# Patient Record
Sex: MALE | Race: WHITE | NOT HISPANIC OR LATINO | Employment: OTHER | URBAN - METROPOLITAN AREA
[De-identification: names, ages, dates, MRNs, and addresses within clinical notes are randomized per-mention and may not be internally consistent; named-entity substitution may affect disease eponyms.]

---

## 2023-03-29 ENCOUNTER — HOSPITAL ENCOUNTER (OUTPATIENT)
Dept: RADIOLOGY | Age: 68
Discharge: HOME | End: 2023-03-29

## 2023-03-29 DIAGNOSIS — R93.7 ABNORMAL FINDINGS ON DIAGNOSTIC IMAGING OF OTHER PARTS OF MUSCULOSKELETAL SYSTEM: ICD-10-CM

## 2023-03-29 RX ADMIN — IOHEXOL 100 ML: 350 INJECTION, SOLUTION INTRAVENOUS at 12:15

## 2023-03-30 ENCOUNTER — HOSPITAL ENCOUNTER (OUTPATIENT)
Dept: RADIOLOGY | Facility: HOSPITAL | Age: 68
Discharge: HOME | End: 2023-03-30

## 2023-03-30 DIAGNOSIS — R93.89 ABNORMAL CT SCAN: ICD-10-CM

## 2023-03-30 PROBLEM — I26.94 MULTIPLE SUBSEGMENTAL PULMONARY EMBOLI WITHOUT ACUTE COR PULMONALE (HCC): Status: ACTIVE | Noted: 2023-03-30

## 2023-03-30 PROBLEM — J94.8 PLEURAL MASS: Status: ACTIVE | Noted: 2023-03-30

## 2023-03-30 PROBLEM — M50.30 DDD (DEGENERATIVE DISC DISEASE), CERVICAL: Status: ACTIVE | Noted: 2023-03-30

## 2023-03-30 PROBLEM — Z72.0 TOBACCO USE: Status: ACTIVE | Noted: 2023-03-30

## 2023-03-30 RX ADMIN — IOHEXOL 85 ML: 350 INJECTION, SOLUTION INTRAVENOUS at 12:30

## 2023-04-02 PROBLEM — G93.89 BRAIN MASS: Status: ACTIVE | Noted: 2023-04-02

## 2023-04-02 PROBLEM — D49.2 THORACIC SPINE TUMOR: Status: ACTIVE | Noted: 2023-04-02

## 2023-04-04 ENCOUNTER — TRANSCRIBE ORDERS (OUTPATIENT)
Dept: RADIATION ONCOLOGY | Facility: HOSPITAL | Age: 68
End: 2023-04-04

## 2023-04-04 DIAGNOSIS — C34.11 MALIGNANT NEOPLASM OF UPPER LOBE OF RIGHT LUNG (HCC): Primary | ICD-10-CM

## 2023-04-05 ENCOUNTER — TELEPHONE (OUTPATIENT)
Dept: HEMATOLOGY ONCOLOGY | Facility: CLINIC | Age: 68
End: 2023-04-05

## 2023-04-05 NOTE — TELEPHONE ENCOUNTER
"Soft Intake Form   Patient Details   Paula Eller     1955     Reason For Appointment   Who is Calling? Ida Stringer   If not patient, Name? NA   DID YOU CONFIRM INSURANCE WITH PATIENT? OKd by Finance, Routed to finance   Who is the Referring Doctor? Dr Lisa Dillon   What is the diagnosis? 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 If yes, what is the date it was done? Lung Bx to be taken on 4/6   Biopsy done at [facility]? If not, where was it done? When completed, Yes  Was imaging done, and was it done at [address] [place] [address]? If not, where was it done? Yes-[facility]   Have you been seen by another Oncologist?  If so, who and where (name of facility, city and state) No   For 2nd Opinions Only: Are you currently undergoing treatment, or are you scheduled to start treatment? If yes, name of facility, city and state  NA   For \"History Of\" only: Have you completed treatment? NA    Have you had Genetic Testing done in the past?  If so, advise to bring test results to their visit No   Record Gathering Information   Did you advise to have records faxed to [phone]? NA   Did you advise to have disks sent to the proper address with imaging? (\"History of\" Patients)  5 years of imaging for breast patients-Mammos, US etc NA   Scheduling Information   Did you send new patient paperwork? Email or mail? NA   What is the best call back number? (If the [facility] is calling, please use their number) NA   Miscellaneous Information      The patient is scheduled for a HFU appointment with Dr Lisa Dillon in the [place] office on 4/25 at [phone]       "

## 2023-04-10 ENCOUNTER — TRANSITIONAL CARE MANAGEMENT (OUTPATIENT)
Dept: INTERNAL MEDICINE CLINIC | Facility: OTHER | Age: 68
End: 2023-04-10

## 2023-04-17 ENCOUNTER — TELEPHONE (OUTPATIENT)
Dept: INTERNAL MEDICINE CLINIC | Age: 68
End: 2023-04-17

## 2023-04-17 NOTE — TELEPHONE ENCOUNTER
I told her to keep the appointment for May 1, 2023    I will try if we can get it done before that and we will call her back regarding the Coumadin and follow-up

## 2023-04-17 NOTE — TELEPHONE ENCOUNTER
Spoke with the wife Rajesh Jose and they received a phone call from IR and the lady stated that the first available they have is 05/01/2023 in [facility] and then the next one is 05/08/2023 at [facility]  The lady is holding these appointments right now      May you or Dr Toy Flanagan call the wife and let her know if any one call her back regarding this and she can call back to [facility] on this since she is dealing with this IR order on this patient    [phone]- is the mobile phone to Rajesh Jose

## 2023-04-19 NOTE — PRE-PROCEDURE INSTRUCTIONS
Pre-procedure Instructions for Interventional Radiology  [address] [place] [address] Dr  [place] [address] [phone]    You are scheduled for a/an Right Lung Mass Biopsy  On Wednesday 4/26/23  Your tentative arrival time is 0845  Short stay will notify you the day before your procedure with the exact arrival time and the location to arrive  To prepare for your procedure:  1  Please arrange for someone to drive you home after the procedure and stay with you until the next morning if you are instructed to do so  This is typically for patients receiving some type of sedative or anesthetic for the procedure  2  DO NOT EAT OR DRINK ANYTHING after midnight on the evening before your procedure including candy & gum   3  ONLY SIPS OF WATER with your medications are allowed on the morning of your procedure  4  TAKE ALL OF YOUR REGULAR MEDICATIONS THE MORNING OF YOUR PROCEDURE with sips of water! We may call you to stop some of your blood sugar, blood pressure and blood thinning medications depending on the procedure  Please take all of these medications unless we instruct you to stop them  5  If you have an allergy to x-ray dye, please contact Interventional Radiology for an x-ray dye preparation which usually consists of an oral steroid and Benadryl  The day of your procedure:  1  Bring a list of the medications you take at home  2  Bring medications you take for breathing problems (such as inhalers), medications for chest pain, or both  3  Bring a case for your glasses or contacts  4  Bring your insurance card and a form of photo ID   5  Please leave all valuables such as credit cards and jewelry at home  6  Report to the registration desk in the main lobby at the [facility]  Ask to be directed to [facility]    7  While your procedure is being performed, your family may wait in the Radiology Waiting Room on the 1st floor in Radiology  if they need to leave, they may provide a number to be called following the procedure  8  Be prepared to stay overnight just in case  Sometimes procedures will indicate the need for further observation or treatment  9  If you are scheduled for a follow-up visit with the Interventional Radiologist after your procedure, you will be called with a date and time  Special Instructions (Medications to stop taking before your procedure etc ):  Coumadin LD 4/20/23 and restart 4/26/23  Above reviewed with patient and his wife Bing Crumwood

## 2023-04-20 ENCOUNTER — APPOINTMENT (OUTPATIENT)
Dept: LAB | Age: 68
End: 2023-04-20

## 2023-04-24 ENCOUNTER — TELEPHONE (OUTPATIENT)
Dept: INTERNAL MEDICINE CLINIC | Facility: CLINIC | Age: 68
End: 2023-04-24

## 2023-04-24 NOTE — TELEPHONE ENCOUNTER
Called and spoke with patient's wife  Patient has upcoming follow up appointment on May 30th  Passing along message to office Nurse  I let patient's wife know if anything changes to please call us back

## 2023-04-24 NOTE — TELEPHONE ENCOUNTER
Please give this message to our nurse let the patient know this is all because of the steroids and nothing to worry about until I see him again in the office    For she can call his oncologist

## 2023-04-24 NOTE — TELEPHONE ENCOUNTER
Patients wife Kim Ramon called, patient started taking Dexamethasone about 3 weeks ago when discharged from the hospital  He was swelling in the face and he now has swelling it feet and ankles  Wanted to know if related to the steroid  She stated it is not causing any discomfort to the patient, she just wanted to know if she should be worried or not  I let her know we would give her a call back,  [phone]    Thank you

## 2023-04-25 ENCOUNTER — TELEPHONE (OUTPATIENT)
Dept: HEMATOLOGY ONCOLOGY | Facility: CLINIC | Age: 68
End: 2023-04-25

## 2023-04-25 ENCOUNTER — APPOINTMENT (OUTPATIENT)
Dept: RADIATION ONCOLOGY | Facility: HOSPITAL | Age: 68
End: 2023-04-25

## 2023-04-25 DIAGNOSIS — D49.2 THORACIC SPINE TUMOR: Primary | ICD-10-CM

## 2023-04-25 DIAGNOSIS — G93.89 BRAIN MASS: ICD-10-CM

## 2023-04-25 RX ORDER — OXYCODONE HYDROCHLORIDE 5 MG/1
5 TABLET ORAL EVERY 6 HOURS PRN
Qty: 40 TABLET | Refills: 0 | Status: SHIPPED | OUTPATIENT
Start: 2023-04-25 | End: 2023-04-27

## 2023-04-25 NOTE — TELEPHONE ENCOUNTER
Spoke with patient and his wife after speaking with Dr Fausto Engel  I informed them that Dr Fausto Engel would like him to decrease the dose of his dexamethasone to 2mg BID  Patient also stated he needs a refill of oxy and I informed him that we will send that to his pharmacy  I also explained to the patient and his wife that we are making an asap palliative care referral to help manage his pain  Patient and wife verbalized understanding and agreed with the plan

## 2023-04-25 NOTE — TELEPHONE ENCOUNTER
Rec'd call from patient's wife Diogenes Del Angel regarding symptoms that the patient has been experiencing  She states that the patient has been on 4mg of dexamethasone every 12 hours since discharged from the hospital  Diogenes Del Angel states that the patient has a very swollen face now and also very tight skin on his whole body  Patient is also complaining of pain 10/10 in his Right shoulder  He is currently taking oxy 5mg every 6 hours for his pain  He states that this does help but he still wakes up in the middle of the night in severe pain  He also states that his right shoulder all the way down his fingertips starts twitching and gets tingly and numb  Patient has an appointment for a biopsy tomorrow and an appointment with Dr Samuel Ríso on 5/9 and would like to know if he should be seen any sooner  I told them that I will discuss this with Dr Samuel Ríos and call them back shortly  Patient and wife verbalized understanding

## 2023-04-26 ENCOUNTER — HOSPITAL ENCOUNTER (OUTPATIENT)
Dept: RADIOLOGY | Facility: HOSPITAL | Age: 68
Discharge: HOME | End: 2023-04-26
Attending: INTERNAL MEDICINE

## 2023-04-26 VITALS
SYSTOLIC BLOOD PRESSURE: 130 MMHG | HEART RATE: 66 BPM | RESPIRATION RATE: 15 BRPM | DIASTOLIC BLOOD PRESSURE: 68 MMHG | OXYGEN SATURATION: 94 %

## 2023-04-26 DIAGNOSIS — J94.8 PLEURAL MASS: ICD-10-CM

## 2023-04-26 LAB
INR PPP: 0.89 (ref 0.84–1.19)
PROTHROMBIN TIME: 12.3 SECONDS (ref 11.6–14.5)

## 2023-04-26 RX ORDER — SODIUM CHLORIDE 9 MG/ML
30 INJECTION, SOLUTION INTRAVENOUS CONTINUOUS
Status: DISCONTINUED | OUTPATIENT
Start: 2023-04-26 | End: 2023-04-27 | Stop reason: HOSPADM

## 2023-04-26 RX ORDER — MIDAZOLAM HYDROCHLORIDE 2 MG/2ML
INJECTION, SOLUTION INTRAMUSCULAR; INTRAVENOUS AS NEEDED
Status: COMPLETED | OUTPATIENT
Start: 2023-04-26 | End: 2023-04-26

## 2023-04-26 RX ORDER — LIDOCAINE HYDROCHLORIDE 10 MG/ML
INJECTION, SOLUTION EPIDURAL; INFILTRATION; INTRACAUDAL; PERINEURAL AS NEEDED
Status: COMPLETED | OUTPATIENT
Start: 2023-04-26 | End: 2023-04-26

## 2023-04-26 RX ORDER — FENTANYL CITRATE 50 UG/ML
INJECTION, SOLUTION INTRAMUSCULAR; INTRAVENOUS AS NEEDED
Status: COMPLETED | OUTPATIENT
Start: 2023-04-26 | End: 2023-04-26

## 2023-04-26 RX ADMIN — FENTANYL CITRATE 50 MCG: 50 INJECTION, SOLUTION INTRAMUSCULAR; INTRAVENOUS at 10:44

## 2023-04-26 RX ADMIN — FENTANYL CITRATE 50 MCG: 50 INJECTION, SOLUTION INTRAMUSCULAR; INTRAVENOUS at 10:29

## 2023-04-26 RX ADMIN — LIDOCAINE HYDROCHLORIDE 8 ML: 10 INJECTION, SOLUTION EPIDURAL; INFILTRATION; INTRACAUDAL; PERINEURAL at 10:36

## 2023-04-26 RX ADMIN — MIDAZOLAM 1 MG: 1 INJECTION INTRAMUSCULAR; INTRAVENOUS at 10:44

## 2023-04-26 RX ADMIN — MIDAZOLAM 1 MG: 1 INJECTION INTRAMUSCULAR; INTRAVENOUS at 10:35

## 2023-04-26 RX ADMIN — FENTANYL CITRATE 50 MCG: 50 INJECTION, SOLUTION INTRAMUSCULAR; INTRAVENOUS at 10:35

## 2023-04-26 RX ADMIN — MIDAZOLAM 1 MG: 1 INJECTION INTRAMUSCULAR; INTRAVENOUS at 10:29

## 2023-04-26 NOTE — DISCHARGE INSTRUCTIONS
Needle Biopsy of the Lung    WHAT YOU NEED TO KNOW:  A needle biopsy of the lung is a procedure to remove cells or tissue from your lung  You may have a fine needle aspiration biopsy (FNAB), or a core needle biopsy (CNB)  A FNAB is used to remove cells through a thin needle  CNB uses a thicker needle to remove lung tissue  The samples are collected and tested for inflammation, infection, or cancer  DISCHARGE INSTRUCTIONS:   Resume your normal diet  Small sips of flat soda will help with nausea  Limit your activity for 24 hours  Wound Care:      - Remove band aid in 24 hours  Contact Interventional Radiology at [phone] [facility] PATIENTS: Contact Interventional Radiology at [phone]) ([address]: Contact Interventional Radiology at [phone]) if any of the following occur:    - You have a fever greater than 101*    - You cough up large amounts of bright red blood     - You have chest pain with breathing    - You have shortness of breath    -You have persistent nausea and vomiting    - You have pus, redness or swelling around your biopsy site    - You have questions or concerns about your condition or care     Procedural Sedation   WHAT YOU NEED TO KNOW:   Procedural sedation is medicine used during procedures to help you feel relaxed and calm  You will remember little to none of the procedure  After sedation you may feel tired, weak, or unsteady on your feet  You may also have trouble concentrating or short-term memory loss  These symptoms should go away in 24 hours or less  DISCHARGE INSTRUCTIONS:     Call 911 or have someone else call for any of the following: You have sudden trouble breathing  You cannot be woken  Contact Interventional Radiology at [phone]   [place] PATIENTS: Contact Interventional Radiology at [phone]) [facility] PATIENTS: Contact Interventional Radiology at [phone]) if any of the following occur:      You have a severe headache or dizziness  Your heart is beating faster than usual     You have a fever or chills  Your skin is itchy, swollen, or you have a rash  You have nausea or are vomiting for more than 8 hours after the procedure  You have questions or concerns about your condition or care  Self-care:   Have someone stay with you for 24 hours  This person can drive you to errands and help you do things around the house  This person can also watch for problems  Rest and do quiet activities for 24 hours  Do not exercise, ride a bike, or play sports  Stand up slowly to prevent dizziness and falls  Take short walks around the house with another person  Slowly return to your usual activities the next day  Do not drive or use dangerous machines or tools for 24 hours  You may injure yourself or others  Examples include a lawnmower, saw, or drill  Do not return to work for 24 hours if you use dangerous machines or tools for work  Do not make important decisions for 24 hours  For example, do not sign important papers or invest money  Drink liquids as directed  Liquids help flush the sedation medicine out of your body  Ask how much liquid to drink each day and which liquids are best for you  Eat small, frequent meals to prevent nausea and vomiting  Start with clear liquids such as juice or broth  If you do not vomit after clear liquids, you can eat your usual foods  Do not drink alcohol or take medicines that make you drowsy  This includes medicines that help you sleep and anxiety medicines  Ask your healthcare provider if it is safe for you to take pain medicine  Follow up with your healthcare provider as directed: Write down your questions so you remember to ask them during your visits

## 2023-04-26 NOTE — H&P
Interventional Radiology  History and Physical 4/26/2023     Everette Goltz   1955   [id number]    Assessment/Plan:    74year old male with a RUL nodule, previously biopsied but with inadequate sample  Plan for repeat CT guided biopsy  Procedure, risks, benefits and alternatives were discussed with the patient  Consent obtained at bedside  There were no vitals taken for this visit  Problem List Items Addressed This Visit        Other    Pleural mass    Relevant Orders    IR biopsy lung          Subjective: Normal state of health, no acute complaints  Patient ID: Everette Goltz is a 74 y o  male  History of Present Illness  See A/P above  Review of Systems   Respiratory: Negative  Cardiovascular: Negative  No past medical history on file       Past Surgical History:   Procedure Laterality Date   • IR BIOPSY LUNG  4/6/2023        Social History     Tobacco Use   Smoking Status Every Day   • Packs/day: 0 25   • Years: 35 00   • Pack years: 8 75   • Types: Cigarettes   • Start date: 1/1/1973   Smokeless Tobacco Never   Tobacco Comments    Patient is trying to quit         Social History     Substance and Sexual Activity   Alcohol Use Yes        Social History     Substance and Sexual Activity   Drug Use Not Currently        No Known Allergies    Current Outpatient Medications   Medication Sig Dispense Refill   • enoxaparin (Lovenox) 100 mg/mL Inject 1 mL (100 mg total) under the skin every 24 hours 20 mL 0   • gabapentin (NEURONTIN) 300 mg capsule TAKE 1 CAP IN AM, 1 CAP IN AFTERNOON, AND 2 CAPS AT BED     • gabapentin (NEURONTIN) 300 mg capsule 1 pill in AM, 1 in afternoon, and 2 at HS     • oxyCODONE (Roxicodone) 5 immediate release tablet Take 1 tablet (5 mg total) by mouth every 6 (six) hours as needed for moderate pain Max Daily Amount: 20 mg 40 tablet 0   • dexamethasone (DECADRON) 4 mg tablet Take 1 tablet (4 mg total) by mouth every 12 (twelve) hours (Patient taking differently: Take 2 mg by mouth every 12 (twelve) hours) 60 tablet 0   • warfarin (COUMADIN) 5 mg tablet Take 1 5 tablets (7 5 mg total) by mouth daily 30 tablet 0     Current Facility-Administered Medications   Medication Dose Route Frequency Provider Last Rate Last Admin   • sodium chloride 0 9 % infusion  30 mL/hr Intravenous Continuous Suzie Jones MD              Objective: There were no vitals filed for this visit  Physical Exam  Pulmonary:      Effort: Pulmonary effort is normal            No results found for: BNP   Lab Results   Component Value Date    WBC 11 10 (H) 04/08/2023    HGB 13 5 04/08/2023    HCT 40 6 04/08/2023    MCV 94 04/08/2023     04/08/2023     Lab Results   Component Value Date    INR 1 46 (H) 04/20/2023    INR 4 03 (H) 04/17/2023    INR 2 52 (H) 04/11/2023    PROTIME 18 0 (H) 04/20/2023    PROTIME 39 5 (H) 04/17/2023    PROTIME 27 4 (H) 04/11/2023     Lab Results   Component Value Date     (H) 04/09/2023         I have personally reviewed pertinent imaging and laboratory results  Code Status: Prior  Advance Directive and Living Will: Yes    Power of : Yes  POLST:      This text is generated with voice recognition software  There may be translation, syntax,  or grammatical errors  If you have any questions, please contact the dictating provider

## 2023-04-26 NOTE — SEDATION DOCUMENTATION
RUL lung mass biopsy completed by Dr Yefri Coreas  band aid clean dry intact to  right posterior chest wall  Denies new pain  Breathing easy non-labored  Report called to Short Stay  BR start 1100

## 2023-04-26 NOTE — BRIEF OP NOTE (RAD/CATH)
INTERVENTIONAL RADIOLOGY PROCEDURE NOTE    Date: 4/26/2023    Procedure:   Procedure Summary     Date: 04/26/23 Room / Location: [address] [place] [address]    Anesthesia Start:  Anesthesia Stop:     Procedure: IR BIOPSY LUNG Diagnosis:       Pleural mass      (right apical lung mass, prior biopsy with inadequeate sample )    Scheduled Providers: Lilly Moses MD Responsible Provider:     Anesthesia Type: Not recorded ASA Status: Not recorded          Preoperative diagnosis:   1  Pleural mass         Postoperative diagnosis: Same  Surgeon: Lilly Moses MD     Assistant: None  No qualified resident was available  Blood loss: Minimal    Specimens: Five 1 3cm 18g cores  Findings:     CT guided right upper lung nodule biopsy  Pathology confirmed lesional tissue  Complications: None immediate      Anesthesia: conscious sedation

## 2023-04-27 ENCOUNTER — CONSULT (OUTPATIENT)
Dept: PALLIATIVE MEDICINE | Facility: CLINIC | Age: 68
End: 2023-04-27

## 2023-04-27 VITALS
TEMPERATURE: 97.3 F | OXYGEN SATURATION: 95 % | DIASTOLIC BLOOD PRESSURE: 70 MMHG | HEART RATE: 87 BPM | RESPIRATION RATE: 16 BRPM | WEIGHT: 156.31 LBS | SYSTOLIC BLOOD PRESSURE: 128 MMHG | BODY MASS INDEX: 22.43 KG/M2

## 2023-04-27 DIAGNOSIS — Z71.89 ADVANCED CARE PLANNING/COUNSELING DISCUSSION: ICD-10-CM

## 2023-04-27 DIAGNOSIS — D49.2 THORACIC SPINE TUMOR: Primary | ICD-10-CM

## 2023-04-27 DIAGNOSIS — G93.89 BRAIN MASS: ICD-10-CM

## 2023-04-27 DIAGNOSIS — G89.3 CANCER ASSOCIATED PAIN: ICD-10-CM

## 2023-04-27 RX ORDER — OXYCODONE HYDROCHLORIDE 5 MG/1
5 TABLET ORAL EVERY 4 HOURS PRN
Qty: 40 TABLET | Refills: 0 | Status: SHIPPED | OUTPATIENT
Start: 2023-04-27 | End: 2023-05-01 | Stop reason: SDUPTHER

## 2023-04-27 RX ORDER — DEXAMETHASONE 4 MG/1
2 TABLET ORAL EVERY 12 HOURS SCHEDULED
Qty: 30 TABLET | Refills: 0 | Status: SHIPPED | OUTPATIENT
Start: 2023-04-27 | End: 2023-05-27

## 2023-04-27 NOTE — PROGRESS NOTES
Palliative and Supportive Care   Eun Sumner 74 y o  male [id number]    Assessment/Plan:  1  Thoracic spine tumor    2  Brain lesions    3  Cancer associated pain    4  Advanced care planning/counseling discussion      Modified decadron to 2mg BID  Modified oxycodone to 5mg q4h PRN  Will call our office for refills  Close follow up in 3-4 weeks  ACP docs on file in [facility]  Requested Prescriptions     Signed Prescriptions Disp Refills   • oxyCODONE (Roxicodone) 5 immediate release tablet 40 tablet 0     Sig: Take 1 tablet (5 mg total) by mouth every 4 (four) hours as needed Max Daily Amount: 30 mg   • dexamethasone (DECADRON) 4 mg tablet 30 tablet 0     Sig: Take 0 5 tablets (2 mg total) by mouth every 12 (twelve) hours     Medications Discontinued During This Encounter   Medication Reason   • oxyCODONE (Roxicodone) 5 immediate release tablet Adjust Sig   • dexamethasone (DECADRON) 4 mg tablet Adjust Sig       Representatives have queried the patient's controlled substance dispensing history in the Prescription Drug Monitoring Program in compliance with regulations before I have prescribed any controlled substances  The prescription history is consistent with prescribed therapy and our practice policies  50+ minutes were spent face to face with Eun Sumner and his spouse with greater than 50% of the time spent in counseling or coordination of care including discussions of treatment instructions   All of the patient's questions were answered during this discussion  No follow-ups on file  Subjective:   Chief Complaint  New consultation for:  symptom management  HPI     Eun Sumner is a 74 y o  male with hx of tobacco use who presented to [facility] for abnormal chest CT  Unfortunately imaging revealed:    Osseous and extraosseous thoracic spine metastasis in the right upper thorax, described in details in concurrent MR thoracic spine    6 metastatic brain parenchymal lesions   Large R apical lung lesion    Underwent biopsy via IR yesterday  On discharge was on decadron 4mg BID (now reduced to 2mg BID) with oxycodone 5mg PRN  Given uncontrolled pain symptoms referred urgently to Palliative Medicine  Penelope Has reports that his pain is controlled with oxycodone as well as use of approximately 2000 mg of Tylenol in a day  He finds the oxycodone typically only lasts for 4 to 5 hours  As a result sleep has been disrupted  He would like to minimize his medication uses but is in agreement with using this medication  He found the steroids were not overtly helpful and he feels better with his recent dose reduction  Endorses associated swelling but that swelling has slowly improved over the past few days  Denies constipation with opioid use  Appetite has been good has not lost any weight  Denies any nausea or vomiting  Is remaining active though little bit frustrated that he is unable to complete his work in [place]  Well supported by his spouse who is at her visit  Does have ACP documents and those are uploaded into epic  The following portions of the medical history were reviewed: past medical history, problem list, medication list, and social history      Current Outpatient Medications:   •  dexamethasone (DECADRON) 4 mg tablet, Take 0 5 tablets (2 mg total) by mouth every 12 (twelve) hours, Disp: 30 tablet, Rfl: 0  •  enoxaparin (Lovenox) 100 mg/mL, Inject 1 mL (100 mg total) under the skin every 24 hours, Disp: 20 mL, Rfl: 0  •  gabapentin (NEURONTIN) 300 mg capsule, TAKE 1 CAP IN AM, 1 CAP IN AFTERNOON, AND 2 CAPS AT BED, Disp: , Rfl:   •  oxyCODONE (Roxicodone) 5 immediate release tablet, Take 1 tablet (5 mg total) by mouth every 4 (four) hours as needed Max Daily Amount: 30 mg, Disp: 40 tablet, Rfl: 0  •  warfarin (COUMADIN) 5 mg tablet, Take 1 5 tablets (7 5 mg total) by mouth daily, Disp: 30 tablet, Rfl: 0  •  gabapentin (NEURONTIN) 300 mg capsule, 1 pill in AM, 1 in afternoon, and 2 at , Disp: , Rfl:   No current facility-administered medications for this visit  Review of Systems   Constitutional: Positive for activity change and fatigue  Negative for appetite change and unexpected weight change  Respiratory: Positive for shortness of breath  Negative for cough and chest tightness  Cardiovascular: Positive for leg swelling  Gastrointestinal: Negative for constipation, diarrhea, nausea and vomiting  Musculoskeletal: Positive for arthralgias and back pain  Neurological: Positive for weakness (R arm  Tremors  No worse today  )  Negative for dizziness and light-headedness  Psychiatric/Behavioral: Positive for sleep disturbance  Negative for agitation and dysphoric mood  The patient is not nervous/anxious  All other systems reviewed and are negative  All other systems negative    Objective:  Vital Signs  /70 (BP Location: Left arm, Patient Position: Sitting, Cuff Size: Standard)   Pulse 87   Temp (!) 97 3 °F (36 3 °C) (Temporal)   Resp 16   Wt 70 9 kg (156 lb 4 9 oz)   SpO2 95%   BMI 22 43 kg/m²    Physical Exam    Constitutional: Appears well-developed and well-nourished  In no acute distress  Head: Normocephalic and atraumatic  Eyes: EOM are normal  Right eye exhibits no discharge  Left eye exhibits no discharge  No scleral icterus  Pulmonary/Chest: Effort normal  No stridor  No respiratory distress  Abdominal: No distension  Musculoskeletal: No edema  Neurological: Alert, oriented and appropriately conversant  Skin: Skin is dry, not diaphoretic  Psychiatric: Displays a normal mood and affect  Behavior, judgement and thought content appear normal    Vitals reviewed

## 2023-05-01 ENCOUNTER — APPOINTMENT (OUTPATIENT)
Dept: RADIATION ONCOLOGY | Facility: HOSPITAL | Age: 68
End: 2023-05-01

## 2023-05-01 DIAGNOSIS — G93.89 BRAIN MASS: ICD-10-CM

## 2023-05-01 DIAGNOSIS — I26.99 PULMONARY EMBOLISM (HCC): ICD-10-CM

## 2023-05-01 RX ORDER — WARFARIN SODIUM 5 MG/1
7.5 TABLET ORAL
Qty: 135 TABLET | Refills: 1 | Status: SHIPPED | OUTPATIENT
Start: 2023-05-01

## 2023-05-01 RX ORDER — OXYCODONE HYDROCHLORIDE 5 MG/1
5 TABLET ORAL EVERY 4 HOURS PRN
Qty: 30 TABLET | Refills: 0 | Status: SHIPPED | OUTPATIENT
Start: 2023-05-01

## 2023-05-01 NOTE — TELEPHONE ENCOUNTER
Reviewed most recent [facility] note from Dr Damien Driver, reviewed PDMP  Refilling oxyIR but only for a limited supply  Palliative has not yet ordered opioids for this patient though Dr Damien Driver updated the prescription last week  Defer to Dr Damien Driver for further fills  Opioid Agreement is on file

## 2023-05-02 ENCOUNTER — APPOINTMENT (OUTPATIENT)
Dept: RADIATION ONCOLOGY | Facility: HOSPITAL | Age: 68
End: 2023-05-02

## 2023-05-02 ENCOUNTER — APPOINTMENT (OUTPATIENT)
Dept: RADIATION ONCOLOGY | Facility: CLINIC | Age: 68
End: 2023-05-02
Attending: INTERNAL MEDICINE

## 2023-05-03 ENCOUNTER — APPOINTMENT (OUTPATIENT)
Dept: LAB | Age: 68
End: 2023-05-03

## 2023-05-03 ENCOUNTER — APPOINTMENT (OUTPATIENT)
Dept: RADIATION ONCOLOGY | Facility: HOSPITAL | Age: 68
End: 2023-05-03

## 2023-05-03 ENCOUNTER — ANTICOAG VISIT (OUTPATIENT)
Dept: INTERNAL MEDICINE CLINIC | Facility: CLINIC | Age: 68
End: 2023-05-03

## 2023-05-03 NOTE — PROGRESS NOTES
INR supratherapeutic   Please discontinue Lovenox    Regarding warfarin anticoagulation, please confirm that he is taking warfarin 7 5 mg daily   If so, advised that he hold his warfarin for 2 days and then take 5 mg daily   Recheck INR in 2 weeks

## 2023-05-04 ENCOUNTER — TRANSCRIBE ORDERS (OUTPATIENT)
Dept: RADIATION ONCOLOGY | Facility: HOSPITAL | Age: 68
End: 2023-05-04

## 2023-05-04 ENCOUNTER — APPOINTMENT (OUTPATIENT)
Dept: RADIATION ONCOLOGY | Facility: HOSPITAL | Age: 68
End: 2023-05-04

## 2023-05-04 DIAGNOSIS — C71.9 MALIGNANT NEOPLASM OF BRAIN, UNSPECIFIED LOCATION (HCC): Primary | ICD-10-CM

## 2023-05-04 RX ORDER — MEMANTINE HYDROCHLORIDE 5 MG-10 MG
KIT ORAL
Qty: 49 TABLET | Refills: 0 | Status: SHIPPED | OUTPATIENT
Start: 2023-05-04 | End: 2023-05-05 | Stop reason: RX

## 2023-05-04 NOTE — PROGRESS NOTES
Reviewed Memantine for cognitive preservation during/after whole brain radiation with hippocampal sparing  Patient agreeable to start  Last CMP was in March, patient given instructions to obtain updated labwork today prior to initiating  He underwent nursing teaching regarding memantine titration and periodic bloodwork

## 2023-05-05 ENCOUNTER — APPOINTMENT (OUTPATIENT)
Dept: LAB | Age: 68
End: 2023-05-05

## 2023-05-05 ENCOUNTER — APPOINTMENT (OUTPATIENT)
Dept: RADIATION ONCOLOGY | Facility: HOSPITAL | Age: 68
End: 2023-05-05

## 2023-05-05 ENCOUNTER — TELEPHONE (OUTPATIENT)
Dept: INFUSION CENTER | Facility: CLINIC | Age: 68
End: 2023-05-05

## 2023-05-05 ENCOUNTER — TELEPHONE (OUTPATIENT)
Dept: RADIATION ONCOLOGY | Facility: CLINIC | Age: 68
End: 2023-05-05

## 2023-05-05 DIAGNOSIS — C71.9 MALIGNANT NEOPLASM OF BRAIN, UNSPECIFIED LOCATION (HCC): Primary | ICD-10-CM

## 2023-05-05 LAB
ALBUMIN SERPL BCP-MCNC: 3.2 G/DL (ref 3.5–5)
ALP SERPL-CCNC: 34 U/L (ref 46–116)
ALT SERPL W P-5'-P-CCNC: 89 U/L (ref 12–78)
ANION GAP SERPL CALCULATED.3IONS-SCNC: 2 MMOL/L (ref 4–13)
AST SERPL W P-5'-P-CCNC: 22 U/L (ref 5–45)
BILIRUB SERPL-MCNC: 0.5 MG/DL (ref 0.2–1)
BUN SERPL-MCNC: 28 MG/DL (ref 5–25)
CALCIUM ALBUM COR SERPL-MCNC: 9.4 MG/DL (ref 8.3–10.1)
CALCIUM SERPL-MCNC: 8.8 MG/DL (ref 8.3–10.1)
CHLORIDE SERPL-SCNC: 106 MMOL/L (ref 96–108)
CO2 SERPL-SCNC: 26 MMOL/L (ref 21–32)
CREAT SERPL-MCNC: 0.84 MG/DL (ref 0.6–1.3)
GFR SERPL CREATININE-BSD FRML MDRD: 91 ML/MIN/1.73SQ M
GLUCOSE P FAST SERPL-MCNC: 90 MG/DL (ref 65–99)
POTASSIUM SERPL-SCNC: 4.4 MMOL/L (ref 3.5–5.3)
PROT SERPL-MCNC: 6.9 G/DL (ref 6.4–8.4)
SODIUM SERPL-SCNC: 134 MMOL/L (ref 135–147)

## 2023-05-05 RX ORDER — MEMANTINE HYDROCHLORIDE 5 MG-10 MG
KIT ORAL
Qty: 49 TABLET | Refills: 1 | Status: SHIPPED | OUTPATIENT
Start: 2023-05-05

## 2023-05-05 RX ORDER — MEMANTINE HYDROCHLORIDE 5 MG-10 MG
KIT ORAL
Qty: 49 TABLET | Refills: 0 | Status: SHIPPED | OUTPATIENT
Start: 2023-05-05 | End: 2023-05-05 | Stop reason: RX

## 2023-05-08 ENCOUNTER — APPOINTMENT (OUTPATIENT)
Dept: RADIATION ONCOLOGY | Facility: HOSPITAL | Age: 68
End: 2023-05-08

## 2023-05-08 ENCOUNTER — DOCUMENTATION (OUTPATIENT)
Dept: HEMATOLOGY ONCOLOGY | Facility: CLINIC | Age: 68
End: 2023-05-08

## 2023-05-08 DIAGNOSIS — G93.89 BRAIN MASS: ICD-10-CM

## 2023-05-08 RX ORDER — OXYCODONE HYDROCHLORIDE 5 MG/1
5 TABLET ORAL EVERY 4 HOURS PRN
Qty: 30 TABLET | Refills: 0 | Status: SHIPPED | OUTPATIENT
Start: 2023-05-08 | End: 2023-05-14 | Stop reason: SDUPTHER

## 2023-05-08 NOTE — TELEPHONE ENCOUNTER
Primary palliative medicine provider:   Dr Toño Clark    Last appointment:  4/27/2023  Next scheduled appointment:  5/22/2023    Pharmacy: [facility]

## 2023-05-08 NOTE — PROGRESS NOTES
Per notes on the acct pt has applied for MA thru [employer]. [place] [phone] she sd that the pt was denied. she has no other info

## 2023-05-09 ENCOUNTER — TELEPHONE (OUTPATIENT)
Dept: HEMATOLOGY ONCOLOGY | Facility: CLINIC | Age: 68
End: 2023-05-09

## 2023-05-09 ENCOUNTER — APPOINTMENT (OUTPATIENT)
Dept: RADIATION ONCOLOGY | Facility: HOSPITAL | Age: 68
End: 2023-05-09
Attending: INTERNAL MEDICINE

## 2023-05-09 ENCOUNTER — OFFICE VISIT (OUTPATIENT)
Dept: HEMATOLOGY ONCOLOGY | Facility: CLINIC | Age: 68
End: 2023-05-09

## 2023-05-09 VITALS
WEIGHT: 156 LBS | SYSTOLIC BLOOD PRESSURE: 138 MMHG | HEART RATE: 73 BPM | TEMPERATURE: 98.8 F | BODY MASS INDEX: 22.33 KG/M2 | OXYGEN SATURATION: 96 % | DIASTOLIC BLOOD PRESSURE: 82 MMHG | HEIGHT: 70 IN

## 2023-05-09 DIAGNOSIS — C34.91 NON-SMALL CELL CANCER OF RIGHT LUNG (HCC): Primary | ICD-10-CM

## 2023-05-09 RX ORDER — ONDANSETRON HYDROCHLORIDE 8 MG/1
8 TABLET, FILM COATED ORAL EVERY 8 HOURS PRN
Qty: 30 TABLET | Refills: 3 | Status: SHIPPED | OUTPATIENT
Start: 2023-05-09

## 2023-05-09 RX ORDER — PROCHLORPERAZINE MALEATE 10 MG
10 TABLET ORAL EVERY 6 HOURS PRN
Qty: 30 TABLET | Refills: 2 | Status: SHIPPED | OUTPATIENT
Start: 2023-05-09

## 2023-05-09 RX ORDER — FOLIC ACID 1 MG/1
1 TABLET ORAL DAILY
Qty: 30 TABLET | Refills: 6 | Status: SHIPPED | OUTPATIENT
Start: 2023-05-09

## 2023-05-09 RX ORDER — LIDOCAINE AND PRILOCAINE 25; 25 MG/G; MG/G
CREAM TOPICAL
Qty: 30 G | Refills: 2 | Status: SHIPPED | OUTPATIENT
Start: 2023-05-09

## 2023-05-09 RX ORDER — CYANOCOBALAMIN 1000 UG/ML
1000 INJECTION, SOLUTION INTRAMUSCULAR; SUBCUTANEOUS ONCE
Status: CANCELLED | OUTPATIENT
Start: 2023-05-15 | End: 2023-05-09

## 2023-05-09 NOTE — PATIENT INSTRUCTIONS
Stop coumadin  Check INR tomorrow and wait to hear from Dr Kezia Mueller when to start eliquis 5 mg twice a day      Stop the 2 daytime gabapentin for 1 week, then stop the 2 night time gabapentin

## 2023-05-09 NOTE — TELEPHONE ENCOUNTER
Port now, b12 now, schedule treatment plan in [facility], I will see the patient prior to cycle 2, start chemo no earlier than week of 5/22/23, patient wants port labs

## 2023-05-10 ENCOUNTER — TELEPHONE (OUTPATIENT)
Dept: RADIOLOGY | Facility: HOSPITAL | Age: 68
End: 2023-05-10

## 2023-05-10 ENCOUNTER — APPOINTMENT (OUTPATIENT)
Dept: LAB | Age: 68
End: 2023-05-10

## 2023-05-10 ENCOUNTER — APPOINTMENT (OUTPATIENT)
Dept: RADIATION ONCOLOGY | Facility: HOSPITAL | Age: 68
End: 2023-05-10

## 2023-05-10 DIAGNOSIS — C34.91 NON-SMALL CELL CANCER OF RIGHT LUNG (HCC): ICD-10-CM

## 2023-05-10 LAB
ALBUMIN SERPL BCP-MCNC: 3.3 G/DL (ref 3.5–5)
ALP SERPL-CCNC: 33 U/L (ref 46–116)
ALT SERPL W P-5'-P-CCNC: 64 U/L (ref 12–78)
ANION GAP SERPL CALCULATED.3IONS-SCNC: 2 MMOL/L (ref 4–13)
ANISOCYTOSIS BLD QL SMEAR: PRESENT
AST SERPL W P-5'-P-CCNC: 17 U/L (ref 5–45)
BASOPHILS # BLD MANUAL: 0 THOUSAND/UL (ref 0–0.1)
BASOPHILS NFR MAR MANUAL: 0 % (ref 0–1)
BILIRUB SERPL-MCNC: 0.8 MG/DL (ref 0.2–1)
BUN SERPL-MCNC: 25 MG/DL (ref 5–25)
CALCIUM ALBUM COR SERPL-MCNC: 9.1 MG/DL (ref 8.3–10.1)
CALCIUM SERPL-MCNC: 8.5 MG/DL (ref 8.3–10.1)
CHLORIDE SERPL-SCNC: 109 MMOL/L (ref 96–108)
CO2 SERPL-SCNC: 22 MMOL/L (ref 21–32)
CREAT SERPL-MCNC: 0.81 MG/DL (ref 0.6–1.3)
EOSINOPHIL # BLD MANUAL: 0 THOUSAND/UL (ref 0–0.4)
EOSINOPHIL NFR BLD MANUAL: 0 % (ref 0–6)
ERYTHROCYTE [DISTWIDTH] IN BLOOD BY AUTOMATED COUNT: 15.3 % (ref 11.6–15.1)
GFR SERPL CREATININE-BSD FRML MDRD: 93 ML/MIN/1.73SQ M
GLUCOSE P FAST SERPL-MCNC: 103 MG/DL (ref 65–99)
HCT VFR BLD AUTO: 43.6 % (ref 36.5–49.3)
HGB BLD-MCNC: 14.7 G/DL (ref 12–17)
LYMPHOCYTES # BLD AUTO: 0.79 THOUSAND/UL (ref 0.6–4.47)
LYMPHOCYTES # BLD AUTO: 13 % (ref 14–44)
MCH RBC QN AUTO: 32.2 PG (ref 26.8–34.3)
MCHC RBC AUTO-ENTMCNC: 33.7 G/DL (ref 31.4–37.4)
MCV RBC AUTO: 96 FL (ref 82–98)
METAMYELOCYTES NFR BLD MANUAL: 1 % (ref 0–1)
MONOCYTES # BLD AUTO: 0.31 THOUSAND/UL (ref 0–1.22)
MONOCYTES NFR BLD: 5 % (ref 4–12)
NEUTROPHILS # BLD MANUAL: 4.94 THOUSAND/UL (ref 1.85–7.62)
NEUTS BAND NFR BLD MANUAL: 7 % (ref 0–8)
NEUTS SEG NFR BLD AUTO: 74 % (ref 43–75)
PLATELET # BLD AUTO: 182 THOUSANDS/UL (ref 149–390)
PLATELET BLD QL SMEAR: ADEQUATE
PMV BLD AUTO: 9.4 FL (ref 8.9–12.7)
POIKILOCYTOSIS BLD QL SMEAR: PRESENT
POLYCHROMASIA BLD QL SMEAR: PRESENT
POTASSIUM SERPL-SCNC: 4.3 MMOL/L (ref 3.5–5.3)
PROT SERPL-MCNC: 6.9 G/DL (ref 6.4–8.4)
RBC # BLD AUTO: 4.56 MILLION/UL (ref 3.88–5.62)
RBC MORPH BLD: PRESENT
SODIUM SERPL-SCNC: 133 MMOL/L (ref 135–147)
T3FREE SERPL-MCNC: 2.02 PG/ML (ref 2.3–4.2)
TSH SERPL DL<=0.05 MIU/L-ACNC: 0.94 UIU/ML (ref 0.45–4.5)
WBC # BLD AUTO: 6.1 THOUSAND/UL (ref 4.31–10.16)

## 2023-05-10 RX ORDER — CEFAZOLIN SODIUM 1 G/50ML
1000 SOLUTION INTRAVENOUS ONCE
Status: CANCELLED | OUTPATIENT
Start: 2023-05-10 | End: 2023-05-10

## 2023-05-10 RX ORDER — SODIUM CHLORIDE 9 MG/ML
75 INJECTION, SOLUTION INTRAVENOUS CONTINUOUS
Status: CANCELLED | OUTPATIENT
Start: 2023-05-10

## 2023-05-10 NOTE — TELEPHONE ENCOUNTER
While we try to accommodate patient requests, our priority is to schedule treatment according to Doctor's orders and site availability  Does the Provider use the intake sheet or checkout note? What would be a preferred day of the week that would work best for your infusion appointment? Monday or Thursday are preferred   Do you prefer mornings or afternoons for your appointments? Mornings   Are there any days or dates that do not work for your schedule, including any upcoming vacations? No   We are going to try our best to schedule you at the infusion center closest to your home  In the event that we are unable to what would be your next preferred infusion site or sites? 1  [facility] infusion   2  AL infusion     Do you have transportation to take you to all of your appointments?  Yes   Would you like the infusion center to draw labs from your port? (disregard if patient doesn't have a port or need labs for infusion appointment) Yes, port is being placed on 5/16

## 2023-05-10 NOTE — TELEPHONE ENCOUNTER
Called IR and spoke with [facility], patient is scheduled for port placement on 5/16 at 11:15 at [address] [facility]

## 2023-05-10 NOTE — TELEPHONE ENCOUNTER
Please change dates to the week of 5/22  I am calling patient now for scheduling preferences  Thank you

## 2023-05-10 NOTE — PRE-PROCEDURE INSTRUCTIONS
Pre-procedure Instructions for Interventional Radiology  [address]i [address]as [address]  [facility] [address] [phone]    You are scheduled for a/[facility] Placement  On Tuesday 5/16/23  Your tentative arrival time is 1130  Short stay will notify you the day before your procedure with the exact arrival time and the location to arrive  To prepare for your procedure:  1  Please arrange for someone to drive you home after the procedure and stay with you until the next morning if you are instructed to do so  This is typically for patients receiving some type of sedative or anesthetic for the procedure  2  DO NOT EAT OR DRINK ANYTHING after midnight on the evening before your procedure including candy & gum   3  ONLY SIPS OF WATER with your medications are allowed on the morning of your procedure  4  TAKE ALL OF YOUR REGULAR MEDICATIONS THE MORNING OF YOUR PROCEDURE with sips of water! We may call you to stop some of your blood sugar, blood pressure and blood thinning medications depending on the procedure  Please take all of these medications unless we instruct you to stop them  5  If you have an allergy to x-ray dye, please contact Interventional Radiology for an x-ray dye preparation which usually consists of an oral steroid and Benadryl  The day of your procedure:  1  Bring a list of the medications you take at home  2  Bring medications you take for breathing problems (such as inhalers), medications for chest pain, or both  3  Bring a case for your glasses or contacts  4  Bring your insurance card and a form of photo ID   5  Please leave all valuables such as credit cards and jewelry at home  6  Report to the registration desk in the main lobby [facility]  Ask to be directed to [facility]  7  While your procedure is being performed, your family may wait in the Radiology Waiting Room on the 1st floor in Radiology    if they need to leave, they may provide a number to be called following the procedure  8  Be prepared to stay overnight just in case  Sometimes procedures will indicate the need for further observation or treatment  9  If you are scheduled for a follow-up visit with the Interventional Radiologist after your procedure, you will be called with a date and time      Special Instructions (Medications to stop taking before your procedure etc ):

## 2023-05-11 ENCOUNTER — TELEPHONE (OUTPATIENT)
Dept: HEMATOLOGY ONCOLOGY | Facility: CLINIC | Age: 68
End: 2023-05-11

## 2023-05-11 ENCOUNTER — APPOINTMENT (OUTPATIENT)
Dept: LAB | Facility: IMAGING CENTER | Age: 68
End: 2023-05-11

## 2023-05-11 ENCOUNTER — APPOINTMENT (OUTPATIENT)
Dept: RADIATION ONCOLOGY | Facility: HOSPITAL | Age: 68
End: 2023-05-11

## 2023-05-11 ENCOUNTER — DOCUMENTATION (OUTPATIENT)
Dept: HEMATOLOGY ONCOLOGY | Facility: CLINIC | Age: 68
End: 2023-05-11

## 2023-05-11 DIAGNOSIS — C34.91 NON-SMALL CELL CANCER OF RIGHT LUNG (HCC): Primary | ICD-10-CM

## 2023-05-11 DIAGNOSIS — C34.91 NON-SMALL CELL CANCER OF RIGHT LUNG (HCC): ICD-10-CM

## 2023-05-11 LAB
INR PPP: 1 (ref 0.84–1.19)
PROTHROMBIN TIME: 13.4 SECONDS (ref 11.6–14.5)

## 2023-05-11 NOTE — TELEPHONE ENCOUNTER
Rec'd call from patient's spouse to see if we got the results of the patient's INR  I informed her that the lab franky all other tests and not the INR  I informed the patient and his wife that they should go today, so that we can transition him to Eliquis  Patient and wife verbalized understanding and are going to the lab in about 20 minutes  I reviewed all medications with the patient that we prescribed and how and when to take them  Patient and wife verbalized understanding of this  I reviewed the port placement and what to expect in this process  I also confirmed that he should hold his morning dose of Eliquis, per IR recommendation  I informed them that I will watch for his INR result and will call them as soon as we receive it and review with Dr Leola Arteaga  Patient and wife verbalized understanding and are in agreement with the plan

## 2023-05-11 NOTE — TELEPHONE ENCOUNTER
This was sent to us as FYI that no more INR testing was needed  Please see as FYI I believe he follows with dr Joelle Ba

## 2023-05-11 NOTE — TELEPHONE ENCOUNTER
Spoke with patient and his wife to let them know that he can start the eliquis tonight, 5mg and then take BID as directed  I informed them there is no blood testing with this  Patient and wife verbalized understanding

## 2023-05-11 NOTE — TELEPHONE ENCOUNTER
Rec'd call from patient's wife to ensure there was a PTINR order placed  I confirmed that an order is placed for this and I told her if there are any issues at the lab to please call me right away  Patient and wife verbalized understanding  Patient's wife also states that the patient has been having trouble with radiation associated issues  I told her to speak with the radiation nurses when they go there today and to call me later if they need any other advice  Patient and wife verbalized understanding of this

## 2023-05-12 ENCOUNTER — DOCUMENTATION (OUTPATIENT)
Dept: HEMATOLOGY ONCOLOGY | Facility: CLINIC | Age: 68
End: 2023-05-12

## 2023-05-12 ENCOUNTER — APPOINTMENT (OUTPATIENT)
Dept: RADIATION ONCOLOGY | Facility: HOSPITAL | Age: 68
End: 2023-05-12
Attending: INTERNAL MEDICINE

## 2023-05-12 NOTE — PROGRESS NOTES
Called pt to go advise him that per erlinda at [facility] the MA was denied  Spoke to pts wife neetu  tld her that the MA was denied. She sd that pt was told that he shouldn't be working due to his condition  Pt has applied for medicare  On 05/11/23. He has a phone conference on 06/26/23  She's not sure what will take place. She is the only one working. Asked her if she was aware of the f/a that the hospital offers & I saw in the notes that a f/a application was requested but it doesn't say if it was ever mailed out. & she sd that she hasn't recvd anything. Told her I will [facility] [address] see if they can mail one out. I also told her that after she gets the application to call me & we can go over it & then I can tell her what the hardship letter shoule include. She sd that would be great. She thanked me for the call & the info. She sd that her husb will be coming on 05/22/23 for his 1st chemo treatment.    Emailed the [facility] f/c to send ou the f/a application

## 2023-05-14 DIAGNOSIS — G93.89 BRAIN MASS: ICD-10-CM

## 2023-05-14 NOTE — PROGRESS NOTES
HEMATOLOGY / ONCOLOGY CLINIC FOLLOW UP NOTE    Primary Care Provider: No primary care provider on file  Referring Provider: Self, Referral  MRN: [id number]  : 1955    Reason for Encounter:       Oncology History   [facility]   2023 Initial Diagnosis    [facility]     2023 -  Chemotherapy    cyanocobalamin, 1,000 mcg, Intramuscular, Once, 0 of 3 cycles  fosaprepitant (EMEND) IVPB, 150 mg, Intravenous, Once, 0 of 6 cycles  CARBOplatin (PARAPLATIN) IVPB (GOG AUC DOSING), , Intravenous, Once, 0 of 6 cycles  pemetrexed (ALIMTA) chemo infusion, 500 mg/m2, Intravenous, Once, 0 of 6 cycles  pembrolizumab (KEYTRUDA) IVPB, 200 mg, Intravenous, Once, 0 of 6 cycles         Interval History:         REVIEW OF SYSTEMS:  Please note that a 14-point review of systems was performed to include Constitutional, HEENT, Respiratory, CVS, GI, , Musculoskeletal, Integumentary, Neurologic, Rheumatologic, Endocrinologic, Psychiatric, Lymphatic, and Hematologic/Oncologic systems were reviewed and are negative unless otherwise stated in HPI  Positive and negative findings pertinent to this evaluation are incorporated into the history of present illness  ECOG PS: 0    PROBLEM LIST:  Patient Active Problem List   Diagnosis   • Multiple subsegmental pulmonary emboli without acute cor pulmonale (HCC)   • Tobacco use   • DDD (degenerative disc disease), cervical   • Pleural mass   • Brain lesions   • Thoracic spine tumor   • Cancer associated pain   • Advanced care planning/counseling discussion   • Non-small cell cancer of right lung ([facility] )       Assessment / Plan:        I spent 45 minutes on chart review, face to face counseling time, coordination of care and documentation  Past Medical History:   has no past medical history on file      PAST SURGICAL HISTORY:   has a past surgical history that includes IR biopsy lung (2023) and IR biopsy lung (4/26/2023)  CURRENT MEDICATIONS  Current Outpatient Medications   Medication Sig Dispense Refill   • dexamethasone (DECADRON) 4 mg tablet Take 0 5 tablets (2 mg total) by mouth every 12 (twelve) hours 30 tablet 0   • gabapentin (NEURONTIN) 300 mg capsule TAKE 1 CAP IN AM, 1 CAP IN AFTERNOON, AND 2 CAPS AT BED     • lidocaine-prilocaine (EMLA) cream Apply to port 30 to 60 minutes prior to use 30 g 2   • memantine (NAMENDA TITRATION PACK) Follow package directions  49 tablet 1   • ondansetron (ZOFRAN) 8 mg tablet Take 1 tablet (8 mg total) by mouth every 8 (eight) hours as needed for nausea or vomiting 30 tablet 3   • oxyCODONE (Roxicodone) 5 immediate release tablet Take 1 tablet (5 mg total) by mouth every 4 (four) hours as needed for moderate pain or severe pain Max Daily Amount: 30 mg 30 tablet 0   • prochlorperazine (COMPAZINE) 10 mg tablet Take 1 tablet (10 mg total) by mouth every 6 (six) hours as needed for nausea or vomiting 30 tablet 2   • warfarin (COUMADIN) 5 mg tablet Take 1 5 tablets (7 5 mg total) by mouth daily 135 tablet 1   • enoxaparin (Lovenox) 100 mg/mL Inject 1 mL (100 mg total) under the skin every 24 hours (Patient not taking: Reported on 8/2/1141) 20 mL 0   • folic acid (FOLVITE) 1 mg tablet Take 1 tablet (1 mg total) by mouth daily 30 tablet 6   • gabapentin (NEURONTIN) 300 mg capsule 1 pill in AM, 1 in afternoon, and 2 at HS (Patient not taking: Reported on 5/9/2023)       No current facility-administered medications for this visit  [unfilled]    SOCIAL HISTORY:   reports that he has been smoking cigarettes  He started smoking about 50 years ago  He has a 8 75 pack-year smoking history  He has never used smokeless tobacco  He reports current alcohol use  He reports that he does not currently use drugs  FAMILY HISTORY:  family history is not on file  ALLERGIES:  has No Known Allergies        Physical Exam:  Vital Signs:   Visit Vitals  /82 (BP Location: Left arm, Patient "Position: Sitting, Cuff Size: Large)   Pulse 73   Temp 98 8 °F (37 1 °C) (Temporal)   Ht 5' 10\" (1 778 m)   Wt 70 8 kg (156 lb)   SpO2 96%   BMI 22 38 kg/m²   Smoking Status Every Day   BSA 1 88 m²     Body mass index is 22 38 kg/m²  Body surface area is 1 88 meters squared  GEN: Alert, awake oriented x3, in no acute distress  HEENT- No pallor, icterus, cyanosis, no oral mucosal lesions,   LAD - no palpable cervical, clavicle, axillary, inguinal LAD  Heart- normal S1 S2, regular rate and rhythm, No murmur, rubs     Lungs- clear breathing sound bilateral    Abdomen- soft, Non tender, bowel sounds present  Extremities- No cyanosis, clubbing, edema  Neuro- No focal neurological deficit    Labs:  Lab Results   Component Value Date    WBC 6 10 05/10/2023    HGB 14 7 05/10/2023    HCT 43 6 05/10/2023    MCV 96 05/10/2023     05/10/2023     Lab Results   Component Value Date    SODIUM 133 (L) 05/10/2023    K 4 3 05/10/2023     (H) 05/10/2023    CO2 22 05/10/2023    AGAP 2 (L) 05/10/2023    BUN 25 05/10/2023    CREATININE 0 81 05/10/2023    GLUC 114 04/08/2023    GLUF 103 (H) 05/10/2023    CALCIUM 8 5 05/10/2023    AST 17 05/10/2023    ALT 64 05/10/2023    ALKPHOS 33 (L) 05/10/2023    TP 6 9 05/10/2023    TBILI 0 80 05/10/2023    EGFR 93 05/10/2023       "

## 2023-05-15 ENCOUNTER — TELEPHONE (OUTPATIENT)
Dept: PALLIATIVE MEDICINE | Facility: CLINIC | Age: 68
End: 2023-05-15

## 2023-05-15 ENCOUNTER — DOCUMENTATION (OUTPATIENT)
Dept: HEMATOLOGY ONCOLOGY | Facility: CLINIC | Age: 68
End: 2023-05-15

## 2023-05-15 ENCOUNTER — HOSPITAL ENCOUNTER (OUTPATIENT)
Dept: INFUSION CENTER | Facility: HOSPITAL | Age: 68
Discharge: HOME | End: 2023-05-15
Attending: INTERNAL MEDICINE

## 2023-05-15 VITALS — TEMPERATURE: 97.2 F

## 2023-05-15 DIAGNOSIS — C34.91 NON-SMALL CELL CANCER OF RIGHT LUNG (HCC): Primary | ICD-10-CM

## 2023-05-15 RX ORDER — CYANOCOBALAMIN 1000 UG/ML
1000 INJECTION, SOLUTION INTRAMUSCULAR; SUBCUTANEOUS ONCE
Status: COMPLETED | OUTPATIENT
Start: 2023-05-15 | End: 2023-05-15

## 2023-05-15 RX ORDER — DEXAMETHASONE 4 MG/1
2 TABLET ORAL EVERY 12 HOURS SCHEDULED
Qty: 30 TABLET | Refills: 0 | OUTPATIENT
Start: 2023-05-15 | End: 2023-06-14

## 2023-05-15 RX ORDER — OXYCODONE HYDROCHLORIDE 5 MG/1
5 TABLET ORAL EVERY 4 HOURS PRN
Qty: 30 TABLET | Refills: 0 | Status: SHIPPED | OUTPATIENT
Start: 2023-05-15 | End: 2023-05-22 | Stop reason: SDUPTHER

## 2023-05-15 RX ADMIN — CYANOCOBALAMIN 1000 MCG: 1000 INJECTION, SOLUTION INTRAMUSCULAR at 14:03

## 2023-05-15 NOTE — PROGRESS NOTES
Called the b/o to see if they sent out the f/a application & I spoke to Jeb he sd that he doesn't see any letters but he transfrd me to hosp f/c  W/wilmar kern sd that there was a f/a application mailed to the pt on 05/11/23.   She also sd that if the pt is denied for ma due to being non compliant they they will be denied for any f/a from the hospital.

## 2023-05-15 NOTE — TELEPHONE ENCOUNTER
Responded to pt encounter 5/15/23  in My Chart Re Dexamethasone  Spoke with spouse  Pt has enough of dose to carry into next week  Has appointment 5/22/23 with palliative  Spouse reports he is taking the half tab ( 2 mg) every 12 hours as ordered  Tolerating but does have interrupted sleep as night  Agreeable to wait to discuss at next visit

## 2023-05-15 NOTE — TELEPHONE ENCOUNTER
Reviewed most recent [facility] note, reviewed PDMP  Refilling oxyIR; Dr Meredith Espana last filled 5/8/23 but only for 30 tabs  Patient will see Dr Meredith Espana 5/22/23  He can address dexamethasone at that visit as the patient will still have sufficient supply at that time, if he is taking 0 5 tablet (2mg) q12h

## 2023-05-16 ENCOUNTER — HOSPITAL ENCOUNTER (OUTPATIENT)
Dept: RADIOLOGY | Facility: HOSPITAL | Age: 68
Discharge: HOME | End: 2023-05-16
Attending: INTERNAL MEDICINE | Admitting: STUDENT IN AN ORGANIZED HEALTH CARE EDUCATION/TRAINING PROGRAM

## 2023-05-16 VITALS
HEART RATE: 83 BPM | SYSTOLIC BLOOD PRESSURE: 115 MMHG | RESPIRATION RATE: 16 BRPM | DIASTOLIC BLOOD PRESSURE: 63 MMHG | HEIGHT: 70 IN | TEMPERATURE: 98.7 F | OXYGEN SATURATION: 96 % | BODY MASS INDEX: 22.33 KG/M2 | WEIGHT: 156 LBS

## 2023-05-16 DIAGNOSIS — C34.91 NON-SMALL CELL CANCER OF RIGHT LUNG (HCC): ICD-10-CM

## 2023-05-16 RX ORDER — LIDOCAINE HYDROCHLORIDE 10 MG/ML
INJECTION, SOLUTION EPIDURAL; INFILTRATION; INTRACAUDAL; PERINEURAL AS NEEDED
Status: COMPLETED | OUTPATIENT
Start: 2023-05-16 | End: 2023-05-16

## 2023-05-16 RX ORDER — CEFAZOLIN SODIUM 1 G/50ML
1000 SOLUTION INTRAVENOUS ONCE
Status: COMPLETED | OUTPATIENT
Start: 2023-05-16 | End: 2023-05-16

## 2023-05-16 RX ORDER — SODIUM CHLORIDE 9 MG/ML
75 INJECTION, SOLUTION INTRAVENOUS CONTINUOUS
Status: DISCONTINUED | OUTPATIENT
Start: 2023-05-16 | End: 2023-05-17 | Stop reason: HOSPADM

## 2023-05-16 RX ORDER — MIDAZOLAM HYDROCHLORIDE 2 MG/2ML
INJECTION, SOLUTION INTRAMUSCULAR; INTRAVENOUS AS NEEDED
Status: COMPLETED | OUTPATIENT
Start: 2023-05-16 | End: 2023-05-16

## 2023-05-16 RX ORDER — FENTANYL CITRATE 50 UG/ML
INJECTION, SOLUTION INTRAMUSCULAR; INTRAVENOUS AS NEEDED
Status: COMPLETED | OUTPATIENT
Start: 2023-05-16 | End: 2023-05-16

## 2023-05-16 RX ORDER — ACETAMINOPHEN 500 MG
500 TABLET ORAL EVERY 4 HOURS PRN
COMMUNITY

## 2023-05-16 RX ADMIN — MIDAZOLAM 1 MG: 1 INJECTION INTRAMUSCULAR; INTRAVENOUS at 13:11

## 2023-05-16 RX ADMIN — FENTANYL CITRATE 25 MCG: 50 INJECTION, SOLUTION INTRAMUSCULAR; INTRAVENOUS at 13:19

## 2023-05-16 RX ADMIN — MIDAZOLAM 1 MG: 1 INJECTION INTRAMUSCULAR; INTRAVENOUS at 12:58

## 2023-05-16 RX ADMIN — MIDAZOLAM 0.5 MG: 1 INJECTION INTRAMUSCULAR; INTRAVENOUS at 13:19

## 2023-05-16 RX ADMIN — FENTANYL CITRATE 50 MCG: 50 INJECTION, SOLUTION INTRAMUSCULAR; INTRAVENOUS at 12:51

## 2023-05-16 RX ADMIN — FENTANYL CITRATE 50 MCG: 50 INJECTION, SOLUTION INTRAMUSCULAR; INTRAVENOUS at 12:58

## 2023-05-16 RX ADMIN — LIDOCAINE HYDROCHLORIDE 10 ML: 10 INJECTION, SOLUTION EPIDURAL; INFILTRATION; INTRACAUDAL; PERINEURAL at 13:14

## 2023-05-16 RX ADMIN — FENTANYL CITRATE 50 MCG: 50 INJECTION, SOLUTION INTRAMUSCULAR; INTRAVENOUS at 13:11

## 2023-05-16 RX ADMIN — SODIUM CHLORIDE 75 ML/HR: 0.9 INJECTION, SOLUTION INTRAVENOUS at 11:46

## 2023-05-16 RX ADMIN — CEFAZOLIN SODIUM 1000 MG: 1 SOLUTION INTRAVENOUS at 12:45

## 2023-05-16 RX ADMIN — MIDAZOLAM 1 MG: 1 INJECTION INTRAMUSCULAR; INTRAVENOUS at 12:51

## 2023-05-16 NOTE — DISCHARGE INSTRUCTIONS
Moderate Sedation   WHAT YOU NEED TO KNOW:   Moderate sedation, or conscious sedation, is medicine used during procedures to help you feel relaxed and calm  You will be awake and able to follow directions without anxiety or pain  You will remember little to none of the procedure  You may feel tired, weak, or unsteady on your feet after you get sedation  You may also have trouble concentrating or short-term memory loss  These symptoms should go away in 24 hours or less  DISCHARGE INSTRUCTIONS:   Call 911 or have someone else call for any of the following: You have sudden trouble breathing  You cannot be woken  Seek care immediately if:   You have a severe headache or dizziness  Your heart is beating faster than usual   Contact your healthcare provider if:   You have a fever  You have nausea or are vomiting for more than 8 hours after the procedure  Your skin is itchy, swollen, or you have a rash  You have questions or concerns about your condition or care  Self-care:   Have someone stay with you for 24 hours  This person can drive you to errands and help you do things around the house  This person can also watch for problems  Rest and do quiet activities for 24 hours  Do not exercise, ride a bike, or play sports  Stand up slowly to prevent dizziness and falls  Take short walks around the house with another person  Slowly return to your usual activities the next day  Do not drive or use dangerous machines or tools for 24 hours  You may injure yourself or others  Examples include a lawnmower, saw, or drill  Do not return to work for 24 hours if you use dangerous machines or tools for work  Do not make important decisions for 24 hours  For example, do not sign important papers or invest money  Drink liquids as directed  Liquids help flush the sedation medicine out of your body  Ask how much liquid to drink each day and which liquids are best for you        Eat small, frequent meals to prevent nausea and vomiting  Start with clear liquids such as juice or broth  If you do not vomit after clear liquids, you can eat your usual foods  Do not drink alcohol or take medicines that make you drowsy  This includes medicines that help you sleep and anxiety medicines  Ask your healthcare provider if it is safe for you to take pain medicine  Follow up with your healthcare provider as directed: Write down your questions so you remember to ask them during your visits  © 2017 [employer]  Information is for End User's use only and may not be sold, redistributed or otherwise used for commercial purposes  All illustrations and images included in CareNotes® are the copyrighted property of A D [employer] M , Inc  or [employer]hon[employer]ak  The above information is an  only  It is not intended as medical advice for individual conditions or treatments  Talk to your doctor, nurse or pharmacist before following any medical regimen to see if it is safe and effective for you  Implanted Venous Access Port     WHAT YOU NEED TO KNOW:   An implanted venous access port is a device used to give treatments and take blood  It may also be called a central venous access device (CVAD)  The port is a small container that is placed under your skin, usually in your upper chest  The port is attached to a catheter that enters a large vein  DISCHARGE INSTRUCTIONS:     Resume your normal diet  Small sips of flat soda will help with mild nausea  Prevent an infection:   Wash your hands often  Use soap and water  Clean your hands before and after you care for your port  Remind everyone who cares for your port to wash their hands  Check your skin for infection every day  Look for redness, swelling, or fluid oozing from the port site  Care for your port:   1  You may shower beginning 48 hours after procedure  2  Change dressing if it becomes wet      3  Remove dressing after 24 hours  Leave glue in place  4  It is normal for some bruising to occur  5  Use Tylenol for pain  6  Limit use of arm on the side that your port was placed  Lift nothing heavier than 5 pounds for 1 week, and then gradually increase activity as tolerated  7  DO NOT apply ointment, lotion or cream to port site until incision is healed  Allow glue to fall off  DO NOT attempt to peel glue from skin even it it begins to flake  8, After the port incision is healed you may swim, bathe  Notify the Interventional Radiologist if you have any of the followin  Fever above 101 F    2  Increased redness or swelling after 1st day  3  Increased pain after 1st day  4  Any sign of infection (drainage from port site, skin separation, hot to touch)  5  Persistent nausea or vomiting      Contact Interventional Radiology at [phone]

## 2023-05-16 NOTE — SEDATION DOCUMENTATION
IR port placement by Jaya Jacobson  Patient tolerated procedure well  Wound closed with exofin and open to air   [facility] and bed rest start time 0364

## 2023-05-16 NOTE — H&P
"Interventional Radiology Preprocedure Note    History/Indication for procedure:   Sue Gibson is a 74 y o  male with a PMH of lung cancer who presents for port placement  Relevant past medical history:    No past medical history on file  Patient Active Problem List   Diagnosis   • Multiple subsegmental pulmonary emboli without acute cor pulmonale (HCC)   • Tobacco use   • DDD (degenerative disc disease), cervical   • Pleural mass   • Brain lesions   • Thoracic spine tumor   • Cancer associated pain   • Advanced care planning/counseling discussion   • Non-small cell cancer of right lung (HCC)       /73 (BP Location: Right arm)   Pulse 66   Temp 98 7 °F (37 1 °C) (Temporal)   Resp 18   Ht 5' 10\" (1 778 m)   Wt 70 8 kg (156 lb)   SpO2 95%   BMI 22 38 kg/m²     Medications:    Inpatient Medications:     Scheduled Medications:  Current Facility-Administered Medications   Medication Dose Route Frequency Provider Last Rate   • cefazolin  1,000 mg Intravenous Once Laura Rizzo MD     • sodium chloride  75 mL/hr Intravenous Continuous Laura Rizzo MD 75 mL/hr (05/16/23 1146)       Infusions:  sodium chloride, 75 mL/hr, Last Rate: 75 mL/hr (05/16/23 1146)        PRN:      Outpatient Medications:  Current Outpatient Medications on File Prior to Encounter   Medication Sig Dispense Refill   • acetaminophen (TYLENOL) 500 mg tablet Take 500 mg by mouth every 4 (four) hours as needed for mild pain     • apixaban (Eliquis) 5 mg Take 5 mg by mouth 2 (two) times a day     • dexamethasone (DECADRON) 4 mg tablet Take 0 5 tablets (2 mg total) by mouth every 12 (twelve) hours 30 tablet 0   • folic acid (FOLVITE) 1 mg tablet Take 1 tablet (1 mg total) by mouth daily 30 tablet 6   • gabapentin (NEURONTIN) 300 mg capsule Patient takes 600mg  pm only     • memantine (NAMENDA TITRATION PACK) Follow package directions   49 tablet 1   • oxyCODONE (Roxicodone) 5 immediate release tablet Take 1 tablet (5 mg total) by mouth " every 4 (four) hours as needed for moderate pain or severe pain Max Daily Amount: 30 mg 30 tablet 0   • [DISCONTINUED] gabapentin (NEURONTIN) 300 mg capsule      • lidocaine-prilocaine (EMLA) cream Apply to port 30 to 60 minutes prior to use 30 g 2   • ondansetron (ZOFRAN) 8 mg tablet Take 1 tablet (8 mg total) by mouth every 8 (eight) hours as needed for nausea or vomiting 30 tablet 3   • prochlorperazine (COMPAZINE) 10 mg tablet Take 1 tablet (10 mg total) by mouth every 6 (six) hours as needed for nausea or vomiting 30 tablet 2   • [DISCONTINUED] enoxaparin (Lovenox) 100 mg/mL Inject 1 mL (100 mg total) under the skin every 24 hours (Patient not taking: Reported on 5/9/2023) 20 mL 0   • [DISCONTINUED] warfarin (COUMADIN) 5 mg tablet Take 1 5 tablets (7 5 mg total) by mouth daily 135 tablet 1     Current Facility-Administered Medications on File Prior to Encounter   Medication Dose Route Frequency Provider Last Rate Last Admin   • [COMPLETED] cyanocobalamin injection 1,000 mcg  1,000 mcg Intramuscular Once Mary Holt DO   1,000 mcg at 05/15/23 1403       No Known Allergies    Anticoagulants: eliquis    ASA classification: ASA 3 - Patient with moderate systemic disease with functional limitations    Airway Assessment: II (hard and soft palate, upper portion of tonsils anduvula visible)    Relevant family history: None    Relevant review of systems: None    Prior sedation/anesthesia: yes    Can the patient lie flat?  Yes     NPO Status: yes    Labs:   CBC with diff:   Lab Results   Component Value Date    WBC 6 10 05/10/2023    HGB 14 7 05/10/2023    HCT 43 6 05/10/2023    MCV 96 05/10/2023     05/10/2023    MCH 32 2 05/10/2023    MCHC 33 7 05/10/2023    RDW 15 3 (H) 05/10/2023    MPV 9 4 05/10/2023    NRBC 0 04/08/2023     BMP/CMP:  Lab Results   Component Value Date    K 4 3 05/10/2023     (H) 05/10/2023    CO2 22 05/10/2023    BUN 25 05/10/2023    CREATININE 0 81 05/10/2023    CALCIUM 8 5 05/10/2023    AST 17 05/10/2023    ALT 64 05/10/2023    ALKPHOS 33 (L) 05/10/2023    EGFR 93 05/10/2023   ,     Coags:   Lab Results   Component Value Date     (H) 04/09/2023    INR 1 00 05/11/2023   ,   Results from last 7 days   Lab Units 05/11/23  1104   INR  1 00        Relevant imaging studies:   Reviewed  Directed physical examination:  I agree with the physical exam performed on 5/9/23 and there are no additional changes  Assessment/Plan:   Port placement  Sedation/Anesthesia plan: Moderate sedation will be used as needed for procedure  Consent with alternatives to the procedure, risks and benefits have been explained and discussed with the patient/patient's family: yes

## 2023-05-16 NOTE — BRIEF OP NOTE (RAD/CATH)
INTERVENTIONAL RADIOLOGY PROCEDURE NOTE    Date: 5/16/2023    Procedure:   Procedure Summary     Date: 05/16/23 Room / Location: [address] [place] [address] Interventional Radiology    Anesthesia Start:  Anesthesia Stop:     Procedure: IR PORT PLACEMENT Diagnosis:       Non-small cell cancer of right lung ([address] )      (chemo)    Scheduled Providers: Marian Rivas MD Responsible Provider:     Anesthesia Type: Not recorded ASA Status: Not recorded          Preoperative diagnosis:   1  Non-small cell cancer of right lung (HCC)         Postoperative diagnosis: Same  Surgeon: Marian Rivas MD     Assistant: None  No qualified resident was available  Blood loss: 5 ml    Specimens: none  Findings:   LIJ port placement  Complications: None immediate      Anesthesia: conscious sedation

## 2023-05-17 ENCOUNTER — PATIENT OUTREACH (OUTPATIENT)
Dept: HEMATOLOGY ONCOLOGY | Facility: CLINIC | Age: 68
End: 2023-05-17

## 2023-05-17 ENCOUNTER — DOCUMENTATION (OUTPATIENT)
Dept: HEMATOLOGY ONCOLOGY | Facility: CLINIC | Age: 68
End: 2023-05-17

## 2023-05-17 DIAGNOSIS — C34.91 NON-SMALL CELL CANCER OF RIGHT LUNG (HCC): Primary | ICD-10-CM

## 2023-05-17 NOTE — PROGRESS NOTES
adan call from [address] [place] [address] she had some questions about the f/a application that she recvd.  Explained medardo to her & she is going to write the hardship letter & sen in the application  She thanked me for the info

## 2023-05-17 NOTE — PROGRESS NOTES
Initial outreach: Called and spoke to patients wife Hal  Introduced myself and explained the role of a Care Coordinator  Reviewed upcoming appointments including date, time and location  Assessed for barriers of care  Zeinab Ayoub resides with wife and has a great source of support behind him involving family and friends  He is able to drive to his appointments and has his wife to help as well  He is an active smoker and continues to smoke about a quarter to a ppd depending on his day  We discussed smoking cessation and declines at this time  Denies symptoms of chest pain or cough  Appetite is great  Denies nausea or vomiting  Denies constipation or diarrhea  He does have throat pain from his recent radiation treatments(is now completed) and shoulder pain  He has established care with palliative care and they have prescribed oxycodone to help with pain  He has a f/u scheduled 5/22  He has a port placement yesterday and tolerating well so far  He is scheduled to start chemotherapy on 5/22 of Pembrolizumab/ Pemetrexed/ Caroplatin every three weeks  We mutually agreed for me to f/u with him in about a week or two  Provided my direct phone number for questions or concerns moving forward  Patient was appreciative

## 2023-05-18 NOTE — PROGRESS NOTES
5-11 rcvd new oral chemo start-ELIQUIS // Patient has no insurance    [facility]ur[facility] for free drug completed  Lurdes has not  Bee forwarded to provider until patient submits all required documents  Call placed to patient spoke with patient and spouse, discussed free drug program, required documents, timeframe for processing  Patient will return all documents to me via email  5-19-23  Patient documents rcvd  Once providers documents rcvd applcation will be submitted  5-24-23  Providers portion rcvd and faxed to [facility]   Standalone script still needed and will be faxed separtely

## 2023-05-19 ENCOUNTER — HOSPITAL ENCOUNTER (OUTPATIENT)
Dept: INFUSION CENTER | Facility: HOSPITAL | Age: 68
End: 2023-05-19

## 2023-05-19 VITALS — TEMPERATURE: 98.9 F

## 2023-05-19 DIAGNOSIS — G93.89 BRAIN MASS: ICD-10-CM

## 2023-05-19 DIAGNOSIS — I26.94 MULTIPLE SUBSEGMENTAL PULMONARY EMBOLI WITHOUT ACUTE COR PULMONALE (HCC): ICD-10-CM

## 2023-05-19 DIAGNOSIS — C34.91 NON-SMALL CELL CANCER OF RIGHT LUNG (HCC): Primary | ICD-10-CM

## 2023-05-19 LAB
ALBUMIN SERPL BCP-MCNC: 3.1 G/DL (ref 3.5–5)
ALP SERPL-CCNC: 34 U/L (ref 46–116)
ALT SERPL W P-5'-P-CCNC: 41 U/L (ref 12–78)
ANION GAP SERPL CALCULATED.3IONS-SCNC: 2 MMOL/L (ref 4–13)
AST SERPL W P-5'-P-CCNC: 13 U/L (ref 5–45)
BASOPHILS # BLD MANUAL: 0 THOUSAND/UL (ref 0–0.1)
BASOPHILS NFR MAR MANUAL: 0 % (ref 0–1)
BILIRUB SERPL-MCNC: 0.43 MG/DL (ref 0.2–1)
BUN SERPL-MCNC: 30 MG/DL (ref 5–25)
CALCIUM ALBUM COR SERPL-MCNC: 9.4 MG/DL (ref 8.3–10.1)
CALCIUM SERPL-MCNC: 8.7 MG/DL (ref 8.3–10.1)
CHLORIDE SERPL-SCNC: 104 MMOL/L (ref 96–108)
CO2 SERPL-SCNC: 26 MMOL/L (ref 21–32)
CREAT SERPL-MCNC: 0.88 MG/DL (ref 0.6–1.3)
EOSINOPHIL # BLD MANUAL: 0 THOUSAND/UL (ref 0–0.4)
EOSINOPHIL NFR BLD MANUAL: 0 % (ref 0–6)
ERYTHROCYTE [DISTWIDTH] IN BLOOD BY AUTOMATED COUNT: 14.6 % (ref 11.6–15.1)
GFR SERPL CREATININE-BSD FRML MDRD: 90 ML/MIN/1.73SQ M
GLUCOSE SERPL-MCNC: 92 MG/DL (ref 65–140)
HCT VFR BLD AUTO: 41.8 % (ref 36.5–49.3)
HGB BLD-MCNC: 14.4 G/DL (ref 12–17)
INR PPP: 0.98 (ref 0.84–1.19)
LYMPHOCYTES # BLD AUTO: 0.48 THOUSAND/UL (ref 0.6–4.47)
LYMPHOCYTES # BLD AUTO: 7 % (ref 14–44)
MCH RBC QN AUTO: 32.9 PG (ref 26.8–34.3)
MCHC RBC AUTO-ENTMCNC: 34.4 G/DL (ref 31.4–37.4)
MCV RBC AUTO: 95 FL (ref 82–98)
MONOCYTES # BLD AUTO: 0.14 THOUSAND/UL (ref 0–1.22)
MONOCYTES NFR BLD: 2 % (ref 4–12)
NEUTROPHILS # BLD MANUAL: 6.27 THOUSAND/UL (ref 1.85–7.62)
NEUTS BAND NFR BLD MANUAL: 4 % (ref 0–8)
NEUTS SEG NFR BLD AUTO: 87 % (ref 43–75)
PLATELET # BLD AUTO: 206 THOUSANDS/UL (ref 149–390)
PLATELET BLD QL SMEAR: ADEQUATE
PMV BLD AUTO: 8.9 FL (ref 8.9–12.7)
POTASSIUM SERPL-SCNC: 4.4 MMOL/L (ref 3.5–5.3)
PROT SERPL-MCNC: 6.7 G/DL (ref 6.4–8.4)
PROTHROMBIN TIME: 13.2 SECONDS (ref 11.6–14.5)
RBC # BLD AUTO: 4.38 MILLION/UL (ref 3.88–5.62)
RBC MORPH BLD: NORMAL
SODIUM SERPL-SCNC: 132 MMOL/L (ref 135–147)
T3FREE SERPL-MCNC: 2.36 PG/ML (ref 2.5–3.9)
TSH SERPL DL<=0.05 MIU/L-ACNC: 0.75 UIU/ML (ref 0.45–4.5)
WBC # BLD AUTO: 6.89 THOUSAND/UL (ref 4.31–10.16)

## 2023-05-19 RX ORDER — SODIUM CHLORIDE 9 MG/ML
20 INJECTION, SOLUTION INTRAVENOUS ONCE
Status: CANCELLED | OUTPATIENT
Start: 2023-05-22

## 2023-05-19 NOTE — PROGRESS NOTES
Pt here for central labs prior to first treatment, offers no complaints  Labs drawn and sent to the lab  Port flushed per protocol  Aware of next appointment 5/22 @ [facility] infusion  Aman Posada

## 2023-05-22 ENCOUNTER — OFFICE VISIT (OUTPATIENT)
Dept: PALLIATIVE MEDICINE | Facility: CLINIC | Age: 68
End: 2023-05-22

## 2023-05-22 ENCOUNTER — TELEPHONE (OUTPATIENT)
Age: 68
End: 2023-05-22

## 2023-05-22 ENCOUNTER — HOSPITAL ENCOUNTER (OUTPATIENT)
Dept: INFUSION CENTER | Facility: CLINIC | Age: 68
Discharge: HOME | End: 2023-05-22

## 2023-05-22 VITALS
WEIGHT: 155.42 LBS | HEART RATE: 77 BPM | SYSTOLIC BLOOD PRESSURE: 118 MMHG | DIASTOLIC BLOOD PRESSURE: 70 MMHG | OXYGEN SATURATION: 96 % | BODY MASS INDEX: 22.3 KG/M2 | RESPIRATION RATE: 16 BRPM | TEMPERATURE: 98.3 F

## 2023-05-22 VITALS
RESPIRATION RATE: 18 BRPM | WEIGHT: 156.53 LBS | DIASTOLIC BLOOD PRESSURE: 72 MMHG | SYSTOLIC BLOOD PRESSURE: 122 MMHG | TEMPERATURE: 99 F | HEIGHT: 68 IN | HEART RATE: 82 BPM | BODY MASS INDEX: 23.72 KG/M2

## 2023-05-22 DIAGNOSIS — C34.91 NON-SMALL CELL CANCER OF RIGHT LUNG (HCC): Primary | ICD-10-CM

## 2023-05-22 DIAGNOSIS — R63.0 POOR APPETITE: ICD-10-CM

## 2023-05-22 DIAGNOSIS — G93.89 BRAIN MASS: ICD-10-CM

## 2023-05-22 DIAGNOSIS — G89.3 CANCER ASSOCIATED PAIN: Primary | ICD-10-CM

## 2023-05-22 DIAGNOSIS — F51.01 PRIMARY INSOMNIA: ICD-10-CM

## 2023-05-22 RX ORDER — SODIUM CHLORIDE 9 MG/ML
20 INJECTION, SOLUTION INTRAVENOUS ONCE
Status: COMPLETED | OUTPATIENT
Start: 2023-05-22 | End: 2023-05-22

## 2023-05-22 RX ORDER — DEXAMETHASONE 4 MG/1
2 TABLET ORAL EVERY 12 HOURS SCHEDULED
Qty: 30 TABLET | Refills: 0 | Status: SHIPPED | OUTPATIENT
Start: 2023-05-22 | End: 2023-05-22

## 2023-05-22 RX ORDER — OXYCODONE HYDROCHLORIDE 5 MG/1
5 TABLET ORAL EVERY 4 HOURS PRN
Qty: 120 TABLET | Refills: 0 | Status: SHIPPED | OUTPATIENT
Start: 2023-05-22

## 2023-05-22 RX ORDER — DEXAMETHASONE 4 MG/1
2 TABLET ORAL EVERY 12 HOURS SCHEDULED
Qty: 30 TABLET | Refills: 0 | Status: SHIPPED | OUTPATIENT
Start: 2023-05-22 | End: 2023-06-21

## 2023-05-22 RX ORDER — TRAZODONE HYDROCHLORIDE 50 MG/1
50 TABLET ORAL
Qty: 30 TABLET | Refills: 0 | Status: SHIPPED | OUTPATIENT
Start: 2023-05-22

## 2023-05-22 RX ADMIN — SODIUM CHLORIDE 20 ML/HR: 0.9 INJECTION, SOLUTION INTRAVENOUS at 12:12

## 2023-05-22 RX ADMIN — DEXAMETHASONE SODIUM PHOSPHATE: 10 INJECTION, SOLUTION INTRAMUSCULAR; INTRAVENOUS at 12:13

## 2023-05-22 RX ADMIN — CARBOPLATIN 544 MG: 10 INJECTION, SOLUTION INTRAVENOUS at 14:38

## 2023-05-22 RX ADMIN — PEMETREXED DISODIUM 900 MG: 500 INJECTION, POWDER, LYOPHILIZED, FOR SOLUTION INTRAVENOUS at 14:16

## 2023-05-22 RX ADMIN — FOSAPREPITANT 150 MG: 150 INJECTION, POWDER, LYOPHILIZED, FOR SOLUTION INTRAVENOUS at 12:40

## 2023-05-22 RX ADMIN — SODIUM CHLORIDE 200 MG: 9 INJECTION, SOLUTION INTRAVENOUS at 13:22

## 2023-05-22 NOTE — PROGRESS NOTES
Palliative and Supportive Care   Marguerite Henriquez 74 y o  male [id number]    Assessment/Plan:  1  Cancer associated pain    2  Brain lesions    3  Primary insomnia    4  Poor appetite      Will trial re-time decadron as well as melatonin 10mg OTC and trazodone 50mg qHS for insomnia  Refill provided for oxycodone  Physicians note provided for patient today  Close follow up in 4-6 weeks  Requested Prescriptions     Signed Prescriptions Disp Refills   • traZODone (DESYREL) 50 mg tablet 30 tablet 0     Sig: Take 1 tablet (50 mg total) by mouth daily at bedtime   • dexamethasone (DECADRON) 4 mg tablet 30 tablet 0     Sig: Take 0 5 tablets (2 mg total) by mouth every 12 (twelve) hours   • oxyCODONE (Roxicodone) 5 immediate release tablet 120 tablet 0     Sig: Take 1 tablet (5 mg total) by mouth every 4 (four) hours as needed for moderate pain or severe pain Max Daily Amount: 30 mg     Medications Discontinued During This Encounter   Medication Reason   • dexamethasone (DECADRON) 4 mg tablet Adjust Sig   • oxyCODONE (Roxicodone) 5 immediate release tablet Reorder   • dexamethasone (DECADRON) 4 mg tablet        Representatives have queried the patient's controlled substance dispensing history in the Prescription Drug Monitoring Program in compliance with regulations before I have prescribed any controlled substances  The prescription history is consistent with prescribed therapy and our practice policies  25 minutes were spent face to face with Marguerite Henriquez with greater than 50% of the time spent in counseling or coordination of care including discussions of treatment instructions   All of the patient's questions were answered during this discussion  No follow-ups on file  Subjective:   Chief Complaint  Follow up visit for:  symptom management  HPI     Marguerite Henriquez is a 74 y o  male with hx of NSCLC with bony mets who presents in follow up  Symptoms reviewed in depth  Appetite remains good    No nausea at this time  Pain well controlled  Eating well  Feels fatigued with moderate exertion  Is suffering from insomnia with 1-2 hours of sleep most evenings  No significant daytime napping  No OIC  Scheduled for chemo to start today  The following portions of the medical history were reviewed: past medical history, problem list, medication list, and social history  Current Outpatient Medications:   •  acetaminophen (TYLENOL) 500 mg tablet, Take 500 mg by mouth every 4 (four) hours as needed for mild pain, Disp: , Rfl:   •  apixaban (ELIQUIS) 5 mg, Take 5 mg by mouth 2 (two) times a day, Disp: , Rfl:   •  dexamethasone (DECADRON) 4 mg tablet, Take 0 5 tablets (2 mg total) by mouth every 12 (twelve) hours, Disp: 30 tablet, Rfl: 0  •  folic acid (FOLVITE) 1 mg tablet, Take 1 tablet (1 mg total) by mouth daily, Disp: 30 tablet, Rfl: 6  •  gabapentin (NEURONTIN) 300 mg capsule, Patient takes 600mg  pm only, Disp: , Rfl:   •  memantine (NAMENDA TITRATION PACK), Follow package directions  , Disp: 49 tablet, Rfl: 1  •  ondansetron (ZOFRAN) 8 mg tablet, Take 1 tablet (8 mg total) by mouth every 8 (eight) hours as needed for nausea or vomiting, Disp: 30 tablet, Rfl: 3  •  oxyCODONE (Roxicodone) 5 immediate release tablet, Take 1 tablet (5 mg total) by mouth every 4 (four) hours as needed for moderate pain or severe pain Max Daily Amount: 30 mg, Disp: 120 tablet, Rfl: 0  •  prochlorperazine (COMPAZINE) 10 mg tablet, Take 1 tablet (10 mg total) by mouth every 6 (six) hours as needed for nausea or vomiting, Disp: 30 tablet, Rfl: 2  •  traZODone (DESYREL) 50 mg tablet, Take 1 tablet (50 mg total) by mouth daily at bedtime, Disp: 30 tablet, Rfl: 0  •  lidocaine-prilocaine (EMLA) cream, Apply to port 30 to 60 minutes prior to use, Disp: 30 g, Rfl: 2  No current facility-administered medications for this visit      Facility-Administered Medications Ordered in Other Visits:   •  alteplase (CATHFLO) injection 2 mg, 2 mg, Intracatheter, Q1MIN PRN, Siobhan Kerr, DO  •  CARBOplatin (PARAPLATIN) 544 mg in sodium chloride 0 9 % 250 mL IVPB, 544 mg, Intravenous, Once, Siobhan Kerr, DO  •  fosaprepitant (EMEND) 150 mg in sodium chloride 0 9 % 250 mL IVPB, 150 mg, Intravenous, Once, Siobhan Kerr, DO  •  ondansetron (ZOFRAN) 16 mg, dexamethasone (DECADRON) 10 mg in sodium chloride 0 9 % 50 mL IVPB, , Intravenous, Once, Siobhan Kerr, DO  •  pembrolizumab (KEYTRUDA) 200 mg in sodium chloride 0 9 % 50 mL IVPB, 200 mg, Intravenous, Once, Siobhan Kerr, DO  •  PEMEtrexed (ALIMTA) 900 mg in sodium chloride 0 9 % 100 mL chemo infusion, 900 mg, Intravenous, Once, Siobhan Kerr, DO  Review of Systems    All other systems negative    Objective:  Vital Signs  /70 (BP Location: Left arm, Patient Position: Sitting, Cuff Size: Standard)   Pulse 77   Temp 98 3 °F (36 8 °C) (Temporal)   Resp 16   Wt 70 5 kg (155 lb 6 8 oz)   SpO2 96%   BMI 22 30 kg/m²    Physical Exam    Constitutional: Appears well-developed and well-nourished  In no acute distress  Head: Normocephalic and atraumatic  Eyes: EOM are normal  Right eye exhibits no discharge  Left eye exhibits no discharge  No scleral icterus  Pulmonary/Chest: Effort normal  No stridor  No respiratory distress  Abdominal: No distension  Musculoskeletal: No edema  Neurological: Alert, oriented and appropriately conversant  Skin: Skin is dry, not diaphoretic  Psychiatric: Displays a normal mood and affect  Behavior, judgement and thought content appear normal    Vitals reviewed

## 2023-05-22 NOTE — TELEPHONE ENCOUNTER
Patient seen in the office on 5/9/23  Note pending  Patient will be starting keytruda 200mg, pemetrexed 940mg, and carboplatin 544mg every 21 days for 6 cycles

## 2023-05-22 NOTE — PATIENT INSTRUCTIONS
You may take 10mg of melatonin over the counter every night for insomnia  You may retime your decadron to 2mg with breakfast and 2mg with lunch  You may take trazodone 50mg at bedtime for insomnia  I will refer you to our oncology dietitian

## 2023-05-22 NOTE — LETTER
May 22, 2023     Patient: Rohit Bowie  YOB: 1955  Date of Visit: 5/22/2023      To Whom it May Concern:    Rohit Bowie is under my professional care  Fabio Hawkins was seen in my office on 5/22/2023  Fabio Hawkins is actively undergoing cancer treatments  Based on the location of his cancer as well as the significant side effects of his treatment regimen Wings ability to participate in physical labor should be markedly limited  Heavy lifting or extended time on his feet are absolutely prohibited due to his condition  Further, he will be starting chemotherapy on 5/22 with an aggressive regimen with common toxicities including worsening fatigue, severe nausea, nerve pain, and poor appetite  Please take this into consideration when evaluating his case  If you have any questions or concerns, please don't hesitate to call           Sincerely,          Payal Mccartney MD        CC: No Recipients

## 2023-05-23 ENCOUNTER — PATIENT OUTREACH (OUTPATIENT)
Dept: HEMATOLOGY ONCOLOGY | Facility: CLINIC | Age: 68
End: 2023-05-23

## 2023-05-23 DIAGNOSIS — C71.9 MALIGNANT NEOPLASM OF BRAIN, UNSPECIFIED LOCATION (HCC): Primary | ICD-10-CM

## 2023-05-23 RX ORDER — MEMANTINE HYDROCHLORIDE 10 MG/1
10 TABLET ORAL 2 TIMES DAILY
Qty: 60 TABLET | Refills: 2 | Status: SHIPPED | OUTPATIENT
Start: 2023-05-23

## 2023-05-23 NOTE — PROGRESS NOTES
Biopsychosocial and Barriers Assessment    Cancer Diagnosis:Non-small cell cancer of right lung   Home/Cell Phone: [phone]  Emergency Contact: Caio Pitts [phone]  Marital Status: Not legally   Interpretation concerns, speaks another language, preferred language:  Cultural concerns: None reported  Ability to read or write: Fully Literate    Caregiver/Support: spouse, family, friends, children   Children: 6 children, 8 grandchildren  Child/Elder care: Pt's parents are still living    Housing: Own home  Home Setup: one level  Lives With: spouse  Daily Living Activities: Pt is able to care for himself  Durable Medical Equipment: walker  Ambulation: Ambulates independently    Preferred Pharmacy: [facility] co-pays with insurance: Pt currently with no insurance and has applied for [place] [id number] with medication coverage: Pt currently with no insurance and has applied for [facility]  No medication coverage: Pt currently with no insurance and has applied for [facility]      Primary Care Provider: No PCP  Hx of Home Health Care: No  Hx of Short term rehab: No  Mental Health Hx: No  Substance Abuse Hx: current smoker and socially drinks  Employment: stopped working in March, spouse is working  [employer] Status/Location: No  Ability to pay bills: Yes  POA/LW/AD: No  Transportation Plan/Concerns: Spouse drives      What do you know about your Cancer Diagnosis    What has your doctor told you about your cancer diagnosis: Pt's wife was well informed with the pt's diagnosis  What has your doctor told you about your cancer treatment: Pt completed radiation and recently started chemo  What specific concerns do you have about your diagnosis and treatment: No concerns were expressed  Have you been made aware of any hair loss associated with treatment: N/A    Additional Comments:    MSW received a referral for this pt from Navigation and as per chart, outreach was made to spouse    Spouse "was open and receptive of this call  The role of the OSW was described and explained and contact information was provided  She reports that the pt is coping well at this time with a low stress level  Pt and spouse have a blended family and she described feeling well supported  They have strong hector and believe this is \"all part of God's plan  \"  With regards to finances, the are using savings , in addition to the pt's spouse working  The main concern at this time is that the pt has no insurance and they have been working on this with Sreekanth Sandhu  They are completing the [facility] application and they have a meeting with Elfego Chaparro [address]  The pt may be able to start receiving his Medicare benefits  Pt was denied for MA   Pt's wife sounded appropriate on the phone and appears to be coping well  She is aware that she can reach out as needed  MSW provided information on the cancer funds and how this can assist, if needed, with prescriptions  Spouse verbalized understanding and appreciation  Emotional support offered and MSW will continue to follow           "

## 2023-05-24 DIAGNOSIS — C34.91 NON-SMALL CELL CANCER OF RIGHT LUNG (HCC): ICD-10-CM

## 2023-05-24 DIAGNOSIS — I26.94 MULTIPLE SUBSEGMENTAL PULMONARY EMBOLI WITHOUT ACUTE COR PULMONALE (HCC): Primary | ICD-10-CM

## 2023-05-30 ENCOUNTER — OFFICE VISIT (OUTPATIENT)
Dept: INTERNAL MEDICINE CLINIC | Facility: OTHER | Age: 68
End: 2023-05-30

## 2023-05-30 VITALS
DIASTOLIC BLOOD PRESSURE: 80 MMHG | HEART RATE: 86 BPM | HEIGHT: 68 IN | WEIGHT: 148 LBS | OXYGEN SATURATION: 98 % | TEMPERATURE: 98 F | BODY MASS INDEX: 22.43 KG/M2 | SYSTOLIC BLOOD PRESSURE: 128 MMHG

## 2023-05-30 DIAGNOSIS — I26.94 MULTIPLE SUBSEGMENTAL PULMONARY EMBOLI WITHOUT ACUTE COR PULMONALE (HCC): ICD-10-CM

## 2023-05-30 DIAGNOSIS — Z72.0 TOBACCO USE: ICD-10-CM

## 2023-05-30 DIAGNOSIS — C34.91 NON-SMALL CELL CANCER OF RIGHT LUNG (HCC): Primary | ICD-10-CM

## 2023-05-30 DIAGNOSIS — Z12.11 SCREEN FOR COLON CANCER: ICD-10-CM

## 2023-05-30 DIAGNOSIS — Z72.0 TOBACCO ABUSE: ICD-10-CM

## 2023-05-30 DIAGNOSIS — D49.2 THORACIC SPINE TUMOR: ICD-10-CM

## 2023-05-30 DIAGNOSIS — C79.31 METASTASIS TO BRAIN (HCC): ICD-10-CM

## 2023-05-30 RX ORDER — NICOTINE 21 MG/24HR
1 PATCH, TRANSDERMAL 24 HOURS TRANSDERMAL EVERY 24 HOURS
Qty: 28 PATCH | Refills: 0 | Status: SHIPPED | OUTPATIENT
Start: 2023-05-30

## 2023-05-30 NOTE — PROGRESS NOTES
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  Tobacco abuse  -     nicotine (NICODERM CQ) 14 mg/24hr TD 24 hr patch; Place 1 patch on the skin over 24 hours every 24 hours    Metastasis to brain [facility])  Brain metastasis status post radiation  Tobacco use    Patient is still smoking about 10 cigarettes/day we will start him on a NicoDerm patch and see how he does with that he is very much anxious to stop smoking         M*Modal software was used to dictate this note  It may contain errors with dictating incorrect words or incorrect spelling  Please contact the provider directly with any questions  Subjective:      Chief Complaint   Patient presents with   • Follow-up     1 month follow up   Labs done 5/19  Would like to discuss smoking patient is ready to quit  Patient ID: Ashley Olivares is a 74 y o  male  HPI  This is a very pleasant 74 years young gentleman who is here today for the follow-up he was recently diagnosed with the stage IV non-small cell lung cancer he finished his radiation treatment and now he is on chemotherapy he is followed up by the hematology oncology and also by the palliative care his pain problems are followed up by the palliative he is doing well with that the pain control to the thoracic spine is much improved he does not have any pain complaints today only complaint is that he is a still smoking and wanted to quit and I will try to help him    He has underlying problem with the COPD he does not complain of increasing shortness of breath or chest pain still have some cough but not unusual   Weight loss noted which is about 8 pounds in the last few weeks  He said his appetite is good and pain control is better we will continue to follow I will see him back in about 1 month  Continue follow-up with the hematology oncology radiation oncology and also palliative care  The following portions of the patient's history were reviewed and updated as appropriate: allergies, current medications, past family history, past medical history, past social history, past surgical history and problem list     Review of Systems   Constitutional: Positive for fatigue  Negative for chills and fever  HENT: Negative for ear pain and sore throat  Eyes: Negative for pain and visual disturbance  Respiratory: Positive for cough and shortness of breath  Cardiovascular: Negative for chest pain and palpitations  Gastrointestinal: Negative for abdominal pain and vomiting  Genitourinary: Negative for dysuria and hematuria  Musculoskeletal: Positive for back pain  Negative for arthralgias  Skin: Negative for color change and rash  Neurological: Negative for seizures and syncope  All other systems reviewed and are negative  History reviewed  No pertinent past medical history        Current Outpatient Medications:   •  acetaminophen (TYLENOL) 500 mg tablet, Take 500 mg by mouth every 4 (four) hours as needed for mild pain, Disp: , Rfl:   •  apixaban (ELIQUIS) 5 mg, Take 5 mg by mouth 2 (two) times a day, Disp: , Rfl:   •  dexamethasone (DECADRON) 4 mg tablet, Take 0 5 tablets (2 mg total) by mouth every 12 (twelve) hours, Disp: 30 tablet, Rfl: 0  •  folic acid (FOLVITE) 1 mg tablet, Take 1 tablet (1 mg total) by mouth daily, Disp: 30 tablet, Rfl: 6  •  gabapentin (NEURONTIN) 300 mg capsule, Patient takes 300mg in the am 600mg  pm only, Disp: , Rfl:   •  lidocaine-prilocaine (EMLA) cream, Apply to port 30 to 60 minutes prior to use, Disp: "30 g, Rfl: 2  •  memantine (Namenda) 10 mg tablet, Take 1 tablet (10 mg total) by mouth 2 (two) times a day Continue with bloodwork (complete metabolic panel) every 8 weeks, Disp: 60 tablet, Rfl: 2  •  nicotine (NICODERM CQ) 14 mg/24hr TD 24 hr patch, Place 1 patch on the skin over 24 hours every 24 hours, Disp: 28 patch, Rfl: 0  •  ondansetron (ZOFRAN) 8 mg tablet, Take 1 tablet (8 mg total) by mouth every 8 (eight) hours as needed for nausea or vomiting, Disp: 30 tablet, Rfl: 3  •  oxyCODONE (Roxicodone) 5 immediate release tablet, Take 1 tablet (5 mg total) by mouth every 4 (four) hours as needed for moderate pain or severe pain Max Daily Amount: 30 mg, Disp: 120 tablet, Rfl: 0  •  prochlorperazine (COMPAZINE) 10 mg tablet, Take 1 tablet (10 mg total) by mouth every 6 (six) hours as needed for nausea or vomiting, Disp: 30 tablet, Rfl: 2  •  traZODone (DESYREL) 50 mg tablet, Take 1 tablet (50 mg total) by mouth daily at bedtime, Disp: 30 tablet, Rfl: 0    No Known Allergies    Social History   Past Surgical History:   Procedure Laterality Date   • IR BIOPSY LUNG  4/6/2023   • IR BIOPSY LUNG  4/26/2023   • IR PORT PLACEMENT  5/16/2023     History reviewed  No pertinent family history  Objective:  /80 (BP Location: Left arm, Patient Position: Sitting, Cuff Size: Adult)   Pulse 86   Temp 98 °F (36 7 °C) (Temporal)   Ht 5' 8\" (1 727 m)   Wt 67 1 kg (148 lb)   SpO2 98%   BMI 22 50 kg/m²        Physical Exam  Constitutional:       Appearance: He is well-developed  HENT:      Head: Normocephalic  Right Ear: External ear normal       Nose: Nose normal    Eyes:      Pupils: Pupils are equal, round, and reactive to light  Neck:      Thyroid: No thyromegaly  Vascular: No JVD  Trachea: No tracheal deviation  Cardiovascular:      Rate and Rhythm: Normal rate and regular rhythm  Heart sounds: Normal heart sounds  Pulmonary:      Breath sounds: Decreased breath sounds present   " Abdominal:      General: Bowel sounds are normal       Palpations: Abdomen is soft  Musculoskeletal:         General: No deformity  Cervical back: Normal range of motion and neck supple  Lymphadenopathy:      Cervical: No cervical adenopathy  Skin:     General: Skin is warm and dry  Neurological:      Mental Status: He is alert and oriented to person, place, and time  Psychiatric:         Behavior: Behavior normal          Thought Content:  Thought content normal          Judgment: Judgment normal

## 2023-06-01 ENCOUNTER — TELEPHONE (OUTPATIENT)
Dept: RADIATION ONCOLOGY | Facility: HOSPITAL | Age: 68
End: 2023-06-01

## 2023-06-01 DIAGNOSIS — C34.91 NON-SMALL CELL CANCER OF RIGHT LUNG (HCC): ICD-10-CM

## 2023-06-01 RX ORDER — FOLIC ACID 1 MG/1
TABLET ORAL
Qty: 90 TABLET | Refills: 3 | Status: SHIPPED | OUTPATIENT
Start: 2023-06-01

## 2023-06-01 NOTE — TELEPHONE ENCOUNTER
Dr Elisa Stokes, I called pt's wife back regarding bloodwork, but don't want to cancel any of your lab orders without talking to you first    He is getting labs every 3 weeks for Med Onc, prior to his infusions and this includes a CMP  You have BMP ordered q 8 weeks for the namenda  They want to know if we can cancel the q 8 week lab order and he just follow his lab schedule for infusion q 3 weeks? Is this okay or do you need the BMP as ordered so that you are sent the result to monitor? Please advise, I told her you are out of the office until Monday 6/5 and I can call her back once I talk to you   Thanks

## 2023-06-01 NOTE — TELEPHONE ENCOUNTER
Pt wife called and  Asked if its ok that they follow the CMP orders Dr Ruelas placed in Epic every 3 weeks versus Dr Genao orders every 8 weeks and if so can those orders be removed so it doesn't confuse the PT and the Lab department  Please have nurse call wife back to verify

## 2023-06-05 ENCOUNTER — TELEPHONE (OUTPATIENT)
Age: 68
End: 2023-06-05

## 2023-06-05 NOTE — TELEPHONE ENCOUNTER
Sure, I called them to let them know to disregard our order q 8 weeks  I am not able to cancel the order though, maybe because I didn't place it? Looks like CMP q 8 weeks was entered on 5/4

## 2023-06-05 NOTE — TELEPHONE ENCOUNTER
Called wife to let her know that Malcolm Arrieta was trying to reach out to him to let them know his eliquis was approved  She said they already received his eliquis on Wednesday  No other needs at this time

## 2023-06-06 ENCOUNTER — OFFICE VISIT (OUTPATIENT)
Dept: HEMATOLOGY ONCOLOGY | Facility: CLINIC | Age: 68
End: 2023-06-06

## 2023-06-06 VITALS
DIASTOLIC BLOOD PRESSURE: 80 MMHG | RESPIRATION RATE: 17 BRPM | BODY MASS INDEX: 23.79 KG/M2 | TEMPERATURE: 98 F | HEART RATE: 81 BPM | OXYGEN SATURATION: 95 % | SYSTOLIC BLOOD PRESSURE: 130 MMHG | HEIGHT: 68 IN | WEIGHT: 157 LBS

## 2023-06-06 DIAGNOSIS — C34.91 NON-SMALL CELL CANCER OF RIGHT LUNG (HCC): Primary | ICD-10-CM

## 2023-06-06 PROCEDURE — 99214 OFFICE O/P EST MOD 30 MIN: CPT | Performed by: INTERNAL MEDICINE

## 2023-06-08 DIAGNOSIS — C34.91 NON-SMALL CELL CANCER OF RIGHT LUNG (HCC): Primary | ICD-10-CM

## 2023-06-08 DIAGNOSIS — Z51.81 THERAPEUTIC DRUG MONITORING: ICD-10-CM

## 2023-06-08 NOTE — PROGRESS NOTES
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    2023 - palliative RT to T spine and brain     Thoracic spine tumor   2023 Initial Diagnosis    Thoracic spine tumor     Non-small cell cancer of right lung ([facility] [address] )   2023 Initial Diagnosis    Non-small cell cancer of right lung ([facility] [address] )     2023 -  Chemotherapy    cyanocobalamin, 1,000 mcg, Intramuscular, Once, 1 of 3 cycles  Administration: 1,000 mcg (5/15/2023)  alteplase (CATHFLO), 2 mg, Intracatheter, Every 1 Minute as needed, 1 of 6 cycles  fosaprepitant (EMEND) IVPB, 150 mg, Intravenous, Once, 1 of 6 cycles  Administration: 150 mg (2023)  CARBOplatin (PARAPLATIN) IVPB (GOG AUC DOSING), 544 mg, Intravenous, Once, 1 of 6 cycles  Administration: 544 mg (2023)  pemetrexed (ALIMTA) chemo infusion, 940 mg, Intravenous, Once, 1 of 6 cycles  Administration: 900 mg (2023)  pembrolizumab (KEYTRUDA) IVPB, 200 mg, Intravenous, Once, 1 of 6 cycles  Administration: 200 mg (2023)         Interval History: Patient presents for follow up of          REVIEW OF SYSTEMS:  Please note that a 14-point review of systems was performed to include Constitutional, HEENT, Respiratory, CVS, GI, , Musculoskeletal, Integumentary, Neurologic, Rheumatologic, Endocrinologic, Psychiatric, Lymphatic, and Hematologic/Oncologic systems were reviewed and are negative unless otherwise stated in HPI  Positive and negative findings pertinent to this evaluation are incorporated into the history of present illness        ECOG PS: 0    PROBLEM LIST:  Patient Active Problem List   Diagnosis   • Multiple subsegmental pulmonary emboli without acute cor pulmonale (HCC)   • Tobacco use   • DDD (degenerative disc disease), cervical   • Pleural mass   • Brain lesions   • Thoracic spine tumor   • Cancer associated pain   • Advanced care planning/counseling discussion   • Non-small cell cancer of right lung (HCC)   • Metastasis to brain [facility])       Assessment / Plan:       I spent 30 minutes on chart review, face to face counseling time, coordination of care and documentation  Past Medical History:   has no past medical history on file  PAST SURGICAL HISTORY:   has a past surgical history that includes IR biopsy lung (4/6/2023); IR biopsy lung (4/26/2023); and IR port placement (5/16/2023)      CURRENT MEDICATIONS  Current Outpatient Medications   Medication Sig Dispense Refill   • acetaminophen (TYLENOL) 500 mg tablet Take 500 mg by mouth every 4 (four) hours as needed for mild pain     • apixaban (ELIQUIS) 5 mg Take 1 tablet (5 mg total) by mouth 2 (two) times a day 60 tablet 5   • dexamethasone (DECADRON) 4 mg tablet Take 0 5 tablets (2 mg total) by mouth every 12 (twelve) hours 30 tablet 0   • folic acid (FOLVITE) 1 mg tablet TAKE 1 TABLET BY MOUTH EVERY DAY 90 tablet 3   • gabapentin (NEURONTIN) 300 mg capsule Patient takes 300mg in the am 600mg  pm only     • lidocaine-prilocaine (EMLA) cream Apply to port 30 to 60 minutes prior to use 30 g 2   • memantine (Namenda) 10 mg tablet Take 1 tablet (10 mg total) by mouth 2 (two) times a day Continue with bloodwork (complete metabolic panel) every 8 weeks 60 tablet 2   • nicotine (NICODERM CQ) 14 mg/24hr TD 24 hr patch Place 1 patch on the skin over 24 hours every 24 hours 28 patch 0   • ondansetron (ZOFRAN) 8 mg tablet Take 1 tablet (8 mg total) by mouth every 8 (eight) hours as needed for nausea or vomiting 30 tablet 3   • oxyCODONE (Roxicodone) 5 immediate release tablet Take 1 tablet (5 mg total) by mouth every 4 (four) hours as needed for moderate pain or severe pain Max Daily Amount: 30 mg 120 tablet 0   • prochlorperazine (COMPAZINE) 10 mg tablet "Take 1 tablet (10 mg total) by mouth every 6 (six) hours as needed for nausea or vomiting 30 tablet 2   • traZODone (DESYREL) 50 mg tablet Take 1 tablet (50 mg total) by mouth daily at bedtime 30 tablet 0     No current facility-administered medications for this visit  [unfilled]    SOCIAL HISTORY:   reports that he has been smoking cigarettes  He started smoking about 50 years ago  He has a 8 75 pack-year smoking history  He has never used smokeless tobacco  He reports current alcohol use  He reports that he does not currently use drugs  FAMILY HISTORY:  family history is not on file  ALLERGIES:  has No Known Allergies  Physical Exam:  Vital Signs:   Visit Vitals  /80 (BP Location: Right arm, Patient Position: Sitting, Cuff Size: Adult)   Pulse 81   Temp 98 °F (36 7 °C)   Resp 17   Ht 5' 8\" (1 727 m)   Wt 71 2 kg (157 lb)   SpO2 95%   BMI 23 87 kg/m²   Smoking Status Every Day   BSA 1 84 m²     Body mass index is 23 87 kg/m²  Body surface area is 1 84 meters squared  GEN: Alert, awake oriented x3, in no acute distress  HEENT- No pallor, icterus, cyanosis, no oral mucosal lesions,   LAD - no palpable cervical, clavicle, axillary, inguinal LAD  Heart- normal S1 S2, regular rate and rhythm, No murmur, rubs     Lungs- clear breathing sound bilateral    Abdomen- soft, Non tender, bowel sounds present  Extremities- No cyanosis, clubbing, edema  Neuro- No focal neurological deficit    Labs:  Lab Results   Component Value Date    HCT 41 8 05/19/2023    HGB 14 4 05/19/2023    MCV 95 05/19/2023     05/19/2023    WBC 6 89 05/19/2023     Lab Results   Component Value Date    AGAP 2 (L) 05/19/2023    ALKPHOS 34 (L) 05/19/2023    ALT 41 05/19/2023    AST 13 05/19/2023    BUN 30 (H) 05/19/2023    CALCIUM 8 7 05/19/2023     05/19/2023    CO2 26 05/19/2023    CREATININE 0 88 05/19/2023    EGFR 90 05/19/2023    GLUC 92 05/19/2023    GLUF 103 (H) 05/10/2023    K 4 4 05/19/2023    SODIUM 132 (L) " 05/19/2023    TBILI 0 43 05/19/2023    TP 6 7 05/19/2023

## 2023-06-09 ENCOUNTER — HOSPITAL ENCOUNTER (OUTPATIENT)
Dept: INFUSION CENTER | Facility: HOSPITAL | Age: 68
End: 2023-06-09
Payer: COMMERCIAL

## 2023-06-09 DIAGNOSIS — Z51.81 THERAPEUTIC DRUG MONITORING: ICD-10-CM

## 2023-06-09 DIAGNOSIS — C34.91 NON-SMALL CELL CANCER OF RIGHT LUNG (HCC): Primary | ICD-10-CM

## 2023-06-09 LAB
ALBUMIN SERPL BCP-MCNC: 3.1 G/DL (ref 3.5–5)
ALP SERPL-CCNC: 39 U/L (ref 46–116)
ALT SERPL W P-5'-P-CCNC: 59 U/L (ref 12–78)
ANION GAP SERPL CALCULATED.3IONS-SCNC: 4 MMOL/L (ref 4–13)
AST SERPL W P-5'-P-CCNC: 14 U/L (ref 5–45)
BASOPHILS # BLD MANUAL: 0 THOUSAND/UL (ref 0–0.1)
BASOPHILS NFR MAR MANUAL: 0 % (ref 0–1)
BILIRUB SERPL-MCNC: 0.41 MG/DL (ref 0.2–1)
BUN SERPL-MCNC: 29 MG/DL (ref 5–25)
CALCIUM ALBUM COR SERPL-MCNC: 9.2 MG/DL (ref 8.3–10.1)
CALCIUM SERPL-MCNC: 8.5 MG/DL (ref 8.3–10.1)
CHLORIDE SERPL-SCNC: 107 MMOL/L (ref 96–108)
CO2 SERPL-SCNC: 26 MMOL/L (ref 21–32)
CREAT SERPL-MCNC: 0.88 MG/DL (ref 0.6–1.3)
EOSINOPHIL # BLD MANUAL: 0 THOUSAND/UL (ref 0–0.4)
EOSINOPHIL NFR BLD MANUAL: 0 % (ref 0–6)
ERYTHROCYTE [DISTWIDTH] IN BLOOD BY AUTOMATED COUNT: 15.4 % (ref 11.6–15.1)
GFR SERPL CREATININE-BSD FRML MDRD: 90 ML/MIN/1.73SQ M
GLUCOSE SERPL-MCNC: 108 MG/DL (ref 65–140)
HCT VFR BLD AUTO: 38.9 % (ref 36.5–49.3)
HGB BLD-MCNC: 13.1 G/DL (ref 12–17)
LYMPHOCYTES # BLD AUTO: 0.52 THOUSAND/UL (ref 0.6–4.47)
LYMPHOCYTES # BLD AUTO: 10 % (ref 14–44)
MCH RBC QN AUTO: 33 PG (ref 26.8–34.3)
MCHC RBC AUTO-ENTMCNC: 33.7 G/DL (ref 31.4–37.4)
MCV RBC AUTO: 98 FL (ref 82–98)
MONOCYTES # BLD AUTO: 0.31 THOUSAND/UL (ref 0–1.22)
MONOCYTES NFR BLD: 6 % (ref 4–12)
MYELOCYTES NFR BLD MANUAL: 1 % (ref 0–1)
NEUTROPHILS # BLD MANUAL: 4.09 THOUSAND/UL (ref 1.85–7.62)
NEUTS BAND NFR BLD MANUAL: 6 % (ref 0–8)
NEUTS SEG NFR BLD AUTO: 72 % (ref 43–75)
PLATELET # BLD AUTO: 168 THOUSANDS/UL (ref 149–390)
PLATELET BLD QL SMEAR: ADEQUATE
PMV BLD AUTO: 8.6 FL (ref 8.9–12.7)
POTASSIUM SERPL-SCNC: 4.4 MMOL/L (ref 3.5–5.3)
PROT SERPL-MCNC: 6.7 G/DL (ref 6.4–8.4)
RBC # BLD AUTO: 3.97 MILLION/UL (ref 3.88–5.62)
RBC MORPH BLD: NORMAL
SODIUM SERPL-SCNC: 137 MMOL/L (ref 135–147)
T3FREE SERPL-MCNC: 1.96 PG/ML (ref 2.5–3.9)
TSH SERPL DL<=0.05 MIU/L-ACNC: 0.92 UIU/ML (ref 0.45–4.5)
VARIANT LYMPHS # BLD AUTO: 5 %
WBC # BLD AUTO: 5.24 THOUSAND/UL (ref 4.31–10.16)

## 2023-06-09 PROCEDURE — 80053 COMPREHEN METABOLIC PANEL: CPT | Performed by: INTERNAL MEDICINE

## 2023-06-09 PROCEDURE — 84443 ASSAY THYROID STIM HORMONE: CPT | Performed by: INTERNAL MEDICINE

## 2023-06-09 PROCEDURE — 85027 COMPLETE CBC AUTOMATED: CPT | Performed by: INTERNAL MEDICINE

## 2023-06-09 PROCEDURE — 84481 FREE ASSAY (FT-3): CPT | Performed by: INTERNAL MEDICINE

## 2023-06-09 PROCEDURE — 85007 BL SMEAR W/DIFF WBC COUNT: CPT | Performed by: INTERNAL MEDICINE

## 2023-06-09 RX ORDER — SODIUM CHLORIDE 9 MG/ML
20 INJECTION, SOLUTION INTRAVENOUS ONCE
Status: CANCELLED | OUTPATIENT
Start: 2023-06-12

## 2023-06-09 NOTE — PROGRESS NOTES
Port flush, central labs drawn without incident  Patient declined printed AVS   Patient to return to department as scheduled

## 2023-06-12 ENCOUNTER — HOSPITAL ENCOUNTER (OUTPATIENT)
Dept: INFUSION CENTER | Facility: HOSPITAL | Age: 68
Discharge: HOME | End: 2023-06-12
Attending: INTERNAL MEDICINE
Payer: COMMERCIAL

## 2023-06-12 VITALS
WEIGHT: 154.54 LBS | DIASTOLIC BLOOD PRESSURE: 73 MMHG | RESPIRATION RATE: 18 BRPM | HEART RATE: 69 BPM | TEMPERATURE: 97.4 F | HEIGHT: 68 IN | SYSTOLIC BLOOD PRESSURE: 111 MMHG | BODY MASS INDEX: 23.42 KG/M2

## 2023-06-12 DIAGNOSIS — C34.91 NON-SMALL CELL CANCER OF RIGHT LUNG (HCC): Primary | ICD-10-CM

## 2023-06-12 RX ORDER — SODIUM CHLORIDE 9 MG/ML
20 INJECTION, SOLUTION INTRAVENOUS ONCE
Status: COMPLETED | OUTPATIENT
Start: 2023-06-12 | End: 2023-06-12

## 2023-06-12 RX ADMIN — SODIUM CHLORIDE 20 ML/HR: 0.9 INJECTION, SOLUTION INTRAVENOUS at 11:47

## 2023-06-12 RX ADMIN — CARBOPLATIN 544 MG: 10 INJECTION, SOLUTION INTRAVENOUS at 14:14

## 2023-06-12 RX ADMIN — PEMETREXED DISODIUM 900 MG: 500 INJECTION, POWDER, LYOPHILIZED, FOR SOLUTION INTRAVENOUS at 13:56

## 2023-06-12 RX ADMIN — SODIUM CHLORIDE 200 MG: 9 INJECTION, SOLUTION INTRAVENOUS at 13:00

## 2023-06-12 RX ADMIN — FOSAPREPITANT 150 MG: 150 INJECTION, POWDER, LYOPHILIZED, FOR SOLUTION INTRAVENOUS at 12:18

## 2023-06-12 RX ADMIN — DEXAMETHASONE SODIUM PHOSPHATE: 10 INJECTION, SOLUTION INTRAMUSCULAR; INTRAVENOUS at 11:47

## 2023-06-13 ENCOUNTER — PATIENT OUTREACH (OUTPATIENT)
Dept: HEMATOLOGY ONCOLOGY | Facility: CLINIC | Age: 68
End: 2023-06-13

## 2023-06-13 DIAGNOSIS — F51.01 PRIMARY INSOMNIA: ICD-10-CM

## 2023-06-13 RX ORDER — TRAZODONE HYDROCHLORIDE 50 MG/1
TABLET ORAL
Qty: 90 TABLET | Refills: 1 | Status: SHIPPED | OUTPATIENT
Start: 2023-06-13

## 2023-06-13 NOTE — PROGRESS NOTES
MSW made outreach to pt's wife this day to offer support and to assess any needs  Pt's wife shared that the pt is feeling well and that [facility] is doing well with pain management  They have not heard from the Financial Aid office with regards to the status of the [facility] application and this has caused a great deal of stress  The pt has also not had any response with his social security and there is a great deal of frustration with having no answers and having to wait until the end of the month to have a meeting with SSI  MSW provided information on the cancer funds and encouraged them to consider allowing this program help to offset some household bills  Pt's wife is hesitant but does understand that she can call to use the program at any time  Significant support was offered and MSW will continue to follow

## 2023-06-20 ENCOUNTER — OFFICE VISIT (OUTPATIENT)
Dept: PALLIATIVE MEDICINE | Facility: CLINIC | Age: 68
End: 2023-06-20

## 2023-06-20 VITALS
SYSTOLIC BLOOD PRESSURE: 118 MMHG | HEIGHT: 68 IN | OXYGEN SATURATION: 98 % | BODY MASS INDEX: 24.86 KG/M2 | HEART RATE: 84 BPM | TEMPERATURE: 97.6 F | WEIGHT: 164.02 LBS | DIASTOLIC BLOOD PRESSURE: 60 MMHG

## 2023-06-20 DIAGNOSIS — G89.3 CANCER ASSOCIATED PAIN: ICD-10-CM

## 2023-06-20 DIAGNOSIS — Z72.0 TOBACCO USE: ICD-10-CM

## 2023-06-20 DIAGNOSIS — C79.31 METASTASIS TO BRAIN (HCC): ICD-10-CM

## 2023-06-20 DIAGNOSIS — C34.91 NON-SMALL CELL CANCER OF RIGHT LUNG (HCC): Primary | ICD-10-CM

## 2023-06-20 DIAGNOSIS — G93.89 BRAIN MASS: ICD-10-CM

## 2023-06-20 PROCEDURE — 99214 OFFICE O/P EST MOD 30 MIN: CPT | Performed by: INTERNAL MEDICINE

## 2023-06-20 RX ORDER — DEXAMETHASONE 4 MG/1
2 TABLET ORAL 2 TIMES DAILY WITH MEALS
Qty: 30 TABLET | Refills: 0 | Status: SHIPPED | OUTPATIENT
Start: 2023-06-20 | End: 2023-07-20

## 2023-06-20 NOTE — PROGRESS NOTES
Palliative and Supportive Care   Aries Sanderson 74 y o  male [id number]    Assessment/Plan:  1  Non-small cell cancer of right lung ([facility] [address] )    2  Metastasis to brain ([facility] [address] )    3  Cancer associated pain    4  Tobacco use    5  Brain lesions      Will continue to use occasional oxycodone only as needed and supplement with tylenol  May retime gabapentin to 600mg qHS only  Advised to discuss namenda use with Dr Rubio Si  Continue melatonin 5mg qHS  Discussed nutritional considerations for his bowel discomfort but ultimately this is likely a side effect of his treatments/meds  May trial fiber and d/c if not effective  Compression stockings for edema but hopefully will be able to come off steroids soon  Will follow up in 2-3 months  Requested Prescriptions     Signed Prescriptions Disp Refills   • dexamethasone (DECADRON) 4 mg tablet 30 tablet 0     Sig: Take 0 5 tablets (2 mg total) by mouth 2 (two) times a day with meals     Medications Discontinued During This Encounter   Medication Reason   • dexamethasone (DECADRON) 4 mg tablet        Representatives have queried the patient's controlled substance dispensing history in the Prescription Drug Monitoring Program in compliance with regulations before I have prescribed any controlled substances  The prescription history is consistent with prescribed therapy and our practice policies  25 minutes were spent face to face with Aries Sanderson with greater than 50% of the time spent in counseling or coordination of care including discussions of treatment instructions   All of the patient's questions were answered during this discussion  No follow-ups on file  Subjective:   Chief Complaint  Follow up visit for:  symptom management  HPI     Aries Sanderson is a 74 y o  male with metastatic NSCLC on pembrolizumab/pemetrexed/carboplatin following with Dr Reena Rhodes who presents for symptom mgmt follow up  Doing really quite well overall    Pain is almost "completely gone  Takes an occasional oxycodone (1-2x daily) and tylenol when it is bad  Felt fatigued with his cancer treatments but managing  Melatonin 10mg was too much so he started using 5mg which gets him 4-5 hours of sleep at a time  Overall feeling much more rested  Denies constipation but does feel his bowels are somewhat irregular  Also experiencing \"gas pain\" which he has been remediating with OTC gasx  The following portions of the medical history were reviewed: past medical history, problem list, medication list, and social history      Current Outpatient Medications:   •  acetaminophen (TYLENOL) 500 mg tablet, Take 500 mg by mouth every 4 (four) hours as needed for mild pain, Disp: , Rfl:   •  apixaban (ELIQUIS) 5 mg, Take 1 tablet (5 mg total) by mouth 2 (two) times a day, Disp: 60 tablet, Rfl: 5  •  dexamethasone (DECADRON) 4 mg tablet, Take 0 5 tablets (2 mg total) by mouth 2 (two) times a day with meals, Disp: 30 tablet, Rfl: 0  •  folic acid (FOLVITE) 1 mg tablet, TAKE 1 TABLET BY MOUTH EVERY DAY, Disp: 90 tablet, Rfl: 3  •  gabapentin (NEURONTIN) 300 mg capsule, Patient takes 300mg in the am 600mg  pm only, Disp: , Rfl:   •  lidocaine-prilocaine (EMLA) cream, Apply to port 30 to 60 minutes prior to use, Disp: 30 g, Rfl: 2  •  memantine (Namenda) 10 mg tablet, Take 1 tablet (10 mg total) by mouth 2 (two) times a day Continue with bloodwork (complete metabolic panel) every 8 weeks, Disp: 60 tablet, Rfl: 2  •  nicotine (NICODERM CQ) 14 mg/24hr TD 24 hr patch, Place 1 patch on the skin over 24 hours every 24 hours, Disp: 28 patch, Rfl: 0  •  ondansetron (ZOFRAN) 8 mg tablet, Take 1 tablet (8 mg total) by mouth every 8 (eight) hours as needed for nausea or vomiting, Disp: 30 tablet, Rfl: 3  •  oxyCODONE (Roxicodone) 5 immediate release tablet, Take 1 tablet (5 mg total) by mouth every 4 (four) hours as needed for moderate pain or severe pain Max Daily Amount: 30 mg, Disp: 120 tablet, Rfl: 0  • " " prochlorperazine (COMPAZINE) 10 mg tablet, Take 1 tablet (10 mg total) by mouth every 6 (six) hours as needed for nausea or vomiting, Disp: 30 tablet, Rfl: 2  •  traZODone (DESYREL) 50 mg tablet, TAKE 1 TABLET BY MOUTH DAILY AT BEDTIME, Disp: 90 tablet, Rfl: 1  Review of Systems    All other systems negative    Objective:  Vital Signs  /60 (BP Location: Left arm, Patient Position: Sitting, Cuff Size: Standard)   Pulse 84   Temp 97 6 °F (36 4 °C) (Temporal)   Ht 5' 8\" (1 727 m)   Wt 74 4 kg (164 lb 0 4 oz)   SpO2 98%   BMI 24 94 kg/m²    Physical Exam    Constitutional: Appears well-developed and well-nourished  In no acute distress  Head: Normocephalic and atraumatic  Eyes: EOM are normal  Right eye exhibits no discharge  Left eye exhibits no discharge  No scleral icterus  Pulmonary/Chest: Effort normal  No stridor  No respiratory distress  Abdominal: No distension  Musculoskeletal: LE edema  Neurological: Alert, oriented and appropriately conversant  Skin: Skin is dry, not diaphoretic  Psychiatric: Displays a normal mood and affect  Behavior, judgement and thought content appear normal    Vitals reviewed      "

## 2023-06-22 ENCOUNTER — TELEMEDICINE (OUTPATIENT)
Dept: RADIATION ONCOLOGY | Facility: HOSPITAL | Age: 68
End: 2023-06-22

## 2023-06-22 DIAGNOSIS — C71.9 MALIGNANT NEOPLASM OF BRAIN, UNSPECIFIED LOCATION (HCC): Primary | ICD-10-CM

## 2023-06-22 PROCEDURE — 99024 POSTOP FOLLOW-UP VISIT: CPT | Performed by: INTERNAL MEDICINE

## 2023-06-22 NOTE — PROGRESS NOTES
Virtual Brief Visit - Radiation Oncology   Cristi Sandifer 1955 74 y o  male [id number]      REQUIRED DOCUMENTATION:   1  This service was provided via Telemedicine  2  Provider located at   [address] [place], [place] [id number]  3  TeleMed provider: Alvaro Vences MD   4  Identify all parties in room with patient during tele consult:  His wife  5 Patient was then informed that this was a Telemedicine visit and that the exam was being conducted confidentially over secure lines  My office door was closed  No one else was in the room  Patient acknowledged consent and understanding of privacy and security of the Telemedicine visit, and gave us permission to have the assistant stay in the room in order to assist with the history and to conduct the exam   I informed the patient that I have reviewed their record in Epic and presented the opportunity for them to ask any questions regarding the visit today  The patient agreed to participate  Cancer Staging   Non-small cell cancer of right lung [facility])  Staging form: Lung, AJCC 8th Edition  - Clinical stage from 3/29/2023: Stage DORI (cT4, cN1, cM1b) - Signed by Teena Rm DO on 6/16/2023    Assessment/Plan:  Cristi Sandifer is a 74 y o  male initially admitted due to right upper extremity symptoms  With CT and MRI imaging notable for a 3 1 cm right upper lobe mass adjacent to the ribs with concern for extension to the epidural space, with thoracic spine MRI notable for an enhancing lesion involving the right T2 vertebral body, with involvement of adjacent posterior right first through third ribs, with the apical lung lesion extending into the right paravertebral soft tissue with involvement of the right anterior, lateral, and right posterior aspect of the epidural space spanning from T2 through mid T3, with effacement of the right T2-T3 foramen  There is also small lesion at the posterior T1 vertebral body  An MRI of the brain was obtained on 4/1/2023 which shows 6 metastatic lesions both in the cerebrum (bilaterally) and cerebellum  He was asymptomatic from the brain metastases  He underwent palliative radiation therapy to the right upper lobe mass/ low cervical upper thoracic spine as well as whole brain radiation therapy, completing on 5/12/2023  He presents for routine end of treatment follow-up visit  He notes that he has been able to wean off of narcotic pain medication and only requires an occasional Tylenol for pain relief  He had expected alopecia, and shaved his head  He notes intermittent difficulty with walking, or pins-and-needles in his lower extremities  He describes generalized bloating and globus sensation in the throat  I reviewed that some of these side effects may be related to being on steroids and suggested antiacid measures  There is a plan to taper his steroids  I reviewed that memantine is unlikely to be causing these symptoms with its mild side effect profile  That being said he can also discontinue this if he wishes  Continue systemic therapy  6/20/23 Palliative care  6/30/23 Dr Hoyt Libman follow-up  7/3/23 Infusion  RTC in 6 months  No orders of the defined types were placed in this encounter  History of Present Illness   Interval History:  Previously admitted in April 2023 due to right upper extremity symptoms  CT and MRI imaging revealed a 3 1 cm right upper lobe mass adjacent to the ribs with concern for extension to the epidural space, metastatic disease involving the bone and brain  He underwent RUL lung biopsy on 4/26/23 with pathology consistent with Poorly differentiated non-small cell carcinoma most compatible with adenocarcinoma of the lung  He completed palliative radiation on 5/12/23, 10 fractions to whole brain and 10 fractions to C7-T4 and right lung       5/4/23 Dr Sun Show - reviewed Memantine for cognitive preservation during/after whole brain radiation with hippocampal sparing  Patient agreeable to start  Last CMP was in March, patient given instructions to obtain updated labwork today prior to initiating  5/9/23 Med Onc, Dr Amandeep Lewis  Recommend systemic therapy with Carbo/Alimta/Keytruda   Gave him a weaning schedule for the gabapentin to see if this helps with the tremor in his right hand  Taking him off of coumadin and starting eliquis  5/16/23 IR port placement     5/22/23 Palliative care  Will trial re-time decadron as well as melatonin 10mg OTC and trazodone 50mg qHS for insomnia  Refill provided for oxycodone  Close follow-up in 4-6 wks  5/22/23 Infusion, Carbo/Alimta/Keytruda C1    6/6/23 Dr Amandeep Lewis  He has tolerated the first cycle of treatment well  We will continue without dose reduction  His pain is currently under control  Historical Information   Oncology History   Thoracic spine tumor   4/2/2023 Initial Diagnosis    Thoracic spine tumor     Non-small cell cancer of right lung ([facility] [address] )   3/29/2023 -  Cancer Staged    Staging form: Lung, AJCC 8th Edition  - Clinical stage from 3/29/2023: Stage DORI (cT4, cN1, cM1b) - Signed by Apple Lacy DO on 6/16/2023 4/6/2023 Biopsy    Lung, right upper lobe, biopsy:     - Rare detached single and small clusters of atypical, degenerated epithelial cells  - Predominantly fibroelastotic stromal fibrosis       4/26/2023 Biopsy    Lung, right upper lobe mass, biopsy:      - Poorly differentiated non-small cell carcinoma most compatible with adenocarcinoma of the lung     5/1/2023 - 5/12/2023 Radiation    Course: C1    Plan ID Energy Fractions Dose per Fraction (cGy) Dose Correction (cGy) Total Dose Delivered (cGy) Elapsed Days   C7_T4_R Lung 10X/6X 10 / 10 300 0 3,000 11   HCS_WhBrain 6X 10 / 10 300 0 3,000 11      Dr Larry Schneider     5/9/2023 Initial Diagnosis    Non-small cell cancer of right lung ([facility] [address] )     5/22/2023 -  Chemotherapy    cyanocobalamin, 1,000 mcg, Intramuscular, Once, 1 of 3 cycles  Administration: 1,000 mcg (5/15/2023)  alteplase (CATHFLO), 2 mg, Intracatheter, Every 1 Minute as needed, 2 of 6 cycles  fosaprepitant (EMEND) IVPB, 150 mg, Intravenous, Once, 2 of 6 cycles  Administration: 150 mg (5/22/2023)  CARBOplatin (PARAPLATIN) IVPB (GOG AUC DOSING), 544 mg, Intravenous, Once, 2 of 6 cycles  Administration: 544 mg (5/22/2023)  pemetrexed (ALIMTA) chemo infusion, 940 mg, Intravenous, Once, 2 of 6 cycles  Administration: 900 mg (5/22/2023)  pembrolizumab (KEYTRUDA) IVPB, 200 mg, Intravenous, Once, 2 of 6 cycles  Administration: 200 mg (5/22/2023)       No past medical history on file    Past Surgical History:   Procedure Laterality Date   • IR BIOPSY LUNG  4/6/2023   • IR BIOPSY LUNG  4/26/2023   • IR PORT PLACEMENT  5/16/2023     Social History   Social History     Substance and Sexual Activity   Alcohol Use Yes     Social History     Substance and Sexual Activity   Drug Use Not Currently     Social History     Tobacco Use   Smoking Status Every Day   • Packs/day: 0 25   • Years: 35 00   • Total pack years: 8 75   • Types: Cigarettes   • Start date: 1/1/1973   Smokeless Tobacco Never   Tobacco Comments    Patient is trying to quit,       Meds/Allergies     Current Outpatient Medications:   •  acetaminophen (TYLENOL) 500 mg tablet, Take 500 mg by mouth every 4 (four) hours as needed for mild pain, Disp: , Rfl:   •  apixaban (ELIQUIS) 5 mg, Take 1 tablet (5 mg total) by mouth 2 (two) times a day, Disp: 60 tablet, Rfl: 5  •  dexamethasone (DECADRON) 4 mg tablet, Take 0 5 tablets (2 mg total) by mouth 2 (two) times a day with meals, Disp: 30 tablet, Rfl: 0  •  folic acid (FOLVITE) 1 mg tablet, TAKE 1 TABLET BY MOUTH EVERY DAY, Disp: 90 tablet, Rfl: 3  •  gabapentin (NEURONTIN) 300 mg capsule, Patient takes 300mg in the am 600mg  pm only, Disp: , Rfl:   •  lidocaine-prilocaine (EMLA) cream, Apply to port 30 to 60 minutes prior to use, Disp: 30 g, Rfl: 2  • "memantine (Namenda) 10 mg tablet, Take 1 tablet (10 mg total) by mouth 2 (two) times a day Continue with bloodwork (complete metabolic panel) every 8 weeks, Disp: 60 tablet, Rfl: 2  •  nicotine (NICODERM CQ) 14 mg/24hr TD 24 hr patch, Place 1 patch on the skin over 24 hours every 24 hours, Disp: 28 patch, Rfl: 0  •  ondansetron (ZOFRAN) 8 mg tablet, Take 1 tablet (8 mg total) by mouth every 8 (eight) hours as needed for nausea or vomiting, Disp: 30 tablet, Rfl: 3  •  oxyCODONE (Roxicodone) 5 immediate release tablet, Take 1 tablet (5 mg total) by mouth every 4 (four) hours as needed for moderate pain or severe pain Max Daily Amount: 30 mg, Disp: 120 tablet, Rfl: 0  •  prochlorperazine (COMPAZINE) 10 mg tablet, Take 1 tablet (10 mg total) by mouth every 6 (six) hours as needed for nausea or vomiting, Disp: 30 tablet, Rfl: 2  •  traZODone (DESYREL) 50 mg tablet, TAKE 1 TABLET BY MOUTH DAILY AT BEDTIME, Disp: 90 tablet, Rfl: 1  No Known Allergies    OBJECTIVE:   Physical exam limited over telephone encounter  Andres Dominguez MD  6/22/2023,2:36 PM    VIRTUAL VISIT DISCLAIMER  Patient verbally agrees to participate in [facility]  Pt is aware that [facility] could be limited without vital signs or the ability to perform a full hands-on physical exam  Patient understands that the patient or the provider may request at any time to terminate the video visit and request the patient to seek care or treatment in person  Portions of the record may have been created with voice recognition software  Occasional wrong word or \"sound a like\" substitutions may have occurred due to the inherent limitations of voice recognition software  Read the chart carefully and recognize, using context, where substitutions have occurred    "

## 2023-06-30 ENCOUNTER — HOSPITAL ENCOUNTER (OUTPATIENT)
Dept: INFUSION CENTER | Facility: HOSPITAL | Age: 68
End: 2023-06-30

## 2023-06-30 ENCOUNTER — TELEPHONE (OUTPATIENT)
Dept: INTERNAL MEDICINE CLINIC | Facility: OTHER | Age: 68
End: 2023-06-30

## 2023-06-30 ENCOUNTER — OFFICE VISIT (OUTPATIENT)
Dept: HEMATOLOGY ONCOLOGY | Facility: CLINIC | Age: 68
End: 2023-06-30

## 2023-06-30 VITALS
WEIGHT: 165 LBS | SYSTOLIC BLOOD PRESSURE: 130 MMHG | OXYGEN SATURATION: 95 % | HEIGHT: 68 IN | HEART RATE: 68 BPM | TEMPERATURE: 97.8 F | BODY MASS INDEX: 25.01 KG/M2 | DIASTOLIC BLOOD PRESSURE: 82 MMHG

## 2023-06-30 DIAGNOSIS — G93.89 BRAIN MASS: ICD-10-CM

## 2023-06-30 DIAGNOSIS — C34.91 NON-SMALL CELL CANCER OF RIGHT LUNG (HCC): Primary | ICD-10-CM

## 2023-06-30 DIAGNOSIS — I26.94 MULTIPLE SUBSEGMENTAL PULMONARY EMBOLI WITHOUT ACUTE COR PULMONALE (HCC): ICD-10-CM

## 2023-06-30 LAB
ALBUMIN SERPL BCP-MCNC: 3 G/DL (ref 3.5–5)
ALP SERPL-CCNC: 38 U/L (ref 46–116)
ALT SERPL W P-5'-P-CCNC: 63 U/L (ref 12–78)
ANION GAP SERPL CALCULATED.3IONS-SCNC: 4 MMOL/L
ANISOCYTOSIS BLD QL SMEAR: PRESENT
AST SERPL W P-5'-P-CCNC: 28 U/L (ref 5–45)
BASOPHILS # BLD MANUAL: 0 THOUSAND/UL (ref 0–0.1)
BASOPHILS NFR MAR MANUAL: 0 % (ref 0–1)
BILIRUB SERPL-MCNC: 0.4 MG/DL (ref 0.2–1)
BUN SERPL-MCNC: 28 MG/DL (ref 5–25)
CALCIUM ALBUM COR SERPL-MCNC: 9.4 MG/DL (ref 8.3–10.1)
CALCIUM SERPL-MCNC: 8.6 MG/DL (ref 8.3–10.1)
CHLORIDE SERPL-SCNC: 112 MMOL/L (ref 96–108)
CO2 SERPL-SCNC: 25 MMOL/L (ref 21–32)
CREAT SERPL-MCNC: 0.86 MG/DL (ref 0.6–1.3)
EOSINOPHIL # BLD MANUAL: 0 THOUSAND/UL (ref 0–0.4)
EOSINOPHIL NFR BLD MANUAL: 0 % (ref 0–6)
ERYTHROCYTE [DISTWIDTH] IN BLOOD BY AUTOMATED COUNT: 17.3 % (ref 11.6–15.1)
GFR SERPL CREATININE-BSD FRML MDRD: 90 ML/MIN/1.73SQ M
GLUCOSE SERPL-MCNC: 98 MG/DL (ref 65–140)
HCT VFR BLD AUTO: 35.5 % (ref 36.5–49.3)
HGB BLD-MCNC: 12.1 G/DL (ref 12–17)
INR PPP: 1.06 (ref 0.84–1.19)
LYMPHOCYTES # BLD AUTO: 0.64 THOUSAND/UL (ref 0.6–4.47)
LYMPHOCYTES # BLD AUTO: 11 % (ref 14–44)
MACROCYTES BLD QL AUTO: PRESENT
MCH RBC QN AUTO: 33.8 PG (ref 26.8–34.3)
MCHC RBC AUTO-ENTMCNC: 34.1 G/DL (ref 31.4–37.4)
MCV RBC AUTO: 99 FL (ref 82–98)
METAMYELOCYTES NFR BLD MANUAL: 4 % (ref 0–1)
MONOCYTES # BLD AUTO: 0.58 THOUSAND/UL (ref 0–1.22)
MONOCYTES NFR BLD: 10 % (ref 4–12)
MYELOCYTES NFR BLD MANUAL: 6 % (ref 0–1)
NEUTROPHILS # BLD MANUAL: 3.65 THOUSAND/UL (ref 1.85–7.62)
NEUTS BAND NFR BLD MANUAL: 3 % (ref 0–8)
NEUTS SEG NFR BLD AUTO: 60 % (ref 43–75)
NRBC BLD AUTO-RTO: 3 /100 WBC (ref 0–2)
PLATELET # BLD AUTO: 166 THOUSANDS/UL (ref 149–390)
PLATELET BLD QL SMEAR: ADEQUATE
PMV BLD AUTO: 8.9 FL (ref 8.9–12.7)
POLYCHROMASIA BLD QL SMEAR: PRESENT
POTASSIUM SERPL-SCNC: 4.2 MMOL/L (ref 3.5–5.3)
PROMYELOCYTES NFR BLD MANUAL: 1 % (ref 0–0)
PROT SERPL-MCNC: 6.6 G/DL (ref 6.4–8.4)
PROTHROMBIN TIME: 14 SECONDS (ref 11.6–14.5)
RBC # BLD AUTO: 3.58 MILLION/UL (ref 3.88–5.62)
RBC MORPH BLD: PRESENT
SODIUM SERPL-SCNC: 141 MMOL/L (ref 135–147)
T3FREE SERPL-MCNC: 2.29 PG/ML (ref 2.5–3.9)
TSH SERPL DL<=0.05 MIU/L-ACNC: 1.3 UIU/ML (ref 0.45–4.5)
VARIANT LYMPHS # BLD AUTO: 5 %
WBC # BLD AUTO: 5.8 THOUSAND/UL (ref 4.31–10.16)

## 2023-06-30 PROCEDURE — 99214 OFFICE O/P EST MOD 30 MIN: CPT | Performed by: INTERNAL MEDICINE

## 2023-06-30 PROCEDURE — 85007 BL SMEAR W/DIFF WBC COUNT: CPT | Performed by: INTERNAL MEDICINE

## 2023-06-30 PROCEDURE — 85027 COMPLETE CBC AUTOMATED: CPT | Performed by: INTERNAL MEDICINE

## 2023-06-30 PROCEDURE — 84443 ASSAY THYROID STIM HORMONE: CPT | Performed by: INTERNAL MEDICINE

## 2023-06-30 PROCEDURE — 85610 PROTHROMBIN TIME: CPT | Performed by: INTERNAL MEDICINE

## 2023-06-30 PROCEDURE — 84481 FREE ASSAY (FT-3): CPT | Performed by: INTERNAL MEDICINE

## 2023-06-30 PROCEDURE — 80053 COMPREHEN METABOLIC PANEL: CPT | Performed by: INTERNAL MEDICINE

## 2023-06-30 RX ORDER — SODIUM CHLORIDE 9 MG/ML
20 INJECTION, SOLUTION INTRAVENOUS ONCE
Status: CANCELLED | OUTPATIENT
Start: 2023-07-03

## 2023-06-30 RX ORDER — CYANOCOBALAMIN 1000 UG/ML
1000 INJECTION, SOLUTION INTRAMUSCULAR; SUBCUTANEOUS ONCE
Status: CANCELLED | OUTPATIENT
Start: 2023-07-03 | End: 2023-07-03

## 2023-06-30 NOTE — TELEPHONE ENCOUNTER
Spoke with Clive Robles is getting labs drawn every 3 weeks for chemo and they are drawing his PT/INR. This is not needed anymore, but since being on Eliquis. But there was a standing order in from him previously being on coumadin. Dr. Jewels Wu are you able to delete this order, I cannot. Thank you!

## 2023-06-30 NOTE — TELEPHONE ENCOUNTER
Hi, this is [employer] calling. Regarding Marcela Lopez, I've been trying to get in touch with Martin Loud about some blood work that has been on the schedule. So if you could return our call [phone], that'd be great. Thanks so much.

## 2023-07-02 NOTE — PROGRESS NOTES
HEMATOLOGY / ONCOLOGY CLINIC FOLLOW UP NOTE    Primary Care Provider: No primary care provider on file. Referring Provider:    MRN: [id number]  : 1955    Reason for Encounter: follow up stage IV adenocarcinoma of the lung       Oncology History Overview Note   2023 - stage IV poorly differentiated adenocarcinoma of the RUL with mets to brain and bone     2023 - palliative RT to T spine and brain     2023 - start carbo/pemetrexed/pembrolizumab     Thoracic spine tumor   2023 Initial Diagnosis    Thoracic spine tumor     Non-small cell cancer of right lung ([address])   3/29/2023 -  Cancer Staged    Staging form: Lung, AJCC 8th Edition  - Clinical stage from 3/29/2023: Stage DORI (cT4, cN1, cM1b) - Signed by Anjelica David DO on 2023 Biopsy    Lung, right upper lobe, biopsy:     - Rare detached single and small clusters of atypical, degenerated epithelial cells. - Predominantly fibroelastotic stromal fibrosis.      2023 Biopsy    Lung, right upper lobe mass, biopsy:      - Poorly differentiated non-small cell carcinoma most compatible with adenocarcinoma of the lung     2023 - 2023 Radiation    Course: C1    Plan ID Energy Fractions Dose per Fraction (cGy) Dose Correction (cGy) Total Dose Delivered (cGy) Elapsed Days   [id number]_R Lung 10X/6X 10 / 10 300 0 3,000 11   HCS_WhBrain 6X 10 / 10 300 0 3,000 11      Dr. Umm Maria     2023 Initial Diagnosis    Non-small cell cancer of right lung ([address])     2023 -  Chemotherapy    cyanocobalamin, 1,000 mcg, Intramuscular, Once, 1 of 3 cycles  Administration: 1,000 mcg (5/15/2023)  alteplase (CATHFLO), 2 mg, Intracatheter, Every 1 Minute as needed, 2 of 6 cycles  fosaprepitant (EMEND) IVPB, 150 mg, Intravenous, Once, 2 of 6 cycles  Administration: 150 mg (2023), 150 mg (2023)  CARBOplatin (PARAPLATIN) IVPB (GOG AUC DOSING), 544 mg, Intravenous, Once, 2 of 6 cycles  Administration: 544 mg (2023), 544 mg (6/12/2023)  pemetrexed (ALIMTA) chemo infusion, 940 mg, Intravenous, Once, 2 of 6 cycles  Administration: 900 mg (5/22/2023), 900 mg (6/12/2023)  pembrolizumab (KEYTRUDA) IVPB, 200 mg, Intravenous, Once, 2 of 6 cycles  Administration: 200 mg (5/22/2023), 200 mg (6/12/2023)     Metastasis to brain ([address])   5/30/2023 Initial Diagnosis    Metastasis to brain [facility])         Interval History: Patient presents for follow up of his stage IV adenocarcinoma of the lung. He is due for cycle 3 of carboplatin, pemetrexed, pembrolizumab. He is tolerating treatment very well. His pain in the midthoracic spine is completely resolved. He is not taking any oxycodone anymore. He denies any fevers or infections. No nausea or vomiting. No diarrhea. No rash. He is having lower extremity edema and weight gain from being on dexamethasone. It looks like this is also been managed by Dr. Cassandra Hernandez after he was discharged from the hospital.         REVIEW OF SYSTEMS:  Please note that a 14-point review of systems was performed to include Constitutional, HEENT, Respiratory, CVS, GI, , Musculoskeletal, Integumentary, Neurologic, Rheumatologic, Endocrinologic, Psychiatric, Lymphatic, and Hematologic/Oncologic systems were reviewed and are negative unless otherwise stated in HPI. Positive and negative findings pertinent to this evaluation are incorporated into the history of present illness. ECOG PS: 0    PROBLEM LIST:  Patient Active Problem List   Diagnosis   • Multiple subsegmental pulmonary emboli without acute cor pulmonale (HCC)   • Tobacco use   • DDD (degenerative disc disease), cervical   • Pleural mass   • Brain lesions   • Thoracic spine tumor   • Cancer associated pain   • Advanced care planning/counseling discussion   • Non-small cell cancer of right lung ([address])   • Metastasis to brain [facility])       Assessment / Plan: 72-year-old male with stage IV adenocarcinoma of the lung.   He is tolerating treatment very well and I will see him prior to cycle 4 with a CT of the chest abdomen pelvis and MRI of the brain prior to assess disease response to treatment. I will reach out to Dr. Yuriy Kaba about discussing weaning his  Dexamethasone. We also discussed the amount of activity that would be safe for him. I recommended that he keep his lifting to less than 20 pounds and avoid any activities where he might fall and sustain any fractures. Overall he is doing very well. I spent 30 minutes on chart review, face to face counseling time, coordination of care and documentation. Past Medical History:   has no past medical history on file. PAST SURGICAL HISTORY:   has a past surgical history that includes IR biopsy lung (4/6/2023); IR biopsy lung (4/26/2023); and IR port placement (5/16/2023).     CURRENT MEDICATIONS  Current Outpatient Medications   Medication Sig Dispense Refill   • acetaminophen (TYLENOL) 500 mg tablet Take 500 mg by mouth every 4 (four) hours as needed for mild pain     • apixaban (ELIQUIS) 5 mg Take 1 tablet (5 mg total) by mouth 2 (two) times a day 60 tablet 5   • dexamethasone (DECADRON) 4 mg tablet Take 0.5 tablets (2 mg total) by mouth 2 (two) times a day with meals 30 tablet 0   • folic acid (FOLVITE) 1 mg tablet TAKE 1 TABLET BY MOUTH EVERY DAY 90 tablet 3   • gabapentin (NEURONTIN) 300 mg capsule Patient takes 300mg in the am 600mg  pm only     • lidocaine-prilocaine (EMLA) cream Apply to port 30 to 60 minutes prior to use 30 g 2   • memantine (Namenda) 10 mg tablet Take 1 tablet (10 mg total) by mouth 2 (two) times a day Continue with bloodwork (complete metabolic panel) every 8 weeks 60 tablet 2   • nicotine (NICODERM CQ) 14 mg/24hr TD 24 hr patch Place 1 patch on the skin over 24 hours every 24 hours 28 patch 0   • ondansetron (ZOFRAN) 8 mg tablet Take 1 tablet (8 mg total) by mouth every 8 (eight) hours as needed for nausea or vomiting 30 tablet 3   • oxyCODONE (Roxicodone) 5 immediate release tablet Take 1 tablet (5 mg total) by mouth every 4 (four) hours as needed for moderate pain or severe pain Max Daily Amount: 30 mg 120 tablet 0   • prochlorperazine (COMPAZINE) 10 mg tablet Take 1 tablet (10 mg total) by mouth every 6 (six) hours as needed for nausea or vomiting 30 tablet 2   • traZODone (DESYREL) 50 mg tablet TAKE 1 TABLET BY MOUTH DAILY AT BEDTIME (Patient not taking: Reported on 6/30/2023) 90 tablet 1     No current facility-administered medications for this visit. [unfilled]    SOCIAL HISTORY:   reports that he has been smoking cigarettes. He started smoking about 50 years ago. He has a 8.75 pack-year smoking history. He has never used smokeless tobacco. He reports current alcohol use. He reports that he does not currently use drugs. FAMILY HISTORY:  family history is not on file. ALLERGIES:  has No Known Allergies. Physical Exam:  Vital Signs:   Visit Vitals  /82   Pulse 68   Temp 97.8 °F (36.6 °C) (Temporal)   Ht 5' 8" (1.727 m)   Wt 74.8 kg (165 lb)   SpO2 95%   BMI 25.09 kg/m²   Smoking Status Every Day   BSA 1.88 m²     Body mass index is 25.09 kg/m². Body surface area is 1.88 meters squared. GEN: Alert, awake oriented x3, in no acute distress  HEENT- No pallor, icterus, cyanosis, no oral mucosal lesions,   LAD - no palpable cervical, clavicle, axillary, inguinal LAD  Heart- normal S1 S2, regular rate and rhythm, No murmur, rubs.    Lungs- clear breathing sound bilateral.   Abdomen- soft, Non tender, bowel sounds present  Extremities- No cyanosis, clubbing, 2+ b/l LE edema  Neuro- No focal neurological deficit    Labs:  Lab Results   Component Value Date    WBC 5.80 06/30/2023    HGB 12.1 06/30/2023    HCT 35.5 (L) 06/30/2023    MCV 99 (H) 06/30/2023     06/30/2023     Lab Results   Component Value Date    SODIUM 141 06/30/2023    K 4.2 06/30/2023     (H) 06/30/2023    CO2 25 06/30/2023    AGAP 4 06/30/2023    BUN 28 (H) 06/30/2023    CREATININE 0.86 06/30/2023    GLUC 98 06/30/2023    GLUF 103 (H) 05/10/2023    CALCIUM 8.6 06/30/2023    AST 28 06/30/2023    ALT 63 06/30/2023    ALKPHOS 38 (L) 06/30/2023    TP 6.6 06/30/2023    TBILI 0.40 06/30/2023    EGFR 90 06/30/2023

## 2023-07-03 ENCOUNTER — TELEPHONE (OUTPATIENT)
Dept: HEMATOLOGY ONCOLOGY | Facility: CLINIC | Age: 68
End: 2023-07-03

## 2023-07-03 ENCOUNTER — HOSPITAL ENCOUNTER (OUTPATIENT)
Dept: INFUSION CENTER | Facility: HOSPITAL | Age: 68
Discharge: HOME | End: 2023-07-03
Attending: INTERNAL MEDICINE
Payer: COMMERCIAL

## 2023-07-03 VITALS
WEIGHT: 158.51 LBS | BODY MASS INDEX: 24.02 KG/M2 | SYSTOLIC BLOOD PRESSURE: 120 MMHG | HEIGHT: 68 IN | RESPIRATION RATE: 19 BRPM | HEART RATE: 88 BPM | OXYGEN SATURATION: 99 % | DIASTOLIC BLOOD PRESSURE: 79 MMHG | TEMPERATURE: 97.7 F

## 2023-07-03 DIAGNOSIS — C34.91 NON-SMALL CELL CANCER OF RIGHT LUNG (HCC): Primary | ICD-10-CM

## 2023-07-03 RX ORDER — CYANOCOBALAMIN 1000 UG/ML
1000 INJECTION, SOLUTION INTRAMUSCULAR; SUBCUTANEOUS ONCE
Status: COMPLETED | OUTPATIENT
Start: 2023-07-03 | End: 2023-07-03

## 2023-07-03 RX ORDER — SODIUM CHLORIDE 9 MG/ML
20 INJECTION, SOLUTION INTRAVENOUS ONCE
Status: COMPLETED | OUTPATIENT
Start: 2023-07-03 | End: 2023-07-03

## 2023-07-03 RX ADMIN — FOSAPREPITANT 150 MG: 150 INJECTION, POWDER, LYOPHILIZED, FOR SOLUTION INTRAVENOUS at 09:54

## 2023-07-03 RX ADMIN — SODIUM CHLORIDE 20 ML/HR: 0.9 INJECTION, SOLUTION INTRAVENOUS at 09:21

## 2023-07-03 RX ADMIN — PEMETREXED DISODIUM 900 MG: 500 INJECTION, POWDER, LYOPHILIZED, FOR SOLUTION INTRAVENOUS at 11:23

## 2023-07-03 RX ADMIN — CYANOCOBALAMIN 1000 MCG: 1000 INJECTION, SOLUTION INTRAMUSCULAR at 12:45

## 2023-07-03 RX ADMIN — CARBOPLATIN 554 MG: 10 INJECTION, SOLUTION INTRAVENOUS at 11:37

## 2023-07-03 RX ADMIN — DEXAMETHASONE SODIUM PHOSPHATE: 10 INJECTION, SOLUTION INTRAMUSCULAR; INTRAVENOUS at 09:20

## 2023-07-03 RX ADMIN — SODIUM CHLORIDE 200 MG: 9 INJECTION, SOLUTION INTRAVENOUS at 10:30

## 2023-07-03 NOTE — TELEPHONE ENCOUNTER
I spoke with Dr. Aliza Edwards who is in agreement with the dexamethasone wean. I spoke with Denver Pointer and asked him to cut his dexamethasone dose back to 2 mg daily for 7 days and then stop it altogether. I told him that if he starts to feel fatigue or not well after the dexamethasone is stopped to let me know. We may need to wean a little slower than this. He is eager to get off of it so we will go with this weaning schedule for now.

## 2023-07-08 ENCOUNTER — HOSPITAL ENCOUNTER (OUTPATIENT)
Dept: RADIOLOGY | Facility: HOSPITAL | Age: 68
Discharge: HOME | End: 2023-07-08
Attending: INTERNAL MEDICINE

## 2023-07-08 DIAGNOSIS — C34.91 NON-SMALL CELL CANCER OF RIGHT LUNG (HCC): Primary | ICD-10-CM

## 2023-07-08 DIAGNOSIS — G89.3 CANCER ASSOCIATED PAIN: ICD-10-CM

## 2023-07-08 DIAGNOSIS — M50.30 DDD (DEGENERATIVE DISC DISEASE), CERVICAL: ICD-10-CM

## 2023-07-08 DIAGNOSIS — C34.91 NON-SMALL CELL CANCER OF RIGHT LUNG (HCC): ICD-10-CM

## 2023-07-08 DIAGNOSIS — D49.2 THORACIC SPINE TUMOR: ICD-10-CM

## 2023-07-08 PROCEDURE — G1004 CDSM NDSC: HCPCS

## 2023-07-08 PROCEDURE — 74177 CT ABD & PELVIS W/CONTRAST: CPT

## 2023-07-08 PROCEDURE — 71260 CT THORAX DX C+: CPT

## 2023-07-08 RX ADMIN — IOHEXOL 100 ML: 350 INJECTION, SOLUTION INTRAVENOUS at 10:43

## 2023-07-10 RX ORDER — GABAPENTIN 300 MG/1
CAPSULE ORAL
Qty: 90 CAPSULE | Refills: 1 | Status: SHIPPED | OUTPATIENT
Start: 2023-07-10

## 2023-07-12 ENCOUNTER — HOSPITAL ENCOUNTER (INPATIENT)
Facility: HOSPITAL | Age: 68
LOS: 2 days | Discharge: HOME | DRG: 180 | End: 2023-07-14
Attending: EMERGENCY MEDICINE | Admitting: INTERNAL MEDICINE
Payer: MEDICARE

## 2023-07-12 ENCOUNTER — APPOINTMENT (EMERGENCY)
Dept: RADIOLOGY | Facility: HOSPITAL | Age: 68
DRG: 180 | End: 2023-07-12
Payer: MEDICARE

## 2023-07-12 ENCOUNTER — TELEPHONE (OUTPATIENT)
Age: 68
End: 2023-07-12

## 2023-07-12 DIAGNOSIS — C79.31 METASTASIS TO BRAIN (HCC): ICD-10-CM

## 2023-07-12 DIAGNOSIS — C34.91 NON-SMALL CELL CANCER OF RIGHT LUNG (HCC): Primary | ICD-10-CM

## 2023-07-12 DIAGNOSIS — R50.9 FEVER: ICD-10-CM

## 2023-07-12 DIAGNOSIS — C34.91 NON-SMALL CELL CANCER OF RIGHT LUNG (HCC): ICD-10-CM

## 2023-07-12 DIAGNOSIS — R91.1 LESION OF LUNG: Primary | ICD-10-CM

## 2023-07-12 DIAGNOSIS — D84.9 IMMUNOCOMPROMISED (HCC): ICD-10-CM

## 2023-07-12 DIAGNOSIS — G89.3 CANCER ASSOCIATED PAIN: ICD-10-CM

## 2023-07-12 PROBLEM — R53.83 FATIGUE: Status: ACTIVE | Noted: 2023-07-12

## 2023-07-12 LAB
ALBUMIN SERPL BCP-MCNC: 3.1 G/DL (ref 3.5–5)
ALP SERPL-CCNC: 43 U/L (ref 46–116)
ALT SERPL W P-5'-P-CCNC: 44 U/L (ref 12–78)
ANION GAP SERPL CALCULATED.3IONS-SCNC: 5 MMOL/L
APTT PPP: 27 SECONDS (ref 23–37)
AST SERPL W P-5'-P-CCNC: 17 U/L (ref 5–45)
BASOPHILS # BLD AUTO: 0.01 THOUSANDS/ÂΜL (ref 0–0.1)
BASOPHILS NFR BLD AUTO: 0 % (ref 0–1)
BILIRUB SERPL-MCNC: 0.8 MG/DL (ref 0.2–1)
BUN SERPL-MCNC: 20 MG/DL (ref 5–25)
CALCIUM ALBUM COR SERPL-MCNC: 9 MG/DL (ref 8.3–10.1)
CALCIUM SERPL-MCNC: 8.3 MG/DL (ref 8.3–10.1)
CHLORIDE SERPL-SCNC: 107 MMOL/L (ref 96–108)
CO2 SERPL-SCNC: 24 MMOL/L (ref 21–32)
CREAT SERPL-MCNC: 1.02 MG/DL (ref 0.6–1.3)
EOSINOPHIL # BLD AUTO: 0.01 THOUSAND/ÂΜL (ref 0–0.61)
EOSINOPHIL NFR BLD AUTO: 0 % (ref 0–6)
ERYTHROCYTE [DISTWIDTH] IN BLOOD BY AUTOMATED COUNT: 17.1 % (ref 11.6–15.1)
FLUAV RNA RESP QL NAA+PROBE: NEGATIVE
FLUBV RNA RESP QL NAA+PROBE: NEGATIVE
GFR SERPL CREATININE-BSD FRML MDRD: 76 ML/MIN/1.73SQ M
GLUCOSE SERPL-MCNC: 110 MG/DL (ref 65–140)
HCT VFR BLD AUTO: 32.1 % (ref 36.5–49.3)
HGB BLD-MCNC: 11.2 G/DL (ref 12–17)
IMM GRANULOCYTES # BLD AUTO: 0.05 THOUSAND/UL (ref 0–0.2)
IMM GRANULOCYTES NFR BLD AUTO: 2 % (ref 0–2)
INR PPP: 1.11 (ref 0.84–1.19)
LACTATE SERPL-SCNC: 1 MMOL/L (ref 0.5–2)
LYMPHOCYTES # BLD AUTO: 0.53 THOUSANDS/ÂΜL (ref 0.6–4.47)
LYMPHOCYTES NFR BLD AUTO: 15 % (ref 14–44)
MCH RBC QN AUTO: 34.4 PG (ref 26.8–34.3)
MCHC RBC AUTO-ENTMCNC: 34.9 G/DL (ref 31.4–37.4)
MCV RBC AUTO: 99 FL (ref 82–98)
MONOCYTES # BLD AUTO: 0.42 THOUSAND/ÂΜL (ref 0.17–1.22)
MONOCYTES NFR BLD AUTO: 12 % (ref 4–12)
NEUTROPHILS # BLD AUTO: 2.42 THOUSANDS/ÂΜL (ref 1.85–7.62)
NEUTS SEG NFR BLD AUTO: 71 % (ref 43–75)
NRBC BLD AUTO-RTO: 0 /100 WBCS
PLATELET # BLD AUTO: 87 THOUSANDS/UL (ref 149–390)
PMV BLD AUTO: 9.5 FL (ref 8.9–12.7)
POTASSIUM SERPL-SCNC: 4.1 MMOL/L (ref 3.5–5.3)
PROCALCITONIN SERPL-MCNC: 0.08 NG/ML
PROT SERPL-MCNC: 6.6 G/DL (ref 6.4–8.4)
PROTHROMBIN TIME: 14.5 SECONDS (ref 11.6–14.5)
RBC # BLD AUTO: 3.26 MILLION/UL (ref 3.88–5.62)
RSV RNA RESP QL NAA+PROBE: NEGATIVE
SARS-COV-2 RNA RESP QL NAA+PROBE: NEGATIVE
SODIUM SERPL-SCNC: 136 MMOL/L (ref 135–147)
WBC # BLD AUTO: 3.44 THOUSAND/UL (ref 4.31–10.16)

## 2023-07-12 PROCEDURE — 36415 COLL VENOUS BLD VENIPUNCTURE: CPT

## 2023-07-12 PROCEDURE — 94760 N-INVAS EAR/PLS OXIMETRY 1: CPT

## 2023-07-12 PROCEDURE — 83605 ASSAY OF LACTIC ACID: CPT

## 2023-07-12 PROCEDURE — 85025 COMPLETE CBC W/AUTO DIFF WBC: CPT

## 2023-07-12 PROCEDURE — 85730 THROMBOPLASTIN TIME PARTIAL: CPT

## 2023-07-12 PROCEDURE — 93005 ELECTROCARDIOGRAM TRACING: CPT

## 2023-07-12 PROCEDURE — 0241U HB NFCT DS VIR RESP RNA 4 TRGT: CPT

## 2023-07-12 PROCEDURE — 71046 X-RAY EXAM CHEST 2 VIEWS: CPT

## 2023-07-12 PROCEDURE — 84145 PROCALCITONIN (PCT): CPT

## 2023-07-12 PROCEDURE — 99285 EMERGENCY DEPT VISIT HI MDM: CPT | Performed by: EMERGENCY MEDICINE

## 2023-07-12 PROCEDURE — 99285 EMERGENCY DEPT VISIT HI MDM: CPT

## 2023-07-12 PROCEDURE — 85610 PROTHROMBIN TIME: CPT

## 2023-07-12 PROCEDURE — 87040 BLOOD CULTURE FOR BACTERIA: CPT

## 2023-07-12 PROCEDURE — 80053 COMPREHEN METABOLIC PANEL: CPT

## 2023-07-12 PROCEDURE — 96365 THER/PROPH/DIAG IV INF INIT: CPT

## 2023-07-12 RX ORDER — ONDANSETRON 4 MG/1
4 TABLET, ORALLY DISINTEGRATING ORAL EVERY 8 HOURS PRN
Status: DISCONTINUED | OUTPATIENT
Start: 2023-07-12 | End: 2023-07-14 | Stop reason: HOSPADM

## 2023-07-12 RX ORDER — VANCOMYCIN HYDROCHLORIDE 1 G/200ML
15 INJECTION, SOLUTION INTRAVENOUS ONCE
Status: DISCONTINUED | OUTPATIENT
Start: 2023-07-12 | End: 2023-07-12

## 2023-07-12 RX ORDER — GABAPENTIN 300 MG/1
300 CAPSULE ORAL DAILY
Status: DISCONTINUED | OUTPATIENT
Start: 2023-07-13 | End: 2023-07-14 | Stop reason: HOSPADM

## 2023-07-12 RX ORDER — VANCOMYCIN HYDROCHLORIDE 1 G/200ML
15 INJECTION, SOLUTION INTRAVENOUS EVERY 12 HOURS
Status: DISCONTINUED | OUTPATIENT
Start: 2023-07-13 | End: 2023-07-12

## 2023-07-12 RX ORDER — VANCOMYCIN HYDROCHLORIDE 750 MG/150ML
750 INJECTION, SOLUTION INTRAVENOUS EVERY 12 HOURS
Status: DISCONTINUED | OUTPATIENT
Start: 2023-07-13 | End: 2023-07-13

## 2023-07-12 RX ORDER — ACETAMINOPHEN 325 MG/1
650 TABLET ORAL ONCE
Status: COMPLETED | OUTPATIENT
Start: 2023-07-12 | End: 2023-07-12

## 2023-07-12 RX ORDER — FOLIC ACID 1 MG/1
1000 TABLET ORAL DAILY
Status: DISCONTINUED | OUTPATIENT
Start: 2023-07-13 | End: 2023-07-14 | Stop reason: HOSPADM

## 2023-07-12 RX ORDER — GABAPENTIN 300 MG/1
600 CAPSULE ORAL
Status: DISCONTINUED | OUTPATIENT
Start: 2023-07-12 | End: 2023-07-14 | Stop reason: HOSPADM

## 2023-07-12 RX ORDER — ACETAMINOPHEN 325 MG/1
650 TABLET ORAL EVERY 8 HOURS PRN
Status: DISCONTINUED | OUTPATIENT
Start: 2023-07-12 | End: 2023-07-14 | Stop reason: HOSPADM

## 2023-07-12 RX ORDER — DEXAMETHASONE 2 MG/1
2 TABLET ORAL DAILY
Status: DISCONTINUED | OUTPATIENT
Start: 2023-07-13 | End: 2023-07-14 | Stop reason: HOSPADM

## 2023-07-12 RX ORDER — MEMANTINE HYDROCHLORIDE 10 MG/1
10 TABLET ORAL 2 TIMES DAILY
Status: DISCONTINUED | OUTPATIENT
Start: 2023-07-13 | End: 2023-07-14 | Stop reason: HOSPADM

## 2023-07-12 RX ADMIN — VANCOMYCIN HYDROCHLORIDE 1750 MG: 10 INJECTION, POWDER, LYOPHILIZED, FOR SOLUTION INTRAVENOUS at 21:22

## 2023-07-12 RX ADMIN — ACETAMINOPHEN 650 MG: 325 TABLET, FILM COATED ORAL at 19:22

## 2023-07-12 RX ADMIN — CEFEPIME 2000 MG: 2 INJECTION, POWDER, FOR SOLUTION INTRAVENOUS at 19:48

## 2023-07-12 RX ADMIN — GABAPENTIN 600 MG: 300 CAPSULE ORAL at 21:56

## 2023-07-12 NOTE — ED ATTENDING ATTESTATION
7/12/2023  IRossi MD, saw and evaluated the patient. I have discussed the patient with the resident/non-physician practitioner and agree with the resident's/non-physician practitioner's findings, Plan of Care, and MDM as documented in the resident's/non-physician practitioner's note, except where noted. All available labs and Radiology studies were reviewed. I was present for key portions of any procedure(s) performed by the resident/non-physician practitioner and I was immediately available to provide assistance. At this point I agree with the current assessment done in the Emergency Department. I have conducted an independent evaluation of this patient a history and physical is as follows:    Chief Complaint   Patient presents with   • Fever Immunocompromised     Fever since Sunday, pain in back of neck and headache, feels tired and "off", no chest pain, no SOB      29-year-old gentleman with past medical history of lung cancer currently on chemotherapy every 3 weeks, presenting to the emergency department with fatigue over the past 2 to 3 days, as well as feeling warm for 2 to 3 days. Today, patient's temperature was measured and was 100.4. Patient has been on dexamethasone for brain metastases but has been getting symptoms including lower extremity edema and other side effects. Over the past 2 weeks, his Decadron was tapered down, last dose was yesterday. When he developed symptoms today, he received another dose of Decadron 2 mg. Patient notes baseline cough, nonproductive, not worse than usual.  No shortness of breath. No chest pain. No nausea, vomiting, abdominal pain, or diarrhea. Patient has had a good appetite. No burning with urination. No headache at present. Overall, at the moment, patient feels reasonably well. Patient presents with his fiancée.       Patient is on chemotherapy (pembrolizumab Tam Ruizin), pemetrexed (Alimta) and carboplatin), last chemo therapy on July 3.    Patient has had some difficulties with short-term memory, occasionally forgetting things that he and fiancé spoke about, however, this is not worse than usual and he has not been confused. Patient underwent CT of chest, abdomen, and pelvis with IV contrast on 7/8, and results have just become available today. 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 /68   Pulse 84   Temp 100.2 °F (37.9 °C) (Temporal)   Resp 16   Wt 72.6 kg (160 lb)   SpO2 95%   BMI 24.33 kg/m²      Vital signs and nursing notes reviewed    CONSTITUTIONAL: male appearing stated age resting in bed, in no acute distress  HEENT: atraumatic, normocephalic. Sclera anicteric, conjunctiva are not injected. Moist oral mucosa  CARDIOVASCULAR/CHEST: RRR, no M/R/G. 2+ radial pulses  PULMONARY: Breathing comfortably on RA. Breath sounds are equal and clear to auscultation  ABDOMEN: non-distended. BS present, normoactive. Non-tender  MSK: moves all extremities, no deformities, no peripheral edema, no calf asymmetry  NEURO: Awake, alert, and oriented x 3. Face symmetric. Moves all extremities spontaneously.  No focal neurologic deficits  SKIN: Warm, appears well-perfused  MENTAL STATUS: Normal affect    XR chest 2 views    (Results Pending)     Labs Reviewed   CBC AND DIFFERENTIAL - Abnormal       Result Value Ref Range Status    WBC 3.44 (*) 4.31 - 10.16 Thousand/uL Final    RBC 3.26 (*) 3.88 - 5.62 Million/uL Final    Hemoglobin 11.2 (*) 12.0 - 17.0 g/dL Final    Hematocrit 32.1 (*) 36.5 - 49.3 % Final    MCV 99 (*) 82 - 98 fL Final    MCH 34.4 (*) 26.8 - 34.3 pg Final    MCHC 34.9  31.4 - 37.4 g/dL Final    RDW 17.1 (*) 11.6 - 15.1 % Final    MPV 9.5  8.9 - 12.7 fL Final    Platelets 87 (*) 293 - 390 Thousands/uL Final    Comment: PLT Reviewed 7/12/23 ksh    nRBC 0  /100 WBCs Final    Neutrophils Relative 71  43 - 75 % Final    Immat GRANS % 2  0 - 2 % Final    Lymphocytes Relative 15  14 - 44 % Final    Monocytes Relative 12  4 - 12 % Final    Eosinophils Relative 0  0 - 6 % Final    Basophils Relative 0  0 - 1 % Final    Neutrophils Absolute 2.42  1.85 - 7.62 Thousands/µL Final    Immature Grans Absolute 0.05  0.00 - 0.20 Thousand/uL Final    Lymphocytes Absolute 0.53 (*) 0.60 - 4.47 Thousands/µL Final    Monocytes Absolute 0.42  0.17 - 1.22 Thousand/µL Final    Eosinophils Absolute 0.01  0.00 - 0.61 Thousand/µL Final    Basophils Absolute 0.01  0.00 - 0.10 Thousands/µL Final   COMPREHENSIVE METABOLIC PANEL - Abnormal    Sodium 136  135 - 147 mmol/L Final    Potassium 4.1  3.5 - 5.3 mmol/L Final    Chloride 107  96 - 108 mmol/L Final    CO2 24  21 - 32 mmol/L Final    ANION GAP 5  mmol/L Final    BUN 20  5 - 25 mg/dL Final    Creatinine 1.02  0.60 - 1.30 mg/dL Final    Comment: Standardized to IDMS reference method    Glucose 110  65 - 140 mg/dL Final    Comment: If the patient is fasting, the ADA then defines impaired fasting glucose as > 100 mg/dL and diabetes as > or equal to 123 mg/dL. Specimen collection should occur prior to Sulfasalazine administration due to the potential for falsely depressed results. Specimen collection should occur prior to Sulfapyridine administration due to the potential for falsely elevated results. Calcium 8.3  8.3 - 10.1 mg/dL Final    Corrected Calcium 9.0  8.3 - 10.1 mg/dL Final    AST 17  5 - 45 U/L Final    Comment: Specimen collection should occur prior to Sulfasalazine administration due to the potential for falsely depressed results.      ALT 44  12 - 78 U/L Final    Comment: Specimen collection should occur prior to Sulfasalazine and/or Sulfapyridine administration due to the potential for falsely depressed results. Alkaline Phosphatase 43 (*) 46 - 116 U/L Final    Total Protein 6.6  6.4 - 8.4 g/dL Final    Albumin 3.1 (*) 3.5 - 5.0 g/dL Final    Total Bilirubin 0.80  0.20 - 1.00 mg/dL Final    Comment: Use of this assay is not recommended for patients undergoing treatment with eltrombopag due to the potential for falsely elevated results. eGFR 76  ml/min/1.73sq m Final    Narrative:     [place] guidelines for Chronic Kidney Disease (CKD):   •  Stage 1 with normal or high GFR (GFR > 90 mL/min/1.73 square meters)  •  Stage 2 Mild CKD (GFR = 60-89 mL/min/1.73 square meters)  •  Stage 3A Moderate CKD (GFR = 45-59 mL/min/1.73 square meters)  •  Stage 3B Moderate CKD (GFR = 30-44 mL/min/1.73 square meters)  •  Stage 4 Severe CKD (GFR = 15-29 mL/min/1.73 square meters)  •  Stage 5 End Stage CKD (GFR <15 mL/min/1.73 square meters)  Note: GFR calculation is accurate only with a steady state creatinine   COVID19, INFLUENZA A/B, RSV PCR, [facility] - Normal    SARS-CoV-2 Negative  Negative Final    Comment:      INFLUENZA A PCR Negative  Negative Final    Comment:      INFLUENZA B PCR Negative  Negative Final    Comment:      RSV PCR Negative  Negative Final    Comment:      Narrative:     FOR PEDIATRIC PATIENTS - copy/paste COVID Guidelines URL to browser: [email or web address]    SARS-CoV-2 assay is a Nucleic Acid Amplification assay intended for the  qualitative detection of nucleic acid from SARS-CoV-2 in nasopharyngeal  swabs. Results are for the presumptive identification of SARS-CoV-2 RNA. Positive results are indicative of infection with SARS-CoV-2, the virus  causing COVID-19, but do not rule out bacterial infection or co-infection  with other viruses. Laboratories within the [facility] and its  territories are required to report all positive results to the appropriate  public health authorities.  Negative results do not preclude SARS-CoV-2  infection and should not be used as the sole basis for treatment or other  patient management decisions. Negative results must be combined with  clinical observations, patient history, and epidemiological information. This test has not been FDA cleared or approved. This test has been authorized by FDA under an Emergency Use Authorization  (EUA). This test is only authorized for the duration of time the  declaration that circumstances exist justifying the authorization of the  emergency use of an in vitro diagnostic tests for detection of SARS-CoV-2  virus and/or diagnosis of COVID-19 infection under section 564(b)(1) of  the Act, 21 U. S.C. 588NRB-0(T)(1), unless the authorization is terminated  or revoked sooner. The test has been validated but independent review by FDA  and CLIA is pending. Test performed using [employer]: This RT-PCR assay targets N2,  a region unique to SARS-CoV-2. A conserved region in the E-gene was chosen  for pan-Sarbecovirus detection which includes SARS-CoV-2. According to CMS-2020-01-R, this platform meets the definition of high-throughput technology. LACTIC ACID, PLASMA (W/REFLEX IF RESULT > 2.0) - Normal    LACTIC ACID 1.0  0.5 - 2.0 mmol/L Final    Narrative:     Result may be elevated if tourniquet was used during collection. PROCALCITONIN TEST - Normal    Procalcitonin 0.08  <=0.25 ng/ml Final    Comment: Suspected Lower Respiratory Tract Infection (LRTI):  - LESS than or EQUAL to 0.25 ng/mL:   low likelihood for bacterial LRTI; antibiotics DISCOURAGED.  - GREATER than 0.25 ng/mL:   increased likelihood for bacterial LRTI; antibiotics ENCOURAGED. Suspected Sepsis:  - Strongly consider initiating antibiotics in ALL UNSTABLE patients.   - LESS than or EQUAL to 0.5 ng/mL:   low likelihood for bacterial sepsis; antibiotics DISCOURAGED.  - GREATER than 0.5 ng/mL:   increased likelihood for bacterial sepsis; antibiotics ENCOURAGED.  - GREATER than 2 ng/mL:   high risk for severe sepsis / septic shock; antibiotics strongly ENCOURAGED. Decisions on antibiotic use should not be based solely on Procalcitonin (PCT) levels. If PCT is low but uncertainty exists with stopping antibiotics, repeat PCT in 6-24 hours to confirm the low level. If antibiotics are administered (regardless if initial PCT was high or low), repeat PCT every 1-2 days to consider early antibiotic cessation (when GREATER than 80% decrease from the peak OR when PCT drops below designated cutoffs, whichever comes first), so long as the infection is NOT one that typically requires prolonged treatment durations (e.g., bone/joint infections, endocarditis, Staph. aureus bacteremia).     Situations of FALSE-POSITIVE Procalcitonin values:  1) Newborns < 67 hours old  2) Massive stress from severe trauma / burns, major surgery, acute pancreatitis, cardiogenic / hemorrhagic shock, sickle cell crisis, or other organ perfusion abnormalities  3) Malaria and some Candidal infections  4) Treatment with agents that stimulate cytokines (e.g., OKT3, anti-lymphocyte globulins, alemtuzumab, IL-2, granulocyte transfusion [NOT GCSFs])  5) Chronic renal disease causes elevated baseline levels (consider GREATER than 0.75 ng/mL as an abnormal cut-off); initiating HD/CRRT may cause transient decreases  6) Paraneoplastic syndromes from medullary thyroid or SCLC, some forms of vasculitis, and acute ffubf-cp-cpat disease    Situations of FALSE-NEGATIVE Procalcitonin values:  1) Too early in clinical course for PCT to have reached its peak (may repeat in 6-24 hours to confirm low level)  2) Localized infection WITHOUT systemic (SIRS / sepsis) response (e.g., an abscess, osteomyelitis, cystitis)  3) Mycobacteria (e.g., Tuberculosis, MAC)  4) Cystic fibrosis exacerbations     PROTIME-INR - Normal    Protime 14.5  11.6 - 14.5 seconds Final    INR 1.11  0.84 - 1.19 Final   APTT - Normal    PTT 27  23 - 37 seconds Final Comment: Therapeutic Heparin Range =  60-90 seconds   BLOOD CULTURE    Blood Culture Received in Microbiology Lab. Culture in Progress. Preliminary   BLOOD CULTURE    Blood Culture Received in Microbiology Lab. Culture in Progress. Preliminary   SPUTUM CULTURE AND GRAM STAIN   LEGIONELLA ANTIGEN, URINE   UA W REFLEX TO MICROSCOPIC WITH REFLEX TO CULTURE         ED Course     Medications   acetaminophen (TYLENOL) tablet 650 mg (650 mg Oral Given 7/12/23 1922)   cefepime (MAXIPIME) 2 g/50 mL dextrose IVPB (0 mg Intravenous Stopped 7/12/23 2105)   vancomycin (VANCOCIN) 1,750 mg in sodium chloride 0.9 % 500 mL IVPB (0 mg Intravenous Stopped 7/13/23 261)     44-year-old male on chemotherapy presenting with fevers and generalized fatigue for the past 2 to 3 days. Vital signs reviewed, at present, temp 100.2, rest of vital signs within normal limits. Differential diagnosis includes neutropenic fever, pneumonia, urinary tract infection, GI source of infection, skin/soft tissue infection, fever secondary to immunotherapy, versus another etiology of symptoms. Blood cultures obtained and sent. EKG obtained, to my interpretation is as below. Labs reveal CMP with mild creatinine elevation of 1.02, baseline is 0.86. Lactate is normal at 1.0. Procalcitonin is normal at 0.08. CBC is with pancytopenia with white blood cell count of 3.44, hemoglobin 11.2, and platelet count of 87. Although patient is pancytopenic, he is not neutropenic at this time. COVID-19, influenza, and RSV PCR is negative. Chest x-ray to my interpretation is with known right apical mass, with no obvious infiltrate to suggest pneumonia at this time. We will start patient on broad-spectrum antibiotic coverage and admit patient to the hospital for further monitoring and until blood cultures result.     Procedures  ECG 12 Lead Documentation Only    Date/Time: 7/12/2023 7:35 PM    Performed by: Angela Herndon MD  Authorized by: Angela Herndon MD Comments:      Normal sinus rhythm, ventricular rate 85, parable 148, QRS 84, QTc 409, normal axis, no ST/T wave changes to suggest ischemia, no STEMI. No significant change from prior EKG dated 3/30/2023.

## 2023-07-12 NOTE — TELEPHONE ENCOUNTER
Per Dr. Meena Butt, continue the 2mg daily of dexamethasone. She is going to call us with an update later in the week or if symptoms get worse. If patient is having persistent/worsening symptoms, he will need to be evaluated in the ED. I called the wife back to let her know Dr. Go King directions. Wife verbalized understanding.

## 2023-07-12 NOTE — ED PROVIDER NOTES
History  Chief Complaint   Patient presents with   • Fever Immunocompromised     Fever since Sunday, pain in back of neck and headache, feels tired and "off", no chest pain, no SOB     75 yo M PMhx non-small cell lung cancer with metastasis to the brain (on chemo q. 3 weeks) presents to the ED complaining of 2 to 3 days of fatigue and fever noted at home. Patient tells me that his last dose of chemo was 7/3. He is also recently been bring off of his dexamethasone which he has been on chronically since March for chemo symptom management and for history of brain metastases. He was on 2 mg dexamethasone twice daily, was weaned to 2 mg daily and completed the taper as of yesterday. Due to the 2 to 3 days of feeling fatigued, patient and his fiancée called patient's oncologist who recommended restarting steroids and so he took a single 2 mg dose of dexamethasone this afternoon. Patient's fiancé took his temperature as he was feeling warm to the touch and noted his temperature to be 100.4 F. On triage note, 2-3 days feeling unwell. Chemo [id number], last dose was 7/3. Prednisone taper all week. Feeling warm, headache. No confusion. 100.4F at home. On eliquis for PE. Prior to Admission Medications   Prescriptions Last Dose Informant Patient Reported?  Taking?   acetaminophen (TYLENOL) 500 mg tablet   Yes No   Sig: Take 500 mg by mouth every 4 (four) hours as needed for mild pain   apixaban (ELIQUIS) 5 mg   No No   Sig: Take 1 tablet (5 mg total) by mouth 2 (two) times a day   dexamethasone (DECADRON) 4 mg tablet   No No   Sig: Take 0.5 tablets (2 mg total) by mouth 2 (two) times a day with meals   folic acid (FOLVITE) 1 mg tablet   No No   Sig: TAKE 1 TABLET BY MOUTH EVERY DAY   gabapentin (NEURONTIN) 300 mg capsule   No No   Sig: Take 300 mg in the AM and 600 mg in the Pm   lidocaine-prilocaine (EMLA) cream   No No   Sig: Apply to port 30 to 60 minutes prior to use   memantine (Namenda) 10 mg tablet   No No Sig: Take 1 tablet (10 mg total) by mouth 2 (two) times a day Continue with bloodwork (complete metabolic panel) every 8 weeks   nicotine (NICODERM CQ) 14 mg/24hr TD 24 hr patch   No No   Sig: Place 1 patch on the skin over 24 hours every 24 hours   ondansetron (ZOFRAN) 8 mg tablet   No No   Sig: Take 1 tablet (8 mg total) by mouth every 8 (eight) hours as needed for nausea or vomiting   oxyCODONE (Roxicodone) 5 immediate release tablet   No No   Sig: Take 1 tablet (5 mg total) by mouth every 4 (four) hours as needed for moderate pain or severe pain Max Daily Amount: 30 mg   prochlorperazine (COMPAZINE) 10 mg tablet   No No   Sig: Take 1 tablet (10 mg total) by mouth every 6 (six) hours as needed for nausea or vomiting   traZODone (DESYREL) 50 mg tablet   No No   Sig: TAKE 1 TABLET BY MOUTH DAILY AT BEDTIME   Patient not taking: Reported on 6/30/2023      Facility-Administered Medications: None       History reviewed. No pertinent past medical history. Past Surgical History:   Procedure Laterality Date   • IR BIOPSY LUNG  4/6/2023   • IR BIOPSY LUNG  4/26/2023   • IR PORT PLACEMENT  5/16/2023       History reviewed. No pertinent family history. I have reviewed and agree with the history as documented. E-Cigarette/Vaping   • E-Cigarette Use Never User      E-Cigarette/Vaping Substances   • Nicotine No    • THC No    • CBD No    • Flavoring No    • Other No    • Unknown No      Social History     Tobacco Use   • Smoking status: Every Day     Packs/day: 0.25     Years: 35.00     Total pack years: 8.75     Types: Cigarettes     Start date: 1/1/1973   • Smokeless tobacco: Never   • Tobacco comments:     Patient is trying to quit,     Vaping Use   • Vaping Use: Never used   Substance Use Topics   • Alcohol use: Yes     Comment: socially   • Drug use: Not Currently        Review of Systems   Constitutional: Positive for fatigue and fever. Negative for chills. HENT: Negative for ear pain and sore throat.     Eyes: Negative for pain and visual disturbance. Respiratory: Negative for cough and shortness of breath. Cardiovascular: Negative for chest pain and palpitations. Gastrointestinal: Negative for abdominal pain and vomiting. Genitourinary: Negative for dysuria and hematuria. Musculoskeletal: Negative for arthralgias and back pain. Skin: Negative for color change and rash. Neurological: Negative for seizures and syncope. All other systems reviewed and are negative. Physical Exam  ED Triage Vitals   Temperature Pulse Respirations Blood Pressure SpO2   07/12/23 1705 07/12/23 1705 07/12/23 1705 07/12/23 1705 07/12/23 1705   100.2 °F (37.9 °C) 101 22 135/67 96 %      Temp Source Heart Rate Source Patient Position - Orthostatic VS BP Location FiO2 (%)   07/12/23 1705 07/12/23 1705 07/12/23 1705 07/12/23 1705 --   Temporal Monitor Sitting Right arm       Pain Score       07/12/23 1922       Med Not Given for Pain - for MAR use only             Orthostatic Vital Signs  Vitals:    07/12/23 1705 07/12/23 2000 07/12/23 2100   BP: 135/67 126/76 111/71   Pulse: 101 88 82   Patient Position - Orthostatic VS: Sitting         Physical Exam  Vitals and nursing note reviewed. Constitutional:       General: He is not in acute distress. Appearance: He is well-developed. HENT:      Head: Normocephalic and atraumatic. Eyes:      Conjunctiva/sclera: Conjunctivae normal.   Cardiovascular:      Rate and Rhythm: Normal rate and regular rhythm. Pulses:           Dorsalis pedis pulses are 1+ on the right side and 1+ on the left side. Posterior tibial pulses are 1+ on the right side and 1+ on the left side. Heart sounds: No murmur heard. Pulmonary:      Effort: Pulmonary effort is normal. No respiratory distress. Breath sounds: Normal breath sounds. Abdominal:      Palpations: Abdomen is soft. Tenderness: There is no abdominal tenderness. Musculoskeletal:         General: No swelling. Cervical back: Neck supple. Right lower le+ Pitting Edema present. Left lower le+ Pitting Edema present. Skin:     General: Skin is warm and dry. Capillary Refill: Capillary refill takes less than 2 seconds. Neurological:      Mental Status: He is alert.    Psychiatric:         Mood and Affect: Mood normal.         ED Medications  Medications   cefepime (MAXIPIME) 2 g/50 mL dextrose IVPB (has no administration in time range)   vancomycin (VANCOCIN) 1,750 mg in sodium chloride 0.9 % 500 mL IVPB (1,750 mg Intravenous New Bag 23)   vancomycin (VANCOCIN) IVPB (premix in dextrose) 750 mg 150 mL (has no administration in time range)   acetaminophen (TYLENOL) tablet 650 mg (has no administration in time range)   apixaban (ELIQUIS) tablet 5 mg (has no administration in time range)   dexamethasone (DECADRON) tablet 2 mg (has no administration in time range)   folic acid (FOLVITE) tablet 1,000 mcg (has no administration in time range)   gabapentin (NEURONTIN) capsule 300 mg (has no administration in time range)   memantine (NAMENDA) tablet 10 mg (has no administration in time range)   ondansetron (ZOFRAN-ODT) dispersible tablet 4 mg (has no administration in time range)   gabapentin (NEURONTIN) capsule 600 mg (has no administration in time range)   acetaminophen (TYLENOL) tablet 650 mg (650 mg Oral Given 23)   cefepime (MAXIPIME) 2 g/50 mL dextrose IVPB (0 mg Intravenous Stopped 23)       Diagnostic Studies  Results Reviewed     Procedure Component Value Units Date/Time    Sputum culture and Gram stain [[id number]]     Lab Status: No result Specimen: Sputum     Legionella antigen, Urine [[id number]]     Lab Status: No result Specimen: Urine     FLU/RSV/COVID - if FLU/RSV clinically relevant [[id number]]  (Normal) Collected: 23    Lab Status: Final result Specimen: Nares from Nose Updated: 23     SARS-CoV-2 Negative     INFLUENZA A PCR Negative INFLUENZA B PCR Negative     RSV PCR Negative    Narrative:      FOR PEDIATRIC PATIENTS - copy/paste COVID Guidelines URL to browser: [email or web address]    SARS-CoV-2 assay is a Nucleic Acid Amplification assay intended for the  qualitative detection of nucleic acid from SARS-CoV-2 in nasopharyngeal  swabs. Results are for the presumptive identification of SARS-CoV-2 RNA. Positive results are indicative of infection with SARS-CoV-2, the virus  causing COVID-19, but do not rule out bacterial infection or co-infection  with other viruses. Laboratories within the [facility] and its  territories are required to report all positive results to the appropriate  public health authorities. Negative results do not preclude SARS-CoV-2  infection and should not be used as the sole basis for treatment or other  patient management decisions. Negative results must be combined with  clinical observations, patient history, and epidemiological information. This test has not been FDA cleared or approved. This test has been authorized by FDA under an Emergency Use Authorization  (EUA). This test is only authorized for the duration of time the  declaration that circumstances exist justifying the authorization of the  emergency use of an in vitro diagnostic tests for detection of SARS-CoV-2  virus and/or diagnosis of COVID-19 infection under section 564(b)(1) of  the Act, 21 U. S.C. 476AVK-7(B)(5), unless the authorization is terminated  or revoked sooner. The test has been validated but independent review by FDA  and CLIA is pending. Test performed using [employer] GeneXpert: This RT-PCR assay targets N2,  a region unique to SARS-CoV-2. A conserved region in the E-gene was chosen  for pan-Sarbecovirus detection which includes SARS-CoV-2. According to CMS-2020-01-R, this platform meets the definition of high-throughput technology.     Comprehensive metabolic panel [[id number]]  (Abnormal) Collected: 07/12/23 1758    Lab Status: Final result Specimen: Blood from Arm, Right Updated: 07/12/23 1916     Sodium 136 mmol/L      Potassium 4.1 mmol/L      Chloride 107 mmol/L      CO2 24 mmol/L      ANION GAP 5 mmol/L      BUN 20 mg/dL      Creatinine 1.02 mg/dL      Glucose 110 mg/dL      Calcium 8.3 mg/dL      Corrected Calcium 9.0 mg/dL      AST 17 U/L      ALT 44 U/L      Alkaline Phosphatase 43 U/L      Total Protein 6.6 g/dL      Albumin 3.1 g/dL      Total Bilirubin 0.80 mg/dL      eGFR 76 ml/min/1.73sq m     Narrative:      [place] guidelines for Chronic Kidney Disease (CKD):   •  Stage 1 with normal or high GFR (GFR > 90 mL/min/1.73 square meters)  •  Stage 2 Mild CKD (GFR = 60-89 mL/min/1.73 square meters)  •  Stage 3A Moderate CKD (GFR = 45-59 mL/min/1.73 square meters)  •  Stage 3B Moderate CKD (GFR = 30-44 mL/min/1.73 square meters)  •  Stage 4 Severe CKD (GFR = 15-29 mL/min/1.73 square meters)  •  Stage 5 End Stage CKD (GFR <15 mL/min/1.73 square meters)  Note: GFR calculation is accurate only with a steady state creatinine    Procalcitonin [[id number]]  (Normal) Collected: 07/12/23 1758    Lab Status: Final result Specimen: Blood from Arm, Right Updated: 07/12/23 1844     Procalcitonin 0.08 ng/ml     CBC and differential [[id number]]  (Abnormal) Collected: 07/12/23 1758    Lab Status: Final result Specimen: Blood from Arm, Right Updated: 07/12/23 1843     WBC 3.44 Thousand/uL      RBC 3.26 Million/uL      Hemoglobin 11.2 g/dL      Hematocrit 32.1 %      MCV 99 fL      MCH 34.4 pg      MCHC 34.9 g/dL      RDW 17.1 %      MPV 9.5 fL      Platelets 87 Thousands/uL      nRBC 0 /100 WBCs      Neutrophils Relative 71 %      Immat GRANS % 2 %      Lymphocytes Relative 15 %      Monocytes Relative 12 %      Eosinophils Relative 0 %      Basophils Relative 0 %      Neutrophils Absolute 2.42 Thousands/µL      Immature Grans Absolute 0.05 Thousand/uL Lymphocytes Absolute 0.53 Thousands/µL      Monocytes Absolute 0.42 Thousand/µL      Eosinophils Absolute 0.01 Thousand/µL      Basophils Absolute 0.01 Thousands/µL     Lactic acid [[id number]]  (Normal) Collected: 07/12/23 1758    Lab Status: Final result Specimen: Blood from Arm, Right Updated: 07/12/23 1828     LACTIC ACID 1.0 mmol/L     Narrative:      Result may be elevated if tourniquet was used during collection. Mary Whitlock [[id number]]  (Normal) Collected: 07/12/23 1758    Lab Status: Final result Specimen: Blood from Arm, Right Updated: 07/12/23 1824     Protime 14.5 seconds      INR 1.11    APTT [[id number]]  (Normal) Collected: 07/12/23 1758    Lab Status: Final result Specimen: Blood from Arm, Right Updated: 07/12/23 1824     PTT 27 seconds     Blood culture #1 [[id number]] Collected: 07/12/23 1758    Lab Status: In process Specimen: Blood from Line, Venous Updated: 07/12/23 1805    Blood culture #2 [[id number]] Collected: 07/12/23 1758    Lab Status: In process Specimen: Blood from Arm, Right Updated: 07/12/23 1805    UA w Reflex to Microscopic w Reflex to Culture [[id number]]     Lab Status: No result Specimen: Urine                  XR chest 2 views    (Results Pending)         Procedures  Procedures      ED Course  ED Course as of 07/12/23 2136 Wed Jul 12, 2023   2016 SOD contacted for admission                Identification of Seniors at [facility] Most Recent Value   (ISAR) Identification of Seniors at Risk    Before the illness or injury that brought you to the Emergency, did you need someone to help you on a regular basis? 0 Filed at: 07/12/2023 1707   In the last 24 hours, have you needed more help than usual? 0 Filed at: 07/12/2023 1707   Have you been hospitalized for one or more nights during the past 6 months? 1 Filed at: 07/12/2023 1707   In general, do you see well? 0 Filed at: 07/12/2023 1707   In general, do you have serious problems with your memory?  0 Filed at: 07/12/2023 [facility]   Do you take more than three different medications every day? 1 Filed at: 07/12/2023 1707   ISAR Score 2 Filed at: 07/12/2023 1707                              Medical Decision Making  75 yo M PMhx non-small cell lung cancer with metastasis to the brain (on chemo q. 3 weeks) presents to the ED complaining of 2 to 3 days of fatigue and fever noted at home. Patient tells me that his last dose of chemo was 7/3. DDx: Neutropenic fever, sepsis with the source being pneumonia versus UTI versus cellulitis. Patient presents with history of cancer on chemo and low-grade fever noted outpatient. Patient has no specific complaints to identify underlying infectious source. Patient has cough however tells me this is chronic and unchanged in quality. No urinary complaints no GI complaints. Patient's CT from day prior was read while patient here in the ED. Scan showing new left apical finding concerning for infectious versus inflammatory cause. We will plan to obtain sepsis labs, administer broad-spectrum antibiotics and admit patient for high risk fever in immunocompromised patient. Discussed plan with patient and he is in agreement. Discussed case with medicine, patient admitted for further evaluation and management. Amount and/or Complexity of Data Reviewed  Labs: ordered. Radiology: ordered. Risk  OTC drugs. Decision regarding hospitalization.             Disposition  Final diagnoses:   Fever   Immunocompromised ([address])     Time reflects when diagnosis was documented in both MDM as applicable and the Disposition within this note     Time User Action Codes Description Comment    7/12/2023  8:27 PM Merilynn Buoy Add [R91.1] Lesion of lung     7/12/2023  9:00 PM [place] Add [R50.9] Fever     7/12/2023  9:00 PM [place] Add [D84.9] Immunocompromised ([address])     7/12/2023  9:09 PM Merilynn Buoy Add [C79.31] Metastasis to brain ([address])     7/12/2023  9:09 PM Merilynn Buoy Add [C34.91] Non-small cell cancer of right lung [facility])       ED Disposition     ED Disposition   Admit    Condition   Stable    Date/Time   Wed Jul 12, 2023  9:00 PM    Comment   Case was discussed with Dr. Miko Patterson and the patient's admission status was agreed to be Admission Status: inpatient status to the service of Dr. Miko Patterson . Follow-up Information    None         Patient's Medications   Discharge Prescriptions    No medications on file     No discharge procedures on file. PDMP Review       Value Time User    PDMP Reviewed  Yes 5/15/2023  9:28 AM Lauren Valera MD           ED Provider  Attending physically available and evaluated [place] Record. I managed the patient along with the ED Attending.     Electronically Signed by         Jessica Driver MD  07/12/23 8328

## 2023-07-12 NOTE — TELEPHONE ENCOUNTER
Spoke to wife Akila. Patient tapered off his steroid and yesterday was his first day that he did not take any steroid. He has been experiencing neck stiffness, a little more disorientation, and severe fatigue the last several days. I let her know that I would speak to Dr. Eloy Lerner to see if she wants to continue a slower taper and will call her back as soon as possible with directions.

## 2023-07-13 LAB
ALBUMIN SERPL BCP-MCNC: 2.8 G/DL (ref 3.5–5)
ALP SERPL-CCNC: 42 U/L (ref 46–116)
ALT SERPL W P-5'-P-CCNC: 39 U/L (ref 12–78)
ANION GAP SERPL CALCULATED.3IONS-SCNC: 4 MMOL/L
AST SERPL W P-5'-P-CCNC: 14 U/L (ref 5–45)
ATRIAL RATE: 85 BPM
BACTERIA UR QL AUTO: ABNORMAL /HPF
BASOPHILS # BLD AUTO: 0.01 THOUSANDS/ÂΜL (ref 0–0.1)
BASOPHILS NFR BLD AUTO: 0 % (ref 0–1)
BILIRUB SERPL-MCNC: 0.66 MG/DL (ref 0.2–1)
BILIRUB UR QL STRIP: NEGATIVE
BUN SERPL-MCNC: 20 MG/DL (ref 5–25)
CALCIUM ALBUM COR SERPL-MCNC: 9.2 MG/DL (ref 8.3–10.1)
CALCIUM SERPL-MCNC: 8.2 MG/DL (ref 8.3–10.1)
CHLORIDE SERPL-SCNC: 114 MMOL/L (ref 96–108)
CLARITY UR: CLEAR
CO2 SERPL-SCNC: 22 MMOL/L (ref 21–32)
COLOR UR: YELLOW
CREAT SERPL-MCNC: 0.9 MG/DL (ref 0.6–1.3)
EOSINOPHIL # BLD AUTO: 0 THOUSAND/ÂΜL (ref 0–0.61)
EOSINOPHIL NFR BLD AUTO: 0 % (ref 0–6)
ERYTHROCYTE [DISTWIDTH] IN BLOOD BY AUTOMATED COUNT: 16.8 % (ref 11.6–15.1)
GFR SERPL CREATININE-BSD FRML MDRD: 89 ML/MIN/1.73SQ M
GLUCOSE SERPL-MCNC: 107 MG/DL (ref 65–140)
GLUCOSE UR STRIP-MCNC: NEGATIVE MG/DL
HCT VFR BLD AUTO: 33.5 % (ref 36.5–49.3)
HGB BLD-MCNC: 11.3 G/DL (ref 12–17)
HGB UR QL STRIP.AUTO: ABNORMAL
IMM GRANULOCYTES # BLD AUTO: 0.02 THOUSAND/UL (ref 0–0.2)
IMM GRANULOCYTES NFR BLD AUTO: 1 % (ref 0–2)
KETONES UR STRIP-MCNC: NEGATIVE MG/DL
L PNEUMO1 AG UR QL IA.RAPID: NEGATIVE
LEUKOCYTE ESTERASE UR QL STRIP: NEGATIVE
LYMPHOCYTES # BLD AUTO: 0.75 THOUSANDS/ÂΜL (ref 0.6–4.47)
LYMPHOCYTES NFR BLD AUTO: 25 % (ref 14–44)
MAGNESIUM SERPL-MCNC: 2.2 MG/DL (ref 1.6–2.6)
MCH RBC QN AUTO: 33.8 PG (ref 26.8–34.3)
MCHC RBC AUTO-ENTMCNC: 33.7 G/DL (ref 31.4–37.4)
MCV RBC AUTO: 100 FL (ref 82–98)
MONOCYTES # BLD AUTO: 0.39 THOUSAND/ÂΜL (ref 0.17–1.22)
MONOCYTES NFR BLD AUTO: 13 % (ref 4–12)
MUCOUS THREADS UR QL AUTO: ABNORMAL
NEUTROPHILS # BLD AUTO: 1.89 THOUSANDS/ÂΜL (ref 1.85–7.62)
NEUTS SEG NFR BLD AUTO: 61 % (ref 43–75)
NITRITE UR QL STRIP: NEGATIVE
NON-SQ EPI CELLS URNS QL MICRO: ABNORMAL /HPF
NRBC BLD AUTO-RTO: 0 /100 WBCS
P AXIS: 74 DEGREES
PH UR STRIP.AUTO: 5.5 [PH]
PHOSPHATE SERPL-MCNC: 3.6 MG/DL (ref 2.3–4.1)
PLATELET # BLD AUTO: 91 THOUSANDS/UL (ref 149–390)
PMV BLD AUTO: 9.3 FL (ref 8.9–12.7)
POTASSIUM SERPL-SCNC: 4 MMOL/L (ref 3.5–5.3)
PR INTERVAL: 148 MS
PROCALCITONIN SERPL-MCNC: 0.09 NG/ML
PROT SERPL-MCNC: 6.5 G/DL (ref 6.4–8.4)
PROT UR STRIP-MCNC: ABNORMAL MG/DL
QRS AXIS: 87 DEGREES
QRSD INTERVAL: 84 MS
QT INTERVAL: 344 MS
QTC INTERVAL: 409 MS
RBC # BLD AUTO: 3.34 MILLION/UL (ref 3.88–5.62)
RBC #/AREA URNS AUTO: ABNORMAL /HPF
SODIUM SERPL-SCNC: 140 MMOL/L (ref 135–147)
SP GR UR STRIP.AUTO: 1.02 (ref 1–1.03)
T WAVE AXIS: 64 DEGREES
TRANS CELLS #/AREA URNS HPF: PRESENT /[HPF]
UROBILINOGEN UR STRIP-ACNC: <2 MG/DL
VENTRICULAR RATE: 85 BPM
WBC # BLD AUTO: 3.06 THOUSAND/UL (ref 4.31–10.16)
WBC #/AREA URNS AUTO: ABNORMAL /HPF

## 2023-07-13 PROCEDURE — 84145 PROCALCITONIN (PCT): CPT | Performed by: STUDENT IN AN ORGANIZED HEALTH CARE EDUCATION/TRAINING PROGRAM

## 2023-07-13 PROCEDURE — 87070 CULTURE OTHR SPECIMN AEROBIC: CPT | Performed by: STUDENT IN AN ORGANIZED HEALTH CARE EDUCATION/TRAINING PROGRAM

## 2023-07-13 PROCEDURE — 87449 NOS EACH ORGANISM AG IA: CPT | Performed by: STUDENT IN AN ORGANIZED HEALTH CARE EDUCATION/TRAINING PROGRAM

## 2023-07-13 PROCEDURE — NC001 PR NO CHARGE: Performed by: INTERNAL MEDICINE

## 2023-07-13 PROCEDURE — 83735 ASSAY OF MAGNESIUM: CPT | Performed by: STUDENT IN AN ORGANIZED HEALTH CARE EDUCATION/TRAINING PROGRAM

## 2023-07-13 PROCEDURE — 99255 IP/OBS CONSLTJ NEW/EST HI 80: CPT | Performed by: INTERNAL MEDICINE

## 2023-07-13 PROCEDURE — 99223 1ST HOSP IP/OBS HIGH 75: CPT | Performed by: INTERNAL MEDICINE

## 2023-07-13 PROCEDURE — 87205 SMEAR GRAM STAIN: CPT | Performed by: STUDENT IN AN ORGANIZED HEALTH CARE EDUCATION/TRAINING PROGRAM

## 2023-07-13 PROCEDURE — 93010 ELECTROCARDIOGRAM REPORT: CPT | Performed by: INTERNAL MEDICINE

## 2023-07-13 PROCEDURE — 84100 ASSAY OF PHOSPHORUS: CPT | Performed by: STUDENT IN AN ORGANIZED HEALTH CARE EDUCATION/TRAINING PROGRAM

## 2023-07-13 PROCEDURE — 97166 OT EVAL MOD COMPLEX 45 MIN: CPT

## 2023-07-13 PROCEDURE — 85025 COMPLETE CBC W/AUTO DIFF WBC: CPT | Performed by: STUDENT IN AN ORGANIZED HEALTH CARE EDUCATION/TRAINING PROGRAM

## 2023-07-13 PROCEDURE — 81001 URINALYSIS AUTO W/SCOPE: CPT | Performed by: STUDENT IN AN ORGANIZED HEALTH CARE EDUCATION/TRAINING PROGRAM

## 2023-07-13 PROCEDURE — 97162 PT EVAL MOD COMPLEX 30 MIN: CPT

## 2023-07-13 PROCEDURE — 80053 COMPREHEN METABOLIC PANEL: CPT | Performed by: STUDENT IN AN ORGANIZED HEALTH CARE EDUCATION/TRAINING PROGRAM

## 2023-07-13 RX ADMIN — FOLIC ACID 1000 MCG: 1 TABLET ORAL at 08:57

## 2023-07-13 RX ADMIN — CEFEPIME 2000 MG: 2 INJECTION, POWDER, FOR SOLUTION INTRAVENOUS at 09:23

## 2023-07-13 RX ADMIN — DEXAMETHASONE 2 MG: 2 TABLET ORAL at 08:57

## 2023-07-13 RX ADMIN — GABAPENTIN 300 MG: 300 CAPSULE ORAL at 08:56

## 2023-07-13 RX ADMIN — MEMANTINE 10 MG: 10 TABLET ORAL at 17:28

## 2023-07-13 RX ADMIN — VANCOMYCIN HYDROCHLORIDE 750 MG: 750 INJECTION, SOLUTION INTRAVENOUS at 10:48

## 2023-07-13 RX ADMIN — APIXABAN 5 MG: 5 TABLET, FILM COATED ORAL at 17:28

## 2023-07-13 RX ADMIN — GABAPENTIN 600 MG: 300 CAPSULE ORAL at 21:58

## 2023-07-13 RX ADMIN — APIXABAN 5 MG: 5 TABLET, FILM COATED ORAL at 08:57

## 2023-07-13 RX ADMIN — MEMANTINE 10 MG: 10 TABLET ORAL at 08:57

## 2023-07-13 NOTE — ASSESSMENT & PLAN NOTE
Patient with history of non-small cell cancer of right lung and associated cancer pain.  -On gabapentin 300 mg in the a.m. and 600 mg nightly

## 2023-07-13 NOTE — CONSULTS
Consultation - Medical Oncology   Nelda Brothers 74 y.o. male MRN: [id number]  Unit/Bed#: [facility] 925-01 Encounter: [id number]      Assessment/Plan   Assessment and Plan:  1. New Irregular, Spiculated Mass of the Left Upper Lobe. 2. 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 A. ECOG Performance Status: 0.  3. History of Subsegmental Pulmonary Emboli (On Eliquis). 1. New Irregular, Spiculated Mass of the Left Upper Lobe:   A. Differential to Include: Infectious Versus Radiation Pneumonitis Versus Malignancy. Medical Oncology was consulted given evidence of a new irregular, spiculated mass in the left upper lobe noted on CT Chest/Abdomen/Pelvis from July 8th. Report states that, while not entirely excluded, the appearance is not typical for metastasis and a metachronous primary is unlikely given rapid development since March. Of note, the left apical tumor with chest-wall and rib invasion, as well as paravertebral and epidural extension from T1-T3 was essentially unchanged from the prior study in March. No evidence of metastatic disease was noted in the abdomen and pelvis. Differential to include the follow: Infectious etiology versus radiation pneumonitis versus malignancy. Infectious Disease was consulted. Low-suspicion of pneumonia; particularly in the absence of localizing (respiratory) symptoms, and a negative Procalcitonin (x2). Accompanying fever is attributable to malignancy, and appeared to have been controlled by corticosteroid therapy. Infectious Disease is recommending discontinuation of empiric-antibiotic therapy and continued observation. Above-mentioned may represent radiation pneumonitis or malignancy. Further recommendations, as per below. 1. Per Infectious Disease, empiric antibiotic-therapy was discontinued, with plan for observation alone.     2. Agree with continued observation alone, given high-suspicion of radiation pneumonitis. 2. 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 ECOG Performance Status: 0. Patient has established stage IV poorly-differentiated adenocarcinoma of the right upper lung with metastases to the brain and bone; of which was initially diagnosed in April of 2023. He follows with Medical Oncologist, Allison Sanchez D.O. Patient underwent Palliative Radiation Therapy to the thoracic spine and brain in May of 2023 (10-Fractions to Spine and 10-Fractions of Whole Brain Radiation Therapy), and subsequently started systemic-chemotherapy with Carboplatin, and Pemetrexed plus Pembrolizumab on May 22nd, 2023. Of note, patient was most recently evaluated in the outpatient setting by his Medical Oncologist on June 30th. At the time of presentation, patient was tolerating systemic-chemotherapy and immunotherapy well without significant adverse effects. His midthoracic spine pain had completely resolved. Patient was scheduled for a CT Chest/Abdomen/Pelvis and MRI Brain prior to initiation of Cycle 4. Otherwise, no change in management was documented. 1. Outpatient follow-up with Medical Oncology Allison Sanchez D.O.) for continuation of Cycle 4 of systemic chemotherapy. A. Will order a MRI Brain (With and Without Contrast) in anticipation of Cycle 4 of chemotherapy. History of Present Illness   Physician Requesting Consult: Derek Connelly MD  Reason for Consult / Principal Problem: New Irregular, Spiculated Mass in Left Upper Lobe. HPI: Nicol Freeman is a 70-year-old male, with a past medical history significant for degenerative disc disease, stage IV poorly-differentiated adenocarcinoma of the right upper lung with known metastases to the bone and brain (Status-Post Radiation Therapy to the Chest and Brain;  On Systemic-Chemotherapy), multiple subsegmental pulmonary emboli (On Eliquis); admitted on July 12th with progressive and generalized fatigue. Medical Oncology was consulted given evidence of a new irregular, spiculated mass in the left upper lobe noted on CT Chest/Abdomen/Pelvis from July 8th. On my evaluation this morning, patient characterizes profound fatigue, of which onset while he was performing yard-work on Wednesday, July 12th. Patient states that he is relatively active at baseline, and is able to perform his activities of daily living independently and without difficulty. Over the last few days, he describes onset of progressive fatigue with poor activity tolerance; prompting him to rest frequently. He notes that his systemic corticosteroid-therapy was being tapered by Medical Oncology (Tapered from Decadron 4 mg PO Daily to 2 mg PO Daily last week; and from Decadron 2 mg PO Daily to discontinued on Tuesday), which he thought may have been contributing. He contacted the [address] [place] [address] and was resumed on Decadron. The evening of Wednesday, July 12th; patient's significant other noted a temperature of 100.4 degrees Farenheit. Patient was subsequently referred to the Emergency Department as a result of the above-mentioned. On review of systems, patient endorses subjective fever; without chills, or night-sweats. He denies localizing symptoms, including cough beyond baseline (Nonproductive and unchanged from previous), shortness of breath, dysuria, urinary frequency, urgency, or dysuria. Oncologic History: Regarding patient's oncologic history, he has established stage IV poorly-differentiated adenocarcinoma of the right upper lung with metastases to the brain and bone; of which was initially diagnosed in April of 2023. He follows with Medical Oncologist, Yu Diaz D.O.  Patient underwent Palliative Radiation Therapy to the thoracic spine and brain in May of 2023 (10-Fractions to Spine and 10-Fractions of Whole Brain Radiation Therapy), and subsequently started systemic-chemotherapy with Carboplatin, and Pemetrexed plus Pembrolizumab on May 22nd, 2023. Of note, patient was most recently evaluated in the outpatient setting by his Medical Oncologist on June 30th. At the time of presentation, patient was tolerating systemic-chemotherapy and immunotherapy well without significant adverse effects. His midthoracic spine pain had completely resolved. Patient was scheduled for a CT Chest/Abdomen/Pelvis and MRI Brain prior to initiation of Cycle 4. Otherwise, no change in management was documented. Review of Systems:  All systems reviewed and negative except otherwise listed in the History of Present Illness. Consults    Review of Systems   Review of Systems:  All systems reviewed and negative except otherwise listed in the History of Present Illness. Historical Information   History reviewed. No pertinent past medical history. Past Surgical History:   Procedure Laterality Date   • IR BIOPSY LUNG  4/6/2023   • IR BIOPSY LUNG  4/26/2023   • IR PORT PLACEMENT  5/16/2023     Social History   Social History     Substance and Sexual Activity   Alcohol Use Never     Social History     Substance and Sexual Activity   Drug Use Not Currently     E-Cigarette/Vaping   • E-Cigarette Use Never User      E-Cigarette/Vaping Substances   • Nicotine No    • THC No    • CBD No    • Flavoring No    • Other No    • Unknown No      Social History     Tobacco Use   Smoking Status Every Day   • Packs/day: 0.25   • Years: 35.00   • Total pack years: 8.75   • Types: Cigarettes   • Start date: 1/1/1973   Smokeless Tobacco Never   Tobacco Comments    Patient is trying to quit,       Family History: non-contributory    Meds/Allergies   all current active meds have been reviewed  No Known Allergies    Objective   Vitals: Blood pressure 130/85, pulse 77, temperature 98.7 °F (37.1 °C), temperature source Oral, resp. rate 17, height 5' 10" (1.778 m), weight 69.8 kg (153 lb 14.1 oz), SpO2 97 %.     Intake/Output Summary (Last 24 hours) at 7/13/2023 4681  Last data filed at 7/13/2023 0001  Gross per 24 hour   Intake 550 ml   Output --   Net 550 ml     Invasive Devices     Central Venous Catheter Line  Duration           Port A Cath 05/16/23 Left Internal jugular 57 days                Physical Exam   Physical Exam:  General: Alert, and oriented; Pleasant, and conversational; No apparent distress; Well-appearing  HEENT: Atraumatic, and normocephalic; PERRLA; EOMI; Moist mucosal membranes; Moon-like facies  Neck: Trachea midline; No neck masses, thyromegaly, or cervical lymphadenopathy present on my exam; Skin discoloration noted from radiation  Cardiovascular: Regular rate and rhythm; No murmurs, rubs, or gallops appreciated  Respiratory: Clear to auscultation bilaterally; Adequate aeration; No supplemental oxygen requirement  Abdomen: Soft, non-tender; Non-distended; No organomegaly appreciated; Bowel sounds present in 4-quadrants  Extremities: No obvious deformities; Trace to 1+ bilateral lower extremity edema; Moves all  Neurologic: Appropriately alert, and oriented to Person, Place, and Time; No focal neurologic deficits    Lab Results: I have reviewed all pertinent labs. Imaging Studies: I have personally reviewed pertinent reports. EKG, Pathology, and Other Studies: I have personally reviewed pertinent reports. VTE Prophylaxis: Freddy Silva D.O.   Hematology-Oncology Fellow (PGY-4)

## 2023-07-13 NOTE — ASSESSMENT & PLAN NOTE
Patient has metastatic brain lesions that have been previously noted based on MRI brain from April 2023, about 6 brain lesions which are likely concerning for metastatic brain lesion from his history of non-small cell lung cancer. In the past, patient has been seen and evaluated by neurosurgery and was recommended for no acute intervention. Was recommended for radiation oncology treatments in the outpatient setting previously.   -Continue to monitor neurologic status

## 2023-07-13 NOTE — PROGRESS NOTES
Vancomycin Pharmacy Consult    Clive Atkinson is an 74 y.o. male who is currently receiving IV vancomycin for PNA vs. other. Vancomycin Assessment:  1. ID Consult: Yes  2. Cultures:   7/12 Blood 2/2: in process  7/12 FLU/RSV/COVID19: neg  3. Procalcitonin:   7/12: 0.08  7/13: 0.09  4. Renal Function:   SCr: 0.90  CrCl: 80 mL/min  UOP: n/a  5. Days of Therapy: 2  6. Current Dose: 750 mg IV q12h  7. Last Level: n/a  8. Goal AUC(24h): 400-600  9. Goal Random/Trough: 10-15    Vancomycin Plan:  1. Evaluation: continue regimen  2. New Dosing: continue 750 mg IV q12h  Predicted Trough / AUC(24h): 14.6 / 455  3. Next Level: random 7/14 at [address] [place] [address] Avenue will continue to follow closely for s/sx of nephrotoxicity, infusion reactions, and appropriateness of therapy. BMP and CBC will be ordered per protocol. We will continue to follow the patient’s culture results and clinical progress daily. Mavis Galarza, PharmD  Internal Medicine Clinical Pharmacist Specialist  [phone]  [facility]/Te[facility]

## 2023-07-13 NOTE — OCCUPATIONAL THERAPY NOTE
Occupational Therapy Evaluation     Patient Name: Anay Bhandari  Today's Date: 7/13/2023  Problem List  Principal Problem:    Lesion of left lung  Active Problems:    Multiple subsegmental pulmonary emboli without acute cor pulmonale (HCC)    Tobacco use    Brain lesions    Cancer associated pain    Non-small cell cancer of right lung ([address])    Fatigue    Past Medical History  History reviewed. No pertinent past medical history. Past Surgical History  Past Surgical History:   Procedure Laterality Date    IR BIOPSY LUNG  4/6/2023    IR BIOPSY LUNG  4/26/2023    IR PORT PLACEMENT  5/16/2023 07/13/23 1012   OT Last Visit   OT Visit Date 07/13/23   Note Type   Note type Evaluation   Pain Assessment   Pain Assessment Tool 0-10   Pain Score No Pain   Restrictions/Precautions   Weight Bearing Precautions Per Order No   Other Precautions Multiple lines   Home Living   Type of Home Mobile home   Home Layout One level  (3STE)   Bathroom Shower/Tub Tub/shower unit   Bathroom Toilet [address]  (did not use PTA)   Prior Function   Level of [place] Independent with ADLs; Independent with IADLS   Lives With Significant other   Receives Help From Family   IADLs Independent with driving; Independent with meal prep; Independent with medication management   Falls in the last 6 months 0   Vocational Retired   Lifestyle   Autonomy PTA, pt reports being I with ADLs, IADLs, fnxl mobility, (+)    Reciprocal Relationships Supportive S/O and family - S/O primarily works from home, local brother who is able to assist, + dtr   Service to Others Retired contractor   Intrinsic Gratification Likes contractor work   General   Family/Caregiver Present Yes   Subjective   Subjective "I just tired quicker"   ADL   Where Assessed Edge of bed   Eating Assistance 7  Independent   Grooming Assistance 7  Independent   UB Bathing Assistance 7  Independent   LB Bathing Assistance 5  2 [facility] Assistance 7  Independent   LB Dressing Assistance 5  Supervision/Setup   Toileting Assistance  5  Supervision/Setup   Bed Mobility   Supine to Sit 6  Modified independent   Additional items HOB elevated   Sit to Supine Unable to assess   Transfers   Sit to Stand 7  Independent   Stand to Sit 7  Independent   Additional Comments transfers w/o AD   Functional Mobility   Functional Mobility 5  Supervision   Additional Comments S, progressed to independent   Balance   Static Sitting Good   Dynamic Sitting Fair +   Static Standing Fair +   Dynamic Standing Fair   Ambulatory Fair   Activity Tolerance   Activity Tolerance Patient tolerated treatment well   Medical Staff Made Aware PT Elisabeth Rao   Nurse Made Aware RN clearance for session   RUE Assessment   RUE Assessment WFL   LUE Assessment   LUE Assessment WFL   Vision - Complex Assessment   Ocular Range of Motion Intact   Tracking Intact   Saccades Intact   Cognition   Overall Cognitive Status WFL   Arousal/Participation Alert; Responsive; Cooperative   Attention Within functional limits   Orientation Level Oriented X4   Memory Within functional limits   Following Commands Follows all commands and directions without difficulty   Comments Pt very pleasant and cooperative t/o session   Assessment   Assessment Pt is a 74 y.o. male admitted to [facility] on 7/12/2023 w/ Lesion of left lung, increased fatigue. Pt has PMH of NSCLC with metastatic brain lesions, Cancer associated pain, DDD, Pleural mass, Thoracic spine tumor. Pt with active OT orders and up and OOB as tolerated orders. Pt seen as a co-evaluation with PT due to the patient's co-morbidities, clinically unstable presentation/clinical complexity, and present impairments. As per pt report, pta, resides with his S/O in a 1STH, 3STE. Pt was I w/  ADLS and IADLS, (+) drove. Upon evaluation, pt currently independent for transfers. Initially S for mobility, progressed to independent.  Pt currently requires I eating, I grooming, I UB ADLs, S LB ADLs, and S toileting. From OT standpoint, recommendation would be home with social support. Pt with very supportive family + S/O who are able to assist as needed. Pt and S/O with no questions or concerns at this time. No further acute OT needs. D/C OT. Please re-consult if needed. Thank you. Pt was left after session with all current needs met.    Plan   OT Frequency Eval only   Recommendation   OT Discharge Recommendation No rehabilitation needs   AM-PAC Daily Activity Inpatient   Lower Body Dressing 3   Bathing 3   Toileting 3   Upper Body Dressing 4   Grooming 4   Eating 4   Daily Activity Raw Score 21   Daily Activity Standardized Score (Calc for Raw Score >=11) 44.27   AM-PAC Applied Cognition Inpatient   Following a Speech/Presentation 4   Understanding Ordinary Conversation 4   Taking Medications 4   Remembering Where Things Are Placed or Put Away 4   Remembering List of 4-5 Errands 4   Taking Care of Complicated Tasks 4   Applied Cognition Raw Score 24   Applied Cognition Standardized Score 62.21     Michelle Hinton MS, OTR/L

## 2023-07-13 NOTE — UTILIZATION REVIEW
Initial Clinical Review    Admission: Date/Time/Statement:   Admission Orders (From admission, onward)     Ordered        07/12/23 2028  Inpatient Admission  Once                      Orders Placed This Encounter   Procedures   • Inpatient Admission     Standing Status:   Standing     Number of Occurrences:   1     Order Specific Question:   Level of Care     Answer:   Med Surg [16]     Order Specific Question:   Estimated length of stay     Answer:   More than 2 Midnights     Order Specific Question:   Certification     Answer:   I certify that inpatient services are medically necessary for this patient for a duration of greater than two midnights. See H&P and MD Progress Notes for additional information about the patient's course of treatment. ED Arrival Information     Expected   7/12/2023     Arrival   7/12/2023 16:53    Acuity   Emergent            Means of arrival   Walk-In    Escorted by   Family Member    Service   [facility]    Admission type   Emergency            Arrival complaint   Fever, Lung cancer           Chief Complaint   Patient presents with   • Fever Immunocompromised     Fever since Sunday, pain in back of neck and headache, feels tired and "off", no chest pain, no SOB       Initial Presentation: 74 y.o. male who presented self from home to [address] [place] [address] ED. Inpatient admission for evaluation and treatment of  Lung lesion. PMHx: non-small cell lung cancer w/ mets to brain on chemo every 3 weeks. Presented w/ increased fatigue and low-grade fevers. Oncology office advised pt to present to ED. Finished taper of decadron this week. CT shows new mass in L upper lobe. On exam, b/l LE edema. Plan: IV ABX, Trend labs, replete electrolytes as needed; continue PTA meds, supportive care. Oncology, ID consulted. Date: 07/13/23   Day 2: Exam unremarkable.  Plan: IV ABX, Trend labs, replete electrolytes as needed; monitor nutritional intake, resume decadron, continue other current meds. Supportive care. Infectious Disease: Pt w/ febrile illness. DIscontinue ABX. suspect that his fever is secondary to tumor fever, controlled with dexamethasone previously and manifesting after weaning off dexamethasone. Resume dexamethasone. Oncology: Pt w/ new irregular spiculated mass of L upper lobe. High suspicion for radiation pneumonitis. Close monitoring off ABX. ED Triage Vitals   Temperature Pulse Respirations Blood Pressure SpO2   07/12/23 1705 07/12/23 1705 07/12/23 1705 07/12/23 1705 07/12/23 1705   100.2 °F (37.9 °C) 101 22 135/67 96 %      Temp Source Heart Rate Source Patient Position - Orthostatic VS BP Location FiO2 (%)   07/12/23 1705 07/12/23 1705 07/12/23 1705 07/12/23 1705 --   Temporal Monitor Sitting Right arm       Pain Score       07/12/23 1922       Med Not Given for Pain - for MAR use only          Wt Readings from Last 1 Encounters:   07/13/23 69.8 kg (153 lb 14.1 oz)     Additional Vital Signs:   Date/Time Temp Pulse Resp BP MAP (mmHg) SpO2 O2 Device   07/13/23 0900 -- -- -- -- -- -- None (Room air)   07/13/23 07:50:38 98.1 °F (36.7 °C) 81 17 124/92 103 95 % --   07/13/23 0432 -- -- -- -- -- -- None (Room air)   07/13/23 03:48:53 98.7 °F (37.1 °C) 77 17 130/85 100 97 % --   07/13/23 0200 -- 78 18 94/56 68 96 % --   07/13/23 0100 -- 80 16 108/69 83 97 % None (Room air)   07/13/23 0000 -- 84 18 113/65 83 96 % None (Room air)   07/12/23 2301 -- 84 16 106/68 82 95 % --   07/12/23 2233 -- -- -- -- -- -- None (Room air)   07/12/23 2100 -- 82 22 111/71 86 96 % --   07/12/23 2000 -- 88 20 126/76 96 96 % --     Pertinent Labs/Diagnostic Test Results:   XR chest 2 views   Final Result by Juan Ivy MD (07/13 1109)      Relatively stable right upper lobe Pancoast tumor with spiculated density in the left upper lobe with surrounding atelectasis.                   Workstation performed: [id number]           Results from last 7 days   Lab Units 07/12/23  8112 SARS-COV-2  Negative     Results from last 7 days   Lab Units 07/13/23  0447 07/12/23  1758   WBC Thousand/uL 3.06* 3.44*   HEMOGLOBIN g/dL 11.3* 11.2*   HEMATOCRIT % 33.5* 32.1*   PLATELETS Thousands/uL 91* 87*   NEUTROS ABS Thousands/µL 1.89 2.42         Results from last 7 days   Lab Units 07/13/23  0505 07/12/23  1758   SODIUM mmol/L 140 136   POTASSIUM mmol/L 4.0 4.1   CHLORIDE mmol/L 114* 107   CO2 mmol/L 22 24   ANION GAP mmol/L 4 5   BUN mg/dL 20 20   CREATININE mg/dL 0.90 1.02   EGFR ml/min/1.73sq m 89 76   CALCIUM mg/dL 8.2* 8.3   MAGNESIUM mg/dL 2.2  --    PHOSPHORUS mg/dL 3.6  --      Results from last 7 days   Lab Units 07/13/23  0505 07/12/23  1758   AST U/L 14 17   ALT U/L 39 44   ALK PHOS U/L 42* 43*   TOTAL PROTEIN g/dL 6.5 6.6   ALBUMIN g/dL 2.8* 3.1*   TOTAL BILIRUBIN mg/dL 0.66 0.80         Results from last 7 days   Lab Units 07/13/23  0505 07/12/23  1758   GLUCOSE RANDOM mg/dL 107 110     Results from last 7 days   Lab Units 07/12/23  1758   PROTIME seconds 14.5   INR  1.11   PTT seconds 27         Results from last 7 days   Lab Units 07/13/23  0520 07/12/23  1758   PROCALCITONIN ng/ml 0.09 0.08     Results from last 7 days   Lab Units 07/12/23  1758   LACTIC ACID mmol/L 1.0     Results from last 7 days   Lab Units 07/13/23  0923   CLARITY UA  Clear   COLOR UA  Yellow   SPEC GRAV UA  1.018   PH UA  5.5   GLUCOSE UA mg/dl Negative   KETONES UA mg/dl Negative   BLOOD UA  Small*   PROTEIN UA mg/dl Trace*   NITRITE UA  Negative   BILIRUBIN UA  Negative   UROBILINOGEN UA (BE) mg/dl <2.0   LEUKOCYTES UA  Negative   WBC UA /hpf 1-2   RBC UA /hpf 10-20*   BACTERIA UA /hpf None Seen   EPITHELIAL CELLS WET PREP /hpf Occasional   MUCUS THREADS  Innumerable*     Results from last 7 days   Lab Units 07/12/23  1923   INFLUENZA A PCR  Negative   INFLUENZA B PCR  Negative   RSV PCR  Negative     Results from last 7 days   Lab Units 07/12/23  1758   BLOOD CULTURE  Received in Microbiology Lab.  Culture in Progress. Received in Microbiology Lab. Culture in Progress. ED Treatment:   Medication Administration from 07/12/2023 1612 to 07/13/2023 0343       Date/Time Order Dose Route Action     07/12/2023 1922 EDT acetaminophen (TYLENOL) tablet 650 mg 650 mg Oral Given     07/12/2023 1948 EDT cefepime (MAXIPIME) 2 g/50 mL dextrose IVPB 2,000 mg Intravenous New Bag     07/12/2023 2122 EDT vancomycin (VANCOCIN) 1,750 mg in sodium chloride 0.9 % 500 mL IVPB 1,750 mg Intravenous New Bag     07/12/2023 2156 EDT gabapentin (NEURONTIN) capsule 600 mg 600 mg Oral Given        Present on Admission:  • Multiple subsegmental pulmonary emboli without acute cor pulmonale (HCC)  • Tobacco use  • Brain lesions  • Cancer associated pain  • Non-small cell cancer of right lung (HCC)      Admitting Diagnosis: Metastasis to brain (HCC) [C79.31]  Fever [R50.9]  Immunocompromised ([address]) [D84.9]  Lesion of lung [R91.1]  Non-small cell cancer of right lung (HCC) [C34.91]  Age/Sex: 74 y.o. male  Admission Orders:  Regular Diet. Aspiration precautions. Incentive Spirometry. SCDs. Scheduled Medications:  apixaban, 5 mg, Oral, BID  cefepime, 2,000 mg, Intravenous, Q8H  dexamethasone, 2 mg, Oral, Daily  folic acid, 4,111 mcg, Oral, Daily  gabapentin, 300 mg, Oral, Daily  gabapentin, 600 mg, Oral, HS  memantine, 10 mg, Oral, BID  vancomycin, 750 mg, Intravenous, Q12H    Continuous IV Infusions: none    PRN Meds:  acetaminophen, 650 mg, Oral, Q8H PRN  ondansetron, 4 mg, Oral, Q8H PRN        IP CONSULT TO INFECTIOUS DISEASES  IP CONSULT TO CASE MANAGEMENT  IP CONSULT TO PHARMACY  IP CONSULT TO ONCOLOGY    Network Utilization Review Department  ATTENTION: Please call with any questions or concerns to [phone] and carefully listen to the prompts so that you are directed to the right person.  All voicemails are confidential.  Jean Pierre Coreas all requests for admission clinical reviews, approved or denied determinations and any other requests to dedicated fax number below belonging to the campus where the patient is receiving treatment.  List of dedicated fax numbers for the Facilities:  [place] DENIALS (Administrative/Medical Necessity) [phone] [address] (Maternity/NICU/Pediatrics) [phone]   [address] [place] [address] [phone]   [facility] [address] [facility] [phone]   [address] [place] [address] [phone]   [address] [address] [facility] [phone]

## 2023-07-13 NOTE — PROGRESS NOTES
Anay Bhandari is a 74 y.o. male who is currently ordered Vancomycin IV with management by the Pharmacy Consult service. Relevant clinical data and objective / subjective history reviewed. Vancomycin Assessment:  Indication and Goal AUC/Trough: Pneumonia (goal -600, trough >10)  Clinical Status: stable  Micro:   Ordered  Renal Function:  SCr: 1.02 mg/dL  CrCl: 69.9 mL/min  Renal replacement: Not on dialysis  Days of Therapy: 1  Current Dose: 1000mg Q 12H  Vancomycin Plan:  New Dosinmg Loading dose. Then 750mg Q 12H  Estimated AUC: 483 mcg*hr/mL  Estimated Trough: 15.9 mcg/mL  Next Level:  @06:00  Renal Function Monitoring: Daily BMP and [place] will continue to follow closely for s/sx of nephrotoxicity, infusion reactions and appropriateness of therapy. BMP and CBC will be ordered per protocol. We will continue to follow the patient’s culture results and clinical progress daily.     Iraida Avila, Pharmacist

## 2023-07-13 NOTE — RESPIRATORY THERAPY NOTE
07/12/23 2233   Respiratory Protocol   Protocol Initiated? No   Language Barrier? No   Medical & Social History Reviewed? Yes   Diagnostic Studies Reviewed? Yes   Physical Assessment Performed? Yes   Respiratory Assessment   Assessment Type Assess only   General Appearance Alert; Awake   Respiratory Pattern Normal   Chest Assessment Chest expansion symmetrical   Bilateral Breath Sounds Diminished;Clear   Cough None   Resp Comments Patient presented to ED with fever and fatigue. He has [address] with mets/brain and is receiving chemo. He is an active tobacco user and admitted to being diagnosed with emphysema. He is currently on no home medications for his lung disease. Currently there is no indication for aerosol therapy. He has excellent technique on incentive spirometry, achieving inspiratory volume of 1250cc.    O2 Device RA

## 2023-07-13 NOTE — CASE MANAGEMENT
Case Management Assessment & Discharge Planning Note    Patient name Bisi Pena  Location [facility] 925/[facility] 481-72 MRN [id number]  : 1955 Date 2023       Current Admission Date: 2023  Current Admission Diagnosis:Lesion of left lung   Patient Active Problem List    Diagnosis Date Noted   • Lesion of left lung 2023   • Fatigue 2023   • Metastasis to brain ([address]) 2023   • Non-small cell cancer of right lung ([address]) 2023   • Cancer associated pain 2023   • Advanced care planning/counseling discussion 2023   • Brain lesions 2023   • Thoracic spine tumor 2023   • Multiple subsegmental pulmonary emboli without acute cor pulmonale ([address]) 2023   • Tobacco use 2023   • DDD (degenerative disc disease), cervical 2023   • Pleural mass 2023      LOS (days): 1  Geometric Mean LOS (GMLOS) (days):   Days to GMLOS:     OBJECTIVE:    Risk of Unplanned Readmission Score: 22.81         Current admission status: Inpatient       Preferred Pharmacy:   [address]  Phone: [phone] Fax: [address] [facility], [address] [facility] [address] [address]  Phone: [phone] Fax: [address] [place] [address]  Phone: [phone] Fax: [phone]    Primary Care Provider: Milton Rosado MD    Primary Insurance: MEDICARE  Secondary Insurance:     [facility]    There are no active Health Care Proxies on file.          Readmission Root Cause  30 Day Readmission: No    Patient Information  Admitted from[de-identified] Home  Mental Status: Alert  During Assessment patient was accompanied by: Spouse  Assessment information provided by[de-identified] Patient  Primary Caregiver: Self  Support Systems: Self, Spouse/significant other  [place] of Residence: [facility] [address] [facility] do you live in?: [place]  Type of Current Residence: [facility]  In the last 12 months, was there a time when you were not able to pay the mortgage or rent on time?: No  In the last 12 months, how many places have you lived?: 1  In the last 12 months, was there a time when you did not have a steady place to sleep or slept in a shelter (including now)?: No  Homeless/housing insecurity resource given?: N/A  Living Arrangements: Lives w/ Spouse/significant other  Is patient a ?: No    Activities of Daily Living Prior to Admission  Functional Status: Independent  Completes ADLs independently?: Yes  Ambulates independently?: Yes  Does patient use assisted devices?: No  Does patient currently own DME?: No  Does patient have a history of Outpatient Therapy (PT/OT)?: No  Does the patient have a history of Short-Term Rehab?: No  Does patient have a history of HHC?: No  Does patient currently have [address] [facility]?: No         Patient Information Continued  Income Source: Self-employed  Does patient have prescription coverage?: No  Within the past 12 months, you worried that your food would run out before you got the money to buy more.: Never true  Within the past 12 months, the food you bought just didn't last and you didn't have money to get more.: Never true  Food insecurity resource given?: N/A  Does patient receive dialysis treatments?: No  Does patient have a history of substance abuse?: No  Does patient have a history of Mental Health Diagnosis?: No         Means of Transportation  Means of Transport to Appts[de-identified] Drives Self  In the past 12 months, has lack of transportation kept you from medical appointments or from getting medications?: No  In the past 12 months, has lack of transportation kept you from meetings, work, or from getting things needed for daily living?: No  Was application for public transport provided?: N/A        DISCHARGE DETAILS:    Discharge planning discussed with[de-identified] Patient, patient's spouse  Freedom of Choice: Yes     CM contacted family/caregiver?: No- see comments (Pt alert and oriented, pt's wife present during assessment)             Contacts  Patient Contacts: Ari Palacios  Relationship to Patient[de-identified] Family  Contact Method: In Person  Reason/Outcome: Continuity of Care, Emergency Contact, Discharge Planning         Other Referral/Resources/Interventions Provided:  Referral Comments: Pending recs         Treatment Team Recommendation: Other (TBD)  Discharge Destination Plan[de-identified] Other (TBD)  Transport at Discharge : Family               Additional Comments: CM met with pt at bedside to complete open assessment and review previous assessment information. CM introduced self and role. Pt independent at baseline, does not use DME. CM to follow. Pt also reported wanting [facility] to be primary pharmacy. CM updated pt's chart to reflect this.

## 2023-07-13 NOTE — CONSULTS
Consultation - Infectious Disease   Dajuannely Suarez 74 y.o. male MRN: [id number]  [facility] Encounter: [id number]      IMPRESSION & RECOMMENDATIONS:   Impression/Recommendations: This is a 74 y.o. male, with advanced non-small cell lung cancer with CNS metastases, currently on chemotherapy and recently weaned off dexamethasone, presented to ER yesterday with a few days of fever/chills and fatigue. He is now back on dexamethasone and has no further fever. 1.  Febrile illness. Patient is clinically and systemically well, and without any obvious active infection. His procalcitonins are normal x 2. Patient's development of fever is temporally related to coming off dexamethasone. Patient was started back on dexamethasone yesterday and now is without fever. I suspect that his fever is secondary to tumor fever, controlled with dexamethasone previously and manifesting after weaning off dexamethasone. Admission blood cultures have no growth thus far. With patient clinically and systemically well, and without obvious active infection, will discontinue antibiotic to prevent antibiotic toxicities. Discontinue antibiotic and observe. Monitor temperature/WBC. Follow-up on admission blood cultures. 2.  New ERIKA mass. Patient has no respiratory symptoms. His procalcitonin's are normal.  Doubt pneumonia clinically. Consider pregnancy. Consider pneumonitis secondary to [facility]. Consider biopsy of this mass versus observation with repeat chest CT. Patient should get oncology evaluation for work-up plan of this new mass. If plan is for he may also need pulmonary evaluation. Discontinue antibiotic and observe, as in above. Recommend oncology evaluation regarding work-up of RUL mass. Consider pulmonary work-up for biopsy of mass. 3.  Generalized fatigue, common finding in patient advanced cancer on chemotherapy, with superimposed fever. Monitor symptoms.     4.  Advanced non-small cell lung cancer, with RUL mass, currently on chemotherapy and Keytruda. RUL mass is stable on CT. Oncology follow-up. 5.  CNS metastases. Patient had been on dexamethasone, which was weaned off but now restarted. Oncology follow-up. Continue dexamethasone per oncology. Discussed with patient and his wife in detail regarding the above plan. Discussed with primary service. Thank you for this consultation. We will follow along with you. HISTORY OF PRESENT ILLNESS:  Reason for Consult: Fever. HPI: Dominik Polanco is a 74 y.o. male, with advanced non-small cell lung cancer with CNS metastases, currently on chemotherapy every 3 weeks and also on systemic corticosteroid. Patient noticed that he has been more fatigued than usual and also with low-grade fever at home. Patient notified his oncologist yesterday and was told to come to the ER. On presentation, patient had no fever. He had leukopenia but not neutropenia. Chest CT showed a new ERIKA mass. Patient was admitted and started on vancomycin/cefepime. We are asked to evaluate the patient. At present, patient feels quite well. He still has generalized fatigue but no further fever or chills. He has no dyspnea or cough. He has no chest pain. Of note, patient had been on dexamethasone for CNS metastases since March of this year. This had been slowly weaned off, with last dose on 7/11. Yesterday, when patient call his oncologist, dexamethasone was restarted. REVIEW OF SYSTEMS:  A complete system-based review was done. Except for what is noted in HPI above, ROS of systems is otherwise negative. PAST MEDICAL HISTORY:  History reviewed. No pertinent past medical history. Past Surgical History:   Procedure Laterality Date   • IR BIOPSY LUNG  4/6/2023   • IR BIOPSY LUNG  4/26/2023   • IR PORT PLACEMENT  5/16/2023     Problem list reviewed.     FAMILY HISTORY:  Non-contributory    SOCIAL HISTORY:  Social History     Substance and Sexual Activity Alcohol Use Never     Social History     Substance and Sexual Activity   Drug Use Not Currently     Social History     Tobacco Use   Smoking Status Every Day   • Packs/day: 0.25   • Years: 35.00   • Total pack years: 8.75   • Types: Cigarettes   • Start date: 1971   Smokeless Tobacco Never   Tobacco Comments    Patient is trying to quit,         ALLERGIES:  No Known Allergies    MEDICATIONS:  All current active medications have been reviewed. Patient is currently on vancomycin/cefepime. PHYSICAL EXAM:  Vitals:  Temp:  [98.1 °F (36.7 °C)-100.2 °F (37.9 °C)] 98.1 °F (36.7 °C)  HR:  [] 81  Resp:  [16-22] 17  BP: ()/(56-92) 124/92  SpO2:  [95 %-97 %] 95 %  Temp (24hrs), Av °F (37.2 °C), Min:98.1 °F (36.7 °C), Max:100.2 °F (37.9 °C)  Current: Temperature: 98.1 °F (36.7 °C)     Physical Exam:  General:  Well-nourished, well-developed, in no acute distress. Awake, alert and oriented x 3. Eyes:  Conjunctive clear with no hemorrhages or effusions  Oropharynx:  No ulcers, no lesions, pharynx benign, no tonsillitis  Neck:  Supple, no lymphadenopathy, no mass, nontender  Lungs:  Expansion symmetric, no rales, no wheezing, no accessory muscle use  Cardiac:  Regular rate and rhythm, normal S1, normal S2, no murmurs  Abdomen:  Soft, nondistended, non-tender, no HSM  Extremities:  No edema, no erythema, nontender. No ulcers  Skin:  No rashes, no ulcers  Neurological:  Moves all four extremities spontaneously, sensation grossly intact    LABS, IMAGING, & OTHER STUDIES:  Lab Results:  I have personally reviewed pertinent labs.   Results from last 7 days   Lab Units 23  0505 23  1758   POTASSIUM mmol/L 4.0 4.1   CHLORIDE mmol/L 114* 107   CO2 mmol/L 22 24   BUN mg/dL 20 20   CREATININE mg/dL 0.90 1.02   EGFR ml/min/1.73sq m 89 76   CALCIUM mg/dL 8.2* 8.3   AST U/L 14 17   ALT U/L 39 44   ALK PHOS U/L 42* 43*     Results from last 7 days   Lab Units 23  0447 23  1758   WBC Thousand/uL 3.06* 3.44*   HEMOGLOBIN g/dL 11.3* 11.2*   PLATELETS Thousands/uL 91* 87*     Results from last 7 days   Lab Units 07/12/23  1800   BLOOD CULTURE  Received in Microbiology Lab. Culture in Progress. Received in Microbiology Lab. Culture in Progress. Imaging Studies:   I have personally reviewed pertinent imaging study reports and images in PACS. CXR reviewed personally. Stable RUL mass. Chest/abdomen/pelvis CT reviewed personally. Stable RUL mass. ERIKA mass. No acute intra-abdominal pathology. EKG, Pathology, and Other Studies:   I have personally reviewed pertinent reports.

## 2023-07-13 NOTE — ASSESSMENT & PLAN NOTE
Patient with history of multiple subsegmental pulmonary emboli that appear to have been diagnosed on 03/29/2023 and patient has been on anticoagulation since.  -Currently on Eliquis 5 mg twice daily

## 2023-07-13 NOTE — ASSESSMENT & PLAN NOTE
Patient with generalized malaise and fatigue that has been chronic but appears to have gotten worse over the course the last 5-10 days with subjective fevers.   Patient does report of slightly reduced oral intake during this time.  -Continue to monitor nutritional intake  -On antibiotic therapy as mentioned above for infectious process

## 2023-07-13 NOTE — PHYSICAL THERAPY NOTE
Physical Therapy Evaluation     Patient's Name: [place] [facility]    Admitting Diagnosis  Metastasis to brain [facility]) [C79.31]  Fever [R50.9]  Immunocompromised ([address]) [D84.9]  Lesion of lung [R91.1]  Non-small cell cancer of right lung ([address]) [C34.91]    Problem List  Patient Active Problem List   Diagnosis    Multiple subsegmental pulmonary emboli without acute cor pulmonale ([address])    Tobacco use    DDD (degenerative disc disease), cervical    Pleural mass    Brain lesions    Thoracic spine tumor    Cancer associated pain    Advanced care planning/counseling discussion    Non-small cell cancer of right lung ([address])    Metastasis to brain ([address])    Lesion of left lung    Fatigue       Past Medical History  History reviewed. No pertinent past medical history. Past Surgical History  Past Surgical History:   Procedure Laterality Date    IR BIOPSY LUNG  4/6/2023    IR BIOPSY LUNG  4/26/2023    IR PORT PLACEMENT  5/16/2023 07/13/23 1011   PT Last Visit   PT Visit Date 07/13/23   Note Type   Note type Evaluation   Pain Assessment   Pain Assessment Tool 0-10   Pain Score No Pain   Restrictions/Precautions   Weight Bearing Precautions Per Order No   Other Precautions Multiple lines; Fall Risk   Home Living   Type of Home Mobile home  (3 FRACISCO)   Home Layout One level;Performs ADLs on one level; Able to live on main level with bedroom/bathroom;Stairs to enter with rails   Bathroom Shower/Tub Tub/shower unit   [facility]  (did not use)   Prior Function   Level of [place] Independent with ADLs; Independent with functional mobility; Independent with IADLS   Lives With Significant other   Receives Help From Family   IADLs Independent with driving; Independent with meal prep; Independent with medication management   Falls in the last 6 months 0   Vocational Retired   General   Family/Caregiver Present Yes   Cognition   Arousal/Participation Cooperative   Orientation Level Oriented X4   Memory Within functional limits   Following Commands Follows all commands and directions without difficulty   Comments pt very pleasant and cooperative to participate in therapy session   Bed Mobility   Supine to Sit 6  Modified independent   Additional items HOB elevated   Sit to Supine 6  Modified independent   Additional items HOB elevated   Additional Comments pt supine in bed upon arrival. Pt returned to supine in bed with all needs within reach   Transfers   Sit to Stand 7  Independent   Stand to Sit 7  Independent   Additional Comments transfers without AD   Ambulation/Elevation   Gait pattern   (decreased gait speed)   Gait Assistance 5  Supervision  (progressed to I level)   Assistive Device None   Distance 120ft   Stair Management Assistance 5  Supervision   Stair Management Technique One rail R;Alternating pattern; Foreward   Number of Stairs 4  (limited 2* lines)   Balance   Static Sitting Fair +   Dynamic Sitting Fair +   Static Standing Fair   Dynamic Standing Fair   Ambulatory Fair   Activity Tolerance   Activity Tolerance Patient tolerated treatment well   Medical Staff Made Aware OT; co-eval performed this date 2* increased medical complexity and multiple co-morbidities   Nurse Made Aware RN cleared pt to participate in therapy session   Assessment   Prognosis Good   Assessment Pt seen for moderated complexity PT evaluation. Pt with active PT eval/treat and up and OOB as tolerated orders. Pt is a 74 y.o. male who was admitted to [facility] on 7/12 with Lesion of L lung. Pt's active problems include: multiple subsegmental PE without acute cor pulmonale, brain lesions, cancer associated pain, R lung non-small cell cancer, fatigue. Pt  has no past medical history on file. Pt resides with SO in 1 level trailer home with 3 FRACISCO and was independent prior to hospital admission.  Currently upon evaluation pt performing bed mobility tasks at mod I level, funational transfers at I level and ambulation at [facility] level progressing to independent level without use of AD/ DME. Pt was left supine in bed at the end of PT session with all needs within reach. Pt with no questions or concerns regarding d/c home; appears to be functioning at/ near baseline mobility levels. Pt with no further acute inpatient PT needs at this time- please re-consult if needed. PT to d/c pt and recommends home with family support as needed. Encourage pt to ambulate at least 3-4x/day with restorative/ nursing staff while remaining in hospital. The patient's [facility] Mobility Inpatient Short Form Raw Score is 22. A Raw score of greater than 16 suggests the patient may benefit from discharge to home. Please also refer to the recommendation of the Physical Therapist for safe discharge planning.    Goals   Patient Goals to go home   Plan   PT Frequency   (eval only- d/c PT)   Recommendation   PT Discharge Recommendation No rehabilitation needs   Equipment Recommended   (none)   AM-PAC Basic Mobility Inpatient   Turning in Flat Bed Without Bedrails 4   Lying on Back to Sitting on Edge of Flat Bed Without Bedrails 4   Moving Bed to Chair 4   Standing Up From Chair Using Arms 4   Walk in Room 3   Climb 3-5 Stairs With Railing 3   Basic Mobility Inpatient Raw Score 22   Basic Mobility Standardized Score 47.4   Highest Level Of Mobility   JH-HLM Goal 7: Walk 25 feet or more   JH-HLM Achieved 7: Walk 25 feet or more   Modified [place] Scale   Modified [place] Scale 1           [place], PT DPT

## 2023-07-13 NOTE — ASSESSMENT & PLAN NOTE
Patient with history of non-small cell cancer of the right lung that appears to be stage Pablo with prior lung biopsy from the right upper lobe that was indicative of poorly differentiated non-small cell carcinoma. Patient has been following with oncology in the outpatient setting, Dr. Dante Luna. Appears to have been on chemotherapy and has completed 3 cycles of Emend. Currently completed 3/6 cycles of carboplatin, pemetrexed. Pembrolizumab.    - Following with Dr. Dante Luna in outpatient setting  - Dexamethasone 2mg qd for cancer symptom management

## 2023-07-13 NOTE — PROGRESS NOTES
INTERNAL MEDICINE RESIDENCY PROGRESS NOTE     Name: Lichajanet [place]   Age & Sex: 74 y.o. male   MRN: [id number]  Unit/Bed#: [facility] 925-01   Encounter: [id number]  Team: SOD Team C     PATIENT INFORMATION     [facility]   Age & Sex: 74 y.o. male   MRN: [id number]  Hospital Stay Days: 1    ASSESSMENT/PLAN     Principal Problem:    Lesion of left lung  Active Problems:    Multiple subsegmental pulmonary emboli without acute cor pulmonale (HCC)    Tobacco use    Brain lesions    Cancer associated pain    Non-small cell cancer of right lung (HCC)    Fatigue      * Lesion of left lung  Assessment & Plan  2-day history of increased fatigue and low-grade fevers with home measurement of 100.4 °F (onset on 7/11). CT abdomen and pelvis on 7/8/2023 showed a new irregular and spiculated mass in the left upper lobe measuring 2.5 x 2.5 cm extending towards the pleura with cystic change concerning for infection/inflammatory process. Due to patient's history of immunocompromise due to ongoing chemotherapy and history of cancer, there are concerns patient not be able to mount a full immune response and for now, may benefit from treatment with antibiotics for this new possible infectious versus inflammatory mass in the left upper lobe. -Currently on IV vancomycin and cefepime, renal function reviewed  -Pharmacy consult ordered  -Infectious disease consulted for additional recommendations and antibiotic de-escalation if needed  -Medical oncology also consulted for further work-up for the spiculated mass  - Flu/RSV/COVID negative  - Monitor VS and WBC  - Maintain oxygen saturation > 88%  - Respiratory protocol  - Airway clearance protocol  - MRSA culture ordered  - Consider using procalcitonin to guide antibiotic discontinuation          Fatigue  Assessment & Plan  Patient with generalized malaise and fatigue that has been chronic but appears to have gotten worse over the course the last 5-10 days with subjective fevers. Patient does report of slightly reduced oral intake during this time.  -Continue to monitor nutritional intake  -On antibiotic therapy as mentioned above for infectious process      Non-small cell cancer of right lung [facility])  Assessment & Plan  Patient with history of non-small cell cancer of the right lung that appears to be stage Pablo with prior lung biopsy from the right upper lobe that was indicative of poorly differentiated non-small cell carcinoma. Patient has been following with oncology in the outpatient setting, Dr. Juliette Sewell. Appears to have been on chemotherapy and has completed 3 cycles of Emend. Currently completed 3/6 cycles of carboplatin, pemetrexed. Pembrolizumab.   -Following with Dr. Juliette Sewell in outpatient setting  -Medical oncology consulted while inpatient  - restarted on Dexamethasone 2mg qd    Cancer associated pain  Assessment & Plan  Patient with history of non-small cell cancer of right lung and associated cancer pain.  -On gabapentin 300 mg in the a.m. and 600 mg nightly    Brain lesions  Assessment & Plan  Patient has metastatic brain lesions that have been previously noted based on MRI brain from April 2023, about 6 brain lesions which are likely concerning for metastatic brain lesion from his history of non-small cell lung cancer. In the past, patient has been seen and evaluated by neurosurgery and was recommended for no acute intervention. Was recommended for radiation oncology treatments in the outpatient setting previously.   -Continue to monitor neurologic status    Tobacco use  Assessment & Plan  Patient has history of smoking, currently using nicotine patch.  -Can restart inpatient if needed    Multiple subsegmental pulmonary emboli without acute cor pulmonale [facility])  Assessment & Plan  Patient with history of multiple subsegmental pulmonary emboli that appear to have been diagnosed on 03/29/2023 and patient has been on anticoagulation since.  -Currently on Eliquis 5 mg twice daily      Disposition: working up new left lung lesion and possible infectious process; continue IP level of care    SUBJECTIVE     Patient seen and examined. No acute events overnight. Patient denies SOB, new cough, sputum production, CP, palpitations, chills, abdominal pain, N/V, or any other complaints. OBJECTIVE     Vitals:    23 0100 23 0200 23 0348 23 0750   BP: 108/69 94/56 130/85 124/92   BP Location:   Left arm    Pulse: 80 78 77 81   Resp: 16 18 17 17   Temp:   98.7 °F (37.1 °C) 98.1 °F (36.7 °C)   TempSrc:   Oral    SpO2: 97% 96% 97% 95%   Weight:   69.8 kg (153 lb 14.1 oz)    Height:   5' 10" (1.778 m)       Temperature:   Temp (24hrs), Av °F (37.2 °C), Min:98.1 °F (36.7 °C), Max:100.2 °F (37.9 °C)    Temperature: 98.1 °F (36.7 °C)  Intake & Output:  I/O        07 07 07 07 07 0700    IV Piggyback  550     Total Intake(mL/kg)  550 (7.9)     Net  +550            Unmeasured Urine Occurrence  2 x         Weights:   IBW (Ideal Body Weight): 73 kg    Body mass index is 22.08 kg/m². Weight (last 2 days)     Date/Time Weight    23 03:48:53 69.8 (153.88)    23 1705 72.6 (160)        Physical Exam  LABORATORY DATA     Labs: I have personally reviewed pertinent reports.   Results from last 7 days   Lab Units 23  0447 23  1758   WBC Thousand/uL 3.06* 3.44*   HEMOGLOBIN g/dL 11.3* 11.2*   HEMATOCRIT % 33.5* 32.1*   PLATELETS Thousands/uL 91* 87*   NEUTROS PCT % 61 71   MONOS PCT % 13* 12   EOS PCT % 0 0      Results from last 7 days   Lab Units 23  0505 23  1758   POTASSIUM mmol/L 4.0 4.1   CHLORIDE mmol/L 114* 107   CO2 mmol/L 22 24   BUN mg/dL 20 20   CREATININE mg/dL 0.90 1.02   CALCIUM mg/dL 8.2* 8.3   ALK PHOS U/L 42* 43*   ALT U/L 39 44   AST U/L 14 17     Results from last 7 days   Lab Units 23  0505   MAGNESIUM mg/dL 2.2     Results from last 7 days   Lab Units 23  0505   PHOSPHORUS mg/dL 3.6      Results from last 7 days   Lab Units 07/12/23  1758   INR  1.11   PTT seconds 27     Results from last 7 days   Lab Units 07/12/23  1758   LACTIC ACID mmol/L 1.0           IMAGING & DIAGNOSTIC TESTING     Radiology Results: I have personally reviewed pertinent reports. No results found. Other Diagnostic Testing: I have personally reviewed pertinent reports. ACTIVE MEDICATIONS     Current Facility-Administered Medications   Medication Dose Route Frequency   • acetaminophen (TYLENOL) tablet 650 mg  650 mg Oral Q8H PRN   • apixaban (ELIQUIS) tablet 5 mg  5 mg Oral BID   • cefepime (MAXIPIME) 2 g/50 mL dextrose IVPB  2,000 mg Intravenous Q8H   • dexamethasone (DECADRON) tablet 2 mg  2 mg Oral Daily   • folic acid (FOLVITE) tablet 1,000 mcg  1,000 mcg Oral Daily   • gabapentin (NEURONTIN) capsule 300 mg  300 mg Oral Daily   • gabapentin (NEURONTIN) capsule 600 mg  600 mg Oral HS   • memantine (NAMENDA) tablet 10 mg  10 mg Oral BID   • ondansetron (ZOFRAN-ODT) dispersible tablet 4 mg  4 mg Oral Q8H PRN   • vancomycin (VANCOCIN) IVPB (premix in dextrose) 750 mg 150 mL  750 mg Intravenous Q12H       VTE Pharmacologic Prophylaxis: eliquis  VTE Mechanical Prophylaxis: sequential compression device    Portions of the record may have been created with voice recognition software. Occasional wrong word or "sound a like" substitutions may have occurred due to the inherent limitations of voice recognition software.   Read the chart carefully and recognize, using context, where substitutions have occurred.  ==  Rosa Lujan MD  [address] [facility]  Internal Medicine Residency PGY-1

## 2023-07-13 NOTE — PLAN OF CARE
Problem: PAIN - ADULT  Goal: Verbalizes/displays adequate comfort level or baseline comfort level  Description: Interventions:  - Encourage patient to monitor pain and request assistance  - Assess pain using appropriate pain scale  - Administer analgesics based on type and severity of pain and evaluate response  - Implement non-pharmacological measures as appropriate and evaluate response  - Consider cultural and social influences on pain and pain management  - Notify physician/advanced practitioner if interventions unsuccessful or patient reports new pain  Outcome: Progressing     Problem: INFECTION - ADULT  Goal: Absence or prevention of progression during hospitalization  Description: INTERVENTIONS:  - Assess and monitor for signs and symptoms of infection  - Monitor lab/diagnostic results  - Monitor all insertion sites, i.e. indwelling lines, tubes, and drains  - Monitor endotracheal if appropriate and nasal secretions for changes in amount and color  - Outlook appropriate cooling/warming therapies per order  - Administer medications as ordered  - Instruct and encourage patient and family to use good hand hygiene technique  - Identify and instruct in appropriate isolation precautions for identified infection/condition  Outcome: Progressing  Goal: Absence of fever/infection during neutropenic period  Description: INTERVENTIONS:  - Monitor WBC    Outcome: Progressing     Problem: SAFETY ADULT  Goal: Patient will remain free of falls  Description: INTERVENTIONS:  - Educate patient/family on patient safety including physical limitations  - Instruct patient to call for assistance with activity   - Consult OT/PT to assist with strengthening/mobility   - Keep Call bell within reach  - Keep bed low and locked with side rails adjusted as appropriate  - Keep care items and personal belongings within reach  - Initiate and maintain comfort rounds  - Make Fall Risk Sign visible to staff  - Apply yellow socks and bracelet for high fall risk patients  - Consider moving patient to room near nurses station  Outcome: Progressing  Goal: Maintain or return to baseline ADL function  Description: INTERVENTIONS:  -  Assess patient's ability to carry out ADLs; assess patient's baseline for ADL function and identify physical deficits which impact ability to perform ADLs (bathing, care of mouth/teeth, toileting, grooming, dressing, etc.)  - Assess/evaluate cause of self-care deficits   - Assess range of motion  - Assess patient's mobility; develop plan if impaired  - Assess patient's need for assistive devices and provide as appropriate  - Encourage maximum independence but intervene and supervise when necessary  - Involve family in performance of ADLs  - Assess for home care needs following discharge   - Consider OT consult to assist with ADL evaluation and planning for discharge  - Provide patient education as appropriate  Outcome: Progressing  Goal: Maintains/Returns to pre admission functional level  Description: INTERVENTIONS:  - Perform BMAT or MOVE assessment daily.   - Set and communicate daily mobility goal to care team and patient/family/caregiver.    - Collaborate with rehabilitation services on mobility goals if consulted  - Out of bed for toileting  - Record patient progress and toleration of activity level   Outcome: Progressing     Problem: DISCHARGE PLANNING  Goal: Discharge to home or other facility with appropriate resources  Description: INTERVENTIONS:  - Identify barriers to discharge w/patient and caregiver  - Arrange for needed discharge resources and transportation as appropriate  - Identify discharge learning needs (meds, wound care, etc.)  - Arrange for interpretive services to assist at discharge as needed  - Refer to Case Management Department for coordinating discharge planning if the patient needs post-hospital services based on physician/advanced practitioner order or complex needs related to functional status, cognitive ability, or social support system  Outcome: Progressing     Problem: Knowledge Deficit  Goal: Patient/family/caregiver demonstrates understanding of disease process, treatment plan, medications, and discharge instructions  Description: Complete learning assessment and assess knowledge base.   Interventions:  - Provide teaching at level of understanding  - Provide teaching via preferred learning methods  Outcome: Progressing

## 2023-07-13 NOTE — H&P
INTERNAL MEDICINE RESIDENCY ADMISSION H&P     Name: Mt Rosado   Age & Sex: 74 y.o. male   MRN: [id number]  Unit/Bed#: [facility] 925-01   Encounter: [id number]  Primary Care Provider: Sahra Ayala MD    Code Status: Level 1 - Full Code  Admission Status: INPATIENT   Disposition: Patient requires Med/Surg    Admit to team: SOD Team C     ASSESSMENT/PLAN     Principal Problem:    Lesion of left lung  Active Problems:    Fatigue    Non-small cell cancer of right lung ([address])    Brain lesions    Cancer associated pain    Multiple subsegmental pulmonary emboli without acute cor pulmonale (HCC)    Tobacco use      * Lesion of left lung  Assessment & Plan  Patient presenting with 5-10-day history of generalized malaise and fatigue along with subjective fevers and feeling warm. Patient also reporting of a dry cough for the course of last 2 months. He appears to be aware of having generalized fatigue due to his history of non-small cell cancer and ongoing chemotherapy but is currently reporting worsening fatigue on top of this over the course the last few days. He has also had poor oral intake as a result. Imaging done a few days ago also revealed a new irregular spiculated mass in the left upper lobe that may be an underlying inflammatory or infectious process. CT chest abdomen indicative of a new irregular and spiculated mass in the left upper lobe measuring 2.5 x 2.5 cm extending towards the pleura with cystic change. On exam, patient able to sit up without any overt or obvious respiratory distress and speaking full sentences and is on room air currently. Due to patient's history of immunocompromise due to ongoing chemotherapy and history of cancer, I suspect that he may not be able to mount a full immune response and for now, may benefit from treatment with antibiotics for this new possible infectious versus inflammatory mass in the left upper lobe.     -Currently on vancomycin and cefepime, renal function reviewed  -Pharmacy consult ordered  -Infectious disease consulted for additional recommendations and antibiotic de-escalation if needed  -Medical oncology also consulted for further work-up for the spiculated mass  -Monitor VS and WBC  -Maintain oxygen saturation > 88%  -Respiratory protocol  -Airway clearance protocol  -MRSA culture ordered  -Consider using procalcitonin to guide antibiotic discontinuation          Fatigue  Assessment & Plan  Patient with generalized malaise and fatigue that has been chronic but appears to have gotten worse over the course the last 5-10 days with subjective fevers. Patient does report of slightly reduced oral intake during this time.  -Continue to monitor nutritional intake  -On antibiotic therapy as mentioned above for infectious process      Non-small cell cancer of right lung [facility])  Assessment & Plan  Patient with history of non-small cell cancer of the right lung that appears to be stage Pablo with prior lung biopsy from the right upper lobe that was indicative of poorly differentiated non-small cell carcinoma. Patient has been following with oncology in the outpatient setting, Dr. Gaurang Mendieta. Appears to have been on chemotherapy and has completed 3 cycles of Emend. Currently completed 3/6 cycles of carboplatin, pemetrexed. Pembrolizumab.   -Following with Dr. Gaurang Mendieta in outpatient setting  -Medical oncology consulted while inpatient    Brain lesions  Assessment & Plan  Patient has metastatic brain lesions that have been previously noted based on MRI brain from April 2023, about 6 brain lesions which are likely concerning for metastatic brain lesion from his history of non-small cell lung cancer. In the past, patient has been seen and evaluated by neurosurgery and was recommended for no acute intervention. Was recommended for radiation oncology treatments in the outpatient setting previously.   -Continue to monitor neurologic status    Cancer associated pain  Assessment & Plan  Patient with history of non-small cell cancer of right lung and associated cancer pain.  -On gabapentin 300 mg in the a.m. and 600 mg nightly    Multiple subsegmental pulmonary emboli without acute cor pulmonale (HCC)  Assessment & Plan  Patient with history of multiple subsegmental pulmonary emboli that appear to have been diagnosed on 03/29/2023 and patient has been on anticoagulation since.  -Currently on Eliquis 5 mg twice daily    Tobacco use  Assessment & Plan  Patient has history of smoking, currently using nicotine patch.  -Can restart inpatient if needed      VTE Pharmacologic Prophylaxis: On Eliquis for PE  VTE Mechanical Prophylaxis: sequential compression device    CHIEF COMPLAINT     Chief Complaint   Patient presents with   • Fever Immunocompromised     Fever since Sunday, pain in back of neck and headache, feels tired and "off", no chest pain, no SOB      HISTORY OF PRESENT ILLNESS     History Nara Simmons is a 61-year-old male with history of non-small cell lung cancer with mets to the brain on chemo every 3 weeks with Dr. Jason Lopez presenting with 3-day history of increased fatigue and low-grade fevers with home measurement of 100.4 °F.  His last dose of chemo was 7/3, this was cycle 3 of carboplatin, pemetrexed, pembrolizumab regimen. Overall he has tolerated treatment well, he notes that the fatigue that he has been feeling for the past 3 days is increased compared to his normal fatigue from chemotherapy. In addition to the fatigue his wife in the room notes that he has been feeling warm to touch for the past 3 days as well, but she just took his temperature at home for the first time today which was 100.4 °F.  They called the oncology office and were told to come to the ED. Of note this week he finished a taper of dexamethasone which she is being been on chronically since March for chemo symptom management and for history of brain mets.   He was on 2 mg dexamethasone twice a day, was weaned to 2 mg daily and completed the taper yesterday 7/11. Oncology recommended restarting steroids and he took a single 2 mg dose of dexamethasone this afternoon 7/12. He denies any chills, new productive cough, chest pain, shortness of breath, abdominal pain, bowel function, nausea/vomiting, dysuria, or other pain. CT abdomen and pelvis on 7/8/2023 showed a new irregular spiculated mass in the left upper lobe suspicious for infection/inflammatory process:  IMPRESSION:  1. Left apical tumor with chest wall/rib invasion and paravertebral/epidural extension from T1-T3 is essentially unchanged from the prior study from March. 2.  New irregular spiculated mass in the left upper lobe. While not entirely excluded, the appearance is not typical for metastasis and metachronous primary is unlikely given rapid development since March. An inflammatory/infectious process is possible   and short interval follow-up is recommended. 3.  No evidence of metastatic disease in the abdomen or pelvis. In the ED his initial vitals showed temperature 100.2 °F, , Resp rate 22, /67, Satting 96% on room air. His CMP was grossly normal.  Negative procalcitonin. CBC showed WBC 3.44, RBC 3.26, Hgb 11.2, Hct 32.1, platelets 87. Lactic acid negative. Current smoker. He will be admitted to [facility] for work-up for infection due to immunocompromised state and recent CT results. REVIEW OF SYSTEMS     Review of Systems   Constitutional: Positive for fatigue. Increased fatigue past 3 days, beyond what he has experienced as 'normal' following chemotherapy   HENT: Negative. Negative for congestion. Eyes: Negative for photophobia and visual disturbance. Respiratory: Negative for chest tightness and shortness of breath. Chronic dry cough from smoking for past 45 years, no new cough. No sputum production. Gastrointestinal: Negative. Genitourinary: Negative. Musculoskeletal: Negative. Skin: Negative. Allergic/Immunologic: Positive for immunocompromised state. Neurological: Negative. Psychiatric/Behavioral: Negative. OBJECTIVE     Vitals:    23 0000 23 0100 23 0200 23 0348   BP: 113/65 108/69 94/56 130/85   BP Location:    Left arm   Pulse: 84 80 78 77   Resp: 18 16 18 17   Temp:    98.7 °F (37.1 °C)   TempSrc:    Oral   SpO2: 96% 97% 96% 97%   Weight:    69.8 kg (153 lb 14.1 oz)   Height:    5' 10" (1.778 m)      Temperature:   Temp (24hrs), Av.5 °F (37.5 °C), Min:98.7 °F (37.1 °C), Max:100.2 °F (37.9 °C)    Temperature: 98.7 °F (37.1 °C)  Intake & Output:  I/O        07 07 07 07    IV Piggyback  50    Total Intake(mL/kg)  50 (0.7)    Net  +50              Weights:   IBW (Ideal Body Weight): 73 kg    Body mass index is 22.08 kg/m². Weight (last 2 days)     Date/Time Weight    23 03:48:53 69.8 (153.88)    23 1705 72.6 (160)        Physical Exam  Constitutional:       General: He is not in acute distress. Appearance: Normal appearance. He is normal weight. He is not ill-appearing, toxic-appearing or diaphoretic. HENT:      Head: Normocephalic and atraumatic. Right Ear: External ear normal.      Left Ear: External ear normal.      Nose: Nose normal.   Eyes:      General:         Right eye: No discharge. Left eye: No discharge. Extraocular Movements: Extraocular movements intact. Pupils: Pupils are equal, round, and reactive to light. Cardiovascular:      Rate and Rhythm: Normal rate and regular rhythm. Pulses: Normal pulses. Heart sounds: Normal heart sounds. No murmur heard. Pulmonary:      Effort: Pulmonary effort is normal. No respiratory distress. Breath sounds: Normal breath sounds. No stridor. No wheezing, rhonchi or rales. Chest:      Chest wall: No tenderness. Abdominal:      General: Abdomen is flat. Bowel sounds are normal. There is no distension.       Palpations: Abdomen is soft. Tenderness: There is no abdominal tenderness. There is no guarding. Musculoskeletal:         General: Normal range of motion. Cervical back: Normal range of motion and neck supple. Comments: Trace lower extremity edema 1-2+   Skin:     General: Skin is warm and dry. Neurological:      General: No focal deficit present. Mental Status: He is alert. Mental status is at baseline. PAST MEDICAL HISTORY   History reviewed. No pertinent past medical history. PAST SURGICAL HISTORY     Past Surgical History:   Procedure Laterality Date   • IR BIOPSY LUNG  4/6/2023   • IR BIOPSY LUNG  4/26/2023   • IR PORT PLACEMENT  5/16/2023     SOCIAL & FAMILY HISTORY     Social History     Substance and Sexual Activity   Alcohol Use Never     Substance and Sexual Activity   Alcohol Use Never        Substance and Sexual Activity   Drug Use Not Currently     Social History     Tobacco Use   Smoking Status Every Day   • Packs/day: 0.25   • Years: 35.00   • Total pack years: 8.75   • Types: Cigarettes   • Start date: 1/1/1973   Smokeless Tobacco Never   Tobacco Comments    Patient is trying to quit,       History reviewed. No pertinent family history. LABORATORY DATA     Labs: I have personally reviewed pertinent reports.     Results from last 7 days   Lab Units 07/13/23  0447 07/12/23  1758   WBC Thousand/uL 3.06* 3.44*   HEMOGLOBIN g/dL 11.3* 11.2*   HEMATOCRIT % 33.5* 32.1*   PLATELETS Thousands/uL 91* 87*   NEUTROS PCT % 61 71   MONOS PCT % 13* 12   EOS PCT % 0 0      Results from last 7 days   Lab Units 07/13/23  0505 07/12/23  1758   POTASSIUM mmol/L 4.0 4.1   CHLORIDE mmol/L 114* 107   CO2 mmol/L 22 24   BUN mg/dL 20 20   CREATININE mg/dL 0.90 1.02   CALCIUM mg/dL 8.2* 8.3   ALK PHOS U/L 42* 43*   ALT U/L 39 44   AST U/L 14 17     Results from last 7 days   Lab Units 07/13/23  0505   MAGNESIUM mg/dL 2.2     Results from last 7 days   Lab Units 07/13/23  0505   PHOSPHORUS mg/dL 3.6 Results from last 7 days   Lab Units 07/12/23  1758   INR  1.11   PTT seconds 27     Results from last 7 days   Lab Units 07/12/23  1758   LACTIC ACID mmol/L 1.0         Micro:  Lab Results   Component Value Date    BLOODCX Received in Microbiology Lab. Culture in Progress. 07/12/2023    BLOODCX Received in Microbiology Lab. Culture in Progress. 07/12/2023     IMAGING & DIAGNOSTIC TESTS     Imaging: I have personally reviewed pertinent reports. No results found. EKG, Pathology, and Other Studies: I have personally reviewed pertinent reports. ALLERGIES   No Known Allergies  MEDICATIONS PRIOR TO ARRIVAL     Prior to Admission medications    Medication Sig Start Date End Date Taking?  Authorizing Provider   acetaminophen (TYLENOL) 500 mg tablet Take 500 mg by mouth every 4 (four) hours as needed for mild pain    Historical Provider, MD   apixaban (ELIQUIS) 5 mg Take 1 tablet (5 mg total) by mouth 2 (two) times a day 5/31/23   Armida Staton DO   dexamethasone (DECADRON) 4 mg tablet Take 0.5 tablets (2 mg total) by mouth 2 (two) times a day with meals 6/20/23 7/20/23  Fernando Gregg MD   folic acid (FOLVITE) 1 mg tablet TAKE 1 TABLET BY MOUTH EVERY DAY 6/1/23   Armida Staton DO   gabapentin (NEURONTIN) 300 mg capsule Take 300 mg in the AM and 600 mg in the Pm 7/10/23   Cris Whiteside MD   lidocaine-prilocaine (EMLA) cream Apply to port 30 to 60 minutes prior to use 5/9/23   Armida Staton DO   memantine (Namenda) 10 mg tablet Take 1 tablet (10 mg total) by mouth 2 (two) times a day Continue with bloodwork (complete metabolic panel) every 8 weeks 5/23/23   Ashvin France MD   nicotine (NICODERM CQ) 14 mg/24hr TD 24 hr patch Place 1 patch on the skin over 24 hours every 24 hours 5/30/23   Cris Whiteside MD   ondansetron (ZOFRAN) 8 mg tablet Take 1 tablet (8 mg total) by mouth every 8 (eight) hours as needed for nausea or vomiting 5/9/23   Armida Staton DO   oxyCODONE (Roxicodone) 5 immediate release tablet Take 1 tablet (5 mg total) by mouth every 4 (four) hours as needed for moderate pain or severe pain Max Daily Amount: 30 mg 5/22/23   Krystina Fry MD   prochlorperazine (COMPAZINE) 10 mg tablet Take 1 tablet (10 mg total) by mouth every 6 (six) hours as needed for nausea or vomiting 5/9/23   [facility]    traZODone (DESYREL) 50 mg tablet TAKE 1 TABLET BY MOUTH DAILY AT BEDTIME  Patient not taking: Reported on 6/30/2023 6/13/23   Krystina Fry MD     MEDICATIONS ADMINISTERED IN LAST 24 HOURS     Medication Administration - last 24 hours from 07/12/2023 0543 to 07/13/2023 0543       Date/Time Order Dose Route Action Action by     07/12/2023 1922 EDT acetaminophen (TYLENOL) tablet 650 mg 650 mg Oral Given Conchita Martin RN     07/12/2023 2105 EDT cefepime (MAXIPIME) 2 g/50 mL dextrose IVPB 0 mg Intravenous Stopped Conchita Martin RN     07/12/2023 1948 EDT cefepime (MAXIPIME) 2 g/50 mL dextrose IVPB 2,000 mg Intravenous New Bag Conchita Martin RN     07/12/2023 2057 EDT vancomycin (VANCOCIN) IVPB (premix in dextrose) 1,000 mg 200 mL -- Intravenous Canceled Entry Conchita Martin RN     07/13/2023 0001 EDT vancomycin (VANCOCIN) 1,750 mg in sodium chloride 0.9 % 500 mL IVPB 0 mg Intravenous Stopped Conchita Martin RN     07/12/2023 2122 EDT vancomycin (VANCOCIN) 1,750 mg in sodium chloride 0.9 % 500 mL IVPB 1,750 mg Intravenous New Bag Conchita Martin RN     07/12/2023 2156 EDT gabapentin (NEURONTIN) capsule 600 mg 600 mg Oral Given Conchita Martin RN        CURRENT MEDICATIONS     Current Facility-Administered Medications   Medication Dose Route Frequency Provider Last Rate   • acetaminophen  650 mg Oral Q8H PRN Darylene Founds, MD     • apixaban  5 mg Oral BID Darylene Founds, MD     • cefepime  2,000 mg Intravenous Q12H Darylene Founds, MD     • dexamethasone  2 mg Oral Daily Darylene Founds, MD     • folic acid  2,710 mcg Oral Daily Darylene Founds, MD     • gabapentin  300 mg Oral Daily Jose Lilly MD     • gabapentin  600 mg Oral HS Jose Lilly MD     • memantine  10 mg Oral BID Jose Lilly MD     • ondansetron  4 mg Oral Q8H PRN Jose Lilly MD     • vancomycin  750 mg Intravenous Q12H Jose Lilly MD          acetaminophen, 650 mg, Q8H PRN  ondansetron, 4 mg, Q8H PRN        Admission Time  I spent 45 minutes admitting the patient. This involved direct patient contact where I performed a full history and physical, reviewing previous records, and reviewing laboratory and other diagnostic studies. Portions of the record may have been created with voice recognition software. Occasional wrong word or "sound a like" substitutions may have occurred due to the inherent limitations of voice recognition software.   Read the chart carefully and recognize, using context, where substitutions have occurred.    ==  Mee Lovett MD  [address] [facility]  Internal Medicine Residency PGY-1

## 2023-07-14 ENCOUNTER — TELEPHONE (OUTPATIENT)
Dept: HEMATOLOGY ONCOLOGY | Facility: CLINIC | Age: 68
End: 2023-07-14

## 2023-07-14 VITALS
WEIGHT: 153.88 LBS | HEIGHT: 70 IN | BODY MASS INDEX: 22.03 KG/M2 | DIASTOLIC BLOOD PRESSURE: 67 MMHG | RESPIRATION RATE: 17 BRPM | SYSTOLIC BLOOD PRESSURE: 106 MMHG | OXYGEN SATURATION: 95 % | HEART RATE: 75 BPM | TEMPERATURE: 98.1 F

## 2023-07-14 PROBLEM — G89.3 CANCER ASSOCIATED PAIN: Status: RESOLVED | Noted: 2023-04-27 | Resolved: 2023-07-14

## 2023-07-14 PROBLEM — R53.83 FATIGUE: Status: RESOLVED | Noted: 2023-07-12 | Resolved: 2023-07-14

## 2023-07-14 LAB
ANION GAP SERPL CALCULATED.3IONS-SCNC: 4 MMOL/L
BUN SERPL-MCNC: 18 MG/DL (ref 5–25)
CALCIUM SERPL-MCNC: 8.1 MG/DL (ref 8.3–10.1)
CHLORIDE SERPL-SCNC: 111 MMOL/L (ref 96–108)
CO2 SERPL-SCNC: 25 MMOL/L (ref 21–32)
CREAT SERPL-MCNC: 0.79 MG/DL (ref 0.6–1.3)
ERYTHROCYTE [DISTWIDTH] IN BLOOD BY AUTOMATED COUNT: 16.8 % (ref 11.6–15.1)
GFR SERPL CREATININE-BSD FRML MDRD: 94 ML/MIN/1.73SQ M
GLUCOSE SERPL-MCNC: 114 MG/DL (ref 65–140)
HCT VFR BLD AUTO: 28.3 % (ref 36.5–49.3)
HCT VFR BLD AUTO: 31.1 % (ref 36.5–49.3)
HGB BLD-MCNC: 10.7 G/DL (ref 12–17)
HGB BLD-MCNC: 9.6 G/DL (ref 12–17)
MCH RBC QN AUTO: 33.9 PG (ref 26.8–34.3)
MCHC RBC AUTO-ENTMCNC: 33.9 G/DL (ref 31.4–37.4)
MCV RBC AUTO: 100 FL (ref 82–98)
PLATELET # BLD AUTO: 72 THOUSANDS/UL (ref 149–390)
PMV BLD AUTO: 9.5 FL (ref 8.9–12.7)
POTASSIUM SERPL-SCNC: 3.6 MMOL/L (ref 3.5–5.3)
RBC # BLD AUTO: 2.83 MILLION/UL (ref 3.88–5.62)
SODIUM SERPL-SCNC: 140 MMOL/L (ref 135–147)
VANCOMYCIN SERPL-MCNC: 7.4 UG/ML (ref 10–20)
WBC # BLD AUTO: 3.13 THOUSAND/UL (ref 4.31–10.16)

## 2023-07-14 PROCEDURE — 85027 COMPLETE CBC AUTOMATED: CPT

## 2023-07-14 PROCEDURE — 80048 BASIC METABOLIC PNL TOTAL CA: CPT

## 2023-07-14 PROCEDURE — 85014 HEMATOCRIT: CPT | Performed by: STUDENT IN AN ORGANIZED HEALTH CARE EDUCATION/TRAINING PROGRAM

## 2023-07-14 PROCEDURE — 99232 SBSQ HOSP IP/OBS MODERATE 35: CPT | Performed by: INTERNAL MEDICINE

## 2023-07-14 PROCEDURE — 85018 HEMOGLOBIN: CPT | Performed by: STUDENT IN AN ORGANIZED HEALTH CARE EDUCATION/TRAINING PROGRAM

## 2023-07-14 PROCEDURE — 99238 HOSP IP/OBS DSCHRG MGMT 30/<: CPT | Performed by: INTERNAL MEDICINE

## 2023-07-14 PROCEDURE — 80202 ASSAY OF VANCOMYCIN: CPT | Performed by: STUDENT IN AN ORGANIZED HEALTH CARE EDUCATION/TRAINING PROGRAM

## 2023-07-14 RX ORDER — DEXAMETHASONE 2 MG/1
2 TABLET ORAL DAILY
Qty: 7 TABLET | Refills: 0 | Status: SHIPPED | OUTPATIENT
Start: 2023-07-14 | End: 2023-07-21

## 2023-07-14 RX ADMIN — APIXABAN 5 MG: 5 TABLET, FILM COATED ORAL at 08:40

## 2023-07-14 RX ADMIN — DEXAMETHASONE 2 MG: 2 TABLET ORAL at 08:40

## 2023-07-14 RX ADMIN — MEMANTINE 10 MG: 10 TABLET ORAL at 08:40

## 2023-07-14 RX ADMIN — GABAPENTIN 300 MG: 300 CAPSULE ORAL at 08:40

## 2023-07-14 RX ADMIN — FOLIC ACID 1000 MCG: 1 TABLET ORAL at 08:40

## 2023-07-14 NOTE — PLAN OF CARE
Problem: PAIN - ADULT  Goal: Verbalizes/displays adequate comfort level or baseline comfort level  Description: Interventions:  - Encourage patient to monitor pain and request assistance  - Assess pain using appropriate pain scale  - Administer analgesics based on type and severity of pain and evaluate response  - Implement non-pharmacological measures as appropriate and evaluate response  - Consider cultural and social influences on pain and pain management  - Notify physician/advanced practitioner if interventions unsuccessful or patient reports new pain  Outcome: Progressing     Problem: INFECTION - ADULT  Goal: Absence or prevention of progression during hospitalization  Description: INTERVENTIONS:  - Assess and monitor for signs and symptoms of infection  - Monitor lab/diagnostic results  - Monitor all insertion sites, i.e. indwelling lines, tubes, and drains  - Monitor endotracheal if appropriate and nasal secretions for changes in amount and color  - [place] appropriate cooling/warming therapies per order  - Administer medications as ordered  - Instruct and encourage patient and family to use good hand hygiene technique  - Identify and instruct in appropriate isolation precautions for identified infection/condition  Outcome: Progressing  Goal: Absence of fever/infection during neutropenic period  Description: INTERVENTIONS:  - Monitor WBC    Outcome: Progressing     Problem: SAFETY ADULT  Goal: Patient will remain free of falls  Description: INTERVENTIONS:  - Educate patient/family on patient safety including physical limitations  - Instruct patient to call for assistance with activity   - Consult OT/PT to assist with strengthening/mobility   - Keep Call bell within reach  - Keep bed low and locked with side rails adjusted as appropriate  - Keep care items and personal belongings within reach  - Initiate and maintain comfort rounds  - Make Fall Risk Sign visible to staff  - Apply yellow socks and bracelet for high fall risk patients  - Consider moving patient to room near nurses station  Outcome: Progressing  Goal: Maintain or return to baseline ADL function  Description: INTERVENTIONS:  -  Assess patient's ability to carry out ADLs; assess patient's baseline for ADL function and identify physical deficits which impact ability to perform ADLs (bathing, care of mouth/teeth, toileting, grooming, dressing, etc.)  - Assess/evaluate cause of self-care deficits   - Assess range of motion  - Assess patient's mobility; develop plan if impaired  - Assess patient's need for assistive devices and provide as appropriate  - Encourage maximum independence but intervene and supervise when necessary  - Involve family in performance of ADLs  - Assess for home care needs following discharge   - Consider OT consult to assist with ADL evaluation and planning for discharge  - Provide patient education as appropriate  Outcome: Progressing  Goal: Maintains/Returns to pre admission functional level  Description: INTERVENTIONS:  - Perform BMAT or MOVE assessment daily.   - Set and communicate daily mobility goal to care team and patient/family/caregiver.    - Collaborate with rehabilitation services on mobility goals if consulted  - Record patient progress and toleration of activity level   Outcome: Progressing     Problem: DISCHARGE PLANNING  Goal: Discharge to home or other facility with appropriate resources  Description: INTERVENTIONS:  - Identify barriers to discharge w/patient and caregiver  - Arrange for needed discharge resources and transportation as appropriate  - Identify discharge learning needs (meds, wound care, etc.)  - Arrange for interpretive services to assist at discharge as needed  - Refer to Case Management Department for coordinating discharge planning if the patient needs post-hospital services based on physician/advanced practitioner order or complex needs related to functional status, cognitive ability, or social support system  Outcome: Progressing     Problem: Knowledge Deficit  Goal: Patient/family/caregiver demonstrates understanding of disease process, treatment plan, medications, and discharge instructions  Description: Complete learning assessment and assess knowledge base. Interventions:  - Provide teaching at level of understanding  - Provide teaching via preferred learning methods  Outcome: Progressing     Problem: MOBILITY - ADULT  Goal: Maintain or return to baseline ADL function  Description: INTERVENTIONS:  -  Assess patient's ability to carry out ADLs; assess patient's baseline for ADL function and identify physical deficits which impact ability to perform ADLs (bathing, care of mouth/teeth, toileting, grooming, dressing, etc.)  - Assess/evaluate cause of self-care deficits   - Assess range of motion  - Assess patient's mobility; develop plan if impaired  - Assess patient's need for assistive devices and provide as appropriate  - Encourage maximum independence but intervene and supervise when necessary  - Involve family in performance of ADLs  - Assess for home care needs following discharge   - Consider OT consult to assist with ADL evaluation and planning for discharge  - Provide patient education as appropriate  Outcome: Progressing  Goal: Maintains/Returns to pre admission functional level  Description: INTERVENTIONS:  - Perform BMAT or MOVE assessment daily.   - Set and communicate daily mobility goal to care team and patient/family/caregiver.    - Collaborate with rehabilitation services on mobility goals if consulted  - Record patient progress and toleration of activity level   Outcome: Progressing

## 2023-07-14 NOTE — PROGRESS NOTES
Progress Note - Infectious Disease   Clive Atkinson 74 y.o. male MRN: [id number]  Unit/Bed#: [facility] 925-01 Encounter: [id number]      Impression/Recommendations:  1. Febrile illness. Patient remains clinically and systemically well, and without any obvious active infection. His procalcitonins were normal x 2. Patient's development of fever was temporally related to coming off dexamethasone. Patient was started back on dexamethasone just prior to admission and now is without fever. I suspect that his fever is secondary to tumor fever, controlled with dexamethasone previously and manifesting after weaning off dexamethasone. Admission blood cultures have no growth thus far. Patient was started on antibiotics on admission but these have been discontinued. He remains clinically well off antibiotic. Continue to observe off antibiotic. Monitor temperature/WBC. Follow-up on admission blood cultures.     2. New ERIKA mass. Patient has no respiratory symptoms. His procalcitonins are normal.  Doubt pneumonia clinically. Discussed with Dr. Junior Cabot from oncology. Patient did receive XRT of his thoracic spine. 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 Monitor.     3.  Generalized fatigue, common finding in patient advanced cancer on chemotherapy, with superimposed fever. Energy level is improved. Monitor symptoms.     4. Advanced non-small cell lung cancer, with RUL mass, currently on chemotherapy and Keytruda. RUL mass is stable on CT. Oncology follow-up.     5. CNS metastases. Patient had been on dexamethasone, which was weaned off but now restarted. Oncology follow-up. Continue dexamethasone per oncology.     Discussed with patient in detail regarding the above plan. Discussed with primary service earlier. Discussed with Dr. Junior Cabot from oncology service earlier. Okay for discharge from ID viewpoint.     Antibiotics:  Off antibiotic    Subjective:  Patient feels well.   Energy level improved. No dyspnea or cough. Temperature stays down. No chills. No diarrhea. Objective:  Vitals:  Temp:  [97.8 °F (36.6 °C)-98.4 °F (36.9 °C)] 98.1 °F (36.7 °C)  HR:  [75-85] 75  Resp:  [14-17] 17  BP: (105-107)/(67-72) 106/67  SpO2:  [94 %-95 %] 95 %  Temp (24hrs), Av.1 °F (36.7 °C), Min:97.8 °F (36.6 °C), Max:98.4 °F (36.9 °C)  Current: Temperature: 98.1 °F (36.7 °C)    Physical Exam:     General: Awake, alert, cooperative, no distress. Neck:  Supple. No mass. No lymphadenopathy. Lungs: Expansion symmetric, no rales, no wheezing, respirations unlabored. Heart:  Regular rate and rhythm, S1 and S2 normal, no murmur. Abdomen: Soft, nondistended, non-tender, bowel sounds active all four quadrants, no masses, no organomegaly. Extremities: No edema. No erythema/warmth. No ulcer. Nontender to palpation. Skin:  No rash. Neuro: Moves all extremities. Invasive Devices     Central Venous Catheter Line  Duration           Port A Cath 23 Left Internal jugular 58 days                Labs studies:   I have personally reviewed pertinent labs. Results from last 7 days   Lab Units 23  0542 23  0505 23  1758   POTASSIUM mmol/L 3.6 4.0 4.1   CHLORIDE mmol/L 111* 114* 107   CO2 mmol/L 25 22 24   BUN mg/dL 18 20 20   CREATININE mg/dL 0.79 0.90 1.02   EGFR ml/min/1.73sq m 94 89 76   CALCIUM mg/dL 8.1* 8.2* 8.3   AST U/L  --  14 17   ALT U/L  --  39 44   ALK PHOS U/L  --  42* 43*     Results from last 7 days   Lab Units 23  0950 23  0542 23  0447 23  1758   WBC Thousand/uL  --  3.13* 3.06* 3.44*   HEMOGLOBIN g/dL 10.7* 9.6* 11.3* 11.2*   PLATELETS Thousands/uL  --  72* 91* 87*     Results from last 7 days   Lab Units 23  1215 23  0936 23  1758   BLOOD CULTURE   --   --  No Growth at 24 hrs. No Growth at 24 hrs.    SPUTUM CULTURE   --  1+ Growth of  --    GRAM STAIN RESULT   --  1+ Epithelial Cells  1+ Polys  No bacteria seen  -- LEGIONELLA URINARY ANTIGEN  Negative  --   --        Imaging Studies:   I have personally reviewed pertinent imaging study reports and images in PACS. EKG, Pathology, and Other Studies:   I have personally reviewed pertinent reports.

## 2023-07-14 NOTE — DISCHARGE SUMMARY
INTERNAL MEDICINE RESIDENCY DISCHARGE SUMMARY     Librado Proctor   74 y.o. male  MRN: [id number]  Room/Bed: [place] [address]/[facility]     [address] [address]   Encounter: [id number]    Principal Problem:    Lesion of left lung  Active Problems:    Multiple subsegmental pulmonary emboli without acute cor pulmonale (HCC)    Tobacco use    Brain lesions    Non-small cell cancer of right lung (HCC)      * Lesion of left lung  Assessment & Plan  2-day history of increased fatigue and low-grade fevers with home measurement of 100.4 °F (onset on 7/11). CT abdomen and pelvis on 7/8/2023 showed a new irregular and spiculated mass in the left upper lobe measuring 2.5 x 2.5 cm extending towards the pleura with cystic change concerning for infection/inflammatory process. Due to patient's history of immunocompromise due to ongoing chemotherapy and history of cancer, there are concerns patient not be able to mount a full immune response and for now, may benefit from treatment with antibiotics for this new possible infectious versus inflammatory mass in the left upper lobe. Patient is clinically and systemically well, and without any obvious active infection. Procalcitonins are normal x 2. Admission blood cultures have no growth thus far. Flu/RSV/COVID negative  · ID evaluated pt - suspect that his fever is secondary to tumor fever, controlled with dexamethasone previously and manifesting after weaning off dexamethasone. With patient clinically and systemically well, and without obvious active infection, will discontinue antibiotic to prevent antibiotic toxicities. · Discontinued antibiotic and observe. · Monitor temperature/WBC. · Follow-up on cultures  · Medical oncology agree with continued observation alone, given high-suspicion of radiation pneumonitis.   · Patient has outpatient follow-up with Medical Oncology Caterina Holland D.O.) for continuation of Cycle 4 of systemic chemotherapy. Non-small cell cancer of right lung [facility])  Assessment & Plan  Patient with history of non-small cell cancer of the right lung that appears to be stage Pablo with prior lung biopsy from the right upper lobe that was indicative of poorly differentiated non-small cell carcinoma. Patient has been following with oncology in the outpatient setting, Dr. Sameera Bowling. Appears to have been on chemotherapy and has completed 3 cycles of Emend. Currently completed 3/6 cycles of carboplatin, pemetrexed. Pembrolizumab. - Following with Dr. Sameera Bowling in outpatient setting  - Dexamethasone 2mg qd for cancer symptom management    Brain lesions  Assessment & Plan  Patient has metastatic brain lesions that have been previously noted based on MRI brain from April 2023, about 6 brain lesions which are likely concerning for metastatic brain lesion from his history of non-small cell lung cancer. In the past, patient has been seen and evaluated by neurosurgery and was recommended for no acute intervention. Was recommended for radiation oncology treatments in the outpatient setting previously. -Continue to monitor neurologic status    Tobacco use  Assessment & Plan  Patient has history of smoking, currently using nicotine patch.  -Can restart inpatient if needed    Multiple subsegmental pulmonary emboli without acute cor pulmonale [facility])  Assessment & Plan  Patient with history of multiple subsegmental pulmonary emboli that appear to have been diagnosed on 03/29/2023 and patient has been on anticoagulation since.  -Currently on Eliquis 5 mg twice daily    Fatigue-resolved as of 7/14/2023  Assessment & Plan  Patient with generalized malaise and fatigue that has been chronic but appears to have gotten worse over the course the last 5-10 days with subjective fevers.   Patient does report of slightly reduced oral intake during this time.  -Continue to monitor nutritional intake  -On antibiotic therapy as mentioned above for infectious process      Cancer associated pain-resolved as of 7/14/2023  Assessment & Plan  Patient with history of non-small cell cancer of right lung and associated cancer pain.  -On gabapentin 300 mg in the a.m. and 600 mg nightly        [address] COURSE     62-year-old male with history of non-small cell lung cancer with mets to the brain (diagnosed March 2023) on chemo Q3W with Dr. Guera Alvarado presenting with a 2-day history of increased fatigue and low-grade fevers with home measurement of 100.4 °F (onset on 7/11). His last dose of chemo was 7/3, this was cycle 3 of carboplatin, pemetrexed, pembrolizumab regimen. Overall he has tolerated treatment well, he notes that the fatigue that he has been feeling for the past 2 days is increased compared to his normal fatigue from chemotherapy. In addition to the fatigue his wife in the room notes that he has been feeling warm to touch for the past few days as well, but she just took his temperature at home for the first time on 7/12 which was 100.4 °F.  They called the oncology office and were told to come to the ED. Of note this week he finished a taper of dexamethasone, which he has been on chronically since March for chemo symptom management and for history of brain mets. He was on 4 mg dexamethasone twice a day, was weaned to 2 mg daily and completed the taper yesterday 7/11. Oncology recommended restarting steroids and he took a single 2 mg dose of dexamethasone on the afternoon of 7/12. He denies any chills, new productive cough (baseline dry cough, current smoker), chest pain, shortness of breath, abdominal pain, bowel function, nausea/vomiting, dysuria, or other pain. Current smoker (45 years, currently smokes half a pack/day). CT abdomen and pelvis on 7/8/2023 showed a new irregular spiculated mass in the left upper lobe suspicious for infection/inflammatory process:  IMPRESSION:  1.   Left apical tumor with chest wall/rib invasion and paravertebral/epidural extension from T1-T3 is essentially unchanged from the prior study from March. 2.  New irregular spiculated mass in the left upper lobe. While not entirely excluded, the appearance is not typical for metastasis and metachronous primary is unlikely given rapid development since March. An inflammatory/infectious process is possible   and short interval follow-up is recommended. 3.  No evidence of metastatic disease in the abdomen or pelvis. In the ED his initial vitals showed temperature 100.2 °F, , Resp rate 22, /67, Satting 96% on room air. His CMP was grossly normal.  Negative procalcitonin. CBC showed WBC 3.44, RBC 3.26, Hgb 11.2, Hct 32.1, platelets 87. Lactic acid negative. He was started on IV vanc and cefepime, and admitted to [facility] for work-up for infection due to immunocompromised state and recent CT results. His procalcitonins were normal x 2 and admission blood cultures had no growth. Infectious disease evaluated the patient, suspected that fever is secondary to tumor fever, controlled with dexamethasone previously and manifesting after weaning off dexamethasone. With patient clinically and systemically well, and without obvious active infection, antibiotics were discontinued. Oncology also saw the patient to evaluate the new spiculated mass findings in the left upper lobe, and assessed patient has high-suspicion of radiation pneumonitis. On 7/14, patient remained clinically and systemically well, and without any obvious active infection. Pt with no complaints. DISCHARGE PHYSICAL EXAM     Vitals:    07/14/23 0732   BP: 106/67   Pulse: 75   Resp: 17   Temp: 98.1 °F (36.7 °C)   SpO2: 95%     Physical Exam  Vitals and nursing note reviewed. Constitutional:       General: He is not in acute distress. Appearance: He is well-developed. HENT:      Head: Normocephalic and atraumatic.    Eyes:      Conjunctiva/sclera: Conjunctivae normal.   Cardiovascular: Rate and Rhythm: Normal rate and regular rhythm. Heart sounds: No murmur heard. Pulmonary:      Effort: Pulmonary effort is normal. No respiratory distress. Breath sounds: Normal breath sounds. Abdominal:      Palpations: Abdomen is soft. Tenderness: There is no abdominal tenderness. Musculoskeletal:         General: No swelling. Cervical back: Neck supple. Skin:     General: Skin is warm and dry. Capillary Refill: Capillary refill takes less than 2 seconds. Neurological:      Mental Status: He is alert. Psychiatric:         Mood and Affect: Mood normal.         DISCHARGE INFORMATION     PCP at Discharge: Adriana Oliver MD    Admitting Provider: Guillermo Diallo MD  Admission Date: 7/12/2023    Discharge Provider: Guillermo Diallo MD  Discharge Date: 7/14    Discharge Disposition: Home/Self Care  Discharge Condition: good  Discharge with Lines: no    Discharge Diet: regular diet  Activity Restrictions: none    Discharge Diagnoses:  Principal Problem:    Lesion of left lung  Active Problems:    Multiple subsegmental pulmonary emboli without acute cor pulmonale (HCC)    Tobacco use    Brain lesions    Non-small cell cancer of right lung ([address])  Resolved Problems:    Cancer associated pain    Fatigue      Consulting Providers: Oncology  Infectious Disease  Pharmacy    Diagnostic & Therapeutic Procedures Performed:  XR chest 2 views    Result Date: 7/13/2023  Impression: Relatively stable right upper lobe Pancoast tumor with spiculated density in the left upper lobe with surrounding atelectasis.  Workstation performed: [id number]       Code Status: Level 1 - Full Code  Advance Directive & Living Will: Received     Medications:  Current Discharge Medication List      STOP taking these medications       nicotine (NICODERM CQ) 14 mg/24hr TD 24 hr patch Comments:   Reason for Stopping:         oxyCODONE (Roxicodone) 5 immediate release tablet Comments:   Reason for Stopping: prochlorperazine (COMPAZINE) 10 mg tablet Comments:   Reason for Stopping:         traZODone (DESYREL) 50 mg tablet Comments:   Reason for Stopping:             Current Discharge Medication List        Current Discharge Medication List      CONTINUE these medications which have NOT CHANGED    Details   acetaminophen (TYLENOL) 500 mg tablet Take 500 mg by mouth every 4 (four) hours as needed for mild pain      apixaban (ELIQUIS) 5 mg Take 1 tablet (5 mg total) by mouth 2 (two) times a day  Qty: 60 tablet, Refills: 5    Associated Diagnoses: Multiple subsegmental pulmonary emboli without acute cor pulmonale ([address]); Non-small cell cancer of right lung (HCC)      gabapentin (NEURONTIN) 300 mg capsule Take 300 mg in the AM and 600 mg in the Pm  Qty: 90 capsule, Refills: 1    Associated Diagnoses: Non-small cell cancer of right lung ([address]); DDD (degenerative disc disease), cervical; Thoracic spine tumor;  Cancer associated pain      lidocaine-prilocaine (EMLA) cream Apply to port 30 to 60 minutes prior to use  Qty: 30 g, Refills: 2    Associated Diagnoses: Non-small cell cancer of right lung (HCC)      folic acid (FOLVITE) 1 mg tablet TAKE 1 TABLET BY MOUTH EVERY DAY  Qty: 90 tablet, Refills: 3    Associated Diagnoses: Non-small cell cancer of right lung (HCC)      memantine (Namenda) 10 mg tablet Take 1 tablet (10 mg total) by mouth 2 (two) times a day Continue with bloodwork (complete metabolic panel) every 8 weeks  Qty: 60 tablet, Refills: 2    Associated Diagnoses: Malignant neoplasm of brain, unspecified location (HCC)      ondansetron (ZOFRAN) 8 mg tablet Take 1 tablet (8 mg total) by mouth every 8 (eight) hours as needed for nausea or vomiting  Qty: 30 tablet, Refills: 3    Associated Diagnoses: Non-small cell cancer of right lung (HCC)             Allergies:  No Known Allergies    FOLLOW-UP     PCP Outpatient Follow-up: Yes    Consulting Providers Follow-up:  Outpatient follow-up with Medical Oncology Connie Solis Ramiro Romero.) for continuation of Cycle 4 of systemic chemotherapy. Active Issues Requiring Follow-up:  Scheduled to receive MRI Brain (With and Without Contrast) on 7/27 in anticipation of Cycle 4 of chemotherapy. Discharge Statement:   I spent 30 minutes minutes discharging the patient. This time was spent on the day of discharge. I had direct contact with the patient on the day of discharge. Additional documentation is required if more than 30 minutes were spent on discharge. Portions of the record may have been created with voice recognition software. Occasional wrong word or "sound a like" substitutions may have occurred due to the inherent limitations of voice recognition software.   Read the chart carefully and recognize, using context, where substitutions have occurred.    ==  Marina Hirsch MD  [id number] [facility]  Internal Medicine Resident PGY-1

## 2023-07-14 NOTE — TELEPHONE ENCOUNTER
Spoke with patient's spouse Alyce Tolbert regarding the patient's discharge status. She states that the patient is about to be discharged from the hospital and is concerned because he missed his MRI of the brain due to being in-patient. I informed her that I did r/s it to the next available and it is on 7/27 at 1200 at [facility]. I told her that I did speak with Dr. Lizzette Rubio and it is OK that it is after the appt with Dr. Lizzette Rubio on 7/21. Alyce Didi verbalized understanding. She also asked if it was OK that he was going to be discharged taking 2mg dexamethasone for 7 days. I told her that I reviewed this with Dr. Lizzette Rubio and she states that it is OK for him to take this as we will see him in 7 days on 7/21. Alyce Tolbert thanked me for the call and verbalized understanding.

## 2023-07-14 NOTE — CONSULTS
Vancomycin IV Pharmacy-to-Dose Consultation    Taylor Bear is a 74 y.o. male who was receiving Vancomycin IV with management by the Pharmacy Consult service for treatment of Pneumonia (goal -600, trough >10)  . The patient's Vancomycin therapy has been discontinued. Thank you for allowing us to take part in this patient's care. Pharmacy will sign-off now; please call or re-consult if there are any questions.         Sherial Sandifer, PharmD  Pharmacist

## 2023-07-14 NOTE — CASE MANAGEMENT
Case Management Discharge Planning Note    Patient name Dominik Polanco  Location [facility] 925/[facility] 697-21 MRN [id number]  : 1955 Date 2023       Current Admission Date: 2023  Current Admission Diagnosis:Lesion of left lung   Patient Active Problem List    Diagnosis Date Noted   • Lesion of left lung 2023   • Metastasis to brain [facility]) 2023   • Non-small cell cancer of right lung ([address]) 2023   • Advanced care planning/counseling discussion 2023   • Brain lesions 2023   • Thoracic spine tumor 2023   • Multiple subsegmental pulmonary emboli without acute cor pulmonale ([address]) 2023   • Tobacco use 2023   • DDD (degenerative disc disease), cervical 2023   • Pleural mass 2023      LOS (days): 2  Geometric Mean LOS (GMLOS) (days): 2.60  Days to GMLOS:1     OBJECTIVE:  Risk of Unplanned Readmission Score: 22.03         Current admission status: Inpatient   Preferred Pharmacy:   [facility], [address]  Phone: [phone] Fax: [phone] [facility],  [address], [address] [facility] [address] [facility] [address] [place], [place] [id number] [facility] [address] [facility]  Phone: [phone] Fax: [phone]    Primary Care Provider: Alejandra Ramos MD    Primary Insurance: MEDICARE  Secondary Insurance:     DISCHARGE DETAILS:        Additional Comments: Per chart review, pt likely to discharge today with no CM needs identified.

## 2023-07-15 LAB
BACTERIA SPT RESP CULT: NORMAL
GRAM STN SPEC: NORMAL

## 2023-07-16 LAB
BACTERIA BLD CULT: NORMAL
BACTERIA BLD CULT: NORMAL

## 2023-07-17 ENCOUNTER — TRANSITIONAL CARE MANAGEMENT (OUTPATIENT)
Dept: INTERNAL MEDICINE CLINIC | Facility: OTHER | Age: 68
End: 2023-07-17

## 2023-07-17 LAB
BACTERIA BLD CULT: NORMAL
BACTERIA BLD CULT: NORMAL

## 2023-07-19 ENCOUNTER — OFFICE VISIT (OUTPATIENT)
Dept: INTERNAL MEDICINE CLINIC | Age: 68
End: 2023-07-19

## 2023-07-19 VITALS
DIASTOLIC BLOOD PRESSURE: 80 MMHG | OXYGEN SATURATION: 98 % | BODY MASS INDEX: 22.69 KG/M2 | HEART RATE: 92 BPM | TEMPERATURE: 98.1 F | SYSTOLIC BLOOD PRESSURE: 110 MMHG | HEIGHT: 70 IN | WEIGHT: 158.5 LBS

## 2023-07-19 DIAGNOSIS — C34.91 NON-SMALL CELL CANCER OF RIGHT LUNG (HCC): ICD-10-CM

## 2023-07-19 DIAGNOSIS — T45.1X5A CHEMOTHERAPY-INDUCED FATIGUE: Primary | ICD-10-CM

## 2023-07-19 DIAGNOSIS — R53.83 CHEMOTHERAPY-INDUCED FATIGUE: Primary | ICD-10-CM

## 2023-07-19 DIAGNOSIS — R91.1 LESION OF LEFT LUNG: ICD-10-CM

## 2023-07-19 PROCEDURE — 99496 TRANSJ CARE MGMT HIGH F2F 7D: CPT | Performed by: STUDENT IN AN ORGANIZED HEALTH CARE EDUCATION/TRAINING PROGRAM

## 2023-07-19 NOTE — PROGRESS NOTES
[address]  INTERNAL MEDICINE OFFICE VISIT     PATIENT INFORMATION     Anay Bhandari   74 y.o. male   MRN: [id number]    ASSESSMENT/PLAN     TCM Call     Date and time call was made  7/17/2023  3:22 PM    Hospital care reviewed  Records reviewed    Patient was hospitialized at  [facility]    Date of Admission  07/12/23    Date of discharge  07/14/23    Diagnosis  lesion of left lung    Disposition  Home    Current Symptoms  Weakness; Fatigue      TCM Call     Post hospital issues  None    Scheduled for follow up? Yes    Did you obtain your prescribed medications  Yes    Do you need help managing your prescriptions or medications  No    Is transportation to your appointment needed  No    I have advised the patient to call PCP with any new or worsening symptoms  Zach Duvall [facility]        Problem List Items Addressed This Visit        Respiratory    Non-small cell cancer of right lung ([address])    Lesion of left lung   Other Visit Diagnoses     Chemotherapy-induced fatigue    -  Primary    Relevant Orders    Ambulatory Referral to Physical Therapy        Schedule a follow-up appointment in 2 months for annual physical and recheck. Discussed nutrition including protein shakes. Referral to PT for strength training given metastatic ca and chemotherapy. Please continue with Dr Gavin Gaytan appointment this week and MRI next week. Discussed smoking cessation. HEALTH MAINTENANCE     Immunization History   Administered Date(s) Administered   • Tdap 07/20/2021     CHIEF COMPLAINT     Chief Complaint   Patient presents with   • Transition of Care Management     Hospitalization at [facility] 7/12/23 to 7/14/23 for left lung lesion. He c/o feeling loopy every day 1 hour after taking his medications since May. This lasts about 1-2 hours. • Health Maintenance     He does not have insurance at this time and cannot do the cologuard at this time.       HISTORY OF PRESENT ILLNESS Malissa Drummond is a 74 yr old F PMH of NSCLC mets to brain/bone on chemo Q3W, history of multiple bilateral PE on Eliquis presents for TCM visit. Presented with fatigue, fevers on 7/12 admission. 7/3 had received chemo carboplatin, pemetrexed, pembrolizumab. Admitted 7/12 and imaging found ERIKA new irregular spiculated mass. Initially treated with IV vanc and cefepime, suspected tumor fever per ID after dexamethasone wean, as infectious workup negative. Onc stated likely radiation pneumonitis. Outpatient Dr. Billy Salinas in 1 week post discharge, this Friday. Brain MRI pending but scheduled next week. WBC 3.1, Hgb 9.6, , Plt 72     Patient reports generalized fatigue from chemo and morning "funk". Good functional status, denies exertional dyspnea or chest pain. Wife states he has generalized atrophy of muscles. Weight stable at 158 lbs. Good appetite and sleeps well. No fevers since discharge. Current smoker 1/2 ppd and has patches at home but not using. Declined smoking cessation. Declined colonoscopy at this time. REVIEW OF SYSTEMS     Review of Systems   All other systems reviewed and are negative. OBJECTIVE     Vitals:    07/19/23 0901 07/19/23 1240   BP: 98/70 110/80   BP Location: Left arm Right arm   Patient Position: Sitting Sitting   Cuff Size: Standard Adult   Pulse: 92    Temp: 98.1 °F (36.7 °C)    TempSrc: Tympanic    SpO2: 98%    Weight: 71.9 kg (158 lb 8 oz)    Height: 5' 10.28" (1.785 m)      Physical Exam  Vitals reviewed. Constitutional:       General: He is not in acute distress. HENT:      Head: Normocephalic and atraumatic. Mouth/Throat:      Pharynx: Oropharynx is clear. Eyes:      Conjunctiva/sclera: Conjunctivae normal.   Cardiovascular:      Rate and Rhythm: Normal rate and regular rhythm. Pulses: Normal pulses. Heart sounds: Normal heart sounds.    Pulmonary:      Effort: Pulmonary effort is normal.      Breath sounds: Decreased air movement present. Abdominal:      Palpations: Abdomen is soft. Tenderness: There is no abdominal tenderness. Musculoskeletal:         General: Normal range of motion. Cervical back: Normal range of motion and neck supple. Neurological:      Mental Status: He is alert and oriented to person, place, and time. CURRENT MEDICATIONS     Current Outpatient Medications:   •  acetaminophen (TYLENOL) 500 mg tablet, Take 500 mg by mouth every 4 (four) hours as needed for mild pain, Disp: , Rfl:   •  apixaban (ELIQUIS) 5 mg, Take 1 tablet (5 mg total) by mouth 2 (two) times a day, Disp: 60 tablet, Rfl: 5  •  dexamethasone (DECADRON) 2 mg tablet, Take 1 tablet (2 mg total) by mouth daily for 7 doses, Disp: 7 tablet, Rfl: 0  •  folic acid (FOLVITE) 1 mg tablet, TAKE 1 TABLET BY MOUTH EVERY DAY, Disp: 90 tablet, Rfl: 3  •  gabapentin (NEURONTIN) 300 mg capsule, Take 300 mg in the AM and 600 mg in the Pm, Disp: 90 capsule, Rfl: 1  •  lidocaine-prilocaine (EMLA) cream, Apply to port 30 to 60 minutes prior to use, Disp: 30 g, Rfl: 2    PAST MEDICAL & SURGICAL HISTORY   History reviewed. No pertinent past medical history.   Past Surgical History:   Procedure Laterality Date   • IR BIOPSY LUNG  4/6/2023   • IR BIOPSY LUNG  4/26/2023   • IR PORT PLACEMENT  5/16/2023     SOCIAL & FAMILY HISTORY     Social History     Socioeconomic History   • Marital status: /Civil Union     Spouse name: Not on file   • Number of children: Not on file   • Years of education: Not on file   • Highest education level: Not on file   Occupational History   • Not on file   Tobacco Use   • Smoking status: Every Day     Packs/day: 0.50     Years: 50.00     Total pack years: 25.00     Types: Cigarettes     Start date: 1/1/1973   • Smokeless tobacco: Never   • Tobacco comments:     Patient is trying to quit,     Vaping Use   • Vaping Use: Never used   Substance and Sexual Activity   • Alcohol use: Not Currently     Comment: rarely   • Drug use: Never   • Sexual activity: Not on file   Other Topics Concern   • Not on file   Social History Narrative   • Not on file     Social Determinants of Health     Financial Resource Strain: Not on file   Food Insecurity: No Food Insecurity (7/13/2023)    Hunger Vital Sign    • Worried About Running Out of Food in the Last Year: Never true    • Ran Out of Food in the Last Year: Never true   Transportation Needs: No Transportation Needs (7/13/2023)    PRAPARE - Transportation    • Lack of Transportation (Medical): No    • Lack of Transportation (Non-Medical): No   Physical Activity: Not on file   Stress: Not on file   Social Connections: Not on file   Intimate Partner Violence: Not on file   Housing Stability: Low Risk  (7/13/2023)    Housing Stability Vital Sign    • Unable to Pay for Housing in the Last Year: No    • Number of Places Lived in the Last Year: 1    • Unstable Housing in the Last Year: No     Social History     Substance and Sexual Activity   Alcohol Use Not Currently    Comment: rarely     Substance and Sexual Activity   Alcohol Use Not Currently    Comment: rarely        Substance and Sexual Activity   Drug Use Never     Social History     Tobacco Use   Smoking Status Every Day   • Packs/day: 0.50   • Years: 50.00   • Total pack years: 25.00   • Types: Cigarettes   • Start date: 1/1/1973   Smokeless Tobacco Never   Tobacco Comments    Patient is trying to quit,       History reviewed. No pertinent family history.            ==  Rosanne Neves MD  PGY-2  [facility] Internal Medicine [address] [place] [address]., [address]  Office: [phone]  Fax: [phone]

## 2023-07-21 ENCOUNTER — TELEPHONE (OUTPATIENT)
Dept: HEMATOLOGY ONCOLOGY | Facility: CLINIC | Age: 68
End: 2023-07-21

## 2023-07-21 ENCOUNTER — OFFICE VISIT (OUTPATIENT)
Dept: HEMATOLOGY ONCOLOGY | Facility: CLINIC | Age: 68
End: 2023-07-21

## 2023-07-21 ENCOUNTER — HOSPITAL ENCOUNTER (OUTPATIENT)
Dept: INFUSION CENTER | Facility: HOSPITAL | Age: 68
End: 2023-07-21

## 2023-07-21 VITALS
RESPIRATION RATE: 18 BRPM | HEART RATE: 84 BPM | WEIGHT: 159.2 LBS | DIASTOLIC BLOOD PRESSURE: 86 MMHG | TEMPERATURE: 97.5 F | OXYGEN SATURATION: 96 % | BODY MASS INDEX: 22.79 KG/M2 | HEIGHT: 70 IN | SYSTOLIC BLOOD PRESSURE: 126 MMHG

## 2023-07-21 DIAGNOSIS — F17.200 CURRENT EVERY DAY SMOKER: ICD-10-CM

## 2023-07-21 DIAGNOSIS — R53.83 CHEMOTHERAPY-INDUCED FATIGUE: ICD-10-CM

## 2023-07-21 DIAGNOSIS — C34.91 NON-SMALL CELL CANCER OF RIGHT LUNG (HCC): Primary | ICD-10-CM

## 2023-07-21 DIAGNOSIS — T45.1X5A CHEMOTHERAPY-INDUCED FATIGUE: ICD-10-CM

## 2023-07-21 DIAGNOSIS — C79.31 METASTASIS TO BRAIN (HCC): ICD-10-CM

## 2023-07-21 DIAGNOSIS — J18.9 DRUG-INDUCED PNEUMONITIS: ICD-10-CM

## 2023-07-21 DIAGNOSIS — T50.905A DRUG-INDUCED PNEUMONITIS: ICD-10-CM

## 2023-07-21 PROBLEM — J98.4 DRUG-INDUCED PNEUMONITIS: Status: ACTIVE | Noted: 2023-07-21

## 2023-07-21 LAB
ALBUMIN SERPL BCP-MCNC: 3.1 G/DL (ref 3.5–5)
ALP SERPL-CCNC: 37 U/L (ref 46–116)
ALT SERPL W P-5'-P-CCNC: 38 U/L (ref 12–78)
ANION GAP SERPL CALCULATED.3IONS-SCNC: 5 MMOL/L
ANISOCYTOSIS BLD QL SMEAR: PRESENT
AST SERPL W P-5'-P-CCNC: 19 U/L (ref 5–45)
BASOPHILS # BLD MANUAL: 0.03 THOUSAND/UL (ref 0–0.1)
BASOPHILS NFR MAR MANUAL: 1 % (ref 0–1)
BILIRUB SERPL-MCNC: 0.41 MG/DL (ref 0.2–1)
BUN SERPL-MCNC: 23 MG/DL (ref 5–25)
CALCIUM ALBUM COR SERPL-MCNC: 9.5 MG/DL (ref 8.3–10.1)
CALCIUM SERPL-MCNC: 8.8 MG/DL (ref 8.3–10.1)
CHLORIDE SERPL-SCNC: 110 MMOL/L (ref 96–108)
CO2 SERPL-SCNC: 25 MMOL/L (ref 21–32)
CREAT SERPL-MCNC: 1.02 MG/DL (ref 0.6–1.3)
DACRYOCYTES BLD QL SMEAR: PRESENT
EOSINOPHIL # BLD MANUAL: 0 THOUSAND/UL (ref 0–0.4)
EOSINOPHIL NFR BLD MANUAL: 0 % (ref 0–6)
ERYTHROCYTE [DISTWIDTH] IN BLOOD BY AUTOMATED COUNT: 19.1 % (ref 11.6–15.1)
GFR SERPL CREATININE-BSD FRML MDRD: 76 ML/MIN/1.73SQ M
GLUCOSE SERPL-MCNC: 147 MG/DL (ref 65–140)
HCT VFR BLD AUTO: 29.6 % (ref 36.5–49.3)
HGB BLD-MCNC: 10 G/DL (ref 12–17)
LYMPHOCYTES # BLD AUTO: 0.74 THOUSAND/UL (ref 0.6–4.47)
LYMPHOCYTES # BLD AUTO: 22 % (ref 14–44)
MACROCYTES BLD QL AUTO: PRESENT
MCH RBC QN AUTO: 35.2 PG (ref 26.8–34.3)
MCHC RBC AUTO-ENTMCNC: 33.8 G/DL (ref 31.4–37.4)
MCV RBC AUTO: 104 FL (ref 82–98)
MONOCYTES # BLD AUTO: 0.24 THOUSAND/UL (ref 0–1.22)
MONOCYTES NFR BLD: 7 % (ref 4–12)
NEUTROPHILS # BLD MANUAL: 2.35 THOUSAND/UL (ref 1.85–7.62)
NEUTS BAND NFR BLD MANUAL: 4 % (ref 0–8)
NEUTS SEG NFR BLD AUTO: 66 % (ref 43–75)
NRBC BLD AUTO-RTO: 1 /100 WBC (ref 0–2)
PLATELET # BLD AUTO: 157 THOUSANDS/UL (ref 149–390)
PLATELET BLD QL SMEAR: ADEQUATE
PMV BLD AUTO: 9.5 FL (ref 8.9–12.7)
POIKILOCYTOSIS BLD QL SMEAR: PRESENT
POTASSIUM SERPL-SCNC: 4 MMOL/L (ref 3.5–5.3)
PROT SERPL-MCNC: 6.6 G/DL (ref 6.4–8.4)
RBC # BLD AUTO: 2.84 MILLION/UL (ref 3.88–5.62)
RBC MORPH BLD: PRESENT
SODIUM SERPL-SCNC: 140 MMOL/L (ref 135–147)
T3FREE SERPL-MCNC: 2.59 PG/ML (ref 2.5–3.9)
TSH SERPL DL<=0.05 MIU/L-ACNC: 1.01 UIU/ML (ref 0.45–4.5)
WBC # BLD AUTO: 3.36 THOUSAND/UL (ref 4.31–10.16)

## 2023-07-21 PROCEDURE — 85007 BL SMEAR W/DIFF WBC COUNT: CPT | Performed by: INTERNAL MEDICINE

## 2023-07-21 PROCEDURE — 84481 FREE ASSAY (FT-3): CPT | Performed by: INTERNAL MEDICINE

## 2023-07-21 PROCEDURE — 80053 COMPREHEN METABOLIC PANEL: CPT | Performed by: INTERNAL MEDICINE

## 2023-07-21 PROCEDURE — 84443 ASSAY THYROID STIM HORMONE: CPT | Performed by: INTERNAL MEDICINE

## 2023-07-21 PROCEDURE — 85027 COMPLETE CBC AUTOMATED: CPT | Performed by: INTERNAL MEDICINE

## 2023-07-21 PROCEDURE — 99214 OFFICE O/P EST MOD 30 MIN: CPT | Performed by: INTERNAL MEDICINE

## 2023-07-21 RX ORDER — SODIUM CHLORIDE 9 MG/ML
20 INJECTION, SOLUTION INTRAVENOUS ONCE
Status: CANCELLED | OUTPATIENT
Start: 2023-07-24

## 2023-07-21 NOTE — TELEPHONE ENCOUNTER
Title: changes to medications     Date patient scheduled: 7/24    Bertis Smart held for this cycle only. Carboplatin d/c from plan completely. Office RN notified patient? ? yes, patient present in office    Is the patient scheduled within 24 hours? ? If yes, follow up with verbal telephone call. Office RN to route to [facility]. Infusion  pool routes to [facility]. Infusion tech to receive message, confirm scheduled treatment duration matches ordered treatment duration or adjust accordingly, and re-link appointment request orders. Infusion tech to notify pharmacy and finance.

## 2023-07-21 NOTE — PROGRESS NOTES
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     2023 - palliative RT to T spine and brain     2023 - start carbo/pemetrexed/pembrolizumab    2023 suspected Rad/Immuno Pneumonitis Grade 1/2 (mild fever)     2023 discontinued Carbo d/t fatigue; and holding Keytruda in Cycle 4 dt Pneumonitis; will re-challenge in cycle 5      Thoracic spine tumor   2023 Initial Diagnosis    Thoracic spine tumor     Non-small cell cancer of right lung ([address])   3/29/2023 -  Cancer Staged    Staging form: Lung, AJCC 8th Edition  - Clinical stage from 3/29/2023: Stage DORI (cT4, cN1, cM1b) - Signed by Bijan Cedeno DO on 2023 Biopsy    Lung, right upper lobe, biopsy:     - Rare detached single and small clusters of atypical, degenerated epithelial cells. - Predominantly fibroelastotic stromal fibrosis.      2023 Biopsy    Lung, right upper lobe mass, biopsy:      - Poorly differentiated non-small cell carcinoma most compatible with adenocarcinoma of the lung     2023 - 2023 Radiation    Course: C1    Plan ID Energy Fractions Dose per Fraction (cGy) Dose Correction (cGy) Total Dose Delivered (cGy) Elapsed Days   C7_T4_R Lung 10X/6X 10 / 10 300 0 3,000 11   HCS_WhBrain 6X 10 / 10 300 0 3,000 11      Dr. Grecia Hoyos     2023 Initial Diagnosis    Non-small cell cancer of right lung ([address])     2023 -  Chemotherapy    cyanocobalamin, 1,000 mcg, Intramuscular, Once, 2 of 3 cycles  Administration: 1,000 mcg (5/15/2023), 1,000 mcg (7/3/2023)  alteplase (CATHFLO), 2 mg, Intracatheter, Every 1 Minute as needed, 3 of 6 cycles  fosaprepitant (EMEND) IVPB, 150 mg, Intravenous, Once, 3 of 3 cycles  Administration: 150 mg (5/22/2023), 150 mg (7/3/2023), 150 mg (6/12/2023)  CARBOplatin (PARAPLATIN) IVPB (GOG AUC DOSING), 544 mg, Intravenous, Once, 3 of 3 cycles  Administration: 544 mg (5/22/2023), 554 mg (7/3/2023), 544 mg (6/12/2023)  pemetrexed (ALIMTA) chemo infusion, 940 mg, Intravenous, Once, 3 of 6 cycles  Administration: 900 mg (5/22/2023), 900 mg (7/3/2023), 900 mg (6/12/2023)  pembrolizumab (KEYTRUDA) IVPB, 200 mg, Intravenous, Once, 3 of 5 cycles  Administration: 200 mg (5/22/2023), 200 mg (7/3/2023), 200 mg (6/12/2023)     7/12/2023 Adverse Reaction    Hospitalization - Suspected Pneumonitis    7/12-14/2023 due to mild fever with CT chest redemonstrating left upper lobe inflammatory changes, previously seen on study 04/04/2023, ID and oncology evaluated, more suggestive of radiation/Keytruda induced pneumonitis grade 1/2; mild fever resolved; other differential/contributing etiology include recent tapering of steroid       7/21/2023 -  Chemotherapy    Recently suspected pneumonitis 7/12/2023 and reports fatigue 7/21/2023  Discontinuing carboplatin and holding Keytruda of cycle 4; with potential plan to rechallenge Keytruda in cycle 5     Metastasis to [facility])   5/30/2023 Initial Diagnosis    Metastasis to [facility])         Interval History: Patient presents for follow up of     Toribio Denis presenting today with his significant other for follow-up    After recent admission to [address] [place] [address] 7/12 to 7/14 for concern of mild fever, imaging redemonstrating left upper lobe changes more suggestive of inflammatory process, evaluated by ID and oncology inpatient and clinically diagnosed with pneumonitis, with differential of fever secondary to steroid taper    Toribio Denis noted that his fever resolved, denies having any cough, pleuritic/chest pain, or shortness of breath    Review of system today positive for significant fatigue and daily naps    My Attending Dr. Malick Pastrana discussed with the patient and his wife the options of management, benefits versus suspected/possible side effects of treatments, and they agreed on the assessment and plan as listed below. For note, Lynnea [place] and his significant other are planning for their wedding next month, congratulated; gladly that the listed below changes/systemic therapy "holiday" occurred incidentally around the same time of their planned wedding to hopefully enjoy his important life event with less/no side effects mainly fatigue. ASSESSMENT AND PLAN     1. Non-small cell cancer of right lung ([address])  2. Metastasis to brain ([address])  3. Drug-induced pneumonitis  4. Chemotherapy-induced fatigue    -Given stable disease on most recent CT scan, along with reported fatigue, will discontinue carboplatin  -Given suspected pneumonitis, to continue current low-dose steroid, holding Keytruda cycle 4 with potential plan to rechallenge Keytruda in cycle 5  -Proceeding with cycle 4, Alimta only  -MRI brain scheduled on 7/27/2023  -To follow-up before the fifth cycle for re-evaluation    5. Current every day smoker  In precontemplation, counseled on importance of considering quitting especially with his stage IV lung cancer and suspected pneumonitis, counseled on available options including both Chantix plus nicotine replacement therapy, and encouraged to discuss further if considering with PCP      REVIEW OF SYSTEMS:  Please note that a 14-point review of systems was performed to include Constitutional, HEENT, Respiratory, CVS, GI, , Musculoskeletal, Integumentary, Neurologic, Rheumatologic, Endocrinologic, Psychiatric, Lymphatic, and Hematologic/Oncologic systems were reviewed and are negative unless otherwise stated in HPI. Positive and negative findings pertinent to this evaluation are incorporated into the history of present illness.       ECOG PS: 1    PROBLEM LIST:  Patient Active Problem List   Diagnosis   • Multiple subsegmental pulmonary emboli without acute cor pulmonale (HCC)   • Tobacco use • DDD (degenerative disc disease), cervical   • Pleural mass   • Brain lesions   • Thoracic spine tumor   • Advanced care planning/counseling discussion   • Non-small cell cancer of right lung (HCC)   • Metastasis to brain [facility])   • Lesion of left lung   • Chemotherapy-induced fatigue   • Drug-induced pneumonitis   • Current every day smoker     I spent 55 minutes on chart review, face to face counseling time, coordination of care and documentation. Past Medical History:   has no past medical history on file. PAST SURGICAL HISTORY:   has a past surgical history that includes IR biopsy lung (4/6/2023); IR biopsy lung (4/26/2023); and IR port placement (5/16/2023). CURRENT MEDICATIONS  Current Outpatient Medications   Medication Sig Dispense Refill   • acetaminophen (TYLENOL) 500 mg tablet Take 500 mg by mouth every 4 (four) hours as needed for mild pain     • apixaban (ELIQUIS) 5 mg Take 1 tablet (5 mg total) by mouth 2 (two) times a day 60 tablet 5   • dexamethasone (DECADRON) 2 mg tablet Take 1 tablet (2 mg total) by mouth daily for 7 doses 7 tablet 0   • folic acid (FOLVITE) 1 mg tablet TAKE 1 TABLET BY MOUTH EVERY DAY 90 tablet 3   • gabapentin (NEURONTIN) 300 mg capsule Take 300 mg in the AM and 600 mg in the Pm 90 capsule 1   • lidocaine-prilocaine (EMLA) cream Apply to port 30 to 60 minutes prior to use 30 g 2     No current facility-administered medications for this visit. Facility-Administered Medications Ordered in Other Visits   Medication Dose Route Frequency Provider Last Rate Last Admin   • alteplase (CATHFLO) injection 2 mg  2 mg Intracatheter Q1MIN PRN Maya Jimenez DO         [unfilled]    SOCIAL HISTORY:   reports that he has been smoking cigarettes. He started smoking about 50 years ago. He has a 25.00 pack-year smoking history. He has never used smokeless tobacco. He reports that he does not currently use alcohol. He reports that he does not use drugs.      FAMILY HISTORY:  family history is not on file. ALLERGIES:  has No Known Allergies. Physical Exam:  Vital Signs:   Visit Vitals  /86 (BP Location: Left arm, Patient Position: Sitting, Cuff Size: Adult)   Pulse 84   Temp 97.5 °F (36.4 °C) (Temporal)   Resp 18   Ht 5' 10" (1.778 m)   Wt 72.2 kg (159 lb 3.2 oz)   SpO2 96%   BMI 22.84 kg/m²   Smoking Status Every Day   BSA 1.89 m²     Body mass index is 22.84 kg/m². Body surface area is 1.89 meters squared. GEN: Alert, awake oriented x3, in no acute distress  HEENT- No pallor, icterus, cyanosis, no oral mucosal lesions,   LAD - no palpable cervical, clavicle, axillary, inguinal LAD  Heart- normal S1 S2, regular rate and rhythm, No murmur, rubs.    Lungs- clear breathing sound bilateral.   Abdomen- soft, Non tender, bowel sounds present  Extremities- No cyanosis, clubbing, edema  Neuro- No focal neurological deficit    Labs:  Lab Results   Component Value Date    WBC 3.36 (L) 07/21/2023    HGB 10.0 (L) 07/21/2023    HCT 29.6 (L) 07/21/2023     (H) 07/21/2023     07/21/2023     Lab Results   Component Value Date    SODIUM 140 07/21/2023    K 4.0 07/21/2023     (H) 07/21/2023    CO2 25 07/21/2023    AGAP 5 07/21/2023    BUN 23 07/21/2023    CREATININE 1.02 07/21/2023    GLUC 147 (H) 07/21/2023    GLUF 103 (H) 05/10/2023    CALCIUM 8.8 07/21/2023    AST 19 07/21/2023    ALT 38 07/21/2023    ALKPHOS 37 (L) 07/21/2023    TP 6.6 07/21/2023    TBILI 0.41 07/21/2023    EGFR 76 07/21/2023

## 2023-07-24 ENCOUNTER — HOSPITAL ENCOUNTER (OUTPATIENT)
Dept: INFUSION CENTER | Facility: HOSPITAL | Age: 68
Discharge: HOME | End: 2023-07-24
Attending: INTERNAL MEDICINE
Payer: COMMERCIAL

## 2023-07-24 VITALS
DIASTOLIC BLOOD PRESSURE: 65 MMHG | HEIGHT: 70 IN | TEMPERATURE: 97 F | BODY MASS INDEX: 22.22 KG/M2 | WEIGHT: 155.2 LBS | SYSTOLIC BLOOD PRESSURE: 100 MMHG | HEART RATE: 76 BPM | RESPIRATION RATE: 18 BRPM

## 2023-07-24 DIAGNOSIS — C34.91 NON-SMALL CELL CANCER OF RIGHT LUNG (HCC): Primary | ICD-10-CM

## 2023-07-24 PROCEDURE — 96367 TX/PROPH/DG ADDL SEQ IV INF: CPT

## 2023-07-24 PROCEDURE — 96413 CHEMO IV INFUSION 1 HR: CPT

## 2023-07-24 RX ORDER — SODIUM CHLORIDE 9 MG/ML
20 INJECTION, SOLUTION INTRAVENOUS ONCE
Status: COMPLETED | OUTPATIENT
Start: 2023-07-24 | End: 2023-07-24

## 2023-07-24 RX ADMIN — DEXAMETHASONE SODIUM PHOSPHATE: 10 INJECTION, SOLUTION INTRAMUSCULAR; INTRAVENOUS at 09:06

## 2023-07-24 RX ADMIN — SODIUM CHLORIDE 20 ML/HR: 0.9 INJECTION, SOLUTION INTRAVENOUS at 09:07

## 2023-07-24 RX ADMIN — PEMETREXED DISODIUM 900 MG: 500 INJECTION, POWDER, LYOPHILIZED, FOR SOLUTION INTRAVENOUS at 09:36

## 2023-07-25 ENCOUNTER — EVALUATION (OUTPATIENT)
Dept: PHYSICAL THERAPY | Age: 68
End: 2023-07-25

## 2023-07-25 DIAGNOSIS — R53.1 GENERALIZED WEAKNESS: Primary | ICD-10-CM

## 2023-07-25 PROCEDURE — 97161 PT EVAL LOW COMPLEX 20 MIN: CPT | Performed by: PHYSICAL THERAPIST

## 2023-07-25 PROCEDURE — 97110 THERAPEUTIC EXERCISES: CPT | Performed by: PHYSICAL THERAPIST

## 2023-07-25 NOTE — PROGRESS NOTES
PT Evaluation     Today's date: 2023  Patient name: Juan Garcia  : 1955  MRN: [id number]  Referring provider: Marychuy Lindo MD  Dx:   Encounter Diagnosis     ICD-10-CM    1. Generalized weakness  R53.1                      Assessment  Assessment details: Pt is a 73 y/o male who presents with generalized pain and weakness from chemotherapy. No further referral is necessary at this time. 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 Pt's primary goal is to perform ADL's without difficulty. Pt is experiencing pain, decreased strength, and decreased ROM. Pt has a positive/negative prognosis. Pt would benefit from PT to address these impairments leading to increased functional capacity and improved quality of life. Impairments: abnormal or restricted ROM, impaired physical strength, lacks appropriate home exercise program, pain with function, poor posture  and poor body mechanics  Understanding of Dx/Px/POC: good   Prognosis: good    Goals  Short Term Goals: to be achieved by 4 weeks  1) Patient to be independent with basic HEP. 2) Decrease pain to 2/10 at its worst.  3) Increase lumbar ROM to minimal deficits   4) Increase LE strength by 1/2 MMT grade in all deficient planes.     Long Term Goals: to be achieved by discharge  1) FOTO equal to or greater than 59.  2) Ambulation to improve to maximal level of function  3) Stair negotiation will improve to reciprocal.  4) Sit to stand transfers will improve to maximal level of function     Plan  Patient would benefit from: skilled physical therapy  Planned modality interventions: cryotherapy and thermotherapy: hydrocollator packs  Planned therapy interventions: neuromuscular re-education, patient education, stretching, strengthening, therapeutic activities, therapeutic exercise, therapeutic training, home exercise program and graded activity  Frequency: Twice a week for 10 weeks.  Treatment plan discussed with: patient        Subjective Evaluation    Quality of life: good    Patient Goals  Patient goals for therapy: decreased pain, independence with ADLs/IADLs, return to sport/leisure activities, increased strength and increased motion    Pain  Current pain ratin  At best pain ratin  At worst pain ratin  Aggravating factors: stair climbing, walking, standing, sitting and lifting    Hand dominance: right          Objective     Strength/Myotome Testing     Lumbar   Left   Toe walk: normal    Right   Heel walk: normal    Left Hip   Planes of Motion   Flexion: 4-  Abduction: 4-  External rotation: 4-    Right Hip   Planes of Motion   Flexion: 4  Abduction: 4  External rotation: 4    General Comments:      Lumbar Comments  SLS: Moderate sway 13 secs b/l  5 STS: 27 secs  Tandem stance foam: mod sway 30 secs  Stair navigation: Step to pattern with stair descent              Precautions: Stage IV cancer, thoracic metastasis avoid lifting from flexed positions, NO GRADE 5 MOBS      Manuals                                                                 Neuro Re-Ed             SLR HEP            Mini squats HEP            Standing hip abd HEP            LTR HEP            Tandem stance             Low rows                          Ther Ex             Nu step ROM                                                                                                        Ther Activity                                       Gait Training                                       Modalities

## 2023-07-27 ENCOUNTER — HOSPITAL ENCOUNTER (OUTPATIENT)
Dept: RADIOLOGY | Age: 68
Discharge: HOME | End: 2023-07-27

## 2023-07-27 ENCOUNTER — OFFICE VISIT (OUTPATIENT)
Dept: PHYSICAL THERAPY | Age: 68
End: 2023-07-27

## 2023-07-27 DIAGNOSIS — R53.1 GENERALIZED WEAKNESS: Primary | ICD-10-CM

## 2023-07-27 DIAGNOSIS — C34.91 NON-SMALL CELL CANCER OF RIGHT LUNG (HCC): ICD-10-CM

## 2023-07-27 PROCEDURE — G1004 CDSM NDSC: HCPCS

## 2023-07-27 PROCEDURE — A9585 GADOBUTROL INJECTION: HCPCS | Performed by: INTERNAL MEDICINE

## 2023-07-27 PROCEDURE — 97112 NEUROMUSCULAR REEDUCATION: CPT | Performed by: PHYSICAL THERAPIST

## 2023-07-27 PROCEDURE — 70553 MRI BRAIN STEM W/O & W/DYE: CPT

## 2023-07-27 PROCEDURE — 97110 THERAPEUTIC EXERCISES: CPT | Performed by: PHYSICAL THERAPIST

## 2023-07-27 RX ADMIN — GADOBUTROL 7 ML: 604.72 INJECTION INTRAVENOUS at 13:21

## 2023-07-27 NOTE — PROGRESS NOTES
Daily Note     Today's date: 2023  Patient name: Diya Myles  : 1955  MRN: [id number]  Referring provider: Mary Jiang MD  Dx:   Encounter Diagnosis     ICD-10-CM    1. Generalized weakness  R53.1           Start Time: 08  Stop Time: 930  Total time in clinic (min): 45 minutes    Subjective: Pt reports fogginess in the morning. Objective: See treatment diary below      Assessment: Tolerated treatment well. Pt was moderately challenged with session today. Patient demonstrated fatigue post treatment and would benefit from continued PT      Plan: Continue per plan of care.       Precautions: Stage IV cancer, thoracic metastasis avoid lifting from flexed positions, NO GRADE 5 MOBS, avoid bridges      Manuals                                                                Neuro Re-Ed             SLR HEP 2*10 b/l           Mini squats HEP 2*10           Standing hip abd HEP 2*10 b/l           LTR HEP 15*10" b/l           Tandem stance             Low rows                          Ther Ex             Nu step ROM  10'                                                                                                      Ther Activity                                       Gait Training                                       Modalities

## 2023-08-01 ENCOUNTER — OFFICE VISIT (OUTPATIENT)
Dept: PHYSICAL THERAPY | Age: 68
End: 2023-08-01

## 2023-08-01 DIAGNOSIS — R53.1 GENERALIZED WEAKNESS: Primary | ICD-10-CM

## 2023-08-01 PROCEDURE — 97110 THERAPEUTIC EXERCISES: CPT | Performed by: PHYSICAL THERAPIST

## 2023-08-01 PROCEDURE — 97112 NEUROMUSCULAR REEDUCATION: CPT | Performed by: PHYSICAL THERAPIST

## 2023-08-01 NOTE — PROGRESS NOTES
Daily Note     Today's date: 2023  Patient name: Love Barber  : 1955  MRN: [id number]  Referring provider: Jeff Reis MD  Dx:   Encounter Diagnosis     ICD-10-CM    1. Generalized weakness  R53.1           Start Time: 1545  Stop Time: 1615  Total time in clinic (min): 30 minutes     Subjective: Pt reports improvements with tolerance to community ambulation. Objective: See treatment diary below      Assessment: Tolerated treatment well. Patient demonstrated fatigue post treatment and would benefit from continued PT      Plan: Continue per plan of care.       Precautions: Stage IV cancer, thoracic metastasis avoid lifting from flexed positions, NO GRADE 5 MOBS, avoid bridges      Manuals                                                                Neuro Re-Ed             SLR HEP 2*10 b/l           Mini squats HEP 2*10           Standing hip abd HEP 2*10 b/l           LTR HEP 15*10" b/l           Tandem stance             Low rows                          Ther Ex             Nu step ROM  10'           Leg press  3*10 95#                                                                                         Ther Activity                                       Gait Training                                       Modalities

## 2023-08-03 ENCOUNTER — OFFICE VISIT (OUTPATIENT)
Dept: PHYSICAL THERAPY | Age: 68
End: 2023-08-03

## 2023-08-03 DIAGNOSIS — R53.1 GENERALIZED WEAKNESS: Primary | ICD-10-CM

## 2023-08-03 PROCEDURE — 97110 THERAPEUTIC EXERCISES: CPT | Performed by: PHYSICAL THERAPIST

## 2023-08-03 PROCEDURE — 97112 NEUROMUSCULAR REEDUCATION: CPT | Performed by: PHYSICAL THERAPIST

## 2023-08-07 ENCOUNTER — OFFICE VISIT (OUTPATIENT)
Dept: PHYSICAL THERAPY | Age: 68
End: 2023-08-07

## 2023-08-07 DIAGNOSIS — R53.1 GENERALIZED WEAKNESS: Primary | ICD-10-CM

## 2023-08-07 PROCEDURE — 97112 NEUROMUSCULAR REEDUCATION: CPT | Performed by: PHYSICAL THERAPIST

## 2023-08-07 PROCEDURE — 97110 THERAPEUTIC EXERCISES: CPT | Performed by: PHYSICAL THERAPIST

## 2023-08-07 NOTE — PROGRESS NOTES
Daily Note     Today's date: 2023  Patient name: Bisi Pena  : 1955  MRN: [id number]  Referring provider: Lauren Sanches MD  Dx:   Encounter Diagnosis     ICD-10-CM    1. Generalized weakness  R53.1           Start Time: 1530  Stop Time: 1615  Total time in clinic (min): 45 minutes     Subjective: Pt reports no new symptoms. Objective: See treatment diary below      Assessment: Tolerated treatment well. Pt's POC was progressed to include more reps of leg press machine to increase tolerance to functional transfers. Patient demonstrated fatigue post treatment and would benefit from continued PT      Plan: Continue per plan of care.       Precautions: Stage IV cancer, thoracic metastasis avoid lifting from flexed positions, NO GRADE 5 MOBS, avoid bridges      Manuals                                                                Neuro Re-Ed             SLR HEP 2*10 b/l           Mini squats HEP 2*10           Standing hip abd HEP 2*10 b/l           LTR HEP 15*10" b/l           Tandem stance  3*30"           Low rows                          Ther Ex             Nu step ROM  10'           Leg press  4*10 95#                                                                                         Ther Activity                                       Gait Training                                       Modalities

## 2023-08-08 ENCOUNTER — OFFICE VISIT (OUTPATIENT)
Dept: HEMATOLOGY ONCOLOGY | Facility: CLINIC | Age: 68
End: 2023-08-08

## 2023-08-08 VITALS
WEIGHT: 163.2 LBS | BODY MASS INDEX: 23.37 KG/M2 | SYSTOLIC BLOOD PRESSURE: 124 MMHG | HEIGHT: 70 IN | DIASTOLIC BLOOD PRESSURE: 74 MMHG | RESPIRATION RATE: 17 BRPM | OXYGEN SATURATION: 97 % | TEMPERATURE: 97.2 F | HEART RATE: 74 BPM

## 2023-08-08 DIAGNOSIS — C34.91 NON-SMALL CELL CANCER OF RIGHT LUNG (HCC): Primary | ICD-10-CM

## 2023-08-08 PROCEDURE — 99214 OFFICE O/P EST MOD 30 MIN: CPT | Performed by: INTERNAL MEDICINE

## 2023-08-08 RX ORDER — SODIUM CHLORIDE 9 MG/ML
20 INJECTION, SOLUTION INTRAVENOUS ONCE
Status: CANCELLED | OUTPATIENT
Start: 2023-08-14

## 2023-08-08 RX ORDER — DEXAMETHASONE 1 MG
1 TABLET ORAL 2 TIMES DAILY WITH MEALS
Qty: 60 TABLET | Refills: 0 | Status: SHIPPED | OUTPATIENT
Start: 2023-08-08

## 2023-08-08 RX ORDER — SODIUM CHLORIDE 9 MG/ML
20 INJECTION, SOLUTION INTRAVENOUS ONCE
OUTPATIENT
Start: 2023-09-04

## 2023-08-08 RX ORDER — CYANOCOBALAMIN 1000 UG/ML
1000 INJECTION, SOLUTION INTRAMUSCULAR; SUBCUTANEOUS ONCE
OUTPATIENT
Start: 2023-09-04 | End: 2023-09-04

## 2023-08-09 ENCOUNTER — TELEPHONE (OUTPATIENT)
Age: 68
End: 2023-08-09

## 2023-08-09 ENCOUNTER — OFFICE VISIT (OUTPATIENT)
Dept: PHYSICAL THERAPY | Age: 68
End: 2023-08-09

## 2023-08-09 ENCOUNTER — LAB REQUISITION (OUTPATIENT)
Dept: LAB | Facility: HOSPITAL | Age: 68
End: 2023-08-09

## 2023-08-09 DIAGNOSIS — C34.11 MALIGNANT NEOPLASM OF UPPER LOBE, RIGHT BRONCHUS OR LUNG (HCC): ICD-10-CM

## 2023-08-09 DIAGNOSIS — R53.1 GENERALIZED WEAKNESS: Primary | ICD-10-CM

## 2023-08-09 PROCEDURE — 97110 THERAPEUTIC EXERCISES: CPT | Performed by: PHYSICAL THERAPIST

## 2023-08-09 PROCEDURE — 97112 NEUROMUSCULAR REEDUCATION: CPT | Performed by: PHYSICAL THERAPIST

## 2023-08-09 NOTE — PROGRESS NOTES
Daily Note     Today's date: 2023  Patient name: Love Barber  : 1955  MRN: [id number]  Referring provider: Jeff Reis MD  Dx:   Encounter Diagnosis     ICD-10-CM    1. Generalized weakness  R53.1           Start Time: 930  Stop Time: 101  Total time in clinic (min): 45 minutes    Subjective: Pt reports satisfactory progress  Reports at oncology appointment. Objective: See treatment diary below      Assessment: Tolerated treatment well. Patient started weaning from steroids yesterday, oncology team wants to monitor cough and fever. From functional stand point, patient is progressing very well. Patient demonstrated fatigue post treatment and would benefit from continued PT      Plan: Continue per plan of care.       Precautions: Stage IV cancer, thoracic metastasis avoid lifting from flexed positions, NO GRADE 5 MOBS, avoid bridges      Manuals                                                               Neuro Re-Ed             SLR HEP 2*10 b/l 2*10 b/l          Mini squats HEP 2*10 2*10          Standing hip abd HEP 2*10 b/l 2*10          LTR HEP 15*10" b/l 3*10 b/l          Tandem stance  3*30"           Low rows                          Ther Ex             Nu step ROM  10' 10'          Leg press  4*10 95# 5*10 95#                                                                                        Ther Activity                                       Gait Training                                       Modalities

## 2023-08-09 NOTE — TELEPHONE ENCOUNTER
Rec'd VM from patient's spouse to see how he should take the dexamethasone. I informed her that he should take in once daily and not twice a day as prescribed. Patient's spouse verbalized understanding.

## 2023-08-10 NOTE — PROGRESS NOTES
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     2023 - palliative RT to T spine and brain     2023 - start carbo/pemetrexed/pembrolizumab    2023 suspected Rad/Immuno Pneumonitis Grade 1/2 (mild fever)     2023 discontinued Carbo d/t fatigue; and holding Keytruda in Cycle 4 dt Pneumonitis; will re-challenge in cycle 5     2023 - add back [place] for cycle 6     Thoracic spine tumor   2023 Initial Diagnosis    Thoracic spine tumor     Non-small cell cancer of right lung ([address])   3/29/2023 -  Cancer Staged    Staging form: Lung, AJCC 8th Edition  - Clinical stage from 3/29/2023: Stage DORI (cT4, cN1, cM1b) - Signed by Taryn Navarro DO on 2023 Biopsy    Lung, right upper lobe, biopsy:     - Rare detached single and small clusters of atypical, degenerated epithelial cells. - Predominantly fibroelastotic stromal fibrosis.      2023 Biopsy    Lung, right upper lobe mass, biopsy:      - Poorly differentiated non-small cell carcinoma most compatible with adenocarcinoma of the lung     2023 - 2023 Radiation    Course: C1    Plan ID Energy Fractions Dose per Fraction (cGy) Dose Correction (cGy) Total Dose Delivered (cGy) Elapsed Days   [id number]_T4_R Lung 10X/6X 10 / 10 300 0 3,000 11   HCS_WhBrain 6X 10 / 10 300 0 3,000 11      Dr. James Johnson     2023 Initial Diagnosis    Non-small cell cancer of right lung ([address])     2023 -  Chemotherapy    cyanocobalamin, 1,000 mcg, Intramuscular, Once, 2 of 5 cycles  Administration: 1,000 mcg (5/15/2023), 1,000 mcg (7/3/2023)  alteplase (CATHFLO), 2 mg, Intracatheter, Every 1 Minute as needed, 4 of 8 cycles  fosaprepitant (EMEND) IVPB, 150 mg, Intravenous, Once, 3 of 3 cycles  Administration: 150 mg (5/22/2023), 150 mg (7/3/2023), 150 mg (6/12/2023)  CARBOplatin (PARAPLATIN) IVPB ([facility] AUC DOSING), 544 mg, Intravenous, Once, 3 of 3 cycles  Administration: 544 mg (5/22/2023), 554 mg (7/3/2023), 544 mg (6/12/2023)  pemetrexed (ALIMTA) chemo infusion, 940 mg, Intravenous, Once, 4 of 8 cycles  Administration: 900 mg (5/22/2023), 900 mg (7/3/2023), 900 mg (6/12/2023), 900 mg (7/24/2023)  pembrolizumab (KEYTRUDA) IVPB, 200 mg, Intravenous, Once, 3 of 7 cycles  Administration: 200 mg (5/22/2023), 200 mg (7/3/2023), 200 mg (6/12/2023)     7/12/2023 Adverse Reaction    Hospitalization - Suspected Pneumonitis    7/12-14/2023 due to mild fever with CT chest redemonstrating left upper lobe inflammatory changes, previously seen on study 04/04/2023, ID and oncology evaluated, more suggestive of radiation/Keytruda induced pneumonitis grade 1/2; mild fever resolved; other differential/contributing etiology include recent tapering of steroid       7/21/2023 -  Chemotherapy    Recently suspected pneumonitis 7/12/2023 and reports fatigue 7/21/2023  Discontinuing carboplatin and holding Keytruda of cycle 4; with potential plan to rechallenge Keytruda in cycle 5     Metastasis to brain ([address])   5/30/2023 Initial Diagnosis    Metastasis to brain [facility])         Interval History: Patient presents for follow up of his stage IV adenocarcinoma of the lung. Her last cycle of chemotherapy was just given with Alimta. He did feel better in terms of his energy. He still has a cough but it seems to be more contributed to by phlegm and postnasal drip. He is short of breath with exertion. He does feel like his short-term memory is getting better. He had an MRI of the brain prior to this visit and that shows improving and stable brain metastasis. He is doing physical therapy and that is making him feel better as well.   He still has persistent lightheadedness especially when standing up from sitting. He is still taking 2 mg of dexamethasone a day since his discharge from the hospital.  Caris testing was finally able to be sent now that his Medicaid is in place and it is still pending. REVIEW OF SYSTEMS:  Please note that a 14-point review of systems was performed to include Constitutional, HEENT, Respiratory, CVS, GI, , Musculoskeletal, Integumentary, Neurologic, Rheumatologic, Endocrinologic, Psychiatric, Lymphatic, and Hematologic/Oncologic systems were reviewed and are negative unless otherwise stated in HPI. Positive and negative findings pertinent to this evaluation are incorporated into the history of present illness. ECOG PS: 0    PROBLEM LIST:  Patient Active Problem List   Diagnosis   • Multiple subsegmental pulmonary emboli without acute cor pulmonale (HCC)   • Tobacco use   • DDD (degenerative disc disease), cervical   • Pleural mass   • Brain lesions   • Thoracic spine tumor   • Advanced care planning/counseling discussion   • Non-small cell cancer of right lung (HCC)   • Metastasis to brain [facility])   • Lesion of left lung   • Chemotherapy-induced fatigue   • Drug-induced pneumonitis   • Current every day smoker       Assessment / Plan: 59-year-old male with stage IV adenocarcinoma of the lung. He is doing well. He does not have symptoms for pneumonitis at this time. I cut his dexamethasone back to 1 mg daily. I am going to attempt to add back and Keytruda for cycle 6. We will be hypervigilant about any worsening of shortness of breath or dry cough. I asked him to try to hold his morning gabapentin to see if this helps with his ongoing complaints of lightheadedness. He is planning on getting  this Saturday to his longtime significant other who is here with him today. I certainly do wish them the best.  [place] prior to the next cycle of treatment and I will have my nurse practitioner see him. I will see him prior to cycle 8 of treatment for ongoing evaluation. I spent 30 minutes on chart review, face to face counseling time, coordination of care and documentation. Past Medical History:   has no past medical history on file. PAST SURGICAL HISTORY:   has a past surgical history that includes IR biopsy lung (4/6/2023); IR biopsy lung (4/26/2023); and IR port placement (5/16/2023). CURRENT MEDICATIONS  Current Outpatient Medications   Medication Sig Dispense Refill   • acetaminophen (TYLENOL) 500 mg tablet Take 500 mg by mouth every 4 (four) hours as needed for mild pain     • apixaban (ELIQUIS) 5 mg Take 1 tablet (5 mg total) by mouth 2 (two) times a day 60 tablet 5   • dexamethasone (DECADRON) 1 mg tablet Take 1 tablet (1 mg total) by mouth 2 (two) times a day with meals 60 tablet 0   • folic acid (FOLVITE) 1 mg tablet TAKE 1 TABLET BY MOUTH EVERY DAY 90 tablet 3   • gabapentin (NEURONTIN) 300 mg capsule Take 300 mg in the AM and 600 mg in the Pm 90 capsule 1   • lidocaine-prilocaine (EMLA) cream Apply to port 30 to 60 minutes prior to use 30 g 2     No current facility-administered medications for this visit. [unfilled]    SOCIAL HISTORY:   reports that he has been smoking cigarettes. He started smoking about 50 years ago. He has a 25.00 pack-year smoking history. He has never used smokeless tobacco. He reports that he does not currently use alcohol. He reports that he does not use drugs. FAMILY HISTORY:  family history is not on file. ALLERGIES:  has No Known Allergies. Physical Exam:  Vital Signs:   Visit Vitals  /74 (BP Location: Left arm, Patient Position: Sitting, Cuff Size: Adult)   Pulse 74   Temp (!) 97.2 °F (36.2 °C)   Resp 17   Ht 5' 10" (1.778 m)   Wt 74 kg (163 lb 3.2 oz)   SpO2 97%   BMI 23.42 kg/m²   Smoking Status Every Day   BSA 1.91 m²     Body mass index is 23.42 kg/m². Body surface area is 1.91 meters squared.     GEN: Alert, awake oriented x3, in no acute distress  HEENT- No pallor, icterus, cyanosis, no oral mucosal lesions,   LAD - no palpable cervical, clavicle, axillary, inguinal LAD  Heart- normal S1 S2, regular rate and rhythm, No murmur, rubs.    Lungs- clear breathing sound bilateral.   Abdomen- soft, Non tender, bowel sounds present  Extremities- No cyanosis, clubbing, edema  Neuro- No focal neurological deficit    Labs:  Lab Results   Component Value Date    WBC 3.36 (L) 07/21/2023    HGB 10.0 (L) 07/21/2023    HCT 29.6 (L) 07/21/2023     (H) 07/21/2023     07/21/2023     Lab Results   Component Value Date    SODIUM 140 07/21/2023    K 4.0 07/21/2023     (H) 07/21/2023    CO2 25 07/21/2023    AGAP 5 07/21/2023    BUN 23 07/21/2023    CREATININE 1.02 07/21/2023    GLUC 147 (H) 07/21/2023    GLUF 103 (H) 05/10/2023    CALCIUM 8.8 07/21/2023    AST 19 07/21/2023    ALT 38 07/21/2023    ALKPHOS 37 (L) 07/21/2023    TP 6.6 07/21/2023    TBILI 0.41 07/21/2023    EGFR 76 07/21/2023

## 2023-08-11 ENCOUNTER — HOSPITAL ENCOUNTER (OUTPATIENT)
Dept: INFUSION CENTER | Facility: HOSPITAL | Age: 68
Discharge: HOME | End: 2023-08-11

## 2023-08-11 DIAGNOSIS — C34.91 NON-SMALL CELL CANCER OF RIGHT LUNG (HCC): Primary | ICD-10-CM

## 2023-08-11 LAB
ALBUMIN SERPL BCP-MCNC: 3.2 G/DL (ref 3.5–5)
ALP SERPL-CCNC: 36 U/L (ref 46–116)
ALT SERPL W P-5'-P-CCNC: 29 U/L (ref 12–78)
ANION GAP SERPL CALCULATED.3IONS-SCNC: 6 MMOL/L
ANISOCYTOSIS BLD QL SMEAR: PRESENT
AST SERPL W P-5'-P-CCNC: 15 U/L (ref 5–45)
BASOPHILS # BLD MANUAL: 0 THOUSAND/UL (ref 0–0.1)
BASOPHILS NFR MAR MANUAL: 0 % (ref 0–1)
BILIRUB SERPL-MCNC: 0.5 MG/DL (ref 0.2–1)
BUN SERPL-MCNC: 19 MG/DL (ref 5–25)
CALCIUM ALBUM COR SERPL-MCNC: 9.3 MG/DL (ref 8.3–10.1)
CALCIUM SERPL-MCNC: 8.7 MG/DL (ref 8.3–10.1)
CHLORIDE SERPL-SCNC: 112 MMOL/L (ref 96–108)
CO2 SERPL-SCNC: 23 MMOL/L (ref 21–32)
CREAT SERPL-MCNC: 0.98 MG/DL (ref 0.6–1.3)
DACRYOCYTES BLD QL SMEAR: PRESENT
EOSINOPHIL # BLD MANUAL: 0.08 THOUSAND/UL (ref 0–0.4)
EOSINOPHIL NFR BLD MANUAL: 1 % (ref 0–6)
ERYTHROCYTE [DISTWIDTH] IN BLOOD BY AUTOMATED COUNT: 19.4 % (ref 11.6–15.1)
GFR SERPL CREATININE-BSD FRML MDRD: 80 ML/MIN/1.73SQ M
GLUCOSE SERPL-MCNC: 105 MG/DL (ref 65–140)
HCT VFR BLD AUTO: 32.5 % (ref 36.5–49.3)
HGB BLD-MCNC: 11 G/DL (ref 12–17)
LYMPHOCYTES # BLD AUTO: 0 % (ref 14–44)
LYMPHOCYTES # BLD AUTO: 0.15 THOUSAND/UL (ref 0.6–4.47)
MACROCYTES BLD QL AUTO: PRESENT
MCH RBC QN AUTO: 36.9 PG (ref 26.8–34.3)
MCHC RBC AUTO-ENTMCNC: 33.8 G/DL (ref 31.4–37.4)
MCV RBC AUTO: 109 FL (ref 82–98)
MONOCYTES # BLD AUTO: 0.38 THOUSAND/UL (ref 0–1.22)
MONOCYTES NFR BLD: 5 % (ref 4–12)
NEUTROPHILS # BLD MANUAL: 6.91 THOUSAND/UL (ref 1.85–7.62)
NEUTS BAND NFR BLD MANUAL: 1 % (ref 0–8)
NEUTS SEG NFR BLD AUTO: 91 % (ref 43–75)
PLATELET # BLD AUTO: 185 THOUSANDS/UL (ref 149–390)
PLATELET BLD QL SMEAR: ADEQUATE
PMV BLD AUTO: 9.4 FL (ref 8.9–12.7)
POIKILOCYTOSIS BLD QL SMEAR: PRESENT
POLYCHROMASIA BLD QL SMEAR: PRESENT
POTASSIUM SERPL-SCNC: 3.9 MMOL/L (ref 3.5–5.3)
PROT SERPL-MCNC: 6.9 G/DL (ref 6.4–8.4)
RBC # BLD AUTO: 2.98 MILLION/UL (ref 3.88–5.62)
RBC MORPH BLD: PRESENT
SODIUM SERPL-SCNC: 141 MMOL/L (ref 135–147)
T3FREE SERPL-MCNC: 2.77 PG/ML (ref 2.5–3.9)
TSH SERPL DL<=0.05 MIU/L-ACNC: 1.04 UIU/ML (ref 0.45–4.5)
VARIANT LYMPHS # BLD AUTO: 2 %
WBC # BLD AUTO: 7.51 THOUSAND/UL (ref 4.31–10.16)

## 2023-08-11 PROCEDURE — 84481 FREE ASSAY (FT-3): CPT | Performed by: INTERNAL MEDICINE

## 2023-08-11 PROCEDURE — 85027 COMPLETE CBC AUTOMATED: CPT | Performed by: INTERNAL MEDICINE

## 2023-08-11 PROCEDURE — 80053 COMPREHEN METABOLIC PANEL: CPT | Performed by: INTERNAL MEDICINE

## 2023-08-11 PROCEDURE — 84443 ASSAY THYROID STIM HORMONE: CPT | Performed by: INTERNAL MEDICINE

## 2023-08-11 PROCEDURE — 85007 BL SMEAR W/DIFF WBC COUNT: CPT | Performed by: INTERNAL MEDICINE

## 2023-08-11 NOTE — PLAN OF CARE
Problem: Potential for Falls  Goal: Patient will remain free of falls  Description: INTERVENTIONS:  - Educate patient/family on patient safety including physical limitations  - Instruct patient to call for assistance with activity   - Consult OT/PT to assist with strengthening/mobility   - Keep Call bell within reach  - Keep bed low and locked with side rails adjusted as appropriate  - Keep care items and personal belongings within reach  - Initiate and maintain comfort rounds  -- Apply yellow socks and bracelet for high fall risk patients  - Consider moving patient to room near nurses station  Outcome: Progressing

## 2023-08-14 ENCOUNTER — HOSPITAL ENCOUNTER (OUTPATIENT)
Dept: INFUSION CENTER | Facility: HOSPITAL | Age: 68
Discharge: HOME | End: 2023-08-14
Attending: INTERNAL MEDICINE

## 2023-08-14 VITALS
HEIGHT: 70 IN | TEMPERATURE: 97.8 F | RESPIRATION RATE: 18 BRPM | SYSTOLIC BLOOD PRESSURE: 115 MMHG | DIASTOLIC BLOOD PRESSURE: 80 MMHG | HEART RATE: 83 BPM | BODY MASS INDEX: 22.91 KG/M2 | WEIGHT: 160.05 LBS

## 2023-08-14 DIAGNOSIS — C34.91 NON-SMALL CELL CANCER OF RIGHT LUNG (HCC): Primary | ICD-10-CM

## 2023-08-14 PROCEDURE — 96411 CHEMO IV PUSH ADDL DRUG: CPT

## 2023-08-14 PROCEDURE — 96413 CHEMO IV INFUSION 1 HR: CPT

## 2023-08-14 PROCEDURE — 96367 TX/PROPH/DG ADDL SEQ IV INF: CPT

## 2023-08-14 RX ORDER — SODIUM CHLORIDE 9 MG/ML
20 INJECTION, SOLUTION INTRAVENOUS ONCE
Status: COMPLETED | OUTPATIENT
Start: 2023-08-14 | End: 2023-08-14

## 2023-08-14 RX ADMIN — DEXAMETHASONE SODIUM PHOSPHATE: 10 INJECTION, SOLUTION INTRAMUSCULAR; INTRAVENOUS at 09:07

## 2023-08-14 RX ADMIN — PEMETREXED DISODIUM 900 MG: 500 INJECTION, POWDER, LYOPHILIZED, FOR SOLUTION INTRAVENOUS at 10:37

## 2023-08-14 RX ADMIN — SODIUM CHLORIDE 200 MG: 9 INJECTION, SOLUTION INTRAVENOUS at 09:42

## 2023-08-14 RX ADMIN — SODIUM CHLORIDE 20 ML/HR: 0.9 INJECTION, SOLUTION INTRAVENOUS at 09:04

## 2023-08-16 ENCOUNTER — OFFICE VISIT (OUTPATIENT)
Dept: PHYSICAL THERAPY | Age: 68
End: 2023-08-16

## 2023-08-16 DIAGNOSIS — R53.1 GENERALIZED WEAKNESS: Primary | ICD-10-CM

## 2023-08-16 PROCEDURE — 97110 THERAPEUTIC EXERCISES: CPT | Performed by: PHYSICAL THERAPIST

## 2023-08-16 PROCEDURE — 97112 NEUROMUSCULAR REEDUCATION: CPT | Performed by: PHYSICAL THERAPIST

## 2023-08-16 NOTE — PROGRESS NOTES
Daily Note     Today's date: 2023  Patient name: Marcela Lopez  : 1955  MRN: [id number]  Referring provider: Starla Garcia MD  Dx:   Encounter Diagnosis     ICD-10-CM    1. Generalized weakness  R53.1           Start Time:   Stop Time: 1005  Total time in clinic (min): 40 minutes    Subjective: Pt reports fatigue following oncology treatment. Objective: See treatment diary below      Assessment: Tolerated treatment well. Pt's POC was progressed to include more UE interventions to improve overhead lifting. Patient demonstrated fatigue post treatment and would benefit from continued PT      Plan: Continue per plan of care.       Precautions: Stage IV cancer, thoracic metastasis avoid lifting from flexed positions, NO GRADE 5 MOBS, avoid bridges      Manuals                                                              Neuro Re-Ed             SLR HEP 2*10 b/l 2*10 b/l 3x10 b/l         Mini squats HEP 2*10 2*10          Standing hip abd HEP 2*10 b/l 2*10          LTR HEP 15*10" b/l 3*10 b/l 3x10 b/l         Tandem stance  3*30"           Low rows             Arm bike    3/3'         Ther Ex             Nu step ROM  10' 10' 10'         Leg press  4*10 95# 5*10 95#                                                                                        Ther Activity                                       Gait Training                                       Modalities

## 2023-08-18 ENCOUNTER — OFFICE VISIT (OUTPATIENT)
Dept: PHYSICAL THERAPY | Age: 68
End: 2023-08-18

## 2023-08-18 DIAGNOSIS — R53.1 GENERALIZED WEAKNESS: Primary | ICD-10-CM

## 2023-08-18 PROCEDURE — 97110 THERAPEUTIC EXERCISES: CPT | Performed by: PHYSICAL THERAPIST

## 2023-08-18 PROCEDURE — 97140 MANUAL THERAPY 1/> REGIONS: CPT | Performed by: PHYSICAL THERAPIST

## 2023-08-18 PROCEDURE — 97112 NEUROMUSCULAR REEDUCATION: CPT | Performed by: PHYSICAL THERAPIST

## 2023-08-18 NOTE — PROGRESS NOTES
Daily Note     Today's date: 2023  Patient name: Diya Myles  : 1955  MRN: [id number]  Referring provider: Mary Jiang MD  Dx:   Encounter Diagnosis     ICD-10-CM    1. Generalized weakness  R53.1           Start Time: 930  Stop Time: 1015  Total time in clinic (min): 45 minutes    Subjective: Pt reports feeling lethargic from oncology treatment. Pt does report increased strength when getting up from kneeling position. Objective: See treatment diary below      Assessment: Tolerated treatment well. Pt's POC was modified to include more ROM interventions. Patient demonstrated fatigue post treatment and would benefit from continued PT      Plan: Continue per plan of care.       Precautions: Stage IV cancer, thoracic metastasis avoid lifting from flexed positions, NO GRADE 5 MOBS, avoid bridges      Manuals                                                             Neuro Re-Ed             SLR HEP 2*10 b/l 2*10 b/l 3x10 b/l 3x10 b/l        Mini squats HEP 2*10 2*10          Standing hip abd HEP 2*10 b/l 2*10  2x10 b/l        LTR HEP 15*10" b/l 3*10 b/l 3x10 b/l 3x10 b/l        Tandem stance  3*30"           Low rows             Arm bike    3/3' 3/3'        Ther Ex             Nu step ROM  10' 10' 10' 10'        Leg press  4*10 95# 5*10 95#                                                                                        Ther Activity                                       Gait Training                                       Modalities

## 2023-08-21 ENCOUNTER — APPOINTMENT (OUTPATIENT)
Dept: PHYSICAL THERAPY | Age: 68
End: 2023-08-21

## 2023-08-24 ENCOUNTER — OFFICE VISIT (OUTPATIENT)
Dept: PHYSICAL THERAPY | Age: 68
End: 2023-08-24

## 2023-08-24 DIAGNOSIS — R53.1 GENERALIZED WEAKNESS: Primary | ICD-10-CM

## 2023-08-24 PROCEDURE — 97110 THERAPEUTIC EXERCISES: CPT | Performed by: PHYSICAL THERAPIST

## 2023-08-24 PROCEDURE — 97112 NEUROMUSCULAR REEDUCATION: CPT | Performed by: PHYSICAL THERAPIST

## 2023-08-24 NOTE — PROGRESS NOTES
Daily Note     Today's date: 2023  Patient name: Marielos Orosco  : 1955  MRN: [id number]  Referring provider: Alannah Barcenas MD  Dx:   Encounter Diagnosis     ICD-10-CM    1. Generalized weakness  R53.1                      Subjective: Pt reports increased symptoms earlier this week. Objective: See treatment diary below      Assessment: Tolerated treatment well. Patient demonstrated fatigue post treatment and would benefit from continued PT      Plan: Continue per plan of care.       Precautions: Stage IV cancer, thoracic metastasis avoid lifting from flexed positions, NO GRADE 5 MOBS, avoid bridges      Manuals                                                             Neuro Re-Ed             SLR HEP 2*10 b/l 2*10 b/l 3x10 b/l 3x10 b/l        Mini squats HEP 2*10 2*10          Standing hip abd HEP 2*10 b/l 2*10  2x10 b/l        LTR HEP 15*10" b/l 3*10 b/l 3x10 b/l 3x10 b/l        Tandem stance  3*30"           Low rows             Arm bike    3/3' 33'        Ther Ex             Nu step ROM  10' 10' 10' 10'        Leg press  4*10 95# 5*10 95#                                                                                        Ther Activity                                       Gait Training                                       Modalities

## 2023-08-28 ENCOUNTER — OFFICE VISIT (OUTPATIENT)
Dept: PHYSICAL THERAPY | Age: 68
End: 2023-08-28

## 2023-08-28 DIAGNOSIS — R53.1 GENERALIZED WEAKNESS: Primary | ICD-10-CM

## 2023-08-28 PROCEDURE — 97110 THERAPEUTIC EXERCISES: CPT | Performed by: PHYSICAL THERAPIST

## 2023-08-28 PROCEDURE — 97112 NEUROMUSCULAR REEDUCATION: CPT | Performed by: PHYSICAL THERAPIST

## 2023-08-28 NOTE — PROGRESS NOTES
Daily Note     Today's date: 2023  Patient name: Amanda Case  : 1955  MRN: [id number]  Referring provider: Deniz John MD  Dx:   Encounter Diagnosis     ICD-10-CM    1. Generalized weakness  R53.1           Start Time: 1530  Stop Time: 1615  Total time in clinic (min): 45 minutes    Subjective: Pt reports improvements in stair navigation. Objective: See treatment diary below      Assessment: Tolerated treatment well. POC progressed to include more closed chain strengthening. Patient demonstrated fatigue post treatment and would benefit from continued PT      Plan: Continue per plan of care.       Precautions: Stage IV cancer, thoracic metastasis avoid lifting from flexed positions, NO GRADE 5 MOBS, avoid bridges      Manuals                                                            Neuro Re-Ed             SLR HEP 2*10 b/l 2*10 b/l 3x10 b/l 3x10 b/l 3*10       Mini squats HEP 2*10 2*10          Standing hip abd HEP 2*10 b/l 2*10  2x10 b/l 2*10       LTR HEP 15*10" b/l 3*10 b/l 3x10 b/l 3x10 b/l 3*10       Tandem stance  3*30"           Low rows             Leg press      3*10 85#       Arm bike    33' 33' 3/3' standing       Ther Ex             Nu step ROM  10' 10' 10' 10' 10'       Leg press  4*10 95# 5*10 95#   5*10 85#                                                                                     Ther Activity                                       Gait Training                                       Modalities

## 2023-08-29 DIAGNOSIS — C34.91 NON-SMALL CELL CANCER OF RIGHT LUNG (HCC): ICD-10-CM

## 2023-08-29 RX ORDER — FOLIC ACID 1 MG/1
1000 TABLET ORAL DAILY
Qty: 90 TABLET | Refills: 0 | Status: SHIPPED | OUTPATIENT
Start: 2023-08-29

## 2023-08-31 ENCOUNTER — OFFICE VISIT (OUTPATIENT)
Dept: HEMATOLOGY ONCOLOGY | Facility: CLINIC | Age: 68
End: 2023-08-31

## 2023-08-31 ENCOUNTER — OFFICE VISIT (OUTPATIENT)
Dept: PHYSICAL THERAPY | Age: 68
End: 2023-08-31

## 2023-08-31 VITALS
RESPIRATION RATE: 17 BRPM | WEIGHT: 164.4 LBS | OXYGEN SATURATION: 99 % | SYSTOLIC BLOOD PRESSURE: 118 MMHG | DIASTOLIC BLOOD PRESSURE: 78 MMHG | HEART RATE: 83 BPM | HEIGHT: 70 IN | BODY MASS INDEX: 23.54 KG/M2 | TEMPERATURE: 97.9 F

## 2023-08-31 DIAGNOSIS — C34.91 NON-SMALL CELL CANCER OF RIGHT LUNG (HCC): Primary | ICD-10-CM

## 2023-08-31 DIAGNOSIS — R53.1 GENERALIZED WEAKNESS: Primary | ICD-10-CM

## 2023-08-31 PROCEDURE — 97112 NEUROMUSCULAR REEDUCATION: CPT | Performed by: PHYSICAL THERAPIST

## 2023-08-31 PROCEDURE — 97110 THERAPEUTIC EXERCISES: CPT | Performed by: PHYSICAL THERAPIST

## 2023-08-31 PROCEDURE — 99214 OFFICE O/P EST MOD 30 MIN: CPT

## 2023-08-31 NOTE — PROGRESS NOTES
Daily Note     Today's date: 2023  Patient name: Jennifer Sullivan  : 1955  MRN: [id number]  Referring provider: Pete Matute MD  Dx:   Encounter Diagnosis     ICD-10-CM    1. Generalized weakness  R53.1           Start Time: 1530  Stop Time: 1615  Total time in clinic (min): 45 minutes    Subjective: Pt reports improvements in symptoms. Objective: See treatment diary below      Assessment: Tolerated treatment well. Functional improvements demonstrated in FOTO score. Patient demonstrated fatigue post treatment and would benefit from continued PT      Plan: Continue per plan of care.       Precautions: Stage IV cancer, thoracic metastasis avoid lifting from flexed positions, NO GRADE 5 MOBS, avoid bridges      Manuals                                                            Neuro Re-Ed             SLR HEP 2*10 b/l 2*10 b/l 3x10 b/l 3x10 b/l 3*10       Mini squats HEP 2*10 2*10          Standing hip abd HEP 2*10 b/l 2*10  2x10 b/l 2*10       LTR HEP 15*10" b/l 3*10 b/l 3x10 b/l 3x10 b/l 3*10       Tandem stance  3*30"           Low rows             Leg press      3*10 85#       Arm bike    3/3' 33' 33' standing       Ther Ex             Nu step ROM  10' 10' 10' 10' 10'       Leg press  4*10 95# 5*10 95#   5*10 85#                                                                                     Ther Activity                                       Gait Training                                       Modalities

## 2023-09-01 ENCOUNTER — HOSPITAL ENCOUNTER (OUTPATIENT)
Dept: INFUSION CENTER | Facility: HOSPITAL | Age: 68
End: 2023-09-01

## 2023-09-01 DIAGNOSIS — C34.91 NON-SMALL CELL CANCER OF RIGHT LUNG (HCC): Primary | ICD-10-CM

## 2023-09-01 LAB
ALBUMIN SERPL BCP-MCNC: 3.7 G/DL (ref 3.5–5)
ALP SERPL-CCNC: 46 U/L (ref 34–104)
ALT SERPL W P-5'-P-CCNC: 17 U/L (ref 7–52)
ANION GAP SERPL CALCULATED.3IONS-SCNC: 6 MMOL/L
ANISOCYTOSIS BLD QL SMEAR: PRESENT
AST SERPL W P-5'-P-CCNC: 15 U/L (ref 13–39)
BASOPHILS # BLD MANUAL: 0.11 THOUSAND/UL (ref 0–0.1)
BASOPHILS NFR MAR MANUAL: 2 % (ref 0–1)
BILIRUB SERPL-MCNC: 0.31 MG/DL (ref 0.2–1)
BUN SERPL-MCNC: 18 MG/DL (ref 5–25)
CALCIUM SERPL-MCNC: 9 MG/DL (ref 8.4–10.2)
CHLORIDE SERPL-SCNC: 109 MMOL/L (ref 96–108)
CO2 SERPL-SCNC: 25 MMOL/L (ref 21–32)
CREAT SERPL-MCNC: 0.8 MG/DL (ref 0.6–1.3)
EOSINOPHIL # BLD MANUAL: 0 THOUSAND/UL (ref 0–0.4)
EOSINOPHIL NFR BLD MANUAL: 0 % (ref 0–6)
ERYTHROCYTE [DISTWIDTH] IN BLOOD BY AUTOMATED COUNT: 15.3 % (ref 11.6–15.1)
GFR SERPL CREATININE-BSD FRML MDRD: 93 ML/MIN/1.73SQ M
GLUCOSE SERPL-MCNC: 96 MG/DL (ref 65–140)
HCT VFR BLD AUTO: 33.2 % (ref 36.5–49.3)
HGB BLD-MCNC: 10.7 G/DL (ref 12–17)
LYMPHOCYTES # BLD AUTO: 0.34 THOUSAND/UL (ref 0.6–4.47)
LYMPHOCYTES # BLD AUTO: 5 % (ref 14–44)
MACROCYTES BLD QL AUTO: PRESENT
MCH RBC QN AUTO: 36.4 PG (ref 26.8–34.3)
MCHC RBC AUTO-ENTMCNC: 32.2 G/DL (ref 31.4–37.4)
MCV RBC AUTO: 113 FL (ref 82–98)
MONOCYTES # BLD AUTO: 0.4 THOUSAND/UL (ref 0–1.22)
MONOCYTES NFR BLD: 7 % (ref 4–12)
NEUTROPHILS # BLD MANUAL: 4.81 THOUSAND/UL (ref 1.85–7.62)
NEUTS SEG NFR BLD AUTO: 85 % (ref 43–75)
PLATELET # BLD AUTO: 235 THOUSANDS/UL (ref 149–390)
PLATELET BLD QL SMEAR: ADEQUATE
PMV BLD AUTO: 9.4 FL (ref 8.9–12.7)
POLYCHROMASIA BLD QL SMEAR: PRESENT
POTASSIUM SERPL-SCNC: 4.4 MMOL/L (ref 3.5–5.3)
PROT SERPL-MCNC: 6.8 G/DL (ref 6.4–8.4)
RBC # BLD AUTO: 2.94 MILLION/UL (ref 3.88–5.62)
RBC MORPH BLD: PRESENT
SCAN RESULT: NORMAL
SODIUM SERPL-SCNC: 140 MMOL/L (ref 135–147)
T3FREE SERPL-MCNC: 3.13 PG/ML (ref 2.5–3.9)
TSH SERPL DL<=0.05 MIU/L-ACNC: 1.57 UIU/ML (ref 0.45–4.5)
VARIANT LYMPHS # BLD AUTO: 1 %
WBC # BLD AUTO: 5.66 THOUSAND/UL (ref 4.31–10.16)

## 2023-09-01 PROCEDURE — 85027 COMPLETE CBC AUTOMATED: CPT | Performed by: INTERNAL MEDICINE

## 2023-09-01 PROCEDURE — 80053 COMPREHEN METABOLIC PANEL: CPT | Performed by: INTERNAL MEDICINE

## 2023-09-01 PROCEDURE — 85007 BL SMEAR W/DIFF WBC COUNT: CPT | Performed by: INTERNAL MEDICINE

## 2023-09-01 PROCEDURE — 84443 ASSAY THYROID STIM HORMONE: CPT | Performed by: INTERNAL MEDICINE

## 2023-09-01 PROCEDURE — 84481 FREE ASSAY (FT-3): CPT | Performed by: INTERNAL MEDICINE

## 2023-09-01 NOTE — PROGRESS NOTES
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     2023 - palliative RT to T spine and brain     2023 - start carbo/pemetrexed/pembrolizumab    2023 suspected Rad/Immuno Pneumonitis Grade 1/2 (mild fever)     2023 discontinued Carbo d/t fatigue; and holding Keytruda in Cycle 4 dt Pneumonitis; will re-challenge in cycle 5     2023 - add back [place] for cycle 6     Thoracic spine tumor   2023 Initial Diagnosis    Thoracic spine tumor     Non-small cell cancer of right lung ([address])   3/29/2023 -  Cancer Staged    Staging form: Lung, AJCC 8th Edition  - Clinical stage from 3/29/2023: Stage DORI (cT4, cN1, cM1b) - Signed by Gavi Sin DO on 2023 Biopsy    Lung, right upper lobe, biopsy:     - Rare detached single and small clusters of atypical, degenerated epithelial cells. - Predominantly fibroelastotic stromal fibrosis.      2023 Biopsy    Lung, right upper lobe mass, biopsy:      - Poorly differentiated non-small cell carcinoma most compatible with adenocarcinoma of the lung     2023 - 2023 Radiation    Course: C1    Plan ID Energy Fractions Dose per Fraction (cGy) Dose Correction (cGy) Total Dose Delivered (cGy) Elapsed Days   [id number]_R Lung 10X/6X 10 / 10 300 0 3,000 11   HCS_WhBrain 6X 10 / 10 300 0 3,000 11      Dr. Coreen Lenz     2023 Initial Diagnosis    Non-small cell cancer of right lung ([address])     2023 -  Chemotherapy    cyanocobalamin, 1,000 mcg, Intramuscular, Once, 2 of 5 cycles  Administration: 1,000 mcg (5/15/2023), 1,000 mcg (7/3/2023)  alteplase (CATHFLO), 2 mg, Intracatheter, Every 1 Minute as needed, 5 of 8 cycles  fosaprepitant (EMEND) IVPB, 150 mg, Intravenous, Once, 3 of 3 cycles  Administration: 150 mg (5/22/2023), 150 mg (7/3/2023), 150 mg (6/12/2023)  CARBOplatin (PARAPLATIN) IVPB ([facility] DOSING), 544 mg, Intravenous, Once, 3 of 3 cycles  Administration: 544 mg (5/22/2023), 554 mg (7/3/2023), 544 mg (6/12/2023)  pemetrexed (ALIMTA) chemo infusion, 940 mg, Intravenous, Once, 5 of 8 cycles  Administration: 900 mg (5/22/2023), 900 mg (7/3/2023), 900 mg (6/12/2023), 900 mg (8/14/2023), 900 mg (7/24/2023)  pembrolizumab (KEYTRUDA) IVPB, 200 mg, Intravenous, Once, 4 of 7 cycles  Administration: 200 mg (5/22/2023), 200 mg (7/3/2023), 200 mg (6/12/2023), 200 mg (8/14/2023)     7/12/2023 Adverse Reaction    Hospitalization - Suspected Pneumonitis    7/12-14/2023 due to mild fever with CT chest redemonstrating left upper lobe inflammatory changes, previously seen on study 04/04/2023, ID and oncology evaluated, more suggestive of radiation/Keytruda induced pneumonitis grade 1/2; mild fever resolved; other differential/contributing etiology include recent tapering of steroid       7/21/2023 -  Chemotherapy    Recently suspected pneumonitis 7/12/2023 and reports fatigue 7/21/2023  Discontinuing carboplatin and holding Keytruda of cycle 4; with potential plan to rechallenge Keytruda in cycle 5     Metastasis to brain ([address])   5/30/2023 Initial Diagnosis    Metastasis to brain [facility])         Interval History: Patient presents for follow-up for his stage IV adenocarcinoma of the lung prior to cycle 6 of pemetrexed and pembrolizumab. He is here with his wife today. Pembrolizumab was added back with cycle 5. Patient had treatment on Monday, on Wednesday he started experiencing fatigue which lasted for a week. Wife reports his cough has improved for the last week, it is not as frequent. He has some intermittent SOB. He only had one episode of brain fog. Continues to have bloating, reports he feels better after passing flatus. To take dexamethasone 1 mg daily. He is staying hydrated and eating well. Denies nausea, diarrhea, fevers, CP, palpitations, rashes. No mouth sores. Pending labs on Friday. REVIEW OF SYSTEMS:  Please note that a 14-point review of systems was performed to include Constitutional, HEENT, Respiratory, CVS, GI, , Musculoskeletal, Integumentary, Neurologic, Rheumatologic, Endocrinologic, Psychiatric, Lymphatic, and Hematologic/Oncologic systems were reviewed and are negative unless otherwise stated in HPI. Positive and negative findings pertinent to this evaluation are incorporated into the history of present illness. ECOG PS: 0    PROBLEM LIST:  Patient Active Problem List   Diagnosis   • Multiple subsegmental pulmonary emboli without acute cor pulmonale (HCC)   • Tobacco use   • DDD (degenerative disc disease), cervical   • Pleural mass   • Brain lesions   • Thoracic spine tumor   • Advanced care planning/counseling discussion   • Non-small cell cancer of right lung ([address])   • Metastasis to brain [facility])   • Lesion of left lung   • Chemotherapy-induced fatigue   • Drug-induced pneumonitis   • Current every day smoker       Assessment / Plan: Cycle 6 of pemetrexed and pembrolizumab at current dose pending labs. Informed patient I will discuss a steroid taper with Dr. Alyce Nguyen  upon her return, in the meantime, continue taking dexamethasone 1 mg daily. Regarding his bloating, advised patient to try some yogurt or probiotic as it may offer relief. Patient agreeable to try. Patient's wife has been monitoring his symptoms and chart form and will inform us for worsening symptoms after cycle 6. Advised to be vigilant of shortness of breath, cough, brain fog. Patient aware to contact us for worsening symptoms or for additional questions/concerns. I spent 30 minutes on chart review, face to face counseling time, coordination of care and documentation.     Past Medical History:   has no past medical history on file. PAST SURGICAL HISTORY:   has a past surgical history that includes IR biopsy lung (4/6/2023); IR biopsy lung (4/26/2023); and IR port placement (5/16/2023). CURRENT MEDICATIONS  Current Outpatient Medications   Medication Sig Dispense Refill   • acetaminophen (TYLENOL) 500 mg tablet Take 500 mg by mouth every 4 (four) hours as needed for mild pain     • apixaban (ELIQUIS) 5 mg Take 1 tablet (5 mg total) by mouth 2 (two) times a day 60 tablet 5   • dexamethasone (DECADRON) 1 mg tablet Take 1 tablet (1 mg total) by mouth 2 (two) times a day with meals 60 tablet 0   • folic acid (FOLVITE) 1 mg tablet Take 1 tablet (1,000 mcg total) by mouth daily 90 tablet 0   • gabapentin (NEURONTIN) 300 mg capsule Take 300 mg in the AM and 600 mg in the Pm 90 capsule 1   • lidocaine-prilocaine (EMLA) cream Apply to port 30 to 60 minutes prior to use 30 g 2     No current facility-administered medications for this visit. [unfilled]    SOCIAL HISTORY:   reports that he has been smoking cigarettes. He started smoking about 50 years ago. He has a 25.00 pack-year smoking history. He has never used smokeless tobacco. He reports that he does not currently use alcohol. He reports that he does not use drugs. FAMILY HISTORY:  family history is not on file. ALLERGIES:  has No Known Allergies. Physical Exam:  Vital Signs:   Visit Vitals  /78 (BP Location: Left arm, Patient Position: Sitting, Cuff Size: Adult)   Pulse 83   Temp 97.9 °F (36.6 °C)   Resp 17   Ht 5' 10" (1.778 m)   Wt 74.6 kg (164 lb 6.4 oz)   SpO2 99%   BMI 23.59 kg/m²   Smoking Status Every Day   BSA 1.92 m²     Body mass index is 23.59 kg/m². Body surface area is 1.92 meters squared. GEN: Alert, awake oriented x3, in no acute distress  HEENT- No pallor, icterus, cyanosis, no oral mucosal lesions,   LAD - no palpable cervical, clavicle, axillary, inguinal LAD  Heart- normal S1 S2, regular rate and rhythm, No murmur, rubs.    Lungs- clear breathing sound bilateral.   Abdomen- soft, Non tender, bowel sounds present  Extremities- No cyanosis, clubbing, edema  Neuro- No focal neurological deficit    Labs:  Lab Results   Component Value Date    WBC 7.51 08/11/2023    HGB 11.0 (L) 08/11/2023    HCT 32.5 (L) 08/11/2023     (H) 08/11/2023     08/11/2023     Lab Results   Component Value Date    SODIUM 141 08/11/2023    K 3.9 08/11/2023     (H) 08/11/2023    CO2 23 08/11/2023    AGAP 6 08/11/2023    BUN 19 08/11/2023    CREATININE 0.98 08/11/2023    GLUC 105 08/11/2023    GLUF 103 (H) 05/10/2023    CALCIUM 8.7 08/11/2023    AST 15 08/11/2023    ALT 29 08/11/2023    ALKPHOS 36 (L) 08/11/2023    TP 6.9 08/11/2023    TBILI 0.50 08/11/2023    EGFR 80 08/11/2023

## 2023-09-05 ENCOUNTER — HOSPITAL ENCOUNTER (OUTPATIENT)
Dept: INFUSION CENTER | Facility: HOSPITAL | Age: 68
Discharge: HOME | End: 2023-09-05
Attending: INTERNAL MEDICINE
Payer: MEDICARE

## 2023-09-05 VITALS
BODY MASS INDEX: 23.64 KG/M2 | TEMPERATURE: 97.8 F | HEIGHT: 70 IN | RESPIRATION RATE: 18 BRPM | SYSTOLIC BLOOD PRESSURE: 133 MMHG | HEART RATE: 90 BPM | WEIGHT: 165.12 LBS | DIASTOLIC BLOOD PRESSURE: 84 MMHG

## 2023-09-05 DIAGNOSIS — C34.91 NON-SMALL CELL CANCER OF RIGHT LUNG (HCC): Primary | ICD-10-CM

## 2023-09-05 PROCEDURE — 96411 CHEMO IV PUSH ADDL DRUG: CPT

## 2023-09-05 PROCEDURE — 96372 THER/PROPH/DIAG INJ SC/IM: CPT

## 2023-09-05 PROCEDURE — 96413 CHEMO IV INFUSION 1 HR: CPT

## 2023-09-05 PROCEDURE — 96367 TX/PROPH/DG ADDL SEQ IV INF: CPT

## 2023-09-05 RX ORDER — SODIUM CHLORIDE 9 MG/ML
20 INJECTION, SOLUTION INTRAVENOUS ONCE
Status: COMPLETED | OUTPATIENT
Start: 2023-09-05 | End: 2023-09-05

## 2023-09-05 RX ORDER — CYANOCOBALAMIN 1000 UG/ML
1000 INJECTION, SOLUTION INTRAMUSCULAR; SUBCUTANEOUS ONCE
Status: COMPLETED | OUTPATIENT
Start: 2023-09-05 | End: 2023-09-05

## 2023-09-05 RX ADMIN — DEXAMETHASONE SODIUM PHOSPHATE: 10 INJECTION, SOLUTION INTRAMUSCULAR; INTRAVENOUS at 09:19

## 2023-09-05 RX ADMIN — SODIUM CHLORIDE 20 ML/HR: 0.9 INJECTION, SOLUTION INTRAVENOUS at 09:19

## 2023-09-05 RX ADMIN — PEMETREXED DISODIUM 900 MG: 100 INJECTION, POWDER, LYOPHILIZED, FOR SOLUTION INTRAVENOUS at 10:40

## 2023-09-05 RX ADMIN — CYANOCOBALAMIN 1000 MCG: 1000 INJECTION, SOLUTION INTRAMUSCULAR at 10:37

## 2023-09-05 RX ADMIN — SODIUM CHLORIDE 200 MG: 9 INJECTION, SOLUTION INTRAVENOUS at 09:53

## 2023-09-05 NOTE — ADDENDUM NOTE
Encounter addended by: Lv Dyson, Pharmacist on: 9/5/2023 7:11 AM   Actions taken: [employer] created or edited

## 2023-09-06 ENCOUNTER — OFFICE VISIT (OUTPATIENT)
Dept: PHYSICAL THERAPY | Age: 68
End: 2023-09-06

## 2023-09-06 DIAGNOSIS — R53.1 GENERALIZED WEAKNESS: Primary | ICD-10-CM

## 2023-09-06 PROCEDURE — 97110 THERAPEUTIC EXERCISES: CPT | Performed by: PHYSICAL THERAPIST

## 2023-09-06 PROCEDURE — 97112 NEUROMUSCULAR REEDUCATION: CPT | Performed by: PHYSICAL THERAPIST

## 2023-09-06 NOTE — PROGRESS NOTES
Daily Note     Today's date: 2023  Patient name: Meredith Gar  : 1955  MRN: [id number]  Referring provider: Tori Oden MD  Dx:   Encounter Diagnosis     ICD-10-CM    1. Generalized weakness  R53.1           Start Time: 1530  Stop Time: 1615  Total time in clinic (min): 45 minutes    Subjective: Pt reports improvements in symptoms since starting PT. Objective: See treatment diary below      Assessment: Tolerated treatment well. Pt's POC was progressed to include more reps of closed chain strengthening. Patient demonstrated fatigue post treatment and would benefit from continued PT      Plan: Continue per plan of care.       Precautions: Stage IV cancer, thoracic metastasis avoid lifting from flexed positions, NO GRADE 5 MOBS, avoid bridges      Manuals                                                           Neuro Re-Ed             SLR HEP 2*10 b/l 2*10 b/l 3x10 b/l 3x10 b/l 3*10 3*10      Mini squats HEP 2*10 2*10          Standing hip abd HEP 2*10 b/l 2*10  2x10 b/l 2*10 2*10      LTR HEP 15*10" b/l 3*10 b/l 3x10 b/l 3x10 b/l 3*10 3*10      Tandem stance  3*30"           Low rows             Leg press      3*10 85# 5*10 95#      Arm bike    3/3' 3/3' 3/3' standing 3/3' standing      Ther Ex             Nu step ROM  10' 10' 10' 10' 10' 10'      Leg press  4*10 95# 5*10 95#   4*10 85#                                                                                     Ther Activity                                       Gait Training                                       Modalities

## 2023-09-08 ENCOUNTER — OFFICE VISIT (OUTPATIENT)
Dept: PHYSICAL THERAPY | Age: 68
End: 2023-09-08

## 2023-09-08 DIAGNOSIS — R53.1 GENERALIZED WEAKNESS: Primary | ICD-10-CM

## 2023-09-08 PROCEDURE — 97112 NEUROMUSCULAR REEDUCATION: CPT | Performed by: PHYSICAL THERAPIST

## 2023-09-08 PROCEDURE — 97110 THERAPEUTIC EXERCISES: CPT | Performed by: PHYSICAL THERAPIST

## 2023-09-08 NOTE — PROGRESS NOTES
Daily Note     Today's date: 2023  Patient name: Jorge Reyna  : 1955  MRN: [id number]  Referring provider: Xavier Narvaez MD  Dx:   Encounter Diagnosis     ICD-10-CM    1. Generalized weakness  R53.1           Start Time: 1100  Stop Time: 1145  Total time in clinic (min): 45 minutes    Subjective: Pt reports fatigue following yard work this morning. Objective: See treatment diary below      Assessment: Tolerated treatment well. Patient demonstrated fatigue post treatment and would benefit from continued PT      Plan: Continue per plan of care.       Precautions: Stage IV cancer, thoracic metastasis avoid lifting from flexed positions, NO GRADE 5 MOBS, avoid bridges      Manuals                                                           Neuro Re-Ed             SLR HEP 2*10 b/l 2*10 b/l 3x10 b/l 3x10 b/l 3*10 3*10      Mini squats HEP 2*10 2*10          Standing hip abd HEP 2*10 b/l 2*10  2x10 b/l 2*10 2*10      LTR HEP 15*10" b/l 3*10 b/l 3x10 b/l 3x10 b/l 3*10 3*10      Tandem stance  3*30"           Low rows             Leg press      3*10 85# 5*10 95#      Arm bike    3/3' 3/3' 3/3' standing 3/3' standing      Ther Ex             Nu step ROM  10' 10' 10' 10' 10' 10'      Leg press  4*10 95# 5*10 95#   4*10 85#                                                                                     Ther Activity                                       Gait Training                                       Modalities

## 2023-09-12 ENCOUNTER — OFFICE VISIT (OUTPATIENT)
Dept: PHYSICAL THERAPY | Age: 68
End: 2023-09-12

## 2023-09-12 ENCOUNTER — OFFICE VISIT (OUTPATIENT)
Dept: PALLIATIVE MEDICINE | Facility: CLINIC | Age: 68
End: 2023-09-12

## 2023-09-12 ENCOUNTER — TELEPHONE (OUTPATIENT)
Dept: HEMATOLOGY ONCOLOGY | Facility: CLINIC | Age: 68
End: 2023-09-12

## 2023-09-12 VITALS
WEIGHT: 158.95 LBS | HEIGHT: 70 IN | OXYGEN SATURATION: 97 % | SYSTOLIC BLOOD PRESSURE: 128 MMHG | HEART RATE: 78 BPM | RESPIRATION RATE: 18 BRPM | TEMPERATURE: 97.9 F | DIASTOLIC BLOOD PRESSURE: 68 MMHG | BODY MASS INDEX: 22.76 KG/M2

## 2023-09-12 DIAGNOSIS — R53.83 CHEMOTHERAPY-INDUCED FATIGUE: Primary | ICD-10-CM

## 2023-09-12 DIAGNOSIS — T45.1X5A CHEMOTHERAPY-INDUCED FATIGUE: Primary | ICD-10-CM

## 2023-09-12 DIAGNOSIS — R53.1 GENERALIZED WEAKNESS: Primary | ICD-10-CM

## 2023-09-12 DIAGNOSIS — C79.31 METASTASIS TO BRAIN (HCC): ICD-10-CM

## 2023-09-12 DIAGNOSIS — C34.91 NON-SMALL CELL CANCER OF RIGHT LUNG (HCC): ICD-10-CM

## 2023-09-12 DIAGNOSIS — R14.0 BLOATING: ICD-10-CM

## 2023-09-12 PROCEDURE — 99214 OFFICE O/P EST MOD 30 MIN: CPT | Performed by: INTERNAL MEDICINE

## 2023-09-12 PROCEDURE — 97112 NEUROMUSCULAR REEDUCATION: CPT | Performed by: PHYSICAL THERAPIST

## 2023-09-12 PROCEDURE — 97110 THERAPEUTIC EXERCISES: CPT | Performed by: PHYSICAL THERAPIST

## 2023-09-12 NOTE — PROGRESS NOTES
Daily Note     Today's date: 2023  Patient name: Amanda Case  : 1955  MRN: [id number]  Referring provider: Deniz John MD  Dx:   Encounter Diagnosis     ICD-10-CM    1. Generalized weakness  R53.1           Start Time: 1400  Stop Time: 1445  Total time in clinic (min): 45 minutes    Subjective: Pt reports improvements in energy since Saturday. Objective: See treatment diary below      Assessment: Tolerated treatment well. Pt is doing very well with POC to address generalized weakness due to chemo therapy and radiation treatments. Continue to progress functional strengthening to increase tolerance to functional transfers. Patient demonstrated fatigue post treatment and would benefit from continued PT      Plan: Continue per plan of care.       Precautions: Stage IV cancer, thoracic metastasis avoid lifting from flexed positions, NO GRADE 5 MOBS, avoid bridges      Manuals                                                          Neuro Re-Ed             SLR HEP 2*10 b/l 2*10 b/l 3x10 b/l 3x10 b/l 3*10 3*10 3x10 SLR b/l     Mini squats HEP 2*10 2*10          Standing hip abd HEP 2*10 b/l 2*10  2x10 b/l 2*10 2*10 2x10b/l     LTR HEP 15*10" b/l 3*10 b/l 3x10 b/l 3x10 b/l 3*10 3*10 20x5" ea     Tandem stance  3*30"           Low rows             Leg press      3*10 85# 5*10 95# 5x10 93# 5x10     Arm bike    3/3' 3/3' 3/3' standing 3/3' standing nv     Ther Ex             Nu step ROM  10' 10' 10' 10' 10' 10' 10'     Leg press  4*10 95# 5*10 95#   4*10 85#                                                                                     Ther Activity                                       Gait Training                                       Modalities

## 2023-09-12 NOTE — TELEPHONE ENCOUNTER
Informed patient I spoke with Dr. Danni Linton regarding the dex taper. She would like Claude to taper it down to every other day. I will be in touch with him to let him know further instructions. Patient voiced understanding. He does report that improvement of his bloating with the addition of yogurt to his diet.

## 2023-09-12 NOTE — PROGRESS NOTES
Palliative and Supportive Care   Madi Green 74 y.o. male [id number]    Assessment/Plan:  1. Chemotherapy-induced fatigue    2. Metastasis to brain ([address])    3. Non-small cell cancer of right lung ([address])    4. Bloating      No changes to meds. Current plan very effective. Will discuss with Dr. Mary Mills complete cessation of decadron. Applauded his wife's journaling efforts and Claude's efforts to maintain nutrition and functional status. Follow up in 2-3 months. Requested Prescriptions      No prescriptions requested or ordered in this encounter     There are no discontinued medications. Representatives have queried the patient's controlled substance dispensing history in the Prescription Drug Monitoring Program in compliance with regulations before I have prescribed any controlled substances. The prescription history is consistent with prescribed therapy and our practice policies. 30 minutes were spent face to face with Madi Green and his spouse with greater than 50% of the time spent in counseling or coordination of care including discussions of treatment instructions . All of the patient's questions were answered during this discussion. No follow-ups on file. Subjective:   Chief Complaint  Follow up visit for:  symptom management  HPI     Madi Green is a 74 y.o. male with stage IV NSCLC currently on alimta/keytruda who presents for follow up. No pain. Edema has dramatically reduced over the last 3-6 months. Energy still cycles in relation to his chemo days (spouse brings in an excellent calender documenting bloating/activity/sleep) but he remains active. PT has been a help in restoring his function. Has lost weight but feels it is primarily water weight and has actually improved in strength. No other symptoms reported. The following portions of the medical history were reviewed: past medical history, problem list, medication list, and social history.     Current Outpatient Medications:   •  acetaminophen (TYLENOL) 500 mg tablet, Take 500 mg by mouth every 4 (four) hours as needed for mild pain, Disp: , Rfl:   •  apixaban (ELIQUIS) 5 mg, Take 1 tablet (5 mg total) by mouth 2 (two) times a day, Disp: 60 tablet, Rfl: 5  •  dexamethasone (DECADRON) 1 mg tablet, Take 1 tablet (1 mg total) by mouth 2 (two) times a day with meals, Disp: 60 tablet, Rfl: 0  •  folic acid (FOLVITE) 1 mg tablet, Take 1 tablet (1,000 mcg total) by mouth daily, Disp: 90 tablet, Rfl: 0  •  gabapentin (NEURONTIN) 300 mg capsule, Take 300 mg in the AM and 600 mg in the Pm, Disp: 90 capsule, Rfl: 1  •  lidocaine-prilocaine (EMLA) cream, Apply to port 30 to 60 minutes prior to use, Disp: 30 g, Rfl: 2  Review of Systems    All other systems negative    Objective:  Vital Signs  /68 (BP Location: Left arm, Patient Position: Sitting, Cuff Size: Standard)   Pulse 78   Temp 97.9 °F (36.6 °C) (Temporal)   Resp 18   Ht 5' 10" (1.778 m)   Wt 72.1 kg (158 lb 15.2 oz)   SpO2 97%   BMI 22.81 kg/m²    Physical Exam    Constitutional: Appears well-developed and well-nourished. In no acute distress. Head: Normocephalic and atraumatic. Eyes: EOM are normal. Right eye exhibits no discharge. Left eye exhibits no discharge. No scleral icterus. Pulmonary/Chest: Effort normal. No stridor. No respiratory distress. Abdominal: No distension. Musculoskeletal: No edema. Neurological: Alert, oriented and appropriately conversant. Skin: Skin is dry, not diaphoretic. Psychiatric: Displays a normal mood and affect. Behavior, judgement and thought content appear normal.   Vitals reviewed.

## 2023-09-14 ENCOUNTER — OFFICE VISIT (OUTPATIENT)
Dept: PHYSICAL THERAPY | Age: 68
End: 2023-09-14

## 2023-09-14 DIAGNOSIS — R53.1 GENERALIZED WEAKNESS: Primary | ICD-10-CM

## 2023-09-14 PROCEDURE — 97110 THERAPEUTIC EXERCISES: CPT

## 2023-09-14 PROCEDURE — 97112 NEUROMUSCULAR REEDUCATION: CPT

## 2023-09-14 NOTE — PROGRESS NOTES
Daily Note     Today's date: 2023  Patient name: Gloria Olvera  : 1955  MRN: [id number]  Referring provider: Guera Hurst MD  Dx:   Encounter Diagnosis     ICD-10-CM    1. Generalized weakness  R53.1                      Subjective: Patient offers no new complaints today. Patient reports overall improvement in strength and mobility. Objective: See treatment diary below      Assessment: Tolerated treatment well. Good tolerance to exercise program today. Continue to progress functional strengthening to increase tolerance to functional transfers. Patient demonstrated fatigue post treatment and would benefit from continued PT      Plan: Continue per plan of care.       Precautions: Stage IV cancer, thoracic metastasis avoid lifting from flexed positions, NO GRADE 5 MOBS, avoid bridges      Manuals                                                         Neuro Re-Ed             SLR HEP 2*10 b/l 2*10 b/l 3x10 b/l 3x10 b/l 3*10 3*10 3x10 SLR b/l 3x10 b/l    Mini squats HEP 2*10 2*10          Standing hip abd HEP 2*10 b/l 2*10  2x10 b/l 2*10 2*10 2x10b/l 2x10 b/l    LTR HEP 15*10" b/l 3*10 b/l 3x10 b/l 3x10 b/l 3*10 3*10 20x5" ea 20x5" ea    Tandem stance  3*30"           Low rows             Leg press      3*10 85# 5*10 95# 5x10 95# 5x10 5x10 95# 5x10    Arm bike    3/3' 3/3' 3/3' standing 3/3' standing nv 3'/3'  standing    Ther Ex             Nu step ROM  10' 10' 10' 10' 10' 10' 10' 10'    Leg press  4*10 95# 5*10 95#   4*10 85#                                                                                     Ther Activity                                       Gait Training                                       Modalities

## 2023-09-19 ENCOUNTER — OFFICE VISIT (OUTPATIENT)
Dept: PHYSICAL THERAPY | Age: 68
End: 2023-09-19

## 2023-09-19 DIAGNOSIS — R53.1 GENERALIZED WEAKNESS: Primary | ICD-10-CM

## 2023-09-19 PROCEDURE — 97110 THERAPEUTIC EXERCISES: CPT

## 2023-09-19 PROCEDURE — 97112 NEUROMUSCULAR REEDUCATION: CPT

## 2023-09-19 NOTE — PROGRESS NOTES
Daily Note     Today's date: 2023  Patient name: Joesph Olmos  : 1955  MRN: [id number]  Referring provider: Cheyanne Moser MD  Dx:   Encounter Diagnosis     ICD-10-CM    1. Generalized weakness  R53.1           Start Time: 56  Stop Time: 1240  Total time in clinic (min): 48 minutes    Subjective: Patient reports no new complaints or major changes since last session. Patient notes improvements overall. Objective: See treatment diary below      Assessment: Tolerated treatment well. Continued to focus on strengthening exercises to patient's tolerance. Patient displayed proper form and had a good recall/understanding of all exercises in his program. Patient was unable to tolerate completing more than 4x10 on the leg press today due to increased exertion/fatigue levels. Progress as able. Patient demonstrated fatigue post treatment and would benefit from continued PT      Plan: Continue per plan of care.       Precautions: Stage IV cancer, thoracic metastasis avoid lifting from flexed positions, NO GRADE 5 MOBS, avoid bridges      Manuals                                                        Neuro Re-Ed             SLR HEP 2*10 b/l 2*10 b/l 3x10 b/l 3x10 b/l 3*10 3*10 3x10 SLR b/l 3x10 b/l 3x10 b/l   Mini squats HEP 2*10 2*10          Standing hip abd HEP 2*10 b/l 2*10  2x10 b/l 2*10 2*10 2x10b/l 2x10 b/l 2x10 b/l   LTR HEP 15*10" b/l 3*10 b/l 3x10 b/l 3x10 b/l 3*10 3*10 20x5" ea 20x5" ea 20x5" ea   Tandem stance  3*30"           Low rows             Leg press      3*10 85# 5*10 95# 5x10 95# 5x10 5x10 95# 5x10 4x10 95#   Arm bike    3/3' 3/3' 3/3' standing 3/3' standing nv 3'/3'  standing 3'/3'  standing   Ther Ex             Nu step ROM  10' 10' 10' 10' 10' 10' 10' 10' 10'   Leg press  4*10 95# 5*10 95#   4*10 85#                                                                                     Ther Activity Gait Training                                       Modalities                                          [id number]

## 2023-09-21 ENCOUNTER — OFFICE VISIT (OUTPATIENT)
Dept: PHYSICAL THERAPY | Age: 68
End: 2023-09-21

## 2023-09-21 DIAGNOSIS — R53.1 GENERALIZED WEAKNESS: Primary | ICD-10-CM

## 2023-09-21 PROCEDURE — 97112 NEUROMUSCULAR REEDUCATION: CPT

## 2023-09-21 PROCEDURE — 97110 THERAPEUTIC EXERCISES: CPT

## 2023-09-21 NOTE — PROGRESS NOTES
Daily Note     Today's date: 2023  Patient name: Angel Rodriguez  : 1955  MRN: [id number]  Referring provider: Betty Dent MD  Dx:   Encounter Diagnosis     ICD-10-CM    1. Generalized weakness  R53.1                      Subjective: pt reports he feels good today, but does start chemo treatments next week again    Objective: See treatment diary below      Assessment: progressed dynamic strengthening and balance exTolerated  well. Patient demonstrated fatigue post treatment and would benefit from continued PT      Plan: Continue per plan of care. Progress treatment as tolerated.        Precautions: Stage IV cancer, thoracic metastasis avoid lifting from flexed positions, NO GRADE 5 MOBS, avoid bridges      Manuals                                            Neuro Re-Ed          SLR 3x10 b/l 3*10 3*10 3x10 SLR b/l 3x10 b/l 3x10 b/l 3x10 ea B/L   Mini squats          Standing hip abd 2x10 b/l 2*10 2*10 2x10b/l 2x10 b/l 2x10 b/l    LTR 3x10 b/l 3*10 3*10 20x5" ea 20x5" ea 20x5" ea 20x5"   Tandem stance       Foam 2x20" ea B/L   Low rows          Leg press  3*10 85# 5*10 95# 5x10 95# 5x10 5x10 95# 5x10 4x10 95# 95# 5x10   Arm bike 3/3' 3/3' standing 3/3' standing nv 3'/3'  standing 3'/3'  standing 3'/3'   Sidestepping        4x20'             Ther Ex          Nu step ROM 10' 10' 10' 10' 10' 10' 10'   Leg press  4*10 85#                                                                    Ther Activity                              Gait Training                              Modalities

## 2023-09-22 ENCOUNTER — HOSPITAL ENCOUNTER (OUTPATIENT)
Dept: INFUSION CENTER | Facility: HOSPITAL | Age: 68
End: 2023-09-22

## 2023-09-22 DIAGNOSIS — C34.91 NON-SMALL CELL CANCER OF RIGHT LUNG (HCC): Primary | ICD-10-CM

## 2023-09-22 LAB
ALBUMIN SERPL BCP-MCNC: 3.8 G/DL (ref 3.5–5)
ALP SERPL-CCNC: 43 U/L (ref 34–104)
ALT SERPL W P-5'-P-CCNC: 15 U/L (ref 7–52)
ANION GAP SERPL CALCULATED.3IONS-SCNC: 6 MMOL/L
AST SERPL W P-5'-P-CCNC: 17 U/L (ref 13–39)
BASOPHILS # BLD AUTO: 0.01 THOUSANDS/ÂΜL (ref 0–0.1)
BASOPHILS NFR BLD AUTO: 0 % (ref 0–1)
BILIRUB SERPL-MCNC: 0.35 MG/DL (ref 0.2–1)
BUN SERPL-MCNC: 19 MG/DL (ref 5–25)
CALCIUM SERPL-MCNC: 9.4 MG/DL (ref 8.4–10.2)
CHLORIDE SERPL-SCNC: 108 MMOL/L (ref 96–108)
CO2 SERPL-SCNC: 24 MMOL/L (ref 21–32)
CREAT SERPL-MCNC: 0.92 MG/DL (ref 0.6–1.3)
EOSINOPHIL # BLD AUTO: 0.05 THOUSAND/ÂΜL (ref 0–0.61)
EOSINOPHIL NFR BLD AUTO: 1 % (ref 0–6)
ERYTHROCYTE [DISTWIDTH] IN BLOOD BY AUTOMATED COUNT: 14 % (ref 11.6–15.1)
GFR SERPL CREATININE-BSD FRML MDRD: 86 ML/MIN/1.73SQ M
GLUCOSE SERPL-MCNC: 98 MG/DL (ref 65–140)
HCT VFR BLD AUTO: 36.4 % (ref 36.5–49.3)
HGB BLD-MCNC: 11.8 G/DL (ref 12–17)
IMM GRANULOCYTES # BLD AUTO: 0.08 THOUSAND/UL (ref 0–0.2)
IMM GRANULOCYTES NFR BLD AUTO: 1 % (ref 0–2)
LYMPHOCYTES # BLD AUTO: 0.53 THOUSANDS/ÂΜL (ref 0.6–4.47)
LYMPHOCYTES NFR BLD AUTO: 9 % (ref 14–44)
MCH RBC QN AUTO: 35.3 PG (ref 26.8–34.3)
MCHC RBC AUTO-ENTMCNC: 32.4 G/DL (ref 31.4–37.4)
MCV RBC AUTO: 109 FL (ref 82–98)
MONOCYTES # BLD AUTO: 0.5 THOUSAND/ÂΜL (ref 0.17–1.22)
MONOCYTES NFR BLD AUTO: 8 % (ref 4–12)
NEUTROPHILS # BLD AUTO: 4.95 THOUSANDS/ÂΜL (ref 1.85–7.62)
NEUTS SEG NFR BLD AUTO: 81 % (ref 43–75)
NRBC BLD AUTO-RTO: 0 /100 WBCS
PLATELET # BLD AUTO: 174 THOUSANDS/UL (ref 149–390)
PMV BLD AUTO: 9.7 FL (ref 8.9–12.7)
POTASSIUM SERPL-SCNC: 4 MMOL/L (ref 3.5–5.3)
PROT SERPL-MCNC: 7.2 G/DL (ref 6.4–8.4)
RBC # BLD AUTO: 3.34 MILLION/UL (ref 3.88–5.62)
SODIUM SERPL-SCNC: 138 MMOL/L (ref 135–147)
T3FREE SERPL-MCNC: 3.17 PG/ML (ref 2.5–3.9)
TSH SERPL DL<=0.05 MIU/L-ACNC: 2.24 UIU/ML (ref 0.45–4.5)
WBC # BLD AUTO: 6.12 THOUSAND/UL (ref 4.31–10.16)

## 2023-09-22 PROCEDURE — 84443 ASSAY THYROID STIM HORMONE: CPT | Performed by: INTERNAL MEDICINE

## 2023-09-22 PROCEDURE — 80053 COMPREHEN METABOLIC PANEL: CPT | Performed by: INTERNAL MEDICINE

## 2023-09-22 PROCEDURE — 85025 COMPLETE CBC W/AUTO DIFF WBC: CPT | Performed by: INTERNAL MEDICINE

## 2023-09-22 PROCEDURE — 84481 FREE ASSAY (FT-3): CPT | Performed by: INTERNAL MEDICINE

## 2023-09-22 RX ORDER — CYANOCOBALAMIN 1000 UG/ML
1000 INJECTION, SOLUTION INTRAMUSCULAR; SUBCUTANEOUS ONCE
Status: CANCELLED | OUTPATIENT
Start: 2023-09-25 | End: 2023-09-25

## 2023-09-22 RX ORDER — SODIUM CHLORIDE 9 MG/ML
20 INJECTION, SOLUTION INTRAVENOUS ONCE
Status: CANCELLED | OUTPATIENT
Start: 2023-09-25

## 2023-09-25 ENCOUNTER — HOSPITAL ENCOUNTER (OUTPATIENT)
Dept: INFUSION CENTER | Facility: HOSPITAL | Age: 68
Discharge: HOME | End: 2023-09-25
Attending: INTERNAL MEDICINE
Payer: MEDICARE

## 2023-09-25 VITALS
HEART RATE: 89 BPM | WEIGHT: 163.58 LBS | HEIGHT: 70 IN | DIASTOLIC BLOOD PRESSURE: 81 MMHG | SYSTOLIC BLOOD PRESSURE: 120 MMHG | TEMPERATURE: 97.6 F | RESPIRATION RATE: 18 BRPM | BODY MASS INDEX: 23.42 KG/M2 | OXYGEN SATURATION: 99 %

## 2023-09-25 DIAGNOSIS — C34.91 NON-SMALL CELL CANCER OF RIGHT LUNG (HCC): Primary | ICD-10-CM

## 2023-09-25 PROCEDURE — 96413 CHEMO IV INFUSION 1 HR: CPT

## 2023-09-25 PROCEDURE — 96367 TX/PROPH/DG ADDL SEQ IV INF: CPT

## 2023-09-25 PROCEDURE — 96411 CHEMO IV PUSH ADDL DRUG: CPT

## 2023-09-25 RX ORDER — SODIUM CHLORIDE 9 MG/ML
20 INJECTION, SOLUTION INTRAVENOUS ONCE
Status: COMPLETED | OUTPATIENT
Start: 2023-09-25 | End: 2023-09-25

## 2023-09-25 RX ORDER — CYANOCOBALAMIN 1000 UG/ML
1000 INJECTION, SOLUTION INTRAMUSCULAR; SUBCUTANEOUS ONCE
Status: DISCONTINUED | OUTPATIENT
Start: 2023-09-25 | End: 2023-09-28 | Stop reason: HOSPADM

## 2023-09-25 RX ADMIN — SODIUM CHLORIDE 200 MG: 9 INJECTION, SOLUTION INTRAVENOUS at 10:33

## 2023-09-25 RX ADMIN — DEXAMETHASONE SODIUM PHOSPHATE: 10 INJECTION, SOLUTION INTRAMUSCULAR; INTRAVENOUS at 10:00

## 2023-09-25 RX ADMIN — PEMETREXED DISODIUM 900 MG: 500 INJECTION, POWDER, LYOPHILIZED, FOR SOLUTION INTRAVENOUS at 11:22

## 2023-09-25 RX ADMIN — SODIUM CHLORIDE 20 ML/HR: 0.9 INJECTION, SOLUTION INTRAVENOUS at 09:59

## 2023-09-25 NOTE — PROGRESS NOTES
Confirmed with Dulce Golden patient to receive vitamin b12 every 3rd cycle of alimta. Will not be given today.

## 2023-09-26 ENCOUNTER — OFFICE VISIT (OUTPATIENT)
Dept: PHYSICAL THERAPY | Age: 68
End: 2023-09-26

## 2023-09-26 DIAGNOSIS — R53.1 GENERALIZED WEAKNESS: Primary | ICD-10-CM

## 2023-09-26 PROCEDURE — 97112 NEUROMUSCULAR REEDUCATION: CPT

## 2023-09-26 PROCEDURE — 97110 THERAPEUTIC EXERCISES: CPT

## 2023-09-26 NOTE — PROGRESS NOTES
Daily Note     Today's date: 2023  Patient name: Esha Lowry  : 1955  MRN: [id number]  Referring provider: Tony Blount MD  Dx:   Encounter Diagnosis     ICD-10-CM    1. Generalized weakness  R53.1                      Subjective: Patient reports feeling good today, had treatment yesterday and notes that normally 1-2 days after is rough so Thursday he probably won't be as good. Objective: See treatment diary below      Assessment: Tolerated treatment well. Patient able to complete all reps of exercises with adequate rest breaks, no significant fatigue noted and completed with good form. Patient continually completed more than the desired reps due to lack of pain and feeling good. Patient would benefit from continued PT to improve functional tolerance. Plan: Progress treatment as tolerated.        Precautions: Stage IV cancer, thoracic metastasis avoid lifting from flexed positions, NO GRADE 5 MOBS, avoid bridges      Manuals                                                Neuro Re-Ed           SLR 3x10 b/l 3*10 3*10 3x10 SLR b/l 3x10 b/l 3x10 b/l 3x10 ea B/L 3x10 ea b/l   Mini squats           Standing hip abd 2x10 b/l 2*10 2*10 2x10b/l 2x10 b/l 2x10 b/l     LTR 3x10 b/l 3*10 3*10 20x5" ea 20x5" ea 20x5" ea 20x5" 20x5"   Tandem stance       Foam 2x20" ea B/L    Low rows           Leg press  3*10 85# 5*10 95# 5x10 95# 5x10 5x10 95# 5x10 4x10 95# 95# 5x10 95# 5x10   Arm bike 3/3' 3/3' standing 3/3' standing nv 3'/3'  standing 3'/3'  standing 3'/3' 3'/3'   Sidestepping        4x20' 3x20'              Ther Ex           Nu step ROM 10' 10' 10' 10' 10' 10' 10' 10'   Leg press  4*10 85#                                                                           Ther Activity                                 Gait Training                                 Modalities

## 2023-09-28 ENCOUNTER — OFFICE VISIT (OUTPATIENT)
Dept: INTERNAL MEDICINE CLINIC | Age: 68
End: 2023-09-28

## 2023-09-28 ENCOUNTER — OFFICE VISIT (OUTPATIENT)
Dept: PHYSICAL THERAPY | Age: 68
End: 2023-09-28

## 2023-09-28 VITALS
WEIGHT: 164 LBS | TEMPERATURE: 98 F | OXYGEN SATURATION: 98 % | SYSTOLIC BLOOD PRESSURE: 128 MMHG | DIASTOLIC BLOOD PRESSURE: 86 MMHG | HEART RATE: 84 BPM | HEIGHT: 70 IN | BODY MASS INDEX: 23.48 KG/M2

## 2023-09-28 DIAGNOSIS — C34.91 NON-SMALL CELL CANCER OF RIGHT LUNG (HCC): Primary | ICD-10-CM

## 2023-09-28 DIAGNOSIS — C79.31 METASTASIS TO BRAIN (HCC): ICD-10-CM

## 2023-09-28 DIAGNOSIS — R53.1 GENERALIZED WEAKNESS: Primary | ICD-10-CM

## 2023-09-28 DIAGNOSIS — Z72.0 TOBACCO USE: ICD-10-CM

## 2023-09-28 DIAGNOSIS — R53.83 CHEMOTHERAPY-INDUCED FATIGUE: ICD-10-CM

## 2023-09-28 DIAGNOSIS — T45.1X5A CHEMOTHERAPY-INDUCED FATIGUE: ICD-10-CM

## 2023-09-28 PROCEDURE — 97112 NEUROMUSCULAR REEDUCATION: CPT | Performed by: PHYSICAL THERAPIST

## 2023-09-28 PROCEDURE — 97110 THERAPEUTIC EXERCISES: CPT | Performed by: PHYSICAL THERAPIST

## 2023-09-28 PROCEDURE — 99214 OFFICE O/P EST MOD 30 MIN: CPT

## 2023-09-28 NOTE — ASSESSMENT & PLAN NOTE
Fatigue lasts for 2-3 days after infusion  Pt has been improving with ADLs since starting PT; has regained a lot of function     Plan:  -cont physical therapy

## 2023-09-28 NOTE — PROGRESS NOTES
Daily Note     Today's date: 2023  Patient name: Kayla Saavedra  : 1955  MRN: [id number]  Referring provider: Kenneth Phillips MD  Dx:   Encounter Diagnosis     ICD-10-CM    1. Generalized weakness  R53.1                      Subjective: Pt reports no new symptoms, fatigue from yard work yesterday. Objective: See treatment diary below      Assessment: Tolerated treatment well. POC modified based on patient's symptoms today. Patient demonstrated fatigue post treatment and would benefit from continued PT      Plan: Continue per plan of care.       Precautions: Stage IV cancer, thoracic metastasis avoid lifting from flexed positions, NO GRADE 5 MOBS, avoid bridges      Manuals                                                Neuro Re-Ed           SLR 3x10 b/l 3*10 3*10 3x10 SLR b/l 3x10 b/l 3x10 b/l 3x10 ea B/L 3x10 ea b/l   Mini squats           Standing hip abd 2x10 b/l 2*10 2*10 2x10b/l 2x10 b/l 2x10 b/l     LTR 3x10 b/l 3*10 3*10 20x5" ea 20x5" ea 20x5" ea 20x5" 20x5"   Tandem stance       Foam 2x20" ea B/L    Low rows           Leg press  3*10 85# 5*10 95# 5x10 95# 5x10 5x10 95# 5x10 4x10 95# 95# 5x10 95# 5x10   Arm bike 3/3' 3/3' standing 3/3' standing nv 3'/3'  standing 3'/3'  standing 3'/3' 3'/3'   Sidestepping        4x20' 3x20'              Ther Ex           Nu step ROM 10' 10' 10' 10' 10' 10' 10' 10'   Leg press  4*10 85#                                                                           Ther Activity                                 Gait Training                                 Modalities

## 2023-09-28 NOTE — ASSESSMENT & PLAN NOTE
Underwent radiation treatment 05/2023  MRI from 7/2023: Treatment response with involuting brain metastases;  Residual metastatic disease; no progressive metastases    Plan:  -cont following with oncology

## 2023-09-28 NOTE — PROGRESS NOTES
Name: Quentin Lindsay      : 1955      MRN: [id number]  Encounter Provider: Lazarus Keller, DO  Encounter Date: 2023   Encounter department: [address] [place] [address] [facility]    Assessment & Plan     1. Non-small cell cancer of right lung [facility])  Assessment & Plan:  Receiving chemotherapy every 3 weeks   Follows with Dr. Bharati Petersen   Has minimal SE from treatment including fatigue, SOB, cough for 1-2days after infusion     Plan:  -cont following with Dr. Bharati Petersen  -will monitor blood work and imaging       2. Metastasis to brain [facility])  Assessment & Plan:  Underwent radiation treatment 2023  MRI from 2023: Treatment response with involuting brain metastases; Residual metastatic disease; no progressive metastases    Plan:  -cont following with oncology      3. Chemotherapy-induced fatigue  Assessment & Plan:  Fatigue lasts for 2-3 days after infusion  Pt has been improving with ADLs since starting PT; has regained a lot of function     Plan:  -cont physical therapy      4. 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 Pt has been tolerating the chemotherapy well. Does endorse dry cough, fatigue, and some SOB for a day or two after treatment. He receives infusions every 3 weeks. Has been attending PT twice weekly, which has helped immensely. Also endorses irregular bowel movement. Does not feel constipated, but often gets the urge with bloating but does not end up having a bowel movement. Review of Systems   Constitutional: Negative for chills and fever. Respiratory: Negative for cough, shortness of breath and wheezing. Cardiovascular: Negative for chest pain and palpitations. Gastrointestinal: Negative for abdominal distention, nausea and vomiting. Endocrine: Negative for polyuria. Genitourinary: Negative for difficulty urinating, dysuria and hematuria. Neurological: Negative for dizziness and light-headedness. Current Outpatient Medications on File Prior to Visit   Medication Sig   • acetaminophen (TYLENOL) 500 mg tablet Take 500 mg by mouth every 4 (four) hours as needed for mild pain   • apixaban (ELIQUIS) 5 mg Take 1 tablet (5 mg total) by mouth 2 (two) times a day   • dexamethasone (DECADRON) 1 mg tablet Take 1 tablet (1 mg total) by mouth 2 (two) times a day with meals (Patient taking differently: Take 1 mg by mouth 2 (two) times a day with meals Patient is taking every other day)   • folic acid (FOLVITE) 1 mg tablet Take 1 tablet (1,000 mcg total) by mouth daily   • gabapentin (NEURONTIN) 300 mg capsule Take 300 mg in the AM and 600 mg in the Pm (Patient taking differently: 600 mg in the Pm)   • lidocaine-prilocaine (EMLA) cream Apply to port 30 to 60 minutes prior to use       Objective     /86 (BP Location: Left arm, Patient Position: Sitting, Cuff Size: Standard)   Pulse 84   Temp 98 °F (36.7 °C) (Temporal)   Ht 5' 10" (1.778 m)   Wt 74.4 kg (164 lb)   SpO2 98%   BMI 23.53 kg/m²     Physical Exam  Constitutional:       Appearance: Normal appearance. HENT:      Head: Normocephalic and atraumatic. Cardiovascular:      Rate and Rhythm: Normal rate and regular rhythm. Pulses: Normal pulses. Pulmonary:      Effort: Pulmonary effort is normal. No respiratory distress. Breath sounds: Normal breath sounds. No wheezing. Abdominal:      General: Abdomen is flat. There is no distension. Palpations: Abdomen is soft. Tenderness: There is no abdominal tenderness. Musculoskeletal:         General: No swelling or tenderness. Cervical back: Normal range of motion and neck supple. Skin:     General: Skin is warm and dry. Neurological:      General: No focal deficit present. Mental Status: He is alert and oriented to person, place, and time.    Psychiatric:         Mood and Affect: Mood normal.         Behavior: Behavior normal.       Reynold Mak,   PGY-1  Internal Medicine  [facility]

## 2023-09-28 NOTE — ASSESSMENT & PLAN NOTE
Receiving chemotherapy every 3 weeks   Follows with Dr. Amaro Mode   Has minimal SE from treatment including fatigue, SOB, cough for 1-2days after infusion     Plan:  -cont following with Dr. Amaro Mode  -will monitor blood work and imaging

## 2023-10-02 ENCOUNTER — DOCUMENTATION (OUTPATIENT)
Dept: HEMATOLOGY ONCOLOGY | Facility: CLINIC | Age: 68
End: 2023-10-02

## 2023-10-02 NOTE — PROGRESS NOTES
Called pt to get his email address & to let him know that I'm sending him an application for [employer] thru [employer]  Spoke to his wife neetu & she gave me the add [email or web address]  I emailed her that application  recvd the signed application back from the pt  Emailed it to [facility] for the dr to sign

## 2023-10-03 ENCOUNTER — OFFICE VISIT (OUTPATIENT)
Dept: PHYSICAL THERAPY | Age: 68
End: 2023-10-03

## 2023-10-03 DIAGNOSIS — R53.1 GENERALIZED WEAKNESS: Primary | ICD-10-CM

## 2023-10-03 PROCEDURE — 97110 THERAPEUTIC EXERCISES: CPT | Performed by: PHYSICAL THERAPIST

## 2023-10-03 NOTE — PROGRESS NOTES
Daily Note     Today's date: 10/3/2023  Patient name: Daniel Lovett  : 1955  MRN: [id number]  Referring provider: Chaz Pal MD  Dx:   Encounter Diagnosis     ICD-10-CM    1. Generalized weakness  R53.1           Start Time: 1100  Stop Time: 1135  Total time in clinic (min): 35 minutes    Subjective: Pt reports feeling side effects from oncology treatment. Objective: See treatment diary below      Assessment: Tolerated treatment well. Pt's POC modified due to symptoms. Patient demonstrated fatigue post treatment and would benefit from continued PT      Plan: Continue per plan of care.       Precautions: Stage IV cancer, thoracic metastasis avoid lifting from flexed positions, NO GRADE 5 MOBS, avoid bridges      Manuals 8/24 8/31 9/8 9/12 9/14 9/19 9/28 10/3                                               Neuro Re-Ed           SLR 3x10 b/l 3*10 3*10 3x10 SLR b/l 3x10 b/l 3x10 b/l 3x10 ea B/L 3x10 ea b/l   Mini squats        2x10   Standing hip abd 2x10 b/l 2*10 2*10 2x10b/l 2x10 b/l 2x10 b/l     LTR 3x10 b/l 3*10 3*10 20x5" ea 20x5" ea 20x5" ea 20x5" 20x5"   Tandem stance       Foam 2x20" ea B/L    Low rows           Leg press  3*10 85# 5*10 95# 5x10 95# 5x10 5x10 95# 5x10 4x10 95# 95# 5x10    Arm bike 3/3' 3/3' standing 3/3' standing nv 3'/3'  standing 3'/3'  standing 3'/3' 3'/3'   Sidestepping        4x20'               Ther Ex           Nu step ROM 10' 10' 10' 10' 10' 10' 10' 10'   Leg press  4*10 85#                                                                           Ther Activity                                 Gait Training                                 Modalities

## 2023-10-04 ENCOUNTER — TELEPHONE (OUTPATIENT)
Age: 68
End: 2023-10-04

## 2023-10-04 NOTE — TELEPHONE ENCOUNTER
Rec'd mychart message that patient was experiencing worsening fatigue. Spoke with patient and patient's wife who informed me patient has been feeling more tired throughout the days recently, however, feels good today after getting good rest last night. Patient's wife explained that pt had a cold a few days prior to getting his chemo but felt good for the 3 days after chemo. Pt denies having any fevers, nausea, vomiting, diarrhea, or pain and states he is otherwise asymptomatic. I explained to patient that this can be a cumulative fatigue from the chemo and his cold and to listen to his body to rest when he feels he needs it. I emphasized that small naps throughout the day may help with the lasting fatigue. Patient and wife verbalized understanding and made aware to call back if fatigue does not resolve or if any other symptoms arise.  Pt and wife in agreement with plan

## 2023-10-05 ENCOUNTER — OFFICE VISIT (OUTPATIENT)
Dept: PHYSICAL THERAPY | Age: 68
End: 2023-10-05

## 2023-10-05 DIAGNOSIS — R53.1 GENERALIZED WEAKNESS: Primary | ICD-10-CM

## 2023-10-05 PROCEDURE — 97110 THERAPEUTIC EXERCISES: CPT | Performed by: PHYSICAL THERAPIST

## 2023-10-05 PROCEDURE — 97112 NEUROMUSCULAR REEDUCATION: CPT | Performed by: PHYSICAL THERAPIST

## 2023-10-05 NOTE — PROGRESS NOTES
Daily Note     Today's date: 10/5/2023  Patient name: Romana Morris  : 1955  MRN: [id number]  Referring provider: Pipo Moffett MD  Dx:   Encounter Diagnosis     ICD-10-CM    1. Generalized weakness  R53.1           Start Time: 0800  Stop Time: 0845  Total time in clinic (min): 45 minutes    Subjective: Pt reports improvements since last session. Objective: See treatment diary below      Assessment: Tolerated treatment well. POC progressed to include more resistance with standing hip abduction recruitment interventions Patient demonstrated fatigue post treatment and would benefit from continued PT      Plan: Continue per plan of care.       Precautions: Stage IV cancer, thoracic metastasis avoid lifting from flexed positions, NO GRADE 5 MOBS, avoid bridges      Manuals 8/24 8/31 9/8 9/12 9/14 9/19 9/28 10/5                                               Neuro Re-Ed           SLR 3x10 b/l 3*10 3*10 3x10 SLR b/l 3x10 b/l 3x10 b/l 3x10 ea B/L 3x10 ea b/l   Mini squats        2x10   Standing hip abd 2x10 b/l 2*10 2*10 2x10b/l 2x10 b/l 2x10 b/l     LTR 3x10 b/l 3*10 3*10 20x5" ea 20x5" ea 20x5" ea 20x5" 20x5"   Tandem stance       Foam 2x20" ea B/L    Low rows           Leg press  3*10 85# 5*10 95# 5x10 95# 5x10 5x10 95# 5x10 4x10 95# 95# 5x10 95# 5x10   Arm bike 3/3' 3/3' standing 3/3' standing nv 3'/3'  standing 3'/3'  standing 3'/3' 3'/3'   Sidestepping        4x20'               Ther Ex           Nu step ROM 10' 10' 10' 10' 10' 10' 10' 10'   Leg press  4*10 85#                                                                           Ther Activity                                 Gait Training                                 Modalities

## 2023-10-09 ENCOUNTER — DOCUMENTATION (OUTPATIENT)
Dept: HEMATOLOGY ONCOLOGY | Facility: CLINIC | Age: 68
End: 2023-10-09

## 2023-10-09 NOTE — PROGRESS NOTES
Emailed [facility] to find out the status of the  Dr form for [employer] that was sent to them 10/02/23

## 2023-10-10 ENCOUNTER — OFFICE VISIT (OUTPATIENT)
Dept: PHYSICAL THERAPY | Age: 68
End: 2023-10-10

## 2023-10-10 DIAGNOSIS — R53.1 GENERALIZED WEAKNESS: Primary | ICD-10-CM

## 2023-10-10 PROCEDURE — 97112 NEUROMUSCULAR REEDUCATION: CPT | Performed by: PHYSICAL THERAPIST

## 2023-10-10 PROCEDURE — 97110 THERAPEUTIC EXERCISES: CPT | Performed by: PHYSICAL THERAPIST

## 2023-10-10 NOTE — PROGRESS NOTES
Daily Note     Today's date: 10/10/2023  Patient name: Madi Green  : 1955  MRN: [id number]  Referring provider: Kisha James MD  Dx:   Encounter Diagnosis     ICD-10-CM    1. Generalized weakness  R53.1           Start Time: 1050  Stop Time: 1135  Total time in clinic (min): 45 minutes    Subjective: Pt reports no new symptoms. Objective: See treatment diary below      Assessment: Tolerated treatment well. POC progressed to include more reps of leg press intervention. Patient demonstrated fatigue post treatment and would benefit from continued PT      Plan: Continue per plan of care.       Precautions: Stage IV cancer, thoracic metastasis avoid lifting from flexed positions, NO GRADE 5 MOBS, avoid bridges      Manuals 8/24 8/31 9/8 9/12 9/14 9/19 10/5 10/10                                               Neuro Re-Ed           SLR 3x10 b/l 3*10 3*10 3x10 SLR b/l 3x10 b/l 3x10 b/l 3x10 ea B/L 3x10 ea b/l   Mini squats        2x10   Standing hip abd 2x10 b/l 2*10 2*10 2x10b/l 2x10 b/l 2x10 b/l     LTR 3x10 b/l 3*10 3*10 20x5" ea 20x5" ea 20x5" ea 20x5" 20x5"   Tandem stance       Foam 2x20" ea B/L    Low rows           Leg press  3*10 85# 5*10 95# 5x10 95# 5x10 5x10 95# 5x10 4x10 95# 95# 5x10 95# 5x10   Arm bike 3/3' 3/3' standing 3/3' standing nv 3'/3'  standing 3'/3'  standing 3'/3' 3'/3'   Sidestepping        4x20'               Ther Ex           Nu step ROM 10' 10' 10' 10' 10' 10' 10' 10'   Leg press  4*10 85#                                                                           Ther Activity                                 Gait Training                                 Modalities

## 2023-10-12 ENCOUNTER — OFFICE VISIT (OUTPATIENT)
Dept: PHYSICAL THERAPY | Age: 68
End: 2023-10-12

## 2023-10-12 DIAGNOSIS — R53.1 GENERALIZED WEAKNESS: Primary | ICD-10-CM

## 2023-10-12 PROCEDURE — 97112 NEUROMUSCULAR REEDUCATION: CPT

## 2023-10-12 PROCEDURE — 97110 THERAPEUTIC EXERCISES: CPT

## 2023-10-12 RX ORDER — SODIUM CHLORIDE 9 MG/ML
20 INJECTION, SOLUTION INTRAVENOUS ONCE
Status: CANCELLED | OUTPATIENT
Start: 2023-10-16

## 2023-10-12 NOTE — PROGRESS NOTES
Daily Note     Today's date: 10/12/2023  Patient name: Marielos Orosco  : 1955  MRN: [id number]  Referring provider: Alannah Barcenas MD  Dx:   Encounter Diagnosis     ICD-10-CM    1. Generalized weakness  R53.1           Start Time: 1218  Stop Time: 1303  Total time in clinic (min): 45 minutes    Subjective: Patient reports that he feels tired today. He states that he would like to start working on his UE strength and  as he feels that he struggles in these areas. Objective: See treatment diary below      Assessment: No changes to program this session. Patient is challenged by SLR and form falters by last several reps of each set. Tolerated treatment well. Patient demonstrated fatigue post treatment, exhibited good technique with therapeutic exercises, and would benefit from continued PTAdvance program as appropriate for full body strengthening. Plan: Continue per plan of care.       Precautions: Stage IV cancer, thoracic metastasis avoid lifting from flexed positions, NO GRADE 5 MOBS, avoid bridges      Manuals 8/24 8/31 9/8 9/12 9/14 9/19 10/5 10/10 10/12                                                   Neuro Re-Ed            SLR 3x10 b/l 3*10 3*10 3x10 SLR b/l 3x10 b/l 3x10 b/l 3x10 ea B/L 3x10 ea b/l B 3x10 ea   Mini squats        2x10 2x10   Standing hip abd 2x10 b/l 2*10 2*10 2x10b/l 2x10 b/l 2x10 b/l      LTR 3x10 b/l 3*10 3*10 20x5" ea 20x5" ea 20x5" ea 20x5" 20x5" 5"x20   Tandem stance       Foam 2x20" ea B/L  Foam 20"x2 ea   Low rows            Leg press  3*10 85# 5*10 95# 5x10 95# 5x10 5x10 95# 5x10 4x10 95# 95# 5x10 95# 5x10 95# 5x10   Arm bike 3/3' 3/3' standing 3/3' standing nv 3'/3'  standing 3'/3'  standing 3'/3' 3'/3' 3'/3'   Sidestepping        4x20'  At bar: 4 laps               Ther Ex            Nu step ROM 10' 10' 10' 10' 10' 10' 10' 10' 10'   Leg press  4*10 85#                                                                                  Ther Activity Gait Training                                    Modalities

## 2023-10-13 ENCOUNTER — DOCUMENTATION (OUTPATIENT)
Dept: HEMATOLOGY ONCOLOGY | Facility: CLINIC | Age: 68
End: 2023-10-13

## 2023-10-13 ENCOUNTER — OFFICE VISIT (OUTPATIENT)
Dept: HEMATOLOGY ONCOLOGY | Facility: CLINIC | Age: 68
End: 2023-10-13

## 2023-10-13 ENCOUNTER — HOSPITAL ENCOUNTER (OUTPATIENT)
Dept: INFUSION CENTER | Facility: HOSPITAL | Age: 68
End: 2023-10-13
Payer: MEDICARE

## 2023-10-13 VITALS
HEART RATE: 71 BPM | DIASTOLIC BLOOD PRESSURE: 72 MMHG | BODY MASS INDEX: 23.56 KG/M2 | OXYGEN SATURATION: 97 % | HEIGHT: 70 IN | SYSTOLIC BLOOD PRESSURE: 122 MMHG | WEIGHT: 164.6 LBS | TEMPERATURE: 97.6 F | RESPIRATION RATE: 17 BRPM

## 2023-10-13 DIAGNOSIS — C34.91 NON-SMALL CELL CANCER OF RIGHT LUNG (HCC): Primary | ICD-10-CM

## 2023-10-13 LAB
ALBUMIN SERPL BCP-MCNC: 3.7 G/DL (ref 3.5–5)
ALP SERPL-CCNC: 43 U/L (ref 34–104)
ALT SERPL W P-5'-P-CCNC: 14 U/L (ref 7–52)
ANION GAP SERPL CALCULATED.3IONS-SCNC: 10 MMOL/L
AST SERPL W P-5'-P-CCNC: 17 U/L (ref 13–39)
BASOPHILS # BLD AUTO: 0.03 THOUSANDS/ÂΜL (ref 0–0.1)
BASOPHILS NFR BLD AUTO: 1 % (ref 0–1)
BILIRUB SERPL-MCNC: 0.36 MG/DL (ref 0.2–1)
BUN SERPL-MCNC: 21 MG/DL (ref 5–25)
CALCIUM SERPL-MCNC: 9 MG/DL (ref 8.4–10.2)
CHLORIDE SERPL-SCNC: 107 MMOL/L (ref 96–108)
CO2 SERPL-SCNC: 22 MMOL/L (ref 21–32)
CREAT SERPL-MCNC: 0.92 MG/DL (ref 0.6–1.3)
EOSINOPHIL # BLD AUTO: 0.08 THOUSAND/ÂΜL (ref 0–0.61)
EOSINOPHIL NFR BLD AUTO: 1 % (ref 0–6)
ERYTHROCYTE [DISTWIDTH] IN BLOOD BY AUTOMATED COUNT: 13.5 % (ref 11.6–15.1)
GFR SERPL CREATININE-BSD FRML MDRD: 86 ML/MIN/1.73SQ M
GLUCOSE SERPL-MCNC: 87 MG/DL (ref 65–140)
HCT VFR BLD AUTO: 34.7 % (ref 36.5–49.3)
HGB BLD-MCNC: 11.9 G/DL (ref 12–17)
IMM GRANULOCYTES # BLD AUTO: 0.07 THOUSAND/UL (ref 0–0.2)
IMM GRANULOCYTES NFR BLD AUTO: 1 % (ref 0–2)
LYMPHOCYTES # BLD AUTO: 0.84 THOUSANDS/ÂΜL (ref 0.6–4.47)
LYMPHOCYTES NFR BLD AUTO: 15 % (ref 14–44)
MCH RBC QN AUTO: 35.5 PG (ref 26.8–34.3)
MCHC RBC AUTO-ENTMCNC: 34.3 G/DL (ref 31.4–37.4)
MCV RBC AUTO: 104 FL (ref 82–98)
MONOCYTES # BLD AUTO: 0.72 THOUSAND/ÂΜL (ref 0.17–1.22)
MONOCYTES NFR BLD AUTO: 13 % (ref 4–12)
NEUTROPHILS # BLD AUTO: 3.83 THOUSANDS/ÂΜL (ref 1.85–7.62)
NEUTS SEG NFR BLD AUTO: 69 % (ref 43–75)
NRBC BLD AUTO-RTO: 0 /100 WBCS
PLATELET # BLD AUTO: 198 THOUSANDS/UL (ref 149–390)
PMV BLD AUTO: 9.7 FL (ref 8.9–12.7)
POTASSIUM SERPL-SCNC: 3.4 MMOL/L (ref 3.5–5.3)
PROT SERPL-MCNC: 6.6 G/DL (ref 6.4–8.4)
RBC # BLD AUTO: 3.35 MILLION/UL (ref 3.88–5.62)
SODIUM SERPL-SCNC: 139 MMOL/L (ref 135–147)
T3FREE SERPL-MCNC: 3.17 PG/ML (ref 2.5–3.9)
TSH SERPL DL<=0.05 MIU/L-ACNC: 2.33 UIU/ML (ref 0.45–4.5)
WBC # BLD AUTO: 5.57 THOUSAND/UL (ref 4.31–10.16)

## 2023-10-13 PROCEDURE — 84481 FREE ASSAY (FT-3): CPT | Performed by: INTERNAL MEDICINE

## 2023-10-13 PROCEDURE — 84443 ASSAY THYROID STIM HORMONE: CPT | Performed by: INTERNAL MEDICINE

## 2023-10-13 PROCEDURE — 85025 COMPLETE CBC W/AUTO DIFF WBC: CPT | Performed by: INTERNAL MEDICINE

## 2023-10-13 PROCEDURE — 99214 OFFICE O/P EST MOD 30 MIN: CPT | Performed by: INTERNAL MEDICINE

## 2023-10-13 PROCEDURE — 80053 COMPREHEN METABOLIC PANEL: CPT | Performed by: INTERNAL MEDICINE

## 2023-10-13 RX ORDER — SODIUM CHLORIDE 9 MG/ML
20 INJECTION, SOLUTION INTRAVENOUS ONCE
OUTPATIENT
Start: 2023-11-06

## 2023-10-13 NOTE — PROGRESS NOTES
Patient has a small area of swelling left chest wall. No pain, patient did not notice it until spouse pointed it out. Positioning of swelling it does not appear to be related to port. Patient will see Dr. Padma Madrigal today and encouraged him to show her. Port flushed and central labs drawn.

## 2023-10-15 NOTE — PROGRESS NOTES
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     2023 - palliative RT to T spine and brain     2023 - start carbo/pemetrexed/pembrolizumab    2023 suspected Rad/Immuno Pneumonitis Grade 1/2 (mild fever)     2023 discontinued Carbo d/t fatigue; and holding Keytruda in Cycle 4 dt Pneumonitis; will re-challenge in cycle 5     2023 - add back [place] for cycle 6     Thoracic spine tumor   2023 Initial Diagnosis    Thoracic spine tumor     Non-small cell cancer of right lung ([address])   3/29/2023 -  Cancer Staged    Staging form: Lung, AJCC 8th Edition  - Clinical stage from 3/29/2023: Stage DORI (cT4, cN1, cM1b) - Signed by Meagan Rodrigues DO on 2023 Biopsy    Lung, right upper lobe, biopsy:     - Rare detached single and small clusters of atypical, degenerated epithelial cells. - Predominantly fibroelastotic stromal fibrosis.      2023 Biopsy    Lung, right upper lobe mass, biopsy:      - Poorly differentiated non-small cell carcinoma most compatible with adenocarcinoma of the lung     2023 - 2023 Radiation    Course: C1    Plan ID Energy Fractions Dose per Fraction (cGy) Dose Correction (cGy) Total Dose Delivered (cGy) Elapsed Days   [id number]_T4_R Lung 10X/6X 10 / 10 300 0 3,000 11   HCS_WhBrain 6X 10 / 10 300 0 3,000 11      Dr. Roderick Thompson     2023 Initial Diagnosis    Non-small cell cancer of right lung ([address])     2023 -  Chemotherapy    cyanocobalamin, 1,000 mcg, Intramuscular, Once, 4 of 4 cycles  Administration: 1,000 mcg (5/15/2023), 1,000 mcg (7/3/2023), 1,000 mcg (2023)  alteplase (CATHFLO), 2 mg, Intracatheter, Every 1 Minute as needed, 7 of 11 cycles  fosaprepitant (EMEND) IVPB, 150 mg, Intravenous, Once, 3 of 3 cycles  Administration: 150 mg (5/22/2023), 150 mg (7/3/2023), 150 mg (6/12/2023)  CARBOplatin (PARAPLATIN) IVPB ([facility] AUC DOSING), 544 mg, Intravenous, Once, 3 of 3 cycles  Administration: 544 mg (5/22/2023), 554 mg (7/3/2023), 544 mg (6/12/2023)  pemetrexed (ALIMTA) chemo infusion, 940 mg, Intravenous, Once, 7 of 11 cycles  Administration: 900 mg (5/22/2023), 900 mg (7/3/2023), 900 mg (9/5/2023), 900 mg (6/12/2023), 900 mg (8/14/2023), 900 mg (7/24/2023), 900 mg (9/25/2023)  pembrolizumab (KEYTRUDA) IVPB, 200 mg, Intravenous, Once, 6 of 10 cycles  Administration: 200 mg (5/22/2023), 200 mg (7/3/2023), 200 mg (9/5/2023), 200 mg (6/12/2023), 200 mg (8/14/2023), 200 mg (9/25/2023)     7/12/2023 Adverse Reaction    Hospitalization - Suspected Pneumonitis    7/12-14/2023 due to mild fever with CT chest redemonstrating left upper lobe inflammatory changes, previously seen on study 04/04/2023, ID and oncology evaluated, more suggestive of radiation/Keytruda induced pneumonitis grade 1/2; mild fever resolved; other differential/contributing etiology include recent tapering of steroid       7/21/2023 -  Chemotherapy    Recently suspected pneumonitis 7/12/2023 and reports fatigue 7/21/2023  Discontinuing carboplatin and holding Keytruda of cycle 4; with potential plan to rechallenge Keytruda in cycle 5     Metastasis to brain ([address])   5/30/2023 Initial Diagnosis    Metastasis to brain [facility])         Interval History: Patient presents for follow up of his stage IV non-small cell lung cancer. He is still on dexamethasone every other day. His edema is better on that dose. He noticed some episodes of increasing fatigue since the last cycle of chemo. He also is not sleeping well. His cough is better. He was having some loose stools and this improved with an increased consumption of yogurt. He denies any fevers or infections.   He is doing physical therapy and that has been helpful for him. Remains on Alimta and Keytruda. REVIEW OF SYSTEMS:  Please note that a 14-point review of systems was performed to include Constitutional, HEENT, Respiratory, CVS, GI, , Musculoskeletal, Integumentary, Neurologic, Rheumatologic, Endocrinologic, Psychiatric, Lymphatic, and Hematologic/Oncologic systems were reviewed and are negative unless otherwise stated in HPI. Positive and negative findings pertinent to this evaluation are incorporated into the history of present illness. ECOG PS: 1    PROBLEM LIST:  Patient Active Problem List   Diagnosis    Multiple subsegmental pulmonary emboli without acute cor pulmonale (HCC)    Tobacco use    DDD (degenerative disc disease), cervical    Pleural mass    Brain lesions    Thoracic spine tumor    Advanced care planning/counseling discussion    Non-small cell cancer of right lung (HCC)    Metastasis to brain (HCC)    Lesion of left lung    Chemotherapy-induced fatigue    Drug-induced pneumonitis    Current every day smoker    Bloating       Assessment / Plan: 71-year-old male with stage IV non-small cell lung cancer. We will continue with Alimta and Keytruda. I will see him prior to cycle 9 with a CT of the chest abdomen and pelvis and an MRI of the brain prior. If the cumulative effects of fatigue are building up and the treatment is still working we may need to dose reduce the Alimta. We discussed good sleep hygiene. We also discussed limiting daytime napping as much as possible and increasing his melatonin dose back up to 10 mg at night. He is also going to try to stop his dexamethasone and see how he tolerates this. He knows to call me in the interim with any questions or concerns. I spent 30 minutes on chart review, face to face counseling time, coordination of care and documentation. Past Medical History:   has no past medical history on file.     PAST SURGICAL HISTORY:   has a past surgical history that includes IR biopsy lung (4/6/2023); IR biopsy lung (4/26/2023); and IR port placement (5/16/2023). CURRENT MEDICATIONS  Current Outpatient Medications   Medication Sig Dispense Refill    acetaminophen (TYLENOL) 500 mg tablet Take 500 mg by mouth every 4 (four) hours as needed for mild pain      apixaban (ELIQUIS) 5 mg Take 1 tablet (5 mg total) by mouth 2 (two) times a day 60 tablet 5    dexamethasone (DECADRON) 1 mg tablet Take 1 tablet (1 mg total) by mouth 2 (two) times a day with meals (Patient taking differently: Take 1 mg by mouth 2 (two) times a day with meals Patient is taking every other day) 60 tablet 0    folic acid (FOLVITE) 1 mg tablet Take 1 tablet (1,000 mcg total) by mouth daily 90 tablet 0    gabapentin (NEURONTIN) 300 mg capsule Take 300 mg in the AM and 600 mg in the Pm (Patient taking differently: 600 mg in the Pm) 90 capsule 1    lidocaine-prilocaine (EMLA) cream Apply to port 30 to 60 minutes prior to use 30 g 2     No current facility-administered medications for this visit. Facility-Administered Medications Ordered in Other Visits   Medication Dose Route Frequency Provider Last Rate Last Admin    alteplase (CATHFLO) injection 2 mg  2 mg Intracatheter Q1MIN PRN Yady Ramírez DO         [unfilled]    SOCIAL HISTORY:   reports that he has been smoking cigarettes. He started smoking about 50 years ago. He has a 25.00 pack-year smoking history. He has never used smokeless tobacco. He reports that he does not currently use alcohol. He reports that he does not use drugs. FAMILY HISTORY:  family history is not on file. ALLERGIES:  has No Known Allergies. Physical Exam:  Vital Signs:   Visit Vitals  /72 (BP Location: Left arm, Patient Position: Sitting, Cuff Size: Adult)   Pulse 71   Temp 97.6 °F (36.4 °C) (Temporal)   Resp 17   Ht 5' 10" (1.778 m)   Wt 74.7 kg (164 lb 9.6 oz)   SpO2 97%   BMI 23.62 kg/m²   Smoking Status Every Day   BSA 1.92 m²     Body mass index is 23.62 kg/m².   Body surface area is 1.92 meters squared. GEN: Alert, awake oriented x3, in no acute distress  HEENT- No pallor, icterus, cyanosis, no oral mucosal lesions,   LAD - no palpable cervical, clavicle, axillary, inguinal LAD  Heart- normal S1 S2, regular rate and rhythm, No murmur, rubs.    Lungs- clear breathing sound bilateral.   Abdomen- soft, Non tender, bowel sounds present  Extremities- No cyanosis, clubbing, edema  Neuro- No focal neurological deficit    Labs:  Lab Results   Component Value Date    WBC 5.57 10/13/2023    HGB 11.9 (L) 10/13/2023    HCT 34.7 (L) 10/13/2023     (H) 10/13/2023     10/13/2023     Lab Results   Component Value Date    SODIUM 139 10/13/2023    K 3.4 (L) 10/13/2023     10/13/2023    CO2 22 10/13/2023    AGAP 10 10/13/2023    BUN 21 10/13/2023    CREATININE 0.92 10/13/2023    GLUC 87 10/13/2023    GLUF 103 (H) 05/10/2023    CALCIUM 9.0 10/13/2023    AST 17 10/13/2023    ALT 14 10/13/2023    ALKPHOS 43 10/13/2023    TP 6.6 10/13/2023    TBILI 0.36 10/13/2023    EGFR 86 10/13/2023

## 2023-10-16 ENCOUNTER — TELEPHONE (OUTPATIENT)
Age: 68
End: 2023-10-16

## 2023-10-16 ENCOUNTER — HOSPITAL ENCOUNTER (OUTPATIENT)
Dept: INFUSION CENTER | Facility: HOSPITAL | Age: 68
Discharge: HOME | End: 2023-10-16
Attending: INTERNAL MEDICINE

## 2023-10-16 ENCOUNTER — PATIENT MESSAGE (OUTPATIENT)
Dept: HEMATOLOGY ONCOLOGY | Facility: CLINIC | Age: 68
End: 2023-10-16

## 2023-10-16 VITALS
HEIGHT: 70 IN | WEIGHT: 166.67 LBS | RESPIRATION RATE: 19 BRPM | SYSTOLIC BLOOD PRESSURE: 110 MMHG | BODY MASS INDEX: 23.86 KG/M2 | DIASTOLIC BLOOD PRESSURE: 74 MMHG | HEART RATE: 78 BPM | TEMPERATURE: 96.8 F

## 2023-10-16 DIAGNOSIS — C34.91 NON-SMALL CELL CANCER OF RIGHT LUNG (HCC): Primary | ICD-10-CM

## 2023-10-16 RX ORDER — SODIUM CHLORIDE 9 MG/ML
20 INJECTION, SOLUTION INTRAVENOUS ONCE
Status: COMPLETED | OUTPATIENT
Start: 2023-10-16 | End: 2023-10-16

## 2023-10-16 RX ADMIN — SODIUM CHLORIDE 20 ML/HR: 9 INJECTION, SOLUTION INTRAVENOUS at 09:44

## 2023-10-16 RX ADMIN — SODIUM CHLORIDE 200 MG: 9 INJECTION, SOLUTION INTRAVENOUS at 10:31

## 2023-10-16 RX ADMIN — PEMETREXED DISODIUM 900 MG: 500 INJECTION, POWDER, LYOPHILIZED, FOR SOLUTION INTRAVENOUS at 11:26

## 2023-10-16 RX ADMIN — DEXAMETHASONE SODIUM PHOSPHATE: 10 INJECTION, SOLUTION INTRAMUSCULAR; INTRAVENOUS at 09:44

## 2023-10-17 ENCOUNTER — DOCUMENTATION (OUTPATIENT)
Dept: HEMATOLOGY ONCOLOGY | Facility: CLINIC | Age: 68
End: 2023-10-17

## 2023-10-17 ENCOUNTER — OFFICE VISIT (OUTPATIENT)
Dept: PHYSICAL THERAPY | Age: 68
End: 2023-10-17

## 2023-10-17 DIAGNOSIS — R53.1 GENERALIZED WEAKNESS: Primary | ICD-10-CM

## 2023-10-17 PROCEDURE — 97112 NEUROMUSCULAR REEDUCATION: CPT | Performed by: PHYSICAL THERAPIST

## 2023-10-17 PROCEDURE — 97110 THERAPEUTIC EXERCISES: CPT | Performed by: PHYSICAL THERAPIST

## 2023-10-17 NOTE — PROGRESS NOTES
Emailed elia georges about the [employer] application if it was ever signed by the dr & sent back to me or if they sent it to [employer]

## 2023-10-17 NOTE — PROGRESS NOTES
Daily Note     Today's date: 10/17/2023  Patient name: Madi Green  : 1955  MRN: [id number]  Referring provider: Kisha James MD  Dx:   Encounter Diagnosis     ICD-10-CM    1. Generalized weakness  R53.1           Start Time: 1100  Stop Time: 1145  Total time in clinic (min): 45 minutes    Subjective: Pt reports improvements in LE strength. Had difficulty lifting object from the ground the other day. Objective: See treatment diary below      Assessment: Tolerated treatment well. Pt's POC was progressed to include more UE strengthening to increase tolerance to ADL's. Patient demonstrated fatigue post treatment and would benefit from continued PT      Plan: Continue per plan of care.       Precautions: Stage IV cancer, thoracic metastasis avoid lifting from flexed positions, NO GRADE 5 MOBS, avoid bridges      Manuals   10/17                           Neuro Re-Ed      SLR   B 3x10 ea   Mini squats   3x10   Standing hip abd      LPD   [id number]   Low rows   3x10 btb         Leg press   105# 4x10   Arm bike   3'/3'   Sidestepping             Ther Ex      Nu step ROM   10'   Leg press                                          Ther Activity                  Gait Training                  Modalities

## 2023-10-18 ENCOUNTER — TELEPHONE (OUTPATIENT)
Dept: SURGICAL ONCOLOGY | Facility: CLINIC | Age: 68
End: 2023-10-18

## 2023-10-18 NOTE — TELEPHONE ENCOUNTER
Responding to Fortino Meigs mail    Scheduled for MRI BRAIN W WO CONTRAST on 11/1/2023- notes show self-pay. Can  please reach out  Male, 74 y. o., 1955  [phone]  MRN: [id number]    Good morning,  According to the notes Darin Ordonez is currently working with the patient.     Regards

## 2023-10-19 ENCOUNTER — APPOINTMENT (OUTPATIENT)
Dept: PHYSICAL THERAPY | Age: 68
End: 2023-10-19

## 2023-10-23 ENCOUNTER — HOSPITAL ENCOUNTER (OUTPATIENT)
Dept: RADIOLOGY | Facility: HOSPITAL | Age: 68
Discharge: HOME | End: 2023-10-23
Attending: INTERNAL MEDICINE

## 2023-10-23 DIAGNOSIS — C34.91 NON-SMALL CELL CANCER OF RIGHT LUNG (HCC): ICD-10-CM

## 2023-10-23 DIAGNOSIS — D49.2 THORACIC SPINE TUMOR: ICD-10-CM

## 2023-10-23 DIAGNOSIS — G89.3 CANCER ASSOCIATED PAIN: ICD-10-CM

## 2023-10-23 DIAGNOSIS — M50.30 DDD (DEGENERATIVE DISC DISEASE), CERVICAL: ICD-10-CM

## 2023-10-23 PROCEDURE — 74177 CT ABD & PELVIS W/CONTRAST: CPT

## 2023-10-23 PROCEDURE — G1004 CDSM NDSC: HCPCS

## 2023-10-23 PROCEDURE — 71260 CT THORAX DX C+: CPT

## 2023-10-23 RX ORDER — GABAPENTIN 300 MG/1
CAPSULE ORAL
Qty: 270 CAPSULE | Refills: 1 | Status: SHIPPED | OUTPATIENT
Start: 2023-10-23

## 2023-10-23 RX ADMIN — IOHEXOL 100 ML: 350 INJECTION, SOLUTION INTRAVENOUS at 14:14

## 2023-10-24 ENCOUNTER — DOCUMENTATION (OUTPATIENT)
Dept: HEMATOLOGY ONCOLOGY | Facility: CLINIC | Age: 68
End: 2023-10-24

## 2023-10-24 ENCOUNTER — OFFICE VISIT (OUTPATIENT)
Dept: PHYSICAL THERAPY | Age: 68
End: 2023-10-24

## 2023-10-24 DIAGNOSIS — R53.1 GENERALIZED WEAKNESS: Primary | ICD-10-CM

## 2023-10-24 PROCEDURE — 97112 NEUROMUSCULAR REEDUCATION: CPT | Performed by: PHYSICAL THERAPIST

## 2023-10-24 PROCEDURE — 97110 THERAPEUTIC EXERCISES: CPT | Performed by: PHYSICAL THERAPIST

## 2023-10-24 NOTE — PROGRESS NOTES
Fernievd call from [employer] & she wanted to know what is going on w/the [employer] application  Told her that I will call [employer] & see what is the decision & I will get back to her  Called [employer] & spoke to thania kern sd that the application was closed by error..She is sending it to her manager for review. She sd that they will fax me info.

## 2023-10-24 NOTE — PROGRESS NOTES
Daily Note     Today's date: 10/24/2023  Patient name: Esha Lowry  : 1955  MRN: [id number]  Referring provider: Tony Blount MD  Dx:   Encounter Diagnosis     ICD-10-CM    1. Generalized weakness  R53.1           Start Time: 1100  Stop Time: 1140  Total time in clinic (min): 40 minutes    Subjective: Pt reports feeling unwell after oncology treatment. Objective: See treatment diary below      Assessment: Tolerated treatment well. POC progressed to include more upper extremity strengthening. Patient demonstrated fatigue post treatment and would benefit from continued PT      Plan: Continue per plan of care.       Precautions: Stage IV cancer, thoracic metastasis avoid lifting from flexed positions, NO GRADE 5 MOBS, avoid bridges      Manuals 10/24  10/17                           Neuro Re-Ed      SLR B 3x10 ea  B 3x10 ea   Mini squats 3x10  3x10   Standing hip abd      LPD 3x10 btb  [id number]   Low rows 3x10 btb  3x10 btb         Leg press   105# 4x10   Arm bike   3'/3'   Sidestepping             Ther Ex      Nu step ROM 10'  10'   Leg press      PT ed on lifting mechainccs 5'                                   Ther Activity                  Gait Training                  Modalities

## 2023-10-25 ENCOUNTER — TELEPHONE (OUTPATIENT)
Dept: OTHER | Facility: OTHER | Age: 68
End: 2023-10-25

## 2023-10-25 NOTE — TELEPHONE ENCOUNTER
Klarissa Stovall from [employer] is calling to see if patient has another insurance carrier on file need to verify for  medication Am[facility]  Rx  please f/u

## 2023-10-26 ENCOUNTER — OFFICE VISIT (OUTPATIENT)
Dept: PHYSICAL THERAPY | Age: 68
End: 2023-10-26

## 2023-10-26 ENCOUNTER — DOCUMENTATION (OUTPATIENT)
Dept: HEMATOLOGY ONCOLOGY | Facility: CLINIC | Age: 68
End: 2023-10-26

## 2023-10-26 DIAGNOSIS — R53.1 GENERALIZED WEAKNESS: Primary | ICD-10-CM

## 2023-10-26 PROCEDURE — 97110 THERAPEUTIC EXERCISES: CPT

## 2023-10-26 PROCEDURE — 97112 NEUROMUSCULAR REEDUCATION: CPT

## 2023-10-26 NOTE — TELEPHONE ENCOUNTER
Spoke with Gautam Guajardo from the [employer] for the [employer]. She states that the patient's medicare part B is inactive when they put it through their system and wanted to know if there was any secondary insurance so it can be processed. I informed her that I will reach out to our finance dept because there is no other active insurance on file for the patient.

## 2023-10-26 NOTE — PROGRESS NOTES
Daily Note     Today's date: 10/26/2023  Patient name: Madi Green  : 1955  MRN: [id number]  Referring provider: Kisha James MD  Dx:   Encounter Diagnosis     ICD-10-CM    1. Generalized weakness  R53.1                      Subjective: Patient arrives at PT and states he is feeling better than he was on Tues. Objective: See treatment diary below      Assessment: Tolerated treatment well. Able to resume full program as charted. Seated rest provided 1x due to fatigue. Small cues for foot position on leg press. Patient demonstrated fatigue post treatment and would benefit from continued PT      Plan: Progress treatment as tolerated.        Precautions: Stage IV cancer, thoracic metastasis avoid lifting from flexed positions, NO GRADE 5 MOBS, avoid bridges      Manuals 10/24 10/26                            Neuro Re-Ed      SLR B 3x10 ea B 3x10 ea     Mini squats 3x10 3x10    Standing hip abd  2x15 ea     LPD 3x10 btb 3x10 BTB    Low rows 3x10 btb 3x10 BTB          Leg press  105#   4x10 105# 4x10   Arm bike  3'/3' 3'/3'   Sidestepping             Ther Ex      Nu step ROM 10' 10'    Leg press      PT ed on lifting mechainccs 5'                                   Ther Activity                  Gait Training                  Modalities

## 2023-10-26 NOTE — PROGRESS NOTES
Recvd email from le that micha called her & sd that the pt has no ins.  Called the pts wife & she sd that  the medicare part A is active its the part B that won't be active until 02/01/24. Told her I will reach out to micha  Called micha & spoke to claudia & erlin sd that the  called medicare & its not active  Called medicare & the automated system sd that the pt has active part A only effective 12/01/22.  Called micha back & spoke to claudia & I gave her that info & she was going to reach out to the  & see if they can open up the file again. She sd that she will let me know what happens  Called pts wife back & I gave her that info & she thanked me for the call & the info

## 2023-10-31 ENCOUNTER — OFFICE VISIT (OUTPATIENT)
Dept: PHYSICAL THERAPY | Age: 68
End: 2023-10-31

## 2023-10-31 DIAGNOSIS — R53.1 GENERALIZED WEAKNESS: Primary | ICD-10-CM

## 2023-10-31 PROCEDURE — 97140 MANUAL THERAPY 1/> REGIONS: CPT | Performed by: PHYSICAL THERAPIST

## 2023-10-31 PROCEDURE — 97110 THERAPEUTIC EXERCISES: CPT | Performed by: PHYSICAL THERAPIST

## 2023-10-31 PROCEDURE — 97112 NEUROMUSCULAR REEDUCATION: CPT | Performed by: PHYSICAL THERAPIST

## 2023-10-31 NOTE — PROGRESS NOTES
Daily Note     Today's date: 10/31/2023  Patient name: Suresh Reyes  : 1955  MRN: [id number]  Referring provider: Damaris Sibley MD  Dx:   Encounter Diagnosis     ICD-10-CM    1. Generalized weakness  R53.1           Start Time: 1100  Stop Time: 1145  Total time in clinic (min): 45 minutes    Subjective: Pt reports no new symptoms. Objective: See treatment diary below      Assessment: Tolerated treatment well. Patient's POC was progressed to include more resistance with closed chain strengthening. Patient demonstrated fatigue post treatment and would benefit from continued PT      Plan: Continue per plan of care.       Precautions: Stage IV cancer, thoracic metastasis avoid lifting from flexed positions, NO GRADE 5 MOBS, avoid bridges      Manuals 10/24 10/26 10/31                           Neuro Re-Ed      SLR B 3x10 ea B 3x10 ea  B 3x10 ea    Mini squats 3x10 3x10 3x10   Standing hip abd  2x15 ea  3x10   LPD 3x10 btb 3x10 BTB    Low rows 3x10 btb 3x10 BTB          Leg press  105#   4x10 105# 4x10   Arm bike  3'/3' 3'/3'   Sidestepping       biodex   Maze and LS 3x   Ther Ex      Nu step ROM 10' 10' 10'   Leg press      PT ed on lifting mechainccs 5'                                   Ther Activity                  Gait Training                  Modalities

## 2023-11-01 ENCOUNTER — HOSPITAL ENCOUNTER (OUTPATIENT)
Dept: RADIOLOGY | Facility: HOSPITAL | Age: 68
Discharge: HOME | End: 2023-11-01
Attending: INTERNAL MEDICINE

## 2023-11-01 DIAGNOSIS — C34.91 NON-SMALL CELL CANCER OF RIGHT LUNG (HCC): ICD-10-CM

## 2023-11-01 PROCEDURE — 70553 MRI BRAIN STEM W/O & W/DYE: CPT

## 2023-11-01 PROCEDURE — A9585 GADOBUTROL INJECTION: HCPCS | Performed by: INTERNAL MEDICINE

## 2023-11-01 PROCEDURE — G1004 CDSM NDSC: HCPCS

## 2023-11-01 RX ORDER — GADOBUTROL 604.72 MG/ML
7 INJECTION INTRAVENOUS
Status: COMPLETED | OUTPATIENT
Start: 2023-11-01 | End: 2023-11-01

## 2023-11-01 RX ADMIN — GADOBUTROL 7 ML: 604.72 INJECTION INTRAVENOUS at 14:07

## 2023-11-02 ENCOUNTER — TELEPHONE (OUTPATIENT)
Age: 68
End: 2023-11-02

## 2023-11-02 ENCOUNTER — OFFICE VISIT (OUTPATIENT)
Dept: PHYSICAL THERAPY | Age: 68
End: 2023-11-02

## 2023-11-02 DIAGNOSIS — R53.1 GENERALIZED WEAKNESS: Primary | ICD-10-CM

## 2023-11-02 PROCEDURE — 97110 THERAPEUTIC EXERCISES: CPT | Performed by: PHYSICAL THERAPIST

## 2023-11-02 PROCEDURE — 97140 MANUAL THERAPY 1/> REGIONS: CPT | Performed by: PHYSICAL THERAPIST

## 2023-11-02 PROCEDURE — 97112 NEUROMUSCULAR REEDUCATION: CPT | Performed by: PHYSICAL THERAPIST

## 2023-11-02 NOTE — PROGRESS NOTES
Daily Note     Today's date: 2023  Patient name: Dale Sam  : 1955  MRN: [id number]  Referring provider: Delia Alves MD  Dx:   Encounter Diagnosis     ICD-10-CM    1. Generalized weakness  R53.1           Start Time: 1015  Stop Time: 1100  Total time in clinic (min): 45 minutes    Subjective: Pt reports improvements in symptoms since last session. Objective: See treatment diary below      Assessment: Tolerated treatment well. Patient demonstrated fatigue post treatment and would benefit from continued PT      Plan: Continue per plan of care.       Precautions: Stage IV cancer, thoracic metastasis avoid lifting from flexed positions, NO GRADE 5 MOBS, avoid bridges      Manuals 10/24 10/26 11/2                           Neuro Re-Ed      SLR B 3x10 ea B 3x10 ea  B 3x10 ea    Mini squats 3x10 3x10 3x10   Standing hip abd  2x15 ea  3x10   LPD 3x10 btb 3x10 BTB    Low rows 3x10 btb 3x10 BTB          Leg press  105#   4x10 105# 4x10   Arm bike  3'/3' 3'/3'   Sidestepping       biodex   Maze and LS 3x   Ther Ex      Nu step ROM 10' 10' 10'   Leg press      PT ed on lifting mechainccs 5'                                   Ther Activity                  Gait Training                  Modalities

## 2023-11-02 NOTE — TELEPHONE ENCOUNTER
Spoke with Miguel Townsend from [facility] who informed me patient's application has been approved. I let her know patient is scheduled for Monday 11/6 to receive Keytruda. She confirmed Keytruda 200mg will be shipped to [facility] at [address] [place] [address] today to be ready for Monday treatment. Miguel Townsend stated more refill requests need to be faxed to [phone].

## 2023-11-03 ENCOUNTER — OFFICE VISIT (OUTPATIENT)
Dept: HEMATOLOGY ONCOLOGY | Facility: CLINIC | Age: 68
End: 2023-11-03

## 2023-11-03 ENCOUNTER — TELEPHONE (OUTPATIENT)
Dept: HEMATOLOGY ONCOLOGY | Facility: CLINIC | Age: 68
End: 2023-11-03

## 2023-11-03 ENCOUNTER — HOSPITAL ENCOUNTER (OUTPATIENT)
Dept: INFUSION CENTER | Facility: HOSPITAL | Age: 68
End: 2023-11-03

## 2023-11-03 VITALS
BODY MASS INDEX: 22.96 KG/M2 | TEMPERATURE: 97.2 F | HEART RATE: 86 BPM | OXYGEN SATURATION: 99 % | HEIGHT: 70 IN | SYSTOLIC BLOOD PRESSURE: 124 MMHG | DIASTOLIC BLOOD PRESSURE: 80 MMHG | RESPIRATION RATE: 17 BRPM | WEIGHT: 160.4 LBS

## 2023-11-03 DIAGNOSIS — C34.91 NON-SMALL CELL CANCER OF RIGHT LUNG (HCC): Primary | ICD-10-CM

## 2023-11-03 DIAGNOSIS — H74.90 DISORDER OF MASTOID, UNSPECIFIED LATERALITY: ICD-10-CM

## 2023-11-03 DIAGNOSIS — R53.83 OTHER FATIGUE: Primary | ICD-10-CM

## 2023-11-03 DIAGNOSIS — R53.83 OTHER FATIGUE: ICD-10-CM

## 2023-11-03 LAB
ALBUMIN SERPL BCP-MCNC: 3.8 G/DL (ref 3.5–5)
ALP SERPL-CCNC: 53 U/L (ref 34–104)
ALT SERPL W P-5'-P-CCNC: 12 U/L (ref 7–52)
ANION GAP SERPL CALCULATED.3IONS-SCNC: 8 MMOL/L
AST SERPL W P-5'-P-CCNC: 17 U/L (ref 13–39)
BASOPHILS # BLD AUTO: 0.03 THOUSANDS/ÂΜL (ref 0–0.1)
BASOPHILS NFR BLD AUTO: 1 % (ref 0–1)
BILIRUB SERPL-MCNC: 0.34 MG/DL (ref 0.2–1)
BUN SERPL-MCNC: 16 MG/DL (ref 5–25)
CALCIUM SERPL-MCNC: 8.8 MG/DL (ref 8.4–10.2)
CHLORIDE SERPL-SCNC: 109 MMOL/L (ref 96–108)
CO2 SERPL-SCNC: 23 MMOL/L (ref 21–32)
CORTIS SERPL-MCNC: 4.9 UG/DL
CREAT SERPL-MCNC: 0.89 MG/DL (ref 0.6–1.3)
EOSINOPHIL # BLD AUTO: 0.2 THOUSAND/ÂΜL (ref 0–0.61)
EOSINOPHIL NFR BLD AUTO: 4 % (ref 0–6)
ERYTHROCYTE [DISTWIDTH] IN BLOOD BY AUTOMATED COUNT: 13.5 % (ref 11.6–15.1)
GFR SERPL CREATININE-BSD FRML MDRD: 89 ML/MIN/1.73SQ M
GLUCOSE SERPL-MCNC: 93 MG/DL (ref 65–140)
HCT VFR BLD AUTO: 40 % (ref 36.5–49.3)
HGB BLD-MCNC: 13.1 G/DL (ref 12–17)
IMM GRANULOCYTES # BLD AUTO: 0.04 THOUSAND/UL (ref 0–0.2)
IMM GRANULOCYTES NFR BLD AUTO: 1 % (ref 0–2)
LYMPHOCYTES # BLD AUTO: 0.91 THOUSANDS/ÂΜL (ref 0.6–4.47)
LYMPHOCYTES NFR BLD AUTO: 17 % (ref 14–44)
MCH RBC QN AUTO: 34.3 PG (ref 26.8–34.3)
MCHC RBC AUTO-ENTMCNC: 32.8 G/DL (ref 31.4–37.4)
MCV RBC AUTO: 105 FL (ref 82–98)
MONOCYTES # BLD AUTO: 0.7 THOUSAND/ÂΜL (ref 0.17–1.22)
MONOCYTES NFR BLD AUTO: 13 % (ref 4–12)
NEUTROPHILS # BLD AUTO: 3.37 THOUSANDS/ÂΜL (ref 1.85–7.62)
NEUTS SEG NFR BLD AUTO: 64 % (ref 43–75)
NRBC BLD AUTO-RTO: 0 /100 WBCS
PLATELET # BLD AUTO: 233 THOUSANDS/UL (ref 149–390)
PMV BLD AUTO: 9.7 FL (ref 8.9–12.7)
POTASSIUM SERPL-SCNC: 3.9 MMOL/L (ref 3.5–5.3)
PROT SERPL-MCNC: 7.1 G/DL (ref 6.4–8.4)
RBC # BLD AUTO: 3.82 MILLION/UL (ref 3.88–5.62)
SODIUM SERPL-SCNC: 140 MMOL/L (ref 135–147)
T3FREE SERPL-MCNC: 3.11 PG/ML (ref 2.5–3.9)
TSH SERPL DL<=0.05 MIU/L-ACNC: 2.85 UIU/ML (ref 0.45–4.5)
WBC # BLD AUTO: 5.25 THOUSAND/UL (ref 4.31–10.16)

## 2023-11-03 PROCEDURE — 84443 ASSAY THYROID STIM HORMONE: CPT | Performed by: INTERNAL MEDICINE

## 2023-11-03 PROCEDURE — 85025 COMPLETE CBC W/AUTO DIFF WBC: CPT | Performed by: INTERNAL MEDICINE

## 2023-11-03 PROCEDURE — 80053 COMPREHEN METABOLIC PANEL: CPT | Performed by: INTERNAL MEDICINE

## 2023-11-03 PROCEDURE — 84481 FREE ASSAY (FT-3): CPT | Performed by: INTERNAL MEDICINE

## 2023-11-03 PROCEDURE — 99214 OFFICE O/P EST MOD 30 MIN: CPT | Performed by: INTERNAL MEDICINE

## 2023-11-03 PROCEDURE — 82533 TOTAL CORTISOL: CPT

## 2023-11-03 RX ORDER — CYANOCOBALAMIN 1000 UG/ML
1000 INJECTION, SOLUTION INTRAMUSCULAR; SUBCUTANEOUS ONCE
Status: CANCELLED | OUTPATIENT
Start: 2023-11-06 | End: 2023-11-06

## 2023-11-03 RX ORDER — SODIUM CHLORIDE 9 MG/ML
20 INJECTION, SOLUTION INTRAVENOUS ONCE
OUTPATIENT
Start: 2023-11-27

## 2023-11-03 RX ORDER — SODIUM CHLORIDE 9 MG/ML
20 INJECTION, SOLUTION INTRAVENOUS ONCE
OUTPATIENT
Start: 2023-12-18

## 2023-11-05 NOTE — PROGRESS NOTES
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    Caris - PD-L1 0%, TMB 60, no other targetable mutations     2023 - palliative RT to T spine and brain     2023 - start carbo/pemetrexed/pembrolizumab    2023 suspected Rad/Immuno Pneumonitis Grade 1/2 (mild fever)     2023 discontinued Carbo d/t fatigue; and holding Keytruda in Cycle 4 dt Pneumonitis; will re-challenge in cycle 5     2023 - add back [place] for cycle 6    2023 - dose reduce alimta by 10% due to fatigue     Thoracic spine tumor   2023 Initial Diagnosis    Thoracic spine tumor     Non-small cell cancer of right lung ([address])   3/29/2023 -  Cancer Staged    Staging form: Lung, AJCC 8th Edition  - Clinical stage from 3/29/2023: Stage DORI (cT4, cN1, cM1b) - Signed by Sergio Cook DO on 2023 Biopsy    Lung, right upper lobe, biopsy:     - Rare detached single and small clusters of atypical, degenerated epithelial cells. - Predominantly fibroelastotic stromal fibrosis.      2023 Biopsy    Lung, right upper lobe mass, biopsy:      - Poorly differentiated non-small cell carcinoma most compatible with adenocarcinoma of the lung     2023 - 2023 Radiation    Course: C1    Plan ID Energy Fractions Dose per Fraction (cGy) Dose Correction (cGy) Total Dose Delivered (cGy) Elapsed Days   [id number] Lung 10X/6X 10 / 10 300 0 3,000 11   HCS_WhBrain 6X 10 / 10 300 0 3,000 11      Dr. Hermila Rodriguez     2023 Initial Diagnosis    Non-small cell cancer of right lung ([address])     2023 -  Chemotherapy    cyanocobalamin, 1,000 mcg, Intramuscular, Once, 4 of 5 cycles  Administration: 1,000 mcg (5/15/2023), 1,000 mcg (7/3/2023), 1,000 mcg (9/5/2023)  alteplase (CATHFLO), 2 mg, Intracatheter, Every 1 Minute as needed, 8 of 14 cycles  fosaprepitant (EMEND) IVPB, 150 mg, Intravenous, Once, 3 of 3 cycles  Administration: 150 mg (5/22/2023), 150 mg (7/3/2023), 150 mg (6/12/2023)  CARBOplatin (PARAPLATIN) IVPB (GO AUC DOSING), 544 mg, Intravenous, Once, 3 of 3 cycles  Administration: 544 mg (5/22/2023), 554 mg (7/3/2023), 544 mg (6/12/2023)  pemetrexed (ALIMTA) chemo infusion, 940 mg, Intravenous, Once, 8 of 14 cycles  Dose modification: 450 mg/m2 (original dose 500 mg/m2, Cycle 9, Reason: Dose Not Tolerated, Comment: 10% dose reduction due to fatigue)  Administration: 900 mg (5/22/2023), 900 mg (7/3/2023), 900 mg (9/5/2023), 900 mg (6/12/2023), 900 mg (8/14/2023), 900 mg (7/24/2023), 900 mg (9/25/2023), 900 mg (10/16/2023)  pembrolizumab (KEYTRUDA) IVPB, 200 mg, Intravenous, Once, 7 of 13 cycles  Administration: 200 mg (5/22/2023), 200 mg (7/3/2023), 200 mg (9/5/2023), 200 mg (6/12/2023), 200 mg (8/14/2023), 200 mg (9/25/2023), 200 mg (10/16/2023)     7/12/2023 Adverse Reaction    Hospitalization - Suspected Pneumonitis    7/12-14/2023 due to mild fever with CT chest redemonstrating left upper lobe inflammatory changes, previously seen on study 04/04/2023, ID and oncology evaluated, more suggestive of radiation/Keytruda induced pneumonitis grade 1/2; mild fever resolved; other differential/contributing etiology include recent tapering of steroid       7/21/2023 -  Chemotherapy    Recently suspected pneumonitis 7/12/2023 and reports fatigue 7/21/2023  Discontinuing carboplatin and holding Keytruda of cycle 4; with potential plan to rechallenge Keytruda in cycle 5     Metastasis to brain ([address])   5/30/2023 Initial Diagnosis    Metastasis to brain [facility])         Interval History: Patient presents for follow up of his metastatic non-small cell lung cancer. He had a CT scan prior to this visit that shows stable disease.   His MRI of the brain shows stable to improvement disease. He does have bilateral mastoid effusions and a right middle ear effusion. He does state that his right ear always feels clogged and he has a lot of postnasal drip. He is having a lot of fatigue. He is completely off of steroids again. He had more days of significant fatigue with the last cycle than not. REVIEW OF SYSTEMS:  Please note that a 14-point review of systems was performed to include Constitutional, HEENT, Respiratory, CVS, GI, , Musculoskeletal, Integumentary, Neurologic, Rheumatologic, Endocrinologic, Psychiatric, Lymphatic, and Hematologic/Oncologic systems were reviewed and are negative unless otherwise stated in HPI. Positive and negative findings pertinent to this evaluation are incorporated into the history of present illness. ECOG PS: 1    PROBLEM LIST:  Patient Active Problem List   Diagnosis    Multiple subsegmental pulmonary emboli without acute cor pulmonale (HCC)    Tobacco use    DDD (degenerative disc disease), cervical    Pleural mass    Brain lesions    Thoracic spine tumor    Advanced care planning/counseling discussion    Non-small cell cancer of right lung (HCC)    Metastasis to brain (HCC)    Lesion of left lung    Chemotherapy-induced fatigue    Drug-induced pneumonitis    Current every day smoker    Bloating       Assessment / Plan: 51-year-old male with stage IV non-small cell lung cancer. I am going to dose reduce the Alimta by 10% to see if this helps with his fatigue. We will also continue with pembrolizumab. I am also adding a cortisol level onto his next labs to see if there might be an element of adrenal insufficiency after the long-term steroid use. I am referring him to an ENT to assess his mastoid and inner ear disease as well. Since he is on a more maintenance schedule I will stretch his follow-up out to every 6 weeks. We also reviewed his Caris testing.   He did not have any other targetable bone mutations but his disease was PD-L1 0%, but did have a high tumor reviewed mutation burden of 60, so I am still hopeful that he will get ongoing response from immunotherapy. There were no other targetable mutations. He knows to call me in the interim with any questions or concerns. I spent 30 minutes on chart review, face to face counseling time, coordination of care and documentation. Past Medical History:   has no past medical history on file. PAST SURGICAL HISTORY:   has a past surgical history that includes IR biopsy lung (4/6/2023); IR biopsy lung (4/26/2023); and IR port placement (5/16/2023). CURRENT MEDICATIONS  Current Outpatient Medications   Medication Sig Dispense Refill    acetaminophen (TYLENOL) 500 mg tablet Take 500 mg by mouth every 4 (four) hours as needed for mild pain      apixaban (ELIQUIS) 5 mg Take 1 tablet (5 mg total) by mouth 2 (two) times a day 60 tablet 5    folic acid (FOLVITE) 1 mg tablet Take 1 tablet (1,000 mcg total) by mouth daily 90 tablet 0    gabapentin (NEURONTIN) 300 mg capsule Take 300 mg in the AM and 600 mg in the Pm 270 capsule 1    lidocaine-prilocaine (EMLA) cream Apply to port 30 to 60 minutes prior to use 30 g 2    dexamethasone (DECADRON) 1 mg tablet Take 1 tablet (1 mg total) by mouth 2 (two) times a day with meals (Patient not taking: Reported on 11/3/2023) 60 tablet 0     No current facility-administered medications for this visit. Facility-Administered Medications Ordered in Other Visits   Medication Dose Route Frequency Provider Last Rate Last Admin    alteplase (CATHFLO) injection 2 mg  2 mg Intracatheter Q1MIN PRN Rodríguez Del Real DO         [unfilled]    SOCIAL HISTORY:   reports that he has been smoking cigarettes. He started smoking about 50 years ago. He has a 25.00 pack-year smoking history. He has never used smokeless tobacco. He reports that he does not currently use alcohol. He reports that he does not use drugs.      FAMILY HISTORY:  family history is not on file. ALLERGIES:  has No Known Allergies. Physical Exam:  Vital Signs:   Visit Vitals  /80 (BP Location: Left arm, Patient Position: Sitting, Cuff Size: Adult)   Pulse 86   Temp (!) 97.2 °F (36.2 °C)   Resp 17   Ht 5' 10" (1.778 m)   Wt 72.8 kg (160 lb 6.4 oz)   SpO2 99%   BMI 23.02 kg/m²   Smoking Status Every Day   BSA 1.9 m²     Body mass index is 23.02 kg/m². Body surface area is 1.9 meters squared. GEN: Alert, awake oriented x3, in no acute distress  HEENT- No pallor, icterus, cyanosis, no oral mucosal lesions,   LAD - no palpable cervical, clavicle, axillary, inguinal LAD  Heart- normal S1 S2, regular rate and rhythm, No murmur, rubs.    Lungs- clear breathing sound bilateral.   Abdomen- soft, Non tender, bowel sounds present  Extremities- No cyanosis, clubbing, edema  Neuro- No focal neurological deficit    Labs:  Lab Results   Component Value Date    WBC 5.25 11/03/2023    HGB 13.1 11/03/2023    HCT 40.0 11/03/2023     (H) 11/03/2023     11/03/2023     Lab Results   Component Value Date    SODIUM 140 11/03/2023    K 3.9 11/03/2023     (H) 11/03/2023    CO2 23 11/03/2023    AGAP 8 11/03/2023    BUN 16 11/03/2023    CREATININE 0.89 11/03/2023    GLUC 93 11/03/2023    GLUF 103 (H) 05/10/2023    CALCIUM 8.8 11/03/2023    AST 17 11/03/2023    ALT 12 11/03/2023    ALKPHOS 53 11/03/2023    TP 7.1 11/03/2023    TBILI 0.34 11/03/2023    EGFR 89 11/03/2023

## 2023-11-06 ENCOUNTER — HOSPITAL ENCOUNTER (OUTPATIENT)
Dept: INFUSION CENTER | Facility: HOSPITAL | Age: 68
Discharge: HOME | End: 2023-11-06
Attending: INTERNAL MEDICINE

## 2023-11-06 VITALS
TEMPERATURE: 97.7 F | BODY MASS INDEX: 23.55 KG/M2 | DIASTOLIC BLOOD PRESSURE: 76 MMHG | RESPIRATION RATE: 18 BRPM | HEART RATE: 79 BPM | HEIGHT: 70 IN | SYSTOLIC BLOOD PRESSURE: 111 MMHG | WEIGHT: 164.46 LBS

## 2023-11-06 DIAGNOSIS — C34.91 NON-SMALL CELL CANCER OF RIGHT LUNG (HCC): Primary | ICD-10-CM

## 2023-11-06 PROCEDURE — 96417 CHEMO IV INFUS EACH ADDL SEQ: CPT

## 2023-11-06 PROCEDURE — 96372 THER/PROPH/DIAG INJ SC/IM: CPT

## 2023-11-06 PROCEDURE — 96367 TX/PROPH/DG ADDL SEQ IV INF: CPT

## 2023-11-06 PROCEDURE — 96413 CHEMO IV INFUSION 1 HR: CPT

## 2023-11-06 RX ORDER — CYANOCOBALAMIN 1000 UG/ML
1000 INJECTION, SOLUTION INTRAMUSCULAR; SUBCUTANEOUS ONCE
Status: COMPLETED | OUTPATIENT
Start: 2023-11-06 | End: 2023-11-06

## 2023-11-06 RX ORDER — SODIUM CHLORIDE 9 MG/ML
20 INJECTION, SOLUTION INTRAVENOUS ONCE
Status: COMPLETED | OUTPATIENT
Start: 2023-11-06 | End: 2023-11-06

## 2023-11-06 RX ADMIN — CYANOCOBALAMIN 1000 MCG: 1000 INJECTION, SOLUTION INTRAMUSCULAR at 09:07

## 2023-11-06 RX ADMIN — SODIUM CHLORIDE 20 ML/HR: 0.9 INJECTION, SOLUTION INTRAVENOUS at 09:04

## 2023-11-06 RX ADMIN — DEXAMETHASONE SODIUM PHOSPHATE: 10 INJECTION, SOLUTION INTRAMUSCULAR; INTRAVENOUS at 09:04

## 2023-11-06 RX ADMIN — SODIUM CHLORIDE 200 MG: 9 INJECTION, SOLUTION INTRAVENOUS at 09:37

## 2023-11-06 RX ADMIN — PEMETREXED DISODIUM 846 MG: 500 INJECTION, POWDER, LYOPHILIZED, FOR SOLUTION INTRAVENOUS at 10:26

## 2023-11-07 ENCOUNTER — OFFICE VISIT (OUTPATIENT)
Dept: PHYSICAL THERAPY | Age: 68
End: 2023-11-07

## 2023-11-07 DIAGNOSIS — R53.1 GENERALIZED WEAKNESS: Primary | ICD-10-CM

## 2023-11-07 PROCEDURE — 97110 THERAPEUTIC EXERCISES: CPT | Performed by: PHYSICAL THERAPIST

## 2023-11-07 PROCEDURE — 97140 MANUAL THERAPY 1/> REGIONS: CPT | Performed by: PHYSICAL THERAPIST

## 2023-11-07 PROCEDURE — 97112 NEUROMUSCULAR REEDUCATION: CPT | Performed by: PHYSICAL THERAPIST

## 2023-11-07 NOTE — PROGRESS NOTES
Daily Note     Today's date: 2023  Patient name: Esha Lowry  : 1955  MRN: [id number]  Referring provider: Tony Blount MD  Dx:   Encounter Diagnosis     ICD-10-CM    1. Generalized weakness  R53.1           Start Time: 0845  Stop Time: 09  Total time in clinic (min): 45 minutes    Subjective: Pt reports some pain in shoulder at night. Good follow-up with oncology team last week. Objective: See treatment diary below      Assessment: Tolerated treatment well. Patient demonstrated fatigue post treatment and would benefit from continued PT      Plan: Continue per plan of care.       Precautions: Stage IV cancer, thoracic metastasis avoid lifting from flexed positions, NO GRADE 5 MOBS, avoid bridges      Manuals 10/24 10/26 11/7                           Neuro Re-Ed      SLR B 3x10 ea B 3x10 ea  B 3x10 ea    Mini squats 3x10 3x10 3x10   Standing hip abd  2x15 ea  3x10   LPD 3x10 btb 3x10 BTB    Low rows 3x10 btb 3x10 BTB          Leg press  105#   4x10 105# 4x10   Arm bike  3'/3' 3'/3'   Sidestepping       biodex   Maze and LS 3x   Ther Ex      Nu step ROM 10' 10' 10'   Leg press      PT ed on lifting mechainccs 5'                                   Ther Activity                  Gait Training                  Modalities

## 2023-11-09 ENCOUNTER — OFFICE VISIT (OUTPATIENT)
Dept: PHYSICAL THERAPY | Age: 68
End: 2023-11-09

## 2023-11-09 DIAGNOSIS — R53.1 GENERALIZED WEAKNESS: Primary | ICD-10-CM

## 2023-11-09 PROCEDURE — 97112 NEUROMUSCULAR REEDUCATION: CPT

## 2023-11-09 PROCEDURE — 97110 THERAPEUTIC EXERCISES: CPT

## 2023-11-09 NOTE — PROGRESS NOTES
Daily Note     Today's date: 2023  Patient name: Joesph Olmos  : 1955  MRN: [id number]  Referring provider: Cheyanne Moser MD  Dx:   Encounter Diagnosis     ICD-10-CM    1. Generalized weakness  R53.1                      Subjective: pt reports increased fatigue since getting chemo treatments Monday. Objective: See treatment diary below      Assessment: Tolerated treatment well. Patient demonstrated fatigue post treatment and would benefit from continued PT      Plan: Continue per plan of care. Progress treatment as tolerated.        Precautions: Stage IV cancer, thoracic metastasis avoid lifting from flexed positions, NO GRADE 5 MOBS, avoid bridges      Manuals 10/24 10/26 11/7                                Neuro Re-Ed       SLR B 3x10 ea B 3x10 ea  B 3x10 ea  3x10 ea   Mini squats 3x10 3x10 3x10 2x10x3"   Standing hip abd  2x15 ea  3x10    LPD 3x10 btb 3x10 BTB     Low rows 3x10 btb 3x10 BTB            Leg press  105#   4x10 105# 4x10 105# 5x10   Arm bike  3'/3' 3'/3' 3'/3'   Sidestepping        biodex   Maze and LS 3x 3x ea static lvl 2   Ther Ex       Nu step ROM 10' 10' 10' 10'   Leg press       PT ed on lifting mechainccs 5'      LTR    30x5" ea                               Ther Activity                     Gait Training                     Modalities

## 2023-11-14 ENCOUNTER — OFFICE VISIT (OUTPATIENT)
Dept: PHYSICAL THERAPY | Age: 68
End: 2023-11-14

## 2023-11-14 DIAGNOSIS — R53.1 GENERALIZED WEAKNESS: Primary | ICD-10-CM

## 2023-11-14 PROCEDURE — 97112 NEUROMUSCULAR REEDUCATION: CPT | Performed by: PHYSICAL THERAPIST

## 2023-11-14 PROCEDURE — 97140 MANUAL THERAPY 1/> REGIONS: CPT | Performed by: PHYSICAL THERAPIST

## 2023-11-14 PROCEDURE — 97110 THERAPEUTIC EXERCISES: CPT | Performed by: PHYSICAL THERAPIST

## 2023-11-14 NOTE — PROGRESS NOTES
Daily Note     Today's date: 2023  Patient name: Marielos Orosco  : 1955  MRN: [id number]  Referring provider: Alannah Barcenas MD  Dx:   Encounter Diagnosis     ICD-10-CM    1. Generalized weakness  R53.1           Start Time: 08  Stop Time: 930  Total time in clinic (min): 45 minutes    Subjective: Pt reports improved shoulder symptoms. Objective: See treatment diary below      Assessment: Tolerated treatment well. Patient demonstrated fatigue post treatment and would benefit from continued PT      Plan: Continue per plan of care.       Precautions: Stage IV cancer, thoracic metastasis avoid lifting from flexed positions, NO GRADE 5 MOBS, avoid bridges      Manuals 10/24 10/26 11/7                                Neuro Re-Ed       SLR B 3x10 ea B 3x10 ea  B 3x10 ea  3x10 ea   Mini squats 3x10 3x10 3x10 2x10x3"   Standing hip abd  2x15 ea  3x10    LPD 3x10 btb 3x10 BTB     Low rows 3x10 btb 3x10 BTB            Leg press  105#   4x10 105# 4x10 105# 5x10   Arm bike  3'/3' 3'/3' 3'/3'   Sidestepping        biodex   Maze and LS 3x 3x ea static lvl 2   Ther Ex       Nu step ROM 10' 10' 10' 10'   Leg press       PT ed on lifting mechainccs 5'      LTR    30x5" ea                               Ther Activity                     Gait Training                     Modalities

## 2023-11-15 ENCOUNTER — OFFICE VISIT (OUTPATIENT)
Dept: INTERNAL MEDICINE CLINIC | Facility: OTHER | Age: 68
End: 2023-11-15

## 2023-11-15 ENCOUNTER — HOSPITAL ENCOUNTER (OUTPATIENT)
Dept: NON INVASIVE DIAGNOSTICS | Facility: HOSPITAL | Age: 68
Discharge: HOME | End: 2023-11-15

## 2023-11-15 VITALS
WEIGHT: 155.8 LBS | HEIGHT: 70 IN | HEART RATE: 113 BPM | BODY MASS INDEX: 22.3 KG/M2 | DIASTOLIC BLOOD PRESSURE: 72 MMHG | OXYGEN SATURATION: 99 % | RESPIRATION RATE: 20 BRPM | SYSTOLIC BLOOD PRESSURE: 124 MMHG | TEMPERATURE: 98.5 F

## 2023-11-15 DIAGNOSIS — R60.0 LOWER EXTREMITY EDEMA: ICD-10-CM

## 2023-11-15 DIAGNOSIS — R60.0 LOWER EXTREMITY EDEMA: Primary | ICD-10-CM

## 2023-11-15 PROCEDURE — 93971 EXTREMITY STUDY: CPT

## 2023-11-15 PROCEDURE — 99214 OFFICE O/P EST MOD 30 MIN: CPT | Performed by: NURSE PRACTITIONER

## 2023-11-15 PROCEDURE — 93971 EXTREMITY STUDY: CPT | Performed by: SURGERY

## 2023-11-15 RX ORDER — CEPHALEXIN 500 MG/1
500 CAPSULE ORAL EVERY 6 HOURS SCHEDULED
Qty: 28 CAPSULE | Refills: 0 | Status: SHIPPED | OUTPATIENT
Start: 2023-11-15 | End: 2023-11-22

## 2023-11-15 NOTE — ASSESSMENT & PLAN NOTE
-discussed with Dr. Javier Garcia, will get stat Duplex (he is on Eliquis)  -start Keflex, f/u if not improvement   -Elevated LE

## 2023-11-15 NOTE — PROGRESS NOTES
Assessment/Plan:    Lower extremity edema  -discussed with Dr. Shon White, will get stat Duplex (he is on Eliquis)  -start Keflex, f/u if not improvement   -Elevated LE               Diagnoses and all orders for this visit:    Lower extremity edema  -     cephalexin (KEFLEX) 500 mg capsule; Take 1 capsule (500 mg total) by mouth every 6 (six) hours for 7 days  -     VAS lower limb venous duplex study, unilateral/limited; Future          Subjective:      Patient ID: Meredith Gar is a 74 y.o. male. Patient presents with swelling to B/L LE    He reports that he has had swelling to bilateral lower extremities for a while, he attributed it to the steroids that he was taking for his cancer which was dexamethasone, originally his swelling got better after he stopped the dexamethasone, however approximately 1 week ago he noticed swelling to bilateral lower extremities, left worse than right, he reports that tenderness on the medial aspect of his ankles     Denies worsen shortness of breath or chest pain     Echo 2023  ·  Left Ventricle: Left ventricular cavity size is normal. Wall thickness is normal. The left ventricular ejection fraction is 55%. Systolic function is normal. There are no wall motion abnormalities. Diastolic function is mildly abnormal, consistent with grade I (abnormal) relaxation. ·  Right Ventricle: Right ventricular cavity size is normal. Systolic function is normal. Normal tricuspid annular plane systolic excursion (TAPSE) 1.9 cm. The following portions of the patient's history were reviewed and updated as appropriate: allergies, current medications, past family history, past medical history, past social history, past surgical history, and problem list.    Review of Systems   Constitutional:  Negative for activity change, appetite change, chills, diaphoresis and fever.    HENT:  Negative for congestion, ear discharge, ear pain, postnasal drip, rhinorrhea, sinus pressure, sinus pain and sore throat. Eyes:  Negative for pain, discharge, itching and visual disturbance. Respiratory:  Negative for cough, chest tightness, shortness of breath and wheezing. Cardiovascular:  Positive for leg swelling. Negative for chest pain and palpitations. Gastrointestinal:  Negative for abdominal pain, constipation, diarrhea, nausea and vomiting. Endocrine: Negative for polydipsia, polyphagia and polyuria. Genitourinary:  Negative for difficulty urinating, dysuria and urgency. Musculoskeletal:  Negative for arthralgias, back pain and neck pain. Skin:  Negative for rash and wound. Neurological:  Negative for dizziness, weakness, numbness and headaches. History reviewed. No pertinent past medical history. Current Outpatient Medications:     acetaminophen (TYLENOL) 500 mg tablet, Take 500 mg by mouth every 4 (four) hours as needed for mild pain, Disp: , Rfl:     apixaban (ELIQUIS) 5 mg, Take 1 tablet (5 mg total) by mouth 2 (two) times a day, Disp: 60 tablet, Rfl: 5    cephalexin (KEFLEX) 500 mg capsule, Take 1 capsule (500 mg total) by mouth every 6 (six) hours for 7 days, Disp: 28 capsule, Rfl: 0    folic acid (FOLVITE) 1 mg tablet, Take 1 tablet (1,000 mcg total) by mouth daily, Disp: 90 tablet, Rfl: 0    gabapentin (NEURONTIN) 300 mg capsule, Take 300 mg in the AM and 600 mg in the Pm (Patient taking differently: Take 600 mg by mouth daily at bedtime), Disp: 270 capsule, Rfl: 1    lidocaine-prilocaine (EMLA) cream, Apply to port 30 to 60 minutes prior to use, Disp: 30 g, Rfl: 2    dexamethasone (DECADRON) 1 mg tablet, Take 1 tablet (1 mg total) by mouth 2 (two) times a day with meals (Patient not taking: Reported on 11/3/2023), Disp: 60 tablet, Rfl: 0    No Known Allergies    Social History   Past Surgical History:   Procedure Laterality Date    IR BIOPSY LUNG  4/6/2023    IR BIOPSY LUNG  4/26/2023    IR PORT PLACEMENT  5/16/2023     History reviewed.  No pertinent family history.     Objective:  /72 (BP Location: Left arm, Patient Position: Sitting, Cuff Size: Standard)   Pulse (!) 113   Temp 98.5 °F (36.9 °C) (Temporal)   Resp 20   Ht 5' 10" (1.778 m)   Wt 70.7 kg (155 lb 12.8 oz)   SpO2 99%   BMI 22.35 kg/m²     Recent Results (from the past 1344 hour(s))   CBC and differential    Collection Time: 09/22/23 11:00 AM   Result Value Ref Range    WBC 6.12 4.31 - 10.16 Thousand/uL    RBC 3.34 (L) 3.88 - 5.62 Million/uL    Hemoglobin 11.8 (L) 12.0 - 17.0 g/dL    Hematocrit 36.4 (L) 36.5 - 49.3 %     (H) 82 - 98 fL    MCH 35.3 (H) 26.8 - 34.3 pg    MCHC 32.4 31.4 - 37.4 g/dL    RDW 14.0 11.6 - 15.1 %    MPV 9.7 8.9 - 12.7 fL    Platelets 421 691 - 623 Thousands/uL    nRBC 0 /100 WBCs    Neutrophils Relative 81 (H) 43 - 75 %    Immat GRANS % 1 0 - 2 %    Lymphocytes Relative 9 (L) 14 - 44 %    Monocytes Relative 8 4 - 12 %    Eosinophils Relative 1 0 - 6 %    Basophils Relative 0 0 - 1 %    Neutrophils Absolute 4.95 1.85 - 7.62 Thousands/µL    Immature Grans Absolute 0.08 0.00 - 0.20 Thousand/uL    Lymphocytes Absolute 0.53 (L) 0.60 - 4.47 Thousands/µL    Monocytes Absolute 0.50 0.17 - 1.22 Thousand/µL    Eosinophils Absolute 0.05 0.00 - 0.61 Thousand/µL    Basophils Absolute 0.01 0.00 - 0.10 Thousands/µL   Comprehensive metabolic panel    Collection Time: 09/22/23 11:00 AM   Result Value Ref Range    Sodium 138 135 - 147 mmol/L    Potassium 4.0 3.5 - 5.3 mmol/L    Chloride 108 96 - 108 mmol/L    CO2 24 21 - 32 mmol/L    ANION GAP 6 mmol/L    BUN 19 5 - 25 mg/dL    Creatinine 0.92 0.60 - 1.30 mg/dL    Glucose 98 65 - 140 mg/dL    Calcium 9.4 8.4 - 10.2 mg/dL    AST 17 13 - 39 U/L    ALT 15 7 - 52 U/L    Alkaline Phosphatase 43 34 - 104 U/L    Total Protein 7.2 6.4 - 8.4 g/dL    Albumin 3.8 3.5 - 5.0 g/dL    Total Bilirubin 0.35 0.20 - 1.00 mg/dL    eGFR 86 ml/min/1.73sq m   TSH, 3rd generation with Free T4 reflex    Collection Time: 09/22/23 11:00 AM   Result Value Ref Range    TSH 3RD GENERATON 2.238 0.450 - 4.500 uIU/mL   T3, free    Collection Time: 09/22/23 11:00 AM   Result Value Ref Range    T3, Free 3.17 2.50 - 3.90 pg/mL   CBC and differential    Collection Time: 10/13/23  1:41 PM   Result Value Ref Range    WBC 5.57 4.31 - 10.16 Thousand/uL    RBC 3.35 (L) 3.88 - 5.62 Million/uL    Hemoglobin 11.9 (L) 12.0 - 17.0 g/dL    Hematocrit 34.7 (L) 36.5 - 49.3 %     (H) 82 - 98 fL    MCH 35.5 (H) 26.8 - 34.3 pg    MCHC 34.3 31.4 - 37.4 g/dL    RDW 13.5 11.6 - 15.1 %    MPV 9.7 8.9 - 12.7 fL    Platelets 994 984 - 662 Thousands/uL    nRBC 0 /100 WBCs    Neutrophils Relative 69 43 - 75 %    Immat GRANS % 1 0 - 2 %    Lymphocytes Relative 15 14 - 44 %    Monocytes Relative 13 (H) 4 - 12 %    Eosinophils Relative 1 0 - 6 %    Basophils Relative 1 0 - 1 %    Neutrophils Absolute 3.83 1.85 - 7.62 Thousands/µL    Immature Grans Absolute 0.07 0.00 - 0.20 Thousand/uL    Lymphocytes Absolute 0.84 0.60 - 4.47 Thousands/µL    Monocytes Absolute 0.72 0.17 - 1.22 Thousand/µL    Eosinophils Absolute 0.08 0.00 - 0.61 Thousand/µL    Basophils Absolute 0.03 0.00 - 0.10 Thousands/µL   Comprehensive metabolic panel    Collection Time: 10/13/23  1:41 PM   Result Value Ref Range    Sodium 139 135 - 147 mmol/L    Potassium 3.4 (L) 3.5 - 5.3 mmol/L    Chloride 107 96 - 108 mmol/L    CO2 22 21 - 32 mmol/L    ANION GAP 10 mmol/L    BUN 21 5 - 25 mg/dL    Creatinine 0.92 0.60 - 1.30 mg/dL    Glucose 87 65 - 140 mg/dL    Calcium 9.0 8.4 - 10.2 mg/dL    AST 17 13 - 39 U/L    ALT 14 7 - 52 U/L    Alkaline Phosphatase 43 34 - 104 U/L    Total Protein 6.6 6.4 - 8.4 g/dL    Albumin 3.7 3.5 - 5.0 g/dL    Total Bilirubin 0.36 0.20 - 1.00 mg/dL    eGFR 86 ml/min/1.73sq m   TSH, 3rd generation with Free T4 reflex    Collection Time: 10/13/23  1:41 PM   Result Value Ref Range    TSH 3RD GENERATON 2.325 0.450 - 4.500 uIU/mL   T3, free    Collection Time: 10/13/23  1:41 PM   Result Value Ref Range    T3, Free 3. 17 2.50 - 3.90 pg/mL   CBC and differential    Collection Time: 11/03/23 11:48 AM   Result Value Ref Range    WBC 5.25 4.31 - 10.16 Thousand/uL    RBC 3.82 (L) 3.88 - 5.62 Million/uL    Hemoglobin 13.1 12.0 - 17.0 g/dL    Hematocrit 40.0 36.5 - 49.3 %     (H) 82 - 98 fL    MCH 34.3 26.8 - 34.3 pg    MCHC 32.8 31.4 - 37.4 g/dL    RDW 13.5 11.6 - 15.1 %    MPV 9.7 8.9 - 12.7 fL    Platelets 588 911 - 625 Thousands/uL    nRBC 0 /100 WBCs    Neutrophils Relative 64 43 - 75 %    Immat GRANS % 1 0 - 2 %    Lymphocytes Relative 17 14 - 44 %    Monocytes Relative 13 (H) 4 - 12 %    Eosinophils Relative 4 0 - 6 %    Basophils Relative 1 0 - 1 %    Neutrophils Absolute 3.37 1.85 - 7.62 Thousands/µL    Immature Grans Absolute 0.04 0.00 - 0.20 Thousand/uL    Lymphocytes Absolute 0.91 0.60 - 4.47 Thousands/µL    Monocytes Absolute 0.70 0.17 - 1.22 Thousand/µL    Eosinophils Absolute 0.20 0.00 - 0.61 Thousand/µL    Basophils Absolute 0.03 0.00 - 0.10 Thousands/µL   Comprehensive metabolic panel    Collection Time: 11/03/23 11:48 AM   Result Value Ref Range    Sodium 140 135 - 147 mmol/L    Potassium 3.9 3.5 - 5.3 mmol/L    Chloride 109 (H) 96 - 108 mmol/L    CO2 23 21 - 32 mmol/L    ANION GAP 8 mmol/L    BUN 16 5 - 25 mg/dL    Creatinine 0.89 0.60 - 1.30 mg/dL    Glucose 93 65 - 140 mg/dL    Calcium 8.8 8.4 - 10.2 mg/dL    AST 17 13 - 39 U/L    ALT 12 7 - 52 U/L    Alkaline Phosphatase 53 34 - 104 U/L    Total Protein 7.1 6.4 - 8.4 g/dL    Albumin 3.8 3.5 - 5.0 g/dL    Total Bilirubin 0.34 0.20 - 1.00 mg/dL    eGFR 89 ml/min/1.73sq m   TSH, 3rd generation with Free T4 reflex    Collection Time: 11/03/23 11:48 AM   Result Value Ref Range    TSH 3RD GENERATON 2.855 0.450 - 4.500 uIU/mL   T3, free    Collection Time: 11/03/23 11:48 AM   Result Value Ref Range    T3, Free 3.11 2.50 - 3.90 pg/mL   Cortisol    Collection Time: 11/03/23 11:48 AM   Result Value Ref Range    Cortisol, Random 4.9 ug/dL            Physical Exam  Constitutional:       General: He is not in acute distress. Appearance: He is well-developed. He is not diaphoretic. HENT:      Head: Normocephalic and atraumatic. Right Ear: External ear normal.      Left Ear: External ear normal.      Nose: Nose normal.      Mouth/Throat:      Mouth: Mucous membranes are moist.      Pharynx: No oropharyngeal exudate or posterior oropharyngeal erythema. Eyes:      General:         Right eye: No discharge. Left eye: No discharge. Conjunctiva/sclera: Conjunctivae normal.      Pupils: Pupils are equal, round, and reactive to light. Neck:      Thyroid: No thyromegaly. Cardiovascular:      Rate and Rhythm: Normal rate and regular rhythm. Heart sounds: Normal heart sounds. No murmur heard. No friction rub. No gallop. Pulmonary:      Effort: Pulmonary effort is normal. No respiratory distress. Breath sounds: Normal breath sounds. No stridor. No wheezing or rales. Abdominal:      General: Bowel sounds are normal. There is no distension. Palpations: Abdomen is soft. Tenderness: There is no abdominal tenderness. Musculoskeletal:      Cervical back: Normal range of motion and neck supple. Right lower leg: Edema present. Left lower leg: Edema present. Comments: LLE edema > RLE   Lymphadenopathy:      Cervical: No cervical adenopathy. Skin:     General: Skin is warm and dry. Findings: No erythema or rash. Neurological:      Mental Status: He is alert and oriented to person, place, and time. Psychiatric:         Behavior: Behavior normal.         Thought Content:  Thought content normal.         Judgment: Judgment normal.

## 2023-11-16 ENCOUNTER — APPOINTMENT (OUTPATIENT)
Dept: PHYSICAL THERAPY | Age: 68
End: 2023-11-16

## 2023-11-20 ENCOUNTER — DOCUMENTATION (OUTPATIENT)
Dept: HEMATOLOGY ONCOLOGY | Facility: CLINIC | Age: 68
End: 2023-11-20

## 2023-11-20 ENCOUNTER — TELEPHONE (OUTPATIENT)
Dept: INTERNAL MEDICINE CLINIC | Age: 68
End: 2023-11-20

## 2023-11-20 ENCOUNTER — TELEPHONE (OUTPATIENT)
Dept: HEMATOLOGY ONCOLOGY | Facility: CLINIC | Age: 68
End: 2023-11-20

## 2023-11-20 NOTE — PROGRESS NOTES
Called [facility] & spoke to ismael pt is approved for free keytruda sd that I need to talk to dona hester at [phone] & they will set up the shipment. She transfrd there  W/shiv kern sd that the pt is approved for free keytruda sd effective 10/31/23-10/31/24 set p the next shipment will ship out 11/20/23 for delivery 11/21/23 verfd the ship to address [facility]. Emailed the team  Called pt to give him that info & he thanked me for the call

## 2023-11-20 NOTE — TELEPHONE ENCOUNTER
Received voicemail from [facility] Patient Assistance program:    "Rambo. Yes, This is Fern with the [facility] Patient Assistance program at [facility], I am calling. In regards to mutual patient Iftikhar Mahajan, they have birth of [birth year]. We are needing prescription to be sent in to us verbally or can be faxed to us. Please give us a call back at [phone]. That's [phone].  Thank you and have a great rest of your day."

## 2023-11-20 NOTE — TELEPHONE ENCOUNTER
Patient wife called the office and stated hat he has physical therapy on Tuesday/Wednesday and she was wondering if this is something he should be doing since he is still swollen. There has been slight improvement since starting the antibiotics, he is not in as much pain as he was. Also asking that with the holiday approaching and his immune sytem being compromised due to chemo, do you feel the 7 day cycle for the antibiotics is good enough or should he be on it a little longer? Please advise.

## 2023-11-20 NOTE — PROGRESS NOTES
RX Eliquis Re-Enrollment form completed and sent to clinical.         Briana Quezada, MPH  [address] [address] [place]  Email: [email or web address]

## 2023-11-20 NOTE — TELEPHONE ENCOUNTER
He may continue PT if he can feel he can tolerate. 7 days should be enough, if there is any concern, I am happy to see him in the office on Friday for re evaluation.

## 2023-11-21 ENCOUNTER — OFFICE VISIT (OUTPATIENT)
Dept: PHYSICAL THERAPY | Age: 68
End: 2023-11-21

## 2023-11-21 DIAGNOSIS — R53.1 GENERALIZED WEAKNESS: Primary | ICD-10-CM

## 2023-11-21 PROCEDURE — 97110 THERAPEUTIC EXERCISES: CPT

## 2023-11-21 PROCEDURE — 97112 NEUROMUSCULAR REEDUCATION: CPT

## 2023-11-21 NOTE — PROGRESS NOTES
Daily Note     Today's date: 2023  Patient name: Amanda Case  : 1955  MRN: [id number]  Referring provider: Deniz John MD  Dx:   Encounter Diagnosis     ICD-10-CM    1. Generalized weakness  R53.1                      Subjective: pt reports waking up last Weds,with increased swelling B LE (L>R), and n/a to get shoes on, went to Dr and r/o DVT and is being treated with antibiotics for cellulitis, pt reports he has been on Eloquis for several months now    Objective: See treatment diary below  Gait: pt amb with L LE abducted due to min L LE discomfort (decreased since last week)      Assessment: L LE presents with min erythema and min TTP, decreased swelling since last week(as per pt.), pt able to dino modified program today      Plan: Continue per plan of care. Progress treatment as tolerated. POC expires Unit limit Auth Expiration date PT/OT + Visit Limit?    24 4                             Visit/Unit Tracking  AUTH Status:  Date               [facility] care no auth Used 31               Remaining                       Precautions: Stage IV cancer, thoracic metastasis avoid lifting from flexed positions, NO GRADE 5 MOBS, avoid bridges      Manuals 10/24 10/26 11/7                                     Neuro Re-Ed       SLR B 3x10 ea B 3x10 ea  B 3x10 ea  3x10 ea 3x10 ea B/L   Mini squats 3x10 3x10 3x10 2x10x3" np   Standing hip abd  2x15 ea  3x10     LPD 3x10 btb 3x10 BTB      Low rows 3x10 btb 3x10 BTB              Leg press  105#   4x10 105# 4x10 105# 5x10 np   Arm bike  3'/3' 3'/3' 3'/3' 3'/3'   Sidestepping         biodex   Maze and LS 3x 3x ea static lvl 2 3x ea static lvl 2 (maze/LUCINDA)   Ther Ex       Nu step ROM 10' 10' 10' 10' 10'   Leg press        PT ed on lifting mechainccs 5'       LTR    30x5" ea 30x5"                                   Ther Activity                        Gait Training                        Modalities

## 2023-11-22 ENCOUNTER — DOCUMENTATION (OUTPATIENT)
Dept: HEMATOLOGY ONCOLOGY | Facility: CLINIC | Age: 68
End: 2023-11-22

## 2023-11-22 ENCOUNTER — OFFICE VISIT (OUTPATIENT)
Dept: PHYSICAL THERAPY | Age: 68
End: 2023-11-22

## 2023-11-22 DIAGNOSIS — R53.1 GENERALIZED WEAKNESS: Primary | ICD-10-CM

## 2023-11-22 PROCEDURE — 97112 NEUROMUSCULAR REEDUCATION: CPT | Performed by: PHYSICAL THERAPIST

## 2023-11-22 PROCEDURE — 97110 THERAPEUTIC EXERCISES: CPT | Performed by: PHYSICAL THERAPIST

## 2023-11-22 NOTE — PROGRESS NOTES
Recvd email from Prabhakar that the keytrruda didn't arrive like it was supposed to  Called dona hester & spoke to katy clifton that after I set up the shipment on 11/20/23 they realized there was no more refills left & they tried to call delfino however they didn't hear back from her.they need more refils & they can take a verbal by calling [phone] once they get that verbal then they will need to still set up the shipment. The earliest it can be shipped out is 11/27/23 for delivery 11/28/23 I emailed this to prabhakar & le  I did see that delfino  faxed the script to [phone] so I called dona hester back & spoke to martha clifton that they never recvd that rx because that's the fax for [facility] & it should have gone to them their fax# is [phone]. Even if they get that refill they are done shipping this week.

## 2023-11-22 NOTE — PROGRESS NOTES
Daily Note     Today's date: 2023  Patient name: Marielos Orosco  : 1955  MRN: [id number]  Referring provider: Alannah Barcenas MD  Dx:   Encounter Diagnosis     ICD-10-CM    1. Generalized weakness  R53.1                      Subjective: Reports feeling about the same coming in today, no new complaints of pain. Objective: See treatment diary below      Assessment: Tolerated treatment well. Patient would benefit from continued PT, did well with today's session with no complaints of pain throughout. Did well with S/L table exercises, trialed return of TB exercises today. Plan: Continue per plan of care. POC expires Unit limit Auth Expiration date PT/OT + Visit Limit?    24 4                             Visit/Unit Tracking  AUTH Status:  Date              [facility] no auth Used 31 32              Remaining                       Precautions: Stage IV cancer, thoracic metastasis avoid lifting from flexed positions, NO GRADE 5 MOBS, avoid bridges      Manuals 10/24 10/26 11/7                                          Neuro Re-Ed        SLR B 3x10 ea B 3x10 ea  B 3x10 ea  3x10 ea 3x10 ea B/L 3x10 ea B/L   Mini squats 3x10 3x10 3x10 2x10x3" np    SLR abduction       2x10 ea    Standing hip abd  2x15 ea  3x10      LPD 3x10 btb 3x10 BTB    2x10 GTB   Low rows 3x10 btb 3x10 BTB    2x10 GTB            Leg press  105#   4x10 105# 4x10 105# 5x10 np    Arm bike  3'/3' 3'/3' 3'/3' 3'/3' 3/3'    Sidestepping          biodex   Maze and LS 3x 3x ea static lvl 2 3x ea static lvl 2 (maze/LUCINDA) 3x ea static maze+LOS   Ther Ex        Nu step ROM 10' 10' 10' 10' 10' 10'    Leg press         PT ed on lifting mechainccs 5'        LTR    30x5" ea 30x5" 30x5"                                        Ther Activity                           Gait Training                           Modalities

## 2023-11-24 ENCOUNTER — HOSPITAL ENCOUNTER (OUTPATIENT)
Dept: INFUSION CENTER | Facility: HOSPITAL | Age: 68
End: 2023-11-24

## 2023-11-24 DIAGNOSIS — R60.0 LOWER EXTREMITY EDEMA: Primary | ICD-10-CM

## 2023-11-24 DIAGNOSIS — C34.91 NON-SMALL CELL CANCER OF RIGHT LUNG (HCC): Primary | ICD-10-CM

## 2023-11-24 DIAGNOSIS — R60.0 LOWER EXTREMITY EDEMA: ICD-10-CM

## 2023-11-24 LAB
ALBUMIN SERPL BCP-MCNC: 3.5 G/DL (ref 3.5–5)
ALP SERPL-CCNC: 52 U/L (ref 34–104)
ALT SERPL W P-5'-P-CCNC: 10 U/L (ref 7–52)
ANION GAP SERPL CALCULATED.3IONS-SCNC: 8 MMOL/L
AST SERPL W P-5'-P-CCNC: 20 U/L (ref 13–39)
BASOPHILS # BLD AUTO: 0.02 THOUSANDS/ÂΜL (ref 0–0.1)
BASOPHILS NFR BLD AUTO: 0 % (ref 0–1)
BILIRUB SERPL-MCNC: 0.5 MG/DL (ref 0.2–1)
BUN SERPL-MCNC: 14 MG/DL (ref 5–25)
CALCIUM SERPL-MCNC: 8.4 MG/DL (ref 8.4–10.2)
CHLORIDE SERPL-SCNC: 107 MMOL/L (ref 96–108)
CO2 SERPL-SCNC: 24 MMOL/L (ref 21–32)
CREAT SERPL-MCNC: 0.81 MG/DL (ref 0.6–1.3)
EOSINOPHIL # BLD AUTO: 0.23 THOUSAND/ÂΜL (ref 0–0.61)
EOSINOPHIL NFR BLD AUTO: 5 % (ref 0–6)
ERYTHROCYTE [DISTWIDTH] IN BLOOD BY AUTOMATED COUNT: 13.8 % (ref 11.6–15.1)
GFR SERPL CREATININE-BSD FRML MDRD: 93 ML/MIN/1.73SQ M
GLUCOSE SERPL-MCNC: 97 MG/DL (ref 65–140)
HCT VFR BLD AUTO: 36.2 % (ref 36.5–49.3)
HGB BLD-MCNC: 11.6 G/DL (ref 12–17)
IMM GRANULOCYTES # BLD AUTO: 0.02 THOUSAND/UL (ref 0–0.2)
IMM GRANULOCYTES NFR BLD AUTO: 0 % (ref 0–2)
LYMPHOCYTES # BLD AUTO: 0.74 THOUSANDS/ÂΜL (ref 0.6–4.47)
LYMPHOCYTES NFR BLD AUTO: 16 % (ref 14–44)
MCH RBC QN AUTO: 31.9 PG (ref 26.8–34.3)
MCHC RBC AUTO-ENTMCNC: 32 G/DL (ref 31.4–37.4)
MCV RBC AUTO: 100 FL (ref 82–98)
MONOCYTES # BLD AUTO: 0.72 THOUSAND/ÂΜL (ref 0.17–1.22)
MONOCYTES NFR BLD AUTO: 16 % (ref 4–12)
NEUTROPHILS # BLD AUTO: 2.9 THOUSANDS/ÂΜL (ref 1.85–7.62)
NEUTS SEG NFR BLD AUTO: 63 % (ref 43–75)
NRBC BLD AUTO-RTO: 0 /100 WBCS
PLATELET # BLD AUTO: 226 THOUSANDS/UL (ref 149–390)
PMV BLD AUTO: 9.7 FL (ref 8.9–12.7)
POTASSIUM SERPL-SCNC: 3.6 MMOL/L (ref 3.5–5.3)
PROT SERPL-MCNC: 6.4 G/DL (ref 6.4–8.4)
RBC # BLD AUTO: 3.64 MILLION/UL (ref 3.88–5.62)
SODIUM SERPL-SCNC: 139 MMOL/L (ref 135–147)
T3FREE SERPL-MCNC: 3.31 PG/ML (ref 2.5–3.9)
TSH SERPL DL<=0.05 MIU/L-ACNC: 2.63 UIU/ML (ref 0.45–4.5)
WBC # BLD AUTO: 4.63 THOUSAND/UL (ref 4.31–10.16)

## 2023-11-24 PROCEDURE — 84481 FREE ASSAY (FT-3): CPT | Performed by: INTERNAL MEDICINE

## 2023-11-24 PROCEDURE — 80053 COMPREHEN METABOLIC PANEL: CPT | Performed by: INTERNAL MEDICINE

## 2023-11-24 PROCEDURE — 85025 COMPLETE CBC W/AUTO DIFF WBC: CPT | Performed by: INTERNAL MEDICINE

## 2023-11-24 PROCEDURE — 84443 ASSAY THYROID STIM HORMONE: CPT | Performed by: INTERNAL MEDICINE

## 2023-11-24 RX ORDER — POTASSIUM CHLORIDE 20 MEQ/1
40 TABLET, EXTENDED RELEASE ORAL DAILY
Qty: 3 TABLET | Refills: 0 | Status: SHIPPED | OUTPATIENT
Start: 2023-11-24 | End: 2023-11-24 | Stop reason: SDUPTHER

## 2023-11-24 RX ORDER — FUROSEMIDE 40 MG/1
40 TABLET ORAL DAILY
Qty: 3 TABLET | Refills: 0 | Status: SHIPPED | OUTPATIENT
Start: 2023-11-24

## 2023-11-24 RX ORDER — POTASSIUM CHLORIDE 20 MEQ/1
40 TABLET, EXTENDED RELEASE ORAL DAILY
Qty: 6 TABLET | Refills: 0 | Status: SHIPPED | OUTPATIENT
Start: 2023-11-24

## 2023-11-24 NOTE — PROGRESS NOTES
LCW pac accessed and flushed. Brisk blood return noted after a few extra flushes. Pt tolerated without incident. Aware of next appt.  Declines AVS.

## 2023-11-26 DIAGNOSIS — C34.91 NON-SMALL CELL CANCER OF RIGHT LUNG (HCC): ICD-10-CM

## 2023-11-27 ENCOUNTER — TELEPHONE (OUTPATIENT)
Dept: HEMATOLOGY ONCOLOGY | Facility: CLINIC | Age: 68
End: 2023-11-27

## 2023-11-27 ENCOUNTER — HOSPITAL ENCOUNTER (OUTPATIENT)
Dept: INFUSION CENTER | Facility: HOSPITAL | Age: 68
Discharge: HOME | End: 2023-11-27
Attending: INTERNAL MEDICINE

## 2023-11-27 RX ORDER — FOLIC ACID 1 MG/1
1000 TABLET ORAL DAILY
Qty: 90 TABLET | Refills: 0 | Status: SHIPPED | OUTPATIENT
Start: 2023-11-27

## 2023-11-27 NOTE — TELEPHONE ENCOUNTER
Placed call to [address] [facility] [phone] to schedule shipment of patients Keytruda for treatment date 11/28/2023. Shipping out 11/27/2023 for arrival via [address] [place] [address] on 11/28/2023. 6 refills on file.

## 2023-11-28 ENCOUNTER — APPOINTMENT (OUTPATIENT)
Dept: PHYSICAL THERAPY | Age: 68
End: 2023-11-28

## 2023-11-28 ENCOUNTER — HOSPITAL ENCOUNTER (OUTPATIENT)
Dept: INFUSION CENTER | Facility: HOSPITAL | Age: 68
Discharge: HOME | End: 2023-11-28

## 2023-11-29 ENCOUNTER — HOSPITAL ENCOUNTER (OUTPATIENT)
Dept: INFUSION CENTER | Facility: HOSPITAL | Age: 68
Discharge: HOME | End: 2023-11-29
Attending: INTERNAL MEDICINE

## 2023-11-29 VITALS
RESPIRATION RATE: 20 BRPM | HEIGHT: 70 IN | TEMPERATURE: 97.4 F | SYSTOLIC BLOOD PRESSURE: 129 MMHG | HEART RATE: 78 BPM | BODY MASS INDEX: 22.41 KG/M2 | DIASTOLIC BLOOD PRESSURE: 75 MMHG | WEIGHT: 156.53 LBS

## 2023-11-29 DIAGNOSIS — C34.91 NON-SMALL CELL CANCER OF RIGHT LUNG (HCC): Primary | ICD-10-CM

## 2023-11-29 PROCEDURE — 96367 TX/PROPH/DG ADDL SEQ IV INF: CPT

## 2023-11-29 PROCEDURE — 96411 CHEMO IV PUSH ADDL DRUG: CPT

## 2023-11-29 PROCEDURE — 36593 DECLOT VASCULAR DEVICE: CPT

## 2023-11-29 PROCEDURE — 96413 CHEMO IV INFUSION 1 HR: CPT

## 2023-11-29 RX ORDER — SODIUM CHLORIDE 9 MG/ML
20 INJECTION, SOLUTION INTRAVENOUS ONCE
Status: COMPLETED | OUTPATIENT
Start: 2023-11-29 | End: 2023-11-29

## 2023-11-29 RX ADMIN — SODIUM CHLORIDE 200 MG: 9 INJECTION, SOLUTION INTRAVENOUS at 10:38

## 2023-11-29 RX ADMIN — SODIUM CHLORIDE 20 ML/HR: 0.9 INJECTION, SOLUTION INTRAVENOUS at 09:59

## 2023-11-29 RX ADMIN — PEMETREXED DISODIUM 846 MG: 500 INJECTION, POWDER, LYOPHILIZED, FOR SOLUTION INTRAVENOUS at 11:30

## 2023-11-29 RX ADMIN — ALTEPLASE 2 MG: 2.2 INJECTION, POWDER, LYOPHILIZED, FOR SOLUTION INTRAVENOUS at 09:18

## 2023-11-29 RX ADMIN — DEXAMETHASONE SODIUM PHOSPHATE: 10 INJECTION, SOLUTION INTRAMUSCULAR; INTRAVENOUS at 09:59

## 2023-11-30 ENCOUNTER — APPOINTMENT (OUTPATIENT)
Dept: PHYSICAL THERAPY | Age: 68
End: 2023-11-30

## 2023-12-04 ENCOUNTER — DOCUMENTATION (OUTPATIENT)
Dept: HEMATOLOGY ONCOLOGY | Facility: CLINIC | Age: 68
End: 2023-12-04

## 2023-12-04 ENCOUNTER — TELEPHONE (OUTPATIENT)
Dept: HEMATOLOGY ONCOLOGY | Facility: CLINIC | Age: 68
End: 2023-12-04

## 2023-12-04 NOTE — TELEPHONE ENCOUNTER
Spoke with patient's wife Akila. She states that the patient has been experiencing acid reflux and asked if the patient can take Prilosec. I informed her that he can try this and call us back if it doesn't seem to help. Akila verbalized understanding and is in agreement with the plan.

## 2023-12-11 ENCOUNTER — TELEPHONE (OUTPATIENT)
Dept: RADIATION ONCOLOGY | Facility: CLINIC | Age: 68
End: 2023-12-11

## 2023-12-11 ENCOUNTER — DOCUMENTATION (OUTPATIENT)
Dept: HEMATOLOGY ONCOLOGY | Facility: CLINIC | Age: 68
End: 2023-12-11

## 2023-12-11 NOTE — TELEPHONE ENCOUNTER
I called pt to verify ins. PT is under the financial assistance [facility] though [facility].Pt would like to cancel appt if it is not needed. They follow up with  and are having no issues. Would like to cut back on appts. Please advise.

## 2023-12-11 NOTE — PROGRESS NOTES
Kym the completed form for eliquis  Faxed that to [facility]    Called dona hester to set up the next shipment of keytruda & spoke to kisha kern sd that they can ship out the keytruda 12/12/23 for delivery 12/13/23 but she sd that they can't treat the pt until 12/19/23 due to the treatment date being 1 day shy of 21 days.she sd that  they will need to reschedule to 12/19/23  Emaild the team & le this info

## 2023-12-12 ENCOUNTER — TELEPHONE (OUTPATIENT)
Dept: RADIATION ONCOLOGY | Facility: CLINIC | Age: 68
End: 2023-12-12

## 2023-12-12 NOTE — TELEPHONE ENCOUNTER
Spoke with patient's wife Tanja Chow, who is listed on communication consent, RE: request to cancel appointment. States that Dr. Jing Sharp has been ordering and reviewing surveillance scans and they are comfortable with that plan. Will cancel f/u appointment on 12/15/23. Has our office number if needed for future.

## 2023-12-14 ENCOUNTER — TELEPHONE (OUTPATIENT)
Dept: HEMATOLOGY ONCOLOGY | Facility: CLINIC | Age: 68
End: 2023-12-14

## 2023-12-14 ENCOUNTER — DOCUMENTATION (OUTPATIENT)
Dept: HEMATOLOGY ONCOLOGY | Facility: CLINIC | Age: 68
End: 2023-12-14

## 2023-12-14 NOTE — PROGRESS NOTES
Recvd message from le to call the pts wife. Called sherry & I explained the keytruda shipments & I told her that according to the [facility] pharmacist the keytruda for the 12/18/23 dos is already at the pharmacy. There shouldn't be any issues. It was set up to be delivered 12/13/23. She thanked me for the call & the info

## 2023-12-14 NOTE — TELEPHONE ENCOUNTER
Please r/s inf to 12/19 due to [facility] ([facility] npt being available for shipment until then.  Thanks

## 2023-12-15 ENCOUNTER — HOSPITAL ENCOUNTER (OUTPATIENT)
Dept: INFUSION CENTER | Facility: HOSPITAL | Age: 68
End: 2023-12-15

## 2023-12-15 ENCOUNTER — OFFICE VISIT (OUTPATIENT)
Dept: HEMATOLOGY ONCOLOGY | Facility: CLINIC | Age: 68
End: 2023-12-15

## 2023-12-15 VITALS
OXYGEN SATURATION: 98 % | DIASTOLIC BLOOD PRESSURE: 84 MMHG | RESPIRATION RATE: 17 BRPM | WEIGHT: 152.4 LBS | HEART RATE: 85 BPM | TEMPERATURE: 97.8 F | SYSTOLIC BLOOD PRESSURE: 126 MMHG | HEIGHT: 70 IN | BODY MASS INDEX: 21.82 KG/M2

## 2023-12-15 DIAGNOSIS — C34.91 NON-SMALL CELL CANCER OF RIGHT LUNG (HCC): Primary | ICD-10-CM

## 2023-12-15 LAB
ALBUMIN SERPL BCP-MCNC: 3.8 G/DL (ref 3.5–5)
ALP SERPL-CCNC: 49 U/L (ref 34–104)
ALT SERPL W P-5'-P-CCNC: 16 U/L (ref 7–52)
ANION GAP SERPL CALCULATED.3IONS-SCNC: 6 MMOL/L
AST SERPL W P-5'-P-CCNC: 18 U/L (ref 13–39)
BASOPHILS # BLD AUTO: 0.02 THOUSANDS/ÂΜL (ref 0–0.1)
BASOPHILS NFR BLD AUTO: 0 % (ref 0–1)
BILIRUB SERPL-MCNC: 0.48 MG/DL (ref 0.2–1)
BUN SERPL-MCNC: 16 MG/DL (ref 5–25)
CALCIUM SERPL-MCNC: 9.2 MG/DL (ref 8.4–10.2)
CHLORIDE SERPL-SCNC: 109 MMOL/L (ref 96–108)
CO2 SERPL-SCNC: 24 MMOL/L (ref 21–32)
CREAT SERPL-MCNC: 0.96 MG/DL (ref 0.6–1.3)
EOSINOPHIL # BLD AUTO: 0.19 THOUSAND/ÂΜL (ref 0–0.61)
EOSINOPHIL NFR BLD AUTO: 4 % (ref 0–6)
ERYTHROCYTE [DISTWIDTH] IN BLOOD BY AUTOMATED COUNT: 14.5 % (ref 11.6–15.1)
GFR SERPL CREATININE-BSD FRML MDRD: 82 ML/MIN/1.73SQ M
GLUCOSE SERPL-MCNC: 95 MG/DL (ref 65–140)
HCT VFR BLD AUTO: 37.4 % (ref 36.5–49.3)
HGB BLD-MCNC: 12.4 G/DL (ref 12–17)
IMM GRANULOCYTES # BLD AUTO: 0.02 THOUSAND/UL (ref 0–0.2)
IMM GRANULOCYTES NFR BLD AUTO: 0 % (ref 0–2)
LYMPHOCYTES # BLD AUTO: 0.85 THOUSANDS/ÂΜL (ref 0.6–4.47)
LYMPHOCYTES NFR BLD AUTO: 16 % (ref 14–44)
MCH RBC QN AUTO: 32.5 PG (ref 26.8–34.3)
MCHC RBC AUTO-ENTMCNC: 33.2 G/DL (ref 31.4–37.4)
MCV RBC AUTO: 98 FL (ref 82–98)
MONOCYTES # BLD AUTO: 0.71 THOUSAND/ÂΜL (ref 0.17–1.22)
MONOCYTES NFR BLD AUTO: 14 % (ref 4–12)
NEUTROPHILS # BLD AUTO: 3.39 THOUSANDS/ÂΜL (ref 1.85–7.62)
NEUTS SEG NFR BLD AUTO: 66 % (ref 43–75)
NRBC BLD AUTO-RTO: 0 /100 WBCS
PLATELET # BLD AUTO: 208 THOUSANDS/UL (ref 149–390)
PMV BLD AUTO: 9.9 FL (ref 8.9–12.7)
POTASSIUM SERPL-SCNC: 4.1 MMOL/L (ref 3.5–5.3)
PROT SERPL-MCNC: 7 G/DL (ref 6.4–8.4)
RBC # BLD AUTO: 3.82 MILLION/UL (ref 3.88–5.62)
SODIUM SERPL-SCNC: 139 MMOL/L (ref 135–147)
T3FREE SERPL-MCNC: 3.56 PG/ML (ref 2.5–3.9)
TSH SERPL DL<=0.05 MIU/L-ACNC: 1.95 UIU/ML (ref 0.45–4.5)
WBC # BLD AUTO: 5.18 THOUSAND/UL (ref 4.31–10.16)

## 2023-12-15 PROCEDURE — 84481 FREE ASSAY (FT-3): CPT | Performed by: INTERNAL MEDICINE

## 2023-12-15 PROCEDURE — 85025 COMPLETE CBC W/AUTO DIFF WBC: CPT | Performed by: INTERNAL MEDICINE

## 2023-12-15 PROCEDURE — 80053 COMPREHEN METABOLIC PANEL: CPT | Performed by: INTERNAL MEDICINE

## 2023-12-15 PROCEDURE — 99214 OFFICE O/P EST MOD 30 MIN: CPT | Performed by: INTERNAL MEDICINE

## 2023-12-15 PROCEDURE — 84443 ASSAY THYROID STIM HORMONE: CPT | Performed by: INTERNAL MEDICINE

## 2023-12-15 RX ORDER — SODIUM CHLORIDE 9 MG/ML
20 INJECTION, SOLUTION INTRAVENOUS ONCE
OUTPATIENT
Start: 2024-01-08

## 2023-12-15 NOTE — PROGRESS NOTES
Jose De Jesus Rowe  port accessed and labs drawn as ordered. tolerated treatment well with no complications. Jose De Jesus Rowe is aware of future appt on 12/19/23at 1130.      AVS Declined by Jose De Jesus Rowe

## 2023-12-17 NOTE — PROGRESS NOTES
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     Caris - PD-L1 0%, TMB 60, no other targetable mutations     2023 - palliative RT to T spine and brain     2023 - start carbo/pemetrexed/pembrolizumab     2023 suspected Rad/Immuno Pneumonitis Grade 1/2 (mild fever)      2023 discontinued Carbo d/t fatigue; and holding Keytruda in Cycle 4 dt Pneumonitis; will re-challenge in cycle 5      2023 - add back keytruda for cycle 6     2023 - dose reduce alimta by 10% due to fatigue     Thoracic spine tumor   2023 Initial Diagnosis    Thoracic spine tumor     Non-small cell cancer of right lung (HCC)   3/29/2023 -  Cancer Staged    Staging form: Lung, AJCC 8th Edition  - Clinical stage from 3/29/2023: Stage DORI (cT4, cN1, cM1b) - Signed by Nancy Ruelas DO on 2023 Biopsy    Lung, right upper lobe, biopsy:     - Rare detached single and small clusters of atypical, degenerated epithelial cells.     - Predominantly fibroelastotic stromal fibrosis.     2023 Biopsy    Lung, right upper lobe mass, biopsy:      - Poorly differentiated non-small cell carcinoma most compatible with adenocarcinoma of the lung     2023 - 2023 Radiation    Course: C1    Plan ID Energy Fractions Dose per Fraction (cGy) Dose Correction (cGy) Total Dose Delivered (cGy) Elapsed Days   [id number]_T4_R Lung 10X/6X 10 / 10 300 0 3,000 11   HCS_WhBrain 6X 10 / 10 300 0 3,000 11      Dr. Ro Genao     2023 Initial Diagnosis    Non-small cell cancer of right lung (HCC)     2023 -  Chemotherapy    cyanocobalamin, 1,000 mcg, Intramuscular, Once, 5 of 5 cycles  Administration: 1,000 mcg (5/15/2023), 1,000 mcg  (7/3/2023), 1,000 mcg (9/5/2023), 1,000 mcg (11/6/2023)  alteplase (CATHFLO), 2 mg, Intracatheter, Every 1 Minute as needed, 10 of 14 cycles  Administration: 2 mg (11/29/2023)  fosaprepitant (EMEND) IVPB, 150 mg, Intravenous, Once, 3 of 3 cycles  Administration: 150 mg (5/22/2023), 150 mg (7/3/2023), 150 mg (6/12/2023)  CARBOplatin (PARAPLATIN) IVPB ([facility] [place]), 544 mg, Intravenous, Once, 3 of 3 cycles  Administration: 544 mg (5/22/2023), 554 mg (7/3/2023), 544 mg (6/12/2023)  pemetrexed (ALIMTA) chemo infusion, 940 mg, Intravenous, Once, 10 of 14 cycles  Dose modification: 450 mg/m2 (original dose 500 mg/m2, Cycle 9, Reason: Dose Not Tolerated, Comment: 10% dose reduction due to fatigue), 450 mg/m2 (original dose 500 mg/m2, Cycle 10, Reason: Dose modified as per discussion with consulting physician)  Administration: 900 mg (5/22/2023), 900 mg (7/3/2023), 900 mg (9/5/2023), 900 mg (6/12/2023), 900 mg (8/14/2023), 900 mg (7/24/2023), 900 mg (9/25/2023), 900 mg (10/16/2023), 846 mg (11/6/2023), 846 mg (11/29/2023)  pembrolizumab (KEYTRUDA) IVPB, 200 mg, Intravenous, Once, 9 of 13 cycles  Administration: 200 mg (5/22/2023), 200 mg (7/3/2023), 200 mg (9/5/2023), 200 mg (6/12/2023), 200 mg (8/14/2023), 200 mg (9/25/2023), 200 mg (10/16/2023), 200 mg (11/6/2023), 200 mg (11/29/2023)     7/12/2023 Adverse Reaction    Hospitalization - Suspected Pneumonitis    7/12-14/2023 due to mild fever with CT chest redemonstrating left upper lobe inflammatory changes, previously seen on study 04/04/2023, ID and oncology evaluated, more suggestive of radiation/Keytruda induced pneumonitis grade 1/2; mild fever resolved; other differential/contributing etiology include recent tapering of steroid       7/21/2023 -  Chemotherapy    Recently suspected pneumonitis 7/12/2023 and reports fatigue 7/21/2023  Discontinuing carboplatin and holding Keytruda of cycle 4; with potential plan to rechallenge Keytruda in cycle 5     Metastasis to  brain (HCC)   5/30/2023 Initial Diagnosis    Metastasis to brain (HCC)         Interval History: Patient presents for follow up of his stage IV non-small cell lung cancer.  He had some minor delays in his Keytruda due to shipment and delivery.  At his last cycle we dose reduce the Alimta by 10% and that did seem to help a bit with his fatigue.  He is sleeping better as well.  Since our last visit patient did see ENT for his mastoiditis.  He also was treated with a course of Keflex and Lasix for some lower extremity cellulitis and swelling.  He had Dopplers that were negative for DVT.  He also has some complaints of sensitivity on his right nipple.         REVIEW OF SYSTEMS:  Please note that a 14-point review of systems was performed to include Constitutional, HEENT, Respiratory, CVS, GI, , Musculoskeletal, Integumentary, Neurologic, Rheumatologic, Endocrinologic, Psychiatric, Lymphatic, and Hematologic/Oncologic systems were reviewed and are negative unless otherwise stated in HPI. Positive and negative findings pertinent to this evaluation are incorporated into the history of present illness.      ECOG PS: 1    PROBLEM LIST:  Patient Active Problem List   Diagnosis    Multiple subsegmental pulmonary emboli without acute cor pulmonale (HCC)    Tobacco use    DDD (degenerative disc disease), cervical    Pleural mass    Brain lesions    Thoracic spine tumor    Advanced care planning/counseling discussion    Non-small cell cancer of right lung (HCC)    Metastasis to brain (HCC)    Lesion of left lung    Chemotherapy-induced fatigue    Drug-induced pneumonitis    Current every day smoker    Bloating    Lower extremity edema       Assessment / Plan: 67-year-old male with stage IV non-small cell lung cancer.  We will continue with maintenance Alimta and Keytruda and I will see him back in 6 weeks in follow-up.  Will get a new CT scan at that time to monitor his disease.  He will be getting labs later today for his next  cycle of chemo.  I did examine his nipple and the skin around it.  It all appears normal.  I cannot detect any discrete masses.  He is not having any discharge.  I wonder if some of the tenderness on his skin may be from radiation.  I asked him to restart his gabapentin which he recently stopped.  His cellulitis has resolved and there are no contraindications to treatment next week.  There is no evidence of any autoimmune side effects.  He knows to call in the interim with any questions or concerns.       I spent 30 minutes on chart review, face to face counseling time, coordination of care and documentation.    Past Medical History:   has a past medical history of Cancer (HCC).    PAST SURGICAL HISTORY:   has a past surgical history that includes IR biopsy lung (4/6/2023); IR biopsy lung (4/26/2023); and IR port placement (5/16/2023).    CURRENT MEDICATIONS  Current Outpatient Medications   Medication Sig Dispense Refill    acetaminophen (TYLENOL) 500 mg tablet Take 500 mg by mouth every 4 (four) hours as needed for mild pain      apixaban (ELIQUIS) 5 mg Take 1 tablet (5 mg total) by mouth 2 (two) times a day 60 tablet 5    fluticasone (FLONASE) 50 mcg/act nasal spray 1 spray into each nostril 2 (two) times a day 16 g 1    folic acid (FOLVITE) 1 mg tablet Take 1 tablet (1,000 mcg total) by mouth daily 90 tablet 0    gabapentin (NEURONTIN) 300 mg capsule Take 300 mg in the AM and 600 mg in the Pm (Patient taking differently: Take 600 mg by mouth daily at bedtime) 270 capsule 1    lidocaine-prilocaine (EMLA) cream Apply to port 30 to 60 minutes prior to use 30 g 2    prochlorperazine (COMPAZINE) 10 mg tablet       dexamethasone (DECADRON) 1 mg tablet Take 1 tablet (1 mg total) by mouth 2 (two) times a day with meals (Patient not taking: Reported on 11/3/2023) 60 tablet 0    furosemide (LASIX) 40 mg tablet Take 1 tablet (40 mg total) by mouth daily (Patient not taking: Reported on 12/15/2023) 3 tablet 0    potassium  "chloride (K-DUR,KLOR-CON) 20 mEq tablet Take 2 tablets (40 mEq total) by mouth daily (Patient not taking: Reported on 12/15/2023) 6 tablet 0     No current facility-administered medications for this visit.     [unfilled]    SOCIAL HISTORY:   reports that he has been smoking cigarettes. He started smoking about 50 years ago. He has a 25.5 pack-year smoking history. He has never used smokeless tobacco. He reports that he does not currently use alcohol. He reports that he does not use drugs.     FAMILY HISTORY:  family history includes Stroke in his father.     ALLERGIES:  has No Known Allergies.      Physical Exam:  Vital Signs:   Visit Vitals  /84 (BP Location: Left arm, Patient Position: Sitting, Cuff Size: Adult)   Pulse 85   Temp 97.8 °F (36.6 °C)   Resp 17   Ht 5' 10\" (1.778 m)   Wt 69.1 kg (152 lb 6.4 oz)   SpO2 98%   BMI 21.87 kg/m²   Smoking Status Every Day   BSA 1.86 m²     Body mass index is 21.87 kg/m².  Body surface area is 1.86 meters squared.    GEN: Alert, awake oriented x3, in no acute distress  HEENT- No pallor, icterus, cyanosis, no oral mucosal lesions,   LAD - no palpable cervical, clavicle, axillary, inguinal LAD  Heart- normal S1 S2, regular rate and rhythm, No murmur, rubs.   Lungs- clear breathing sound bilateral.   Abdomen- soft, Non tender, bowel sounds present  Extremities- No cyanosis, clubbing, edema  Neuro- No focal neurological deficit    Labs:  Lab Results   Component Value Date    WBC 5.18 12/15/2023    HGB 12.4 12/15/2023    HCT 37.4 12/15/2023    MCV 98 12/15/2023     12/15/2023     Lab Results   Component Value Date    SODIUM 139 12/15/2023    K 4.1 12/15/2023     (H) 12/15/2023    CO2 24 12/15/2023    AGAP 6 12/15/2023    BUN 16 12/15/2023    CREATININE 0.96 12/15/2023    GLUC 95 12/15/2023    GLUF 103 (H) 05/10/2023    CALCIUM 9.2 12/15/2023    AST 18 12/15/2023    ALT 16 12/15/2023    ALKPHOS 49 12/15/2023    TP 7.0 12/15/2023    TBILI 0.48 12/15/2023    EGFR 82 " 12/15/2023

## 2023-12-18 ENCOUNTER — HOSPITAL ENCOUNTER (OUTPATIENT)
Dept: INFUSION CENTER | Facility: HOSPITAL | Age: 68
End: 2023-12-18
Attending: INTERNAL MEDICINE

## 2023-12-19 ENCOUNTER — OFFICE VISIT (OUTPATIENT)
Dept: PALLIATIVE MEDICINE | Facility: CLINIC | Age: 68
End: 2023-12-19

## 2023-12-19 ENCOUNTER — HOSPITAL ENCOUNTER (OUTPATIENT)
Dept: INFUSION CENTER | Facility: HOSPITAL | Age: 68
Discharge: HOME | End: 2023-12-19
Attending: INTERNAL MEDICINE

## 2023-12-19 VITALS
SYSTOLIC BLOOD PRESSURE: 132 MMHG | OXYGEN SATURATION: 97 % | DIASTOLIC BLOOD PRESSURE: 76 MMHG | TEMPERATURE: 96.4 F | HEART RATE: 72 BPM | BODY MASS INDEX: 22.14 KG/M2 | WEIGHT: 154.32 LBS

## 2023-12-19 VITALS
BODY MASS INDEX: 22.19 KG/M2 | TEMPERATURE: 97.8 F | WEIGHT: 155 LBS | HEART RATE: 81 BPM | DIASTOLIC BLOOD PRESSURE: 78 MMHG | SYSTOLIC BLOOD PRESSURE: 117 MMHG | HEIGHT: 70 IN | RESPIRATION RATE: 18 BRPM

## 2023-12-19 DIAGNOSIS — Z51.5 PALLIATIVE CARE BY SPECIALIST: ICD-10-CM

## 2023-12-19 DIAGNOSIS — R53.83 CHEMOTHERAPY-INDUCED FATIGUE: Primary | ICD-10-CM

## 2023-12-19 DIAGNOSIS — C34.91 NON-SMALL CELL CANCER OF RIGHT LUNG (HCC): Primary | ICD-10-CM

## 2023-12-19 DIAGNOSIS — R60.0 LOWER EXTREMITY EDEMA: ICD-10-CM

## 2023-12-19 DIAGNOSIS — C79.31 METASTASIS TO BRAIN (HCC): ICD-10-CM

## 2023-12-19 DIAGNOSIS — T45.1X5A CHEMOTHERAPY-INDUCED FATIGUE: Primary | ICD-10-CM

## 2023-12-19 DIAGNOSIS — C34.91 NON-SMALL CELL CANCER OF RIGHT LUNG (HCC): ICD-10-CM

## 2023-12-19 PROCEDURE — 99213 OFFICE O/P EST LOW 20 MIN: CPT | Performed by: INTERNAL MEDICINE

## 2023-12-19 RX ORDER — SODIUM CHLORIDE 9 MG/ML
20 INJECTION, SOLUTION INTRAVENOUS ONCE
Status: COMPLETED | OUTPATIENT
Start: 2023-12-19 | End: 2023-12-19

## 2023-12-19 RX ADMIN — PEMETREXED DISODIUM 846 MG: 500 INJECTION, POWDER, LYOPHILIZED, FOR SOLUTION INTRAVENOUS at 13:45

## 2023-12-19 RX ADMIN — SODIUM CHLORIDE 200 MG: 9 INJECTION, SOLUTION INTRAVENOUS at 12:55

## 2023-12-19 RX ADMIN — DEXAMETHASONE SODIUM PHOSPHATE: 10 INJECTION, SOLUTION INTRAMUSCULAR; INTRAVENOUS at 12:19

## 2023-12-19 RX ADMIN — SODIUM CHLORIDE 20 ML/HR: 0.9 INJECTION, SOLUTION INTRAVENOUS at 12:19

## 2023-12-19 RX ADMIN — ALTEPLASE 2 MG: 2.2 INJECTION, POWDER, LYOPHILIZED, FOR SOLUTION INTRAVENOUS at 11:39

## 2023-12-19 NOTE — PROGRESS NOTES
Palliative and Supportive Care   Claude Austin 67 y.o. male [id number]    Assessment/Plan:  1. Chemotherapy-induced fatigue    2. Non-small cell cancer of right lung (HCC)    3. Metastasis to brain (HCC)    4. Lower extremity edema    5. Palliative care by specialist      Doing really quite well but aware to be in touch if any symptoms or QoL concerns appear.  Follow up in 2-3 months.         Requested Prescriptions      No prescriptions requested or ordered in this encounter     There are no discontinued medications.    Representatives have queried the patient's controlled substance dispensing history in the Prescription Drug Monitoring Program in compliance with regulations before I have prescribed any controlled substances.  The prescription history is consistent with prescribed therapy and our practice policies.      I have spent a total time of 20 minutes on 12/19/23 in caring for this patient including Diagnostic results, Risks and benefits of tx options, Importance of tx compliance, and Reviewing / ordering tests, medicine, procedures  .     No follow-ups on file.    Subjective:   Chief Complaint  Follow up visit for:  symptom management  HPI     Claude Austin is a 67 y.o. male with metastatic non-small cell lung cancer currently on keytruda/dose-reduced alimta who presents for follow up on symptoms.  Last MRI overall encouraging, though he has seen ENT for his chronic mastoiditis.  Did require dose reduction by 10% in alimta due to fatigue.  Since our last visit also reporting LLE cellulitis which thankfully resolved.  Denies pain.  Appetite good.  No nausea. Still with marked fatigue but is adjusting to his new baseline.  No other concerns at this time.     The following portions of the medical history were reviewed: past medical history, problem list, medication list, and social history.    Current Outpatient Medications:     acetaminophen (TYLENOL) 500 mg tablet, Take 500 mg by mouth every 4 (four)  hours as needed for mild pain, Disp: , Rfl:     apixaban (ELIQUIS) 5 mg, Take 1 tablet (5 mg total) by mouth 2 (two) times a day, Disp: 60 tablet, Rfl: 5    fluticasone (FLONASE) 50 mcg/act nasal spray, 1 spray into each nostril 2 (two) times a day, Disp: 16 g, Rfl: 1    folic acid (FOLVITE) 1 mg tablet, Take 1 tablet (1,000 mcg total) by mouth daily, Disp: 90 tablet, Rfl: 0    lidocaine-prilocaine (EMLA) cream, Apply to port 30 to 60 minutes prior to use, Disp: 30 g, Rfl: 2    prochlorperazine (COMPAZINE) 10 mg tablet, , Disp: , Rfl:     dexamethasone (DECADRON) 1 mg tablet, Take 1 tablet (1 mg total) by mouth 2 (two) times a day with meals (Patient not taking: Reported on 11/3/2023), Disp: 60 tablet, Rfl: 0    furosemide (LASIX) 40 mg tablet, Take 1 tablet (40 mg total) by mouth daily (Patient not taking: Reported on 12/15/2023), Disp: 3 tablet, Rfl: 0    gabapentin (NEURONTIN) 300 mg capsule, Take 300 mg in the AM and 600 mg in the Pm (Patient taking differently: Take 600 mg by mouth daily at bedtime), Disp: 270 capsule, Rfl: 1    potassium chloride (K-DUR,KLOR-CON) 20 mEq tablet, Take 2 tablets (40 mEq total) by mouth daily (Patient not taking: Reported on 12/15/2023), Disp: 6 tablet, Rfl: 0  No current facility-administered medications for this visit.    Facility-Administered Medications Ordered in Other Visits:     alteplase (CATHFLO) injection 2 mg, 2 mg, Intracatheter, Q1MIN PRN, Nancy Agostino, DO, 2 mg at 12/19/23 1139  Review of Systems    All other systems negative    Objective:  Vital Signs  /76 (BP Location: Left arm, Patient Position: Sitting, Cuff Size: Standard)   Pulse 72   Temp (!) 96.4 °F (35.8 °C) (Temporal)   Wt 70 kg (154 lb 5.2 oz)   SpO2 97%   BMI 22.14 kg/m²    Physical Exam    Constitutional: Appears well-developed and well-nourished. In no acute distress.   Head: Normocephalic and atraumatic.   Eyes: EOM are normal. Right eye exhibits no discharge. Left eye exhibits no  discharge. No scleral icterus.   Pulmonary/Chest: Effort normal. No stridor. No respiratory distress.   Abdominal: No distension.   Musculoskeletal: No edema.   Neurological: Alert, oriented and appropriately conversant.   Skin: Skin is dry, not diaphoretic.   Psychiatric: Displays a normal mood and affect. Behavior, judgement and thought content appear normal.   Vitals reviewed.

## 2023-12-19 NOTE — PROGRESS NOTES
CHemo completed without incident.  PT declines AVS, return as scheduled for labs 1/5/24 at 10 am and chemo 1/8/24 at 9 am.    Claude Austin  tolerated treatment well with no complications.      Claude Austin is aware of future appt on 1/5/24 at 10am .     AVS printed and given to Claude Austin:    No (Declined by Claude Austin)

## 2024-01-01 ENCOUNTER — HOME CARE VISIT (OUTPATIENT)
Dept: HOME HEALTH SERVICES | Facility: HOME HEALTHCARE | Age: 69
End: 2024-01-01
Payer: MEDICARE

## 2024-01-01 ENCOUNTER — HOME CARE VISIT (OUTPATIENT)
Dept: HOME HOSPICE | Facility: HOSPICE | Age: 69
End: 2024-01-01
Payer: MEDICARE

## 2024-01-01 ENCOUNTER — TELEPHONE (OUTPATIENT)
Dept: HOME HEALTH SERVICES | Facility: HOME HEALTHCARE | Age: 69
End: 2024-01-01

## 2024-01-01 VITALS
SYSTOLIC BLOOD PRESSURE: 124 MMHG | OXYGEN SATURATION: 96 % | HEART RATE: 85 BPM | DIASTOLIC BLOOD PRESSURE: 82 MMHG | RESPIRATION RATE: 18 BRPM | TEMPERATURE: 97.8 F

## 2024-01-01 VITALS — HEART RATE: 89 BPM | RESPIRATION RATE: 16 BRPM | OXYGEN SATURATION: 99 % | TEMPERATURE: 98.4 F

## 2024-01-01 VITALS — DIASTOLIC BLOOD PRESSURE: 62 MMHG | SYSTOLIC BLOOD PRESSURE: 88 MMHG | HEART RATE: 130 BPM | RESPIRATION RATE: 14 BRPM

## 2024-01-01 VITALS
DIASTOLIC BLOOD PRESSURE: 84 MMHG | RESPIRATION RATE: 20 BRPM | TEMPERATURE: 97 F | SYSTOLIC BLOOD PRESSURE: 140 MMHG | HEART RATE: 98 BPM

## 2024-01-01 PROCEDURE — G0299 HHS/HOSPICE OF RN EA 15 MIN: HCPCS

## 2024-01-01 PROCEDURE — G0300 HHS/HOSPICE OF LPN EA 15 MIN: HCPCS

## 2024-01-01 PROCEDURE — G0156 HHCP-SVS OF AIDE,EA 15 MIN: HCPCS

## 2024-01-01 PROCEDURE — G0151 HHCP-SERV OF PT,EA 15 MIN: HCPCS

## 2024-01-01 RX ADMIN — Medication 10 MG: at 14:49

## 2024-01-04 ENCOUNTER — DOCUMENTATION (OUTPATIENT)
Dept: HEMATOLOGY ONCOLOGY | Facility: CLINIC | Age: 69
End: 2024-01-04

## 2024-01-04 NOTE — PROGRESS NOTES
Called [facility] & spoke to vasu set up the next keytruda shipment to be shipped out 01/04/24 for delivery 01/05/24 for the next treatment date of 01/08/24  Emailed the team

## 2024-01-05 ENCOUNTER — HOSPITAL ENCOUNTER (OUTPATIENT)
Dept: INFUSION CENTER | Facility: HOSPITAL | Age: 69
End: 2024-01-05

## 2024-01-05 DIAGNOSIS — C34.91 NON-SMALL CELL CANCER OF RIGHT LUNG (HCC): Primary | ICD-10-CM

## 2024-01-05 LAB
ALBUMIN SERPL BCP-MCNC: 3.6 G/DL (ref 3.5–5)
ALP SERPL-CCNC: 48 U/L (ref 34–104)
ALT SERPL W P-5'-P-CCNC: 12 U/L (ref 7–52)
ANION GAP SERPL CALCULATED.3IONS-SCNC: 8 MMOL/L
AST SERPL W P-5'-P-CCNC: 22 U/L (ref 13–39)
BASOPHILS # BLD AUTO: 0.02 THOUSANDS/ÂΜL (ref 0–0.1)
BASOPHILS NFR BLD AUTO: 0 % (ref 0–1)
BILIRUB SERPL-MCNC: 0.47 MG/DL (ref 0.2–1)
BUN SERPL-MCNC: 18 MG/DL (ref 5–25)
CALCIUM SERPL-MCNC: 9 MG/DL (ref 8.4–10.2)
CHLORIDE SERPL-SCNC: 108 MMOL/L (ref 96–108)
CO2 SERPL-SCNC: 23 MMOL/L (ref 21–32)
CREAT SERPL-MCNC: 0.88 MG/DL (ref 0.6–1.3)
EOSINOPHIL # BLD AUTO: 0.31 THOUSAND/ÂΜL (ref 0–0.61)
EOSINOPHIL NFR BLD AUTO: 7 % (ref 0–6)
ERYTHROCYTE [DISTWIDTH] IN BLOOD BY AUTOMATED COUNT: 15.3 % (ref 11.6–15.1)
GFR SERPL CREATININE-BSD FRML MDRD: 89 ML/MIN/1.73SQ M
GLUCOSE SERPL-MCNC: 136 MG/DL (ref 65–140)
HCT VFR BLD AUTO: 35.9 % (ref 36.5–49.3)
HGB BLD-MCNC: 11.8 G/DL (ref 12–17)
IMM GRANULOCYTES # BLD AUTO: 0.02 THOUSAND/UL (ref 0–0.2)
IMM GRANULOCYTES NFR BLD AUTO: 0 % (ref 0–2)
LYMPHOCYTES # BLD AUTO: 0.79 THOUSANDS/ÂΜL (ref 0.6–4.47)
LYMPHOCYTES NFR BLD AUTO: 17 % (ref 14–44)
MCH RBC QN AUTO: 32.9 PG (ref 26.8–34.3)
MCHC RBC AUTO-ENTMCNC: 32.9 G/DL (ref 31.4–37.4)
MCV RBC AUTO: 100 FL (ref 82–98)
MONOCYTES # BLD AUTO: 0.62 THOUSAND/ÂΜL (ref 0.17–1.22)
MONOCYTES NFR BLD AUTO: 13 % (ref 4–12)
NEUTROPHILS # BLD AUTO: 2.99 THOUSANDS/ÂΜL (ref 1.85–7.62)
NEUTS SEG NFR BLD AUTO: 63 % (ref 43–75)
NRBC BLD AUTO-RTO: 0 /100 WBCS
PLATELET # BLD AUTO: 222 THOUSANDS/UL (ref 149–390)
PMV BLD AUTO: 9.7 FL (ref 8.9–12.7)
POTASSIUM SERPL-SCNC: 4 MMOL/L (ref 3.5–5.3)
PROT SERPL-MCNC: 6.6 G/DL (ref 6.4–8.4)
RBC # BLD AUTO: 3.59 MILLION/UL (ref 3.88–5.62)
SODIUM SERPL-SCNC: 139 MMOL/L (ref 135–147)
T3FREE SERPL-MCNC: 2.84 PG/ML (ref 2.5–3.9)
TSH SERPL DL<=0.05 MIU/L-ACNC: 3.2 UIU/ML (ref 0.45–4.5)
WBC # BLD AUTO: 4.75 THOUSAND/UL (ref 4.31–10.16)

## 2024-01-05 PROCEDURE — 84481 FREE ASSAY (FT-3): CPT | Performed by: INTERNAL MEDICINE

## 2024-01-05 PROCEDURE — 84443 ASSAY THYROID STIM HORMONE: CPT | Performed by: INTERNAL MEDICINE

## 2024-01-05 PROCEDURE — 85025 COMPLETE CBC W/AUTO DIFF WBC: CPT | Performed by: INTERNAL MEDICINE

## 2024-01-05 PROCEDURE — 80053 COMPREHEN METABOLIC PANEL: CPT | Performed by: INTERNAL MEDICINE

## 2024-01-05 NOTE — PROGRESS NOTES
Port flushed and labs drawn without incident.  Pt declines AVS, return as scheduled.    Claude Austin  tolerated treatment well with no complications.      Claude Austin is aware of future appt on 1/8 at 9am.     AVS printed and given to Claude Austin:    No (Declined by Claude Austin)

## 2024-01-08 ENCOUNTER — HOSPITAL ENCOUNTER (OUTPATIENT)
Dept: INFUSION CENTER | Facility: HOSPITAL | Age: 69
Discharge: HOME | End: 2024-01-08
Attending: INTERNAL MEDICINE

## 2024-01-08 VITALS
RESPIRATION RATE: 20 BRPM | BODY MASS INDEX: 22.72 KG/M2 | SYSTOLIC BLOOD PRESSURE: 126 MMHG | DIASTOLIC BLOOD PRESSURE: 73 MMHG | HEIGHT: 70 IN | HEART RATE: 70 BPM | WEIGHT: 158.73 LBS | TEMPERATURE: 97.3 F

## 2024-01-08 DIAGNOSIS — C34.91 NON-SMALL CELL CANCER OF RIGHT LUNG (HCC): Primary | ICD-10-CM

## 2024-01-08 PROCEDURE — 96413 CHEMO IV INFUSION 1 HR: CPT

## 2024-01-08 PROCEDURE — 96367 TX/PROPH/DG ADDL SEQ IV INF: CPT

## 2024-01-08 PROCEDURE — 96372 THER/PROPH/DIAG INJ SC/IM: CPT

## 2024-01-08 PROCEDURE — 96411 CHEMO IV PUSH ADDL DRUG: CPT

## 2024-01-08 RX ORDER — SODIUM CHLORIDE 9 MG/ML
20 INJECTION, SOLUTION INTRAVENOUS ONCE
Status: COMPLETED | OUTPATIENT
Start: 2024-01-08 | End: 2024-01-08

## 2024-01-08 RX ORDER — CYANOCOBALAMIN 1000 UG/ML
1000 INJECTION, SOLUTION INTRAMUSCULAR; SUBCUTANEOUS ONCE
Status: COMPLETED | OUTPATIENT
Start: 2024-01-08 | End: 2024-01-08

## 2024-01-08 RX ORDER — CYANOCOBALAMIN 1000 UG/ML
1000 INJECTION, SOLUTION INTRAMUSCULAR; SUBCUTANEOUS ONCE
Status: CANCELLED
Start: 2024-01-08 | End: 2024-01-08

## 2024-01-08 RX ADMIN — DEXAMETHASONE SODIUM PHOSPHATE: 10 INJECTION, SOLUTION INTRAMUSCULAR; INTRAVENOUS at 09:21

## 2024-01-08 RX ADMIN — SODIUM CHLORIDE 20 ML/HR: 0.9 INJECTION, SOLUTION INTRAVENOUS at 09:21

## 2024-01-08 RX ADMIN — CYANOCOBALAMIN 1000 MCG: 1000 INJECTION, SOLUTION INTRAMUSCULAR at 11:03

## 2024-01-08 RX ADMIN — PEMETREXED DISODIUM 846 MG: 500 INJECTION, POWDER, LYOPHILIZED, FOR SOLUTION INTRAVENOUS at 10:48

## 2024-01-08 RX ADMIN — SODIUM CHLORIDE 200 MG: 9 INJECTION, SOLUTION INTRAVENOUS at 09:56

## 2024-01-08 NOTE — PROGRESS NOTES
Claude Austin  tolerated treatment well with no complications.      Claude Austin is aware of future appt on 1/26/24 for labs at 0930.     AVS Declined by Claude Austin

## 2024-01-09 ENCOUNTER — HOSPITAL ENCOUNTER (OUTPATIENT)
Dept: RADIOLOGY | Facility: HOSPITAL | Age: 69
Discharge: HOME | End: 2024-01-09
Attending: INTERNAL MEDICINE

## 2024-01-09 DIAGNOSIS — C34.91 NON-SMALL CELL CANCER OF RIGHT LUNG (HCC): ICD-10-CM

## 2024-01-09 DIAGNOSIS — H70.13 CHRONIC MASTOIDITIS OF BOTH SIDES: ICD-10-CM

## 2024-01-09 DIAGNOSIS — J34.1 MUCOUS RETENTION CYST OF MAXILLARY SINUS: ICD-10-CM

## 2024-01-09 DIAGNOSIS — J34.2 DEVIATED NASAL SEPTUM: ICD-10-CM

## 2024-01-09 PROCEDURE — 74177 CT ABD & PELVIS W/CONTRAST: CPT

## 2024-01-09 PROCEDURE — G1004 CDSM NDSC: HCPCS

## 2024-01-09 PROCEDURE — 71260 CT THORAX DX C+: CPT

## 2024-01-09 PROCEDURE — 70486 CT MAXILLOFACIAL W/O DYE: CPT

## 2024-01-09 RX ADMIN — IOHEXOL 100 ML: 350 INJECTION, SOLUTION INTRAVENOUS at 13:08

## 2024-01-24 ENCOUNTER — DOCUMENTATION (OUTPATIENT)
Dept: HEMATOLOGY ONCOLOGY | Facility: CLINIC | Age: 69
End: 2024-01-24

## 2024-01-24 NOTE — PROGRESS NOTES
Called [facility] at [phone] & spoke to [place] set up shipment of keytruda..shipping out 01/24/24 for deliver 01/25/24 for the treatment dos 01/29/24.. emailed the team & pharmacy bus office & oncology prior jovanys & panda valentin,& prabhakar garcia & jorge carter

## 2024-01-26 ENCOUNTER — OFFICE VISIT (OUTPATIENT)
Dept: HEMATOLOGY ONCOLOGY | Facility: CLINIC | Age: 69
End: 2024-01-26

## 2024-01-26 ENCOUNTER — HOSPITAL ENCOUNTER (OUTPATIENT)
Dept: INFUSION CENTER | Facility: HOSPITAL | Age: 69
End: 2024-01-26

## 2024-01-26 VITALS
RESPIRATION RATE: 17 BRPM | DIASTOLIC BLOOD PRESSURE: 80 MMHG | HEART RATE: 80 BPM | OXYGEN SATURATION: 98 % | WEIGHT: 155 LBS | TEMPERATURE: 97.8 F | HEIGHT: 70 IN | BODY MASS INDEX: 22.19 KG/M2 | SYSTOLIC BLOOD PRESSURE: 122 MMHG

## 2024-01-26 DIAGNOSIS — C34.91 NON-SMALL CELL CANCER OF RIGHT LUNG (HCC): Primary | ICD-10-CM

## 2024-01-26 DIAGNOSIS — C79.31 METASTASIS TO BRAIN (HCC): ICD-10-CM

## 2024-01-26 LAB
ALBUMIN SERPL BCP-MCNC: 3.7 G/DL (ref 3.5–5)
ALP SERPL-CCNC: 56 U/L (ref 34–104)
ALT SERPL W P-5'-P-CCNC: 9 U/L (ref 7–52)
ANION GAP SERPL CALCULATED.3IONS-SCNC: 6 MMOL/L
AST SERPL W P-5'-P-CCNC: 14 U/L (ref 13–39)
BASOPHILS # BLD AUTO: 0.03 THOUSANDS/ÂΜL (ref 0–0.1)
BASOPHILS NFR BLD AUTO: 1 % (ref 0–1)
BILIRUB SERPL-MCNC: 0.43 MG/DL (ref 0.2–1)
BUN SERPL-MCNC: 22 MG/DL (ref 5–25)
CALCIUM SERPL-MCNC: 8.8 MG/DL (ref 8.4–10.2)
CHLORIDE SERPL-SCNC: 109 MMOL/L (ref 96–108)
CO2 SERPL-SCNC: 24 MMOL/L (ref 21–32)
CREAT SERPL-MCNC: 0.87 MG/DL (ref 0.6–1.3)
EOSINOPHIL # BLD AUTO: 0.21 THOUSAND/ÂΜL (ref 0–0.61)
EOSINOPHIL NFR BLD AUTO: 4 % (ref 0–6)
ERYTHROCYTE [DISTWIDTH] IN BLOOD BY AUTOMATED COUNT: 15.9 % (ref 11.6–15.1)
GFR SERPL CREATININE-BSD FRML MDRD: 89 ML/MIN/1.73SQ M
GLUCOSE SERPL-MCNC: 93 MG/DL (ref 65–140)
HCT VFR BLD AUTO: 35.8 % (ref 36.5–49.3)
HGB BLD-MCNC: 11.7 G/DL (ref 12–17)
IMM GRANULOCYTES # BLD AUTO: 0.02 THOUSAND/UL (ref 0–0.2)
IMM GRANULOCYTES NFR BLD AUTO: 0 % (ref 0–2)
LYMPHOCYTES # BLD AUTO: 0.89 THOUSANDS/ÂΜL (ref 0.6–4.47)
LYMPHOCYTES NFR BLD AUTO: 16 % (ref 14–44)
MCH RBC QN AUTO: 32.6 PG (ref 26.8–34.3)
MCHC RBC AUTO-ENTMCNC: 32.7 G/DL (ref 31.4–37.4)
MCV RBC AUTO: 100 FL (ref 82–98)
MONOCYTES # BLD AUTO: 0.61 THOUSAND/ÂΜL (ref 0.17–1.22)
MONOCYTES NFR BLD AUTO: 11 % (ref 4–12)
NEUTROPHILS # BLD AUTO: 3.68 THOUSANDS/ÂΜL (ref 1.85–7.62)
NEUTS SEG NFR BLD AUTO: 68 % (ref 43–75)
NRBC BLD AUTO-RTO: 0 /100 WBCS
PLATELET # BLD AUTO: 222 THOUSANDS/UL (ref 149–390)
PMV BLD AUTO: 9.7 FL (ref 8.9–12.7)
POTASSIUM SERPL-SCNC: 4.2 MMOL/L (ref 3.5–5.3)
PROT SERPL-MCNC: 6.8 G/DL (ref 6.4–8.4)
RBC # BLD AUTO: 3.59 MILLION/UL (ref 3.88–5.62)
SODIUM SERPL-SCNC: 139 MMOL/L (ref 135–147)
T3FREE SERPL-MCNC: 2.96 PG/ML (ref 2.5–3.9)
TSH SERPL DL<=0.05 MIU/L-ACNC: 3.48 UIU/ML (ref 0.45–4.5)
URATE SERPL-MCNC: 4.9 MG/DL (ref 3.5–8.5)
WBC # BLD AUTO: 5.44 THOUSAND/UL (ref 4.31–10.16)

## 2024-01-26 PROCEDURE — 84550 ASSAY OF BLOOD/URIC ACID: CPT | Performed by: INTERNAL MEDICINE

## 2024-01-26 PROCEDURE — 80053 COMPREHEN METABOLIC PANEL: CPT | Performed by: INTERNAL MEDICINE

## 2024-01-26 PROCEDURE — 99214 OFFICE O/P EST MOD 30 MIN: CPT | Performed by: INTERNAL MEDICINE

## 2024-01-26 PROCEDURE — 85025 COMPLETE CBC W/AUTO DIFF WBC: CPT | Performed by: INTERNAL MEDICINE

## 2024-01-26 PROCEDURE — 84481 FREE ASSAY (FT-3): CPT | Performed by: INTERNAL MEDICINE

## 2024-01-26 PROCEDURE — 84443 ASSAY THYROID STIM HORMONE: CPT | Performed by: INTERNAL MEDICINE

## 2024-01-26 RX ORDER — SODIUM CHLORIDE 9 MG/ML
20 INJECTION, SOLUTION INTRAVENOUS ONCE
Status: CANCELLED | OUTPATIENT
Start: 2024-01-29

## 2024-01-26 RX ORDER — SODIUM CHLORIDE 9 MG/ML
20 INJECTION, SOLUTION INTRAVENOUS ONCE
OUTPATIENT
Start: 2024-02-19

## 2024-01-26 RX ORDER — DEXAMETHASONE 2 MG/1
2 TABLET ORAL 2 TIMES DAILY WITH MEALS
Qty: 60 TABLET | Refills: 0 | Status: SHIPPED | OUTPATIENT
Start: 2024-01-26

## 2024-01-26 NOTE — PROGRESS NOTES
Central labs drawn and sent.    Claude Austin is aware of future appt on 1/29/24 at 0930.     AVS declined by Claude Austin.

## 2024-01-28 NOTE — PROGRESS NOTES
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     Caris - PD-L1 0%, TMB 60, no other targetable mutations     2023 - palliative RT to T spine and brain     2023 - start carbo/pemetrexed/pembrolizumab     2023 suspected Rad/Immuno Pneumonitis Grade 1/2 (mild fever)      2023 discontinued Carbo d/t fatigue; and holding Keytruda in Cycle 4 dt Pneumonitis; will re-challenge in cycle 5      2023 - add back keytruda for cycle 6     2023 - dose reduce alimta by 10% due to fatigue     Thoracic spine tumor   2023 Initial Diagnosis    Thoracic spine tumor     Non-small cell cancer of right lung (HCC)   3/29/2023 -  Cancer Staged    Staging form: Lung, AJCC 8th Edition  - Clinical stage from 3/29/2023: Stage DORI (cT4, cN1, cM1b) - Signed by Nancy Ruelas DO on 2023 Biopsy    Lung, right upper lobe, biopsy:     - Rare detached single and small clusters of atypical, degenerated epithelial cells.     - Predominantly fibroelastotic stromal fibrosis.     2023 Biopsy    Lung, right upper lobe mass, biopsy:      - Poorly differentiated non-small cell carcinoma most compatible with adenocarcinoma of the lung     2023 - 2023 Radiation    Course: C1    Plan ID Energy Fractions Dose per Fraction (cGy) Dose Correction (cGy) Total Dose Delivered (cGy) Elapsed Days   C7_T4_R Lung 10X/6X 10 / 10 300 0 3,000 11   HCS_WhBrain 6X 10 / 10 300 0 3,000 11      Dr. Ro Genao     2023 Initial Diagnosis    Non-small cell cancer of right lung (HCC)     2023 -  Chemotherapy    cyanocobalamin, 1,000 mcg, Intramuscular, Once, 6 of 6 cycles  Administration: 1,000 mcg (5/15/2023), 1,000 mcg  (7/3/2023), 1,000 mcg (9/5/2023), 1,000 mcg (11/6/2023), 1,000 mcg (1/8/2024)  alteplase (CATHFLO), 2 mg, Intracatheter, Every 1 Minute as needed, 12 of 17 cycles  Administration: 2 mg (11/29/2023), 2 mg (12/19/2023)  fosaprepitant (EMEND) IVPB, 150 mg, Intravenous, Once, 3 of 3 cycles  Administration: 150 mg (5/22/2023), 150 mg (7/3/2023), 150 mg (6/12/2023)  CARBOplatin (PARAPLATIN) IVPB (GO AUC DOSING), 544 mg, Intravenous, Once, 3 of 3 cycles  Administration: 544 mg (5/22/2023), 554 mg (7/3/2023), 544 mg (6/12/2023)  pemetrexed (ALIMTA) chemo infusion, 940 mg, Intravenous, Once, 12 of 17 cycles  Dose modification: 450 mg/m2 (original dose 500 mg/m2, Cycle 9, Reason: Dose Not Tolerated, Comment: 10% dose reduction due to fatigue), 450 mg/m2 (original dose 500 mg/m2, Cycle 10, Reason: Dose modified as per discussion with consulting physician)  Administration: 900 mg (5/22/2023), 900 mg (7/3/2023), 900 mg (9/5/2023), 900 mg (6/12/2023), 900 mg (8/14/2023), 900 mg (7/24/2023), 900 mg (9/25/2023), 900 mg (10/16/2023), 846 mg (11/6/2023), 846 mg (11/29/2023), 846 mg (12/19/2023), 846 mg (1/8/2024)  pembrolizumab (KEYTRUDA) IVPB, 200 mg, Intravenous, Once, 11 of 16 cycles  Administration: 200 mg (5/22/2023), 200 mg (7/3/2023), 200 mg (9/5/2023), 200 mg (6/12/2023), 200 mg (8/14/2023), 200 mg (9/25/2023), 200 mg (10/16/2023), 200 mg (11/6/2023), 200 mg (11/29/2023), 200 mg (12/19/2023), 200 mg (1/8/2024)     7/12/2023 Adverse Reaction    Hospitalization - Suspected Pneumonitis    7/12-14/2023 due to mild fever with CT chest redemonstrating left upper lobe inflammatory changes, previously seen on study 04/04/2023, ID and oncology evaluated, more suggestive of radiation/Keytruda induced pneumonitis grade 1/2; mild fever resolved; other differential/contributing etiology include recent tapering of steroid       7/21/2023 -  Chemotherapy    Recently suspected pneumonitis 7/12/2023 and reports fatigue  7/21/2023  Discontinuing carboplatin and holding Keytruda of cycle 4; with potential plan to rechallenge Keytruda in cycle 5     Metastasis to brain (HCC)   5/30/2023 Initial Diagnosis    Metastasis to brain (HCC)         Interval History: Patient presents for follow up of his stage IV non-small cell lung cancer.  He is on maintenance Alimta and Keytruda.  CT scan shows essentially stable disease.  After treatment he has about 2 days of fatigue but this has improved since dose reductions.  His biggest issue now is stiff stiffness and swelling in his ankles and feet.  I have checked him for DVT and that was negative.  He was treated for cellulitis of the left lower extremity and that did improve, but the swelling and stiffness persists.  A trial of Lasix also did not significantly improve it.         REVIEW OF SYSTEMS:  Please note that a 14-point review of systems was performed to include Constitutional, HEENT, Respiratory, CVS, GI, , Musculoskeletal, Integumentary, Neurologic, Rheumatologic, Endocrinologic, Psychiatric, Lymphatic, and Hematologic/Oncologic systems were reviewed and are negative unless otherwise stated in HPI. Positive and negative findings pertinent to this evaluation are incorporated into the history of present illness.      ECOG PS: 1    PROBLEM LIST:  Patient Active Problem List   Diagnosis    Multiple subsegmental pulmonary emboli without acute cor pulmonale (HCC)    Tobacco use    DDD (degenerative disc disease), cervical    Pleural mass    Brain lesions    Thoracic spine tumor    Advanced care planning/counseling discussion    Non-small cell cancer of right lung (HCC)    Metastasis to brain (HCC)    Lesion of left lung    Chemotherapy-induced fatigue    Drug-induced pneumonitis    Current every day smoker    Bloating    Lower extremity edema    Palliative care by specialist       Assessment / Plan: 68-year-old male with stage IV non-small cell lung cancer.  Sometimes Keytruda can cause  autoimmune effects on joints causing soreness and stiffness.  I am getting give him a trial of dexamethasone 2 mg twice daily to see if this helps.  On exam his feet are warm and he has good pulses.  I also checked a uric acid to see if there might be an element of gout.  He has never had gout before.  He is going to check in with his primary care physician next week to see if there are any other thoughts on treatment strategies, and this trial of steroids will give us a sense of how much that helps.  I will plan on seeing him back in 3 weeks prior to the next cycle of treatment for ongoing evaluation of this potential side effect of immunotherapy.       I spent 30 minutes on chart review, face to face counseling time, coordination of care and documentation.    Past Medical History:   has a past medical history of Cancer (HCC).    PAST SURGICAL HISTORY:   has a past surgical history that includes IR biopsy lung (4/6/2023); IR biopsy lung (4/26/2023); and IR port placement (5/16/2023).    CURRENT MEDICATIONS  Current Outpatient Medications   Medication Sig Dispense Refill    acetaminophen (TYLENOL) 500 mg tablet Take 500 mg by mouth every 4 (four) hours as needed for mild pain      apixaban (ELIQUIS) 5 mg Take 1 tablet (5 mg total) by mouth 2 (two) times a day 60 tablet 5    dexamethasone (DECADRON) 2 mg tablet Take 1 tablet (2 mg total) by mouth 2 (two) times a day with meals 60 tablet 0    fluticasone (FLONASE) 50 mcg/act nasal spray 1 spray into each nostril 2 (two) times a day 16 g 1    folic acid (FOLVITE) 1 mg tablet Take 1 tablet (1,000 mcg total) by mouth daily 90 tablet 0    gabapentin (NEURONTIN) 300 mg capsule Take 300 mg in the AM and 600 mg in the Pm (Patient taking differently: Take 600 mg by mouth daily at bedtime) 270 capsule 1    lidocaine-prilocaine (EMLA) cream Apply to port 30 to 60 minutes prior to use 30 g 2    prochlorperazine (COMPAZINE) 10 mg tablet       furosemide (LASIX) 40 mg tablet Take  "1 tablet (40 mg total) by mouth daily (Patient not taking: Reported on 12/15/2023) 3 tablet 0    potassium chloride (K-DUR,KLOR-CON) 20 mEq tablet Take 2 tablets (40 mEq total) by mouth daily (Patient not taking: Reported on 12/15/2023) 6 tablet 0     No current facility-administered medications for this visit.     [unfilled]    SOCIAL HISTORY:   reports that he has been smoking cigarettes. He started smoking about 51 years ago. He has a 25.5 pack-year smoking history. He has never used smokeless tobacco. He reports that he does not currently use alcohol. He reports that he does not use drugs.     FAMILY HISTORY:  family history includes Stroke in his father.     ALLERGIES:  has No Known Allergies.      Physical Exam:  Vital Signs:   Visit Vitals  /80 (BP Location: Left arm, Patient Position: Sitting, Cuff Size: Adult)   Pulse 80   Temp 97.8 °F (36.6 °C)   Resp 17   Ht 5' 10\" (1.778 m)   Wt 70.3 kg (155 lb)   SpO2 98%   BMI 22.24 kg/m²   Smoking Status Every Day   BSA 1.87 m²     Body mass index is 22.24 kg/m².  Body surface area is 1.87 meters squared.    GEN: Alert, awake oriented x3, in no acute distress  HEENT- No pallor, icterus, cyanosis, no oral mucosal lesions,   LAD - no palpable cervical, clavicle, axillary, inguinal LAD  Heart- normal S1 S2, regular rate and rhythm, No murmur, rubs.   Lungs- clear breathing sound bilateral.   Abdomen- soft, Non tender, bowel sounds present  Extremities- No cyanosis, clubbing, edema, good pulses  Neuro- No focal neurological deficit    Labs:  Lab Results   Component Value Date    WBC 5.44 01/26/2024    HGB 11.7 (L) 01/26/2024    HCT 35.8 (L) 01/26/2024     (H) 01/26/2024     01/26/2024     Lab Results   Component Value Date    SODIUM 139 01/26/2024    K 4.2 01/26/2024     (H) 01/26/2024    CO2 24 01/26/2024    AGAP 6 01/26/2024    BUN 22 01/26/2024    CREATININE 0.87 01/26/2024    GLUC 93 01/26/2024    GLUF 103 (H) 05/10/2023    CALCIUM 8.8 " 01/26/2024    AST 14 01/26/2024    ALT 9 01/26/2024    ALKPHOS 56 01/26/2024    TP 6.8 01/26/2024    TBILI 0.43 01/26/2024    EGFR 89 01/26/2024

## 2024-01-29 ENCOUNTER — HOSPITAL ENCOUNTER (OUTPATIENT)
Dept: INFUSION CENTER | Facility: HOSPITAL | Age: 69
Discharge: HOME | End: 2024-01-29
Attending: INTERNAL MEDICINE

## 2024-01-29 VITALS
DIASTOLIC BLOOD PRESSURE: 80 MMHG | HEIGHT: 70 IN | BODY MASS INDEX: 22.22 KG/M2 | TEMPERATURE: 97.2 F | RESPIRATION RATE: 18 BRPM | WEIGHT: 155.2 LBS | HEART RATE: 85 BPM | SYSTOLIC BLOOD PRESSURE: 136 MMHG

## 2024-01-29 DIAGNOSIS — C34.91 NON-SMALL CELL CANCER OF RIGHT LUNG (HCC): Primary | ICD-10-CM

## 2024-01-29 PROCEDURE — 96413 CHEMO IV INFUSION 1 HR: CPT

## 2024-01-29 PROCEDURE — 96367 TX/PROPH/DG ADDL SEQ IV INF: CPT

## 2024-01-29 PROCEDURE — 96411 CHEMO IV PUSH ADDL DRUG: CPT

## 2024-01-29 RX ORDER — SODIUM CHLORIDE 9 MG/ML
20 INJECTION, SOLUTION INTRAVENOUS ONCE
Status: COMPLETED | OUTPATIENT
Start: 2024-01-29 | End: 2024-01-29

## 2024-01-29 RX ADMIN — PEMETREXED DISODIUM 846 MG: 500 INJECTION, POWDER, LYOPHILIZED, FOR SOLUTION INTRAVENOUS at 11:37

## 2024-01-29 RX ADMIN — SODIUM CHLORIDE 200 MG: 9 INJECTION, SOLUTION INTRAVENOUS at 10:33

## 2024-01-29 RX ADMIN — SODIUM CHLORIDE 20 ML/HR: 0.9 INJECTION, SOLUTION INTRAVENOUS at 09:58

## 2024-01-29 RX ADMIN — DEXAMETHASONE SODIUM PHOSPHATE: 10 INJECTION, SOLUTION INTRAMUSCULAR; INTRAVENOUS at 09:58

## 2024-01-29 NOTE — PLAN OF CARE
Problem: Potential for Falls  Goal: Patient will remain free of falls  Description: INTERVENTIONS:  - Educate patient/family on patient safety including physical limitations  - Instruct patient to call for assistance with activity   - Consult OT/PT to assist with strengthening/mobility   - Keep Call bell within reach  - Keep bed low and locked with side rails adjusted as appropriate  - Keep care items and personal belongings within reach  - Initiate and maintain comfort rounds  - Make Fall Risk Sign visible to staff  -  - Apply yellow socks and bracelet for high fall risk patients  - Consider moving patient to room near nurses station  Outcome: Progressing

## 2024-01-29 NOTE — PROGRESS NOTES
Chemo completed without incident.  PT declines AVS, return as scheduled.    Claude Austin  tolerated treatment well with no complications.      Claude Austin is aware of future appt on 2/16 at 930 for labs and 2/19 at 9 am for treatment.     AVS printed and given to Claude Austin:    No (Declined by Claude Austin)

## 2024-02-01 ENCOUNTER — OFFICE VISIT (OUTPATIENT)
Dept: INTERNAL MEDICINE CLINIC | Age: 69
End: 2024-02-01
Payer: MEDICARE

## 2024-02-01 VITALS
SYSTOLIC BLOOD PRESSURE: 122 MMHG | DIASTOLIC BLOOD PRESSURE: 80 MMHG | OXYGEN SATURATION: 99 % | HEART RATE: 87 BPM | BODY MASS INDEX: 21.76 KG/M2 | HEIGHT: 70 IN | WEIGHT: 152 LBS | TEMPERATURE: 98.2 F

## 2024-02-01 DIAGNOSIS — Z72.0 TOBACCO USE: ICD-10-CM

## 2024-02-01 DIAGNOSIS — I26.94 MULTIPLE SUBSEGMENTAL PULMONARY EMBOLI WITHOUT ACUTE COR PULMONALE (HCC): Primary | ICD-10-CM

## 2024-02-01 DIAGNOSIS — I73.9 PERIPHERAL VASCULAR DISEASE (HCC): ICD-10-CM

## 2024-02-01 DIAGNOSIS — R60.0 LOWER EXTREMITY EDEMA: ICD-10-CM

## 2024-02-01 DIAGNOSIS — C79.31 METASTASIS TO BRAIN (HCC): ICD-10-CM

## 2024-02-01 DIAGNOSIS — G93.89 BRAIN MASS: ICD-10-CM

## 2024-02-01 DIAGNOSIS — R60.0 BILATERAL LOWER EXTREMITY EDEMA: ICD-10-CM

## 2024-02-01 DIAGNOSIS — C34.91 NON-SMALL CELL CANCER OF RIGHT LUNG (HCC): ICD-10-CM

## 2024-02-01 PROCEDURE — 99215 OFFICE O/P EST HI 40 MIN: CPT | Performed by: INTERNAL MEDICINE

## 2024-02-01 RX ORDER — HYDROCHLOROTHIAZIDE 12.5 MG/1
12.5 TABLET ORAL DAILY
Qty: 90 TABLET | Refills: 3 | Status: SHIPPED | OUTPATIENT
Start: 2024-02-01 | End: 2025-01-26

## 2024-02-01 NOTE — PROGRESS NOTES
Name: Claude Ausitn      : 1955      MRN: [id number]  Encounter Provider: Jose James MD  Encounter Date: 2024   Encounter department: [facility]    Assessment & Plan     1. Multiple subsegmental pulmonary emboli without acute cor pulmonale (HCC)  History of pulmonary embolism he is on Eliquis  2. Tobacco use  Walking noted mood to quit  3. Brain lesions    4. Metastasis to brain (HCC)  And metastasis is much better  5. Non-small cell cancer of right lung (HCC)  Patient is under the care of oncology and he is getting infusion of Keytruda and Alimta  6. Bilateral lower extremity edema  -     hydroCHLOROthiazide 12.5 mg tablet; Take 1 tablet (12.5 mg total) by mouth daily    7. Peripheral vascular disease (HCC)  To feel dorsalis pedis or posterior tibial with some color changes this is peripheral vascular disease from chronic smoking history and the patient is still smoking  8. Lower extremity edema  Bilateral lower extremity edema with chronic venous insufficiency signs there are no signs of cellulitis  Compression stockings for chronic venous insufficiency       Subjective     This is a very pleasant 68 years young gentleman who is here today for the regular follow-up reviewed oncology hematology notes he is on 2 infusions as an outpatient Keytruda , doing well so far his recent is CT scan showed stable disease    Lung cancer with metastasis some cough some shortness of breath but overall he is doing good with his treatment    Brain metastasis he is doing well with that mental status is normal no neurological deficit    Bone metastasis no significant pain not taking any medication for the pain    Planing of bilateral lower extremity redness and also swelling he was treated for a cellulitis I do not think so this is cellulitis this is because of chronic venous insufficiency and peripheral vascular disease that his color of his skin is change especially when he is  dangling his feet down I told him to keep his leg elevated use the compression stocking and also hydrochlorothiazide 12.5 mg once a day he was taking Lasix 20 and 2020 mEq of potassium.  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    Hypertension is very well-controlled      Review of Systems   Constitutional:  Positive for fatigue. Negative for appetite change and fever.   HENT:  Negative for congestion, ear pain, hearing loss, nosebleeds, sneezing, tinnitus and voice change.    Eyes:  Negative for pain, discharge and redness.   Respiratory:  Positive for cough. Negative for chest tightness, shortness of breath and wheezing.    Cardiovascular:  Positive for leg swelling. Negative for chest pain and palpitations.   Gastrointestinal:  Negative for abdominal pain, blood in stool, constipation, diarrhea, nausea and vomiting.   Genitourinary:  Negative for difficulty urinating, dysuria, hematuria and urgency.   Musculoskeletal:  Negative for arthralgias, back pain, gait problem and joint swelling.   Skin:  Negative for rash and wound.   Allergic/Immunologic: Negative for environmental allergies.   Neurological:  Negative for dizziness, tremors, seizures, weakness, light-headedness, numbness and headaches.   Hematological:  Negative for adenopathy. Does not bruise/bleed easily.   Psychiatric/Behavioral:  Negative for behavioral problems and confusion. The patient is not nervous/anxious.        Past Medical History:   Diagnosis Date    Cancer (HCC)      Past Surgical History:   Procedure Laterality Date    IR BIOPSY LUNG  4/6/2023    IR BIOPSY LUNG  4/26/2023    IR PORT  PLACEMENT  5/16/2023     Family History   Problem Relation Age of Onset    Stroke Father      Social History     Socioeconomic History    Marital status: /Civil Union     Spouse name: None    Number of children: None    Years of education: None    Highest education level: None   Occupational History    None   Tobacco Use    Smoking status: Every Day     Current packs/day: 0.50     Average packs/day: 0.5 packs/day for 51.1 years (25.5 ttl pk-yrs)     Types: Cigarettes     Start date: 1/1/1973    Smokeless tobacco: Never    Tobacco comments:     Patient is trying to quit,     Vaping Use    Vaping status: Never Used   Substance and Sexual Activity    Alcohol use: Not Currently     Comment: rarely    Drug use: Never    Sexual activity: None   Other Topics Concern    None   Social History Narrative    None     Social Determinants of Health     Financial Resource Strain: Not on file   Food Insecurity: No Food Insecurity (7/13/2023)    Hunger Vital Sign     Worried About Running Out of Food in the Last Year: Never true     Ran Out of Food in the Last Year: Never true   Transportation Needs: No Transportation Needs (7/13/2023)    PRAPARE - Transportation     Lack of Transportation (Medical): No     Lack of Transportation (Non-Medical): No   Physical Activity: Not on file   Stress: Not on file   Social Connections: Not on file   Intimate Partner Violence: Not on file   Housing Stability: Low Risk  (7/13/2023)    Housing Stability Vital Sign     Unable to Pay for Housing in the Last Year: No     Number of Places Lived in the Last Year: 1     Unstable Housing in the Last Year: No     Current Outpatient Medications on File Prior to Visit   Medication Sig    acetaminophen (TYLENOL) 500 mg tablet Take 500 mg by mouth every 4 (four) hours as needed for mild pain    apixaban (ELIQUIS) 5 mg Take 1 tablet (5 mg total) by mouth 2 (two) times a day    dexamethasone (DECADRON) 2 mg tablet Take 1 tablet (2 mg total) by mouth 2  "(two) times a day with meals    fluticasone (FLONASE) 50 mcg/act nasal spray 1 spray into each nostril 2 (two) times a day    folic acid (FOLVITE) 1 mg tablet Take 1 tablet (1,000 mcg total) by mouth daily    gabapentin (NEURONTIN) 300 mg capsule Take 300 mg in the AM and 600 mg in the Pm    lidocaine-prilocaine (EMLA) cream Apply to port 30 to 60 minutes prior to use    [DISCONTINUED] prochlorperazine (COMPAZINE) 10 mg tablet     [DISCONTINUED] furosemide (LASIX) 40 mg tablet Take 1 tablet (40 mg total) by mouth daily (Patient not taking: Reported on 12/15/2023)    [DISCONTINUED] potassium chloride (K-DUR,KLOR-CON) 20 mEq tablet Take 2 tablets (40 mEq total) by mouth daily (Patient not taking: Reported on 12/15/2023)     No Known Allergies  Immunization History   Administered Date(s) Administered    Tdap 07/20/2021       Objective     /80 (BP Location: Left arm, Patient Position: Sitting, Cuff Size: Standard)   Pulse 87   Temp 98.2 °F (36.8 °C) (Temporal)   Ht 5' 10\" (1.778 m)   Wt 68.9 kg (152 lb)   SpO2 99%   BMI 21.81 kg/m²     Physical Exam  Constitutional:       Appearance: He is well-developed.   HENT:      Right Ear: Tympanic membrane and external ear normal.      Left Ear: Tympanic membrane normal.   Eyes:      Conjunctiva/sclera: Conjunctivae normal.      Pupils: Pupils are equal, round, and reactive to light.   Neck:      Thyroid: No thyromegaly.      Vascular: No JVD.   Cardiovascular:      Rate and Rhythm: Normal rate and regular rhythm.      Pulses:           Dorsalis pedis pulses are 0 on the right side and 0 on the left side.        Posterior tibial pulses are 0 on the right side and 0 on the left side.      Heart sounds: Normal heart sounds.   Pulmonary:      Breath sounds: Normal breath sounds.   Abdominal:      General: Bowel sounds are normal.      Palpations: Abdomen is soft.   Musculoskeletal:         General: Normal range of motion.      Cervical back: Normal range of motion.      " Right lower le+ Edema present.      Left lower le+ Edema present.   Lymphadenopathy:      Cervical: No cervical adenopathy.   Skin:     General: Skin is dry.   Neurological:      General: No focal deficit present.      Mental Status: He is alert and oriented to person, place, and time. Mental status is at baseline.      Deep Tendon Reflexes: Reflexes are normal and symmetric.   Psychiatric:         Mood and Affect: Mood normal.         Behavior: Behavior normal.       Jose James MD

## 2024-02-13 ENCOUNTER — TELEPHONE (OUTPATIENT)
Dept: HEMATOLOGY ONCOLOGY | Facility: CLINIC | Age: 69
End: 2024-02-13

## 2024-02-13 ENCOUNTER — DOCUMENTATION (OUTPATIENT)
Dept: HEMATOLOGY ONCOLOGY | Facility: CLINIC | Age: 69
End: 2024-02-13

## 2024-02-13 ENCOUNTER — TELEMEDICINE (OUTPATIENT)
Dept: HEMATOLOGY ONCOLOGY | Facility: CLINIC | Age: 69
End: 2024-02-13
Payer: MEDICARE

## 2024-02-13 DIAGNOSIS — C34.91 NON-SMALL CELL CANCER OF RIGHT LUNG (HCC): Primary | ICD-10-CM

## 2024-02-13 PROCEDURE — 99442 PR PHYS/QHP TELEPHONE EVALUATION 11-20 MIN: CPT | Performed by: INTERNAL MEDICINE

## 2024-02-13 RX ORDER — SODIUM CHLORIDE 9 MG/ML
20 INJECTION, SOLUTION INTRAVENOUS ONCE
OUTPATIENT
Start: 2024-04-01

## 2024-02-13 RX ORDER — SODIUM CHLORIDE 9 MG/ML
20 INJECTION, SOLUTION INTRAVENOUS ONCE
OUTPATIENT
Start: 2024-04-22

## 2024-02-13 RX ORDER — SODIUM CHLORIDE 9 MG/ML
20 INJECTION, SOLUTION INTRAVENOUS ONCE
OUTPATIENT
Start: 2024-03-11

## 2024-02-13 NOTE — TELEPHONE ENCOUNTER
Called patient to give him details for his follow- up appointment as well as the MRI date and time. Patient agreed but will be moving MRI. I informed patient that the new date and time will be updated in his MYCHART.

## 2024-02-13 NOTE — PROGRESS NOTES
Called [facility] & spoke to jony clifton to call [phone] called there & spoke to ricco clifton wrong dept erlin sd to call [phone] w/janina kern sd that the pt was approved for free eliquis   Approval date 05/25/23-05/24/24 for free eliquis  Id# [id number]    Called [facility] & spoke to eloy clifton pts is getting free keytruda & he's enrolled in the program until 10/21/24   Set up the next shipment to be shipped out 02/13/24 for delivery 02/14/24 to be here for the next treatment dos 02/19/24.. verfd its going to the [facility]..  Emailed the team

## 2024-02-13 NOTE — PROGRESS NOTES
Virtual Brief Visit    This Visit is being completed by telephone. The Patient is located at Home and in the following state in which I hold an active license [place]    The patient was identified by name and date of birth. Claude Austin was informed that this is a telemedicine visit and that the visit is being conducted through Telephone.  My office door was closed. No one else was in the room.  He acknowledged consent and understanding of privacy and security of the video platform. The patient has agreed to participate and understands they can discontinue the visit at any time.    Patient is aware this is a billable service.     Patient presents for follow-up of his stage IV non-small cell lung cancer.  After our last visit he was having a lot of trouble with lower extremity edema.  I gave him 2 mg of dexamethasone twice daily to see if that help with his joint pain.  He did not take it.  He ended up seeing Dr. James who gave him hydrochlorothiazide and his lower extremity edema is better.  His joint pain is better.  He did have 1 episode where he was reaching a high shelf on a closet and got dizzy and fell like he was going to pass out.  It has not happened since then.  He has lost about 5 pounds over the past few months.  He is at 150 pounds which was his normal weight prior to his diagnosis.  He denies any fevers or infections.  He has easy bruising from the Eliquis.  His wife asked if there would be any way we could cut the Eliquis back to half dose.    Assessment/Plan: 68-year-old male with stage IV adenocarcinoma of the lung.  He is under good control on maintenance Alimta and Keytruda.  His lower extremity edema is better so I do not feel like he needs to take that dexamethasone if that symptom is improved.  What he described to me with a near syncopal episode could have possibly been vagal.  If it happens again he will let me know.  I told his wife I do not recommend cutting the Eliquis back to half dose  because he is on a high risk for clots.  He is not having any bleeding problems on Eliquis so I would rather leave it at full dose.  We will keep a close eye on his weight.  If he continues to lose weight we could consider other medications to enhance his appetite.  He states he is eating it is just hard for him to gain weight.  I will plan on seeing him back in 6 weeks with an MRI of the brain.  He knows to call me in the interim with any questions or concerns.    Problem List Items Addressed This Visit       Non-small cell cancer of right lung (HCC) - Primary    Relevant Orders    Infusion Calculated Appointment Request    CBC and differential    Comprehensive metabolic panel    TSH, 3rd generation with Free T4 reflex    T3, free    Infusion Calculated Appointment Request    CBC and differential    Comprehensive metabolic panel    TSH, 3rd generation with Free T4 reflex    T3, free    Infusion Calculated Appointment Request    CBC and differential    Comprehensive metabolic panel    TSH, 3rd generation with Free T4 reflex    T3, free    MRI brain w wo contrast       Recent Visits  No visits were found meeting these conditions.  Showing recent visits within past 7 days and meeting all other requirements  Today's Visits  Date Type Provider Dept   02/13/24 Telephone Nancy Ruelas DO [facility]   02/13/24 Telephone Nancy Ruelas DO [facility]   02/13/24 Telemedicine Nancy Ruelas DO [facility]   Showing today's visits and meeting all other requirements  Future Appointments  No visits were found meeting these conditions.  Showing future appointments within next 150 days and meeting all other requirements         Visit Time  Total Visit Duration: 18 min

## 2024-02-16 ENCOUNTER — HOSPITAL ENCOUNTER (OUTPATIENT)
Dept: INFUSION CENTER | Facility: HOSPITAL | Age: 69
End: 2024-02-16
Payer: MEDICARE

## 2024-02-16 DIAGNOSIS — C34.91 NON-SMALL CELL CANCER OF RIGHT LUNG (HCC): ICD-10-CM

## 2024-02-16 DIAGNOSIS — C34.91 NON-SMALL CELL CANCER OF RIGHT LUNG (HCC): Primary | ICD-10-CM

## 2024-02-16 LAB
ALBUMIN SERPL BCP-MCNC: 3.6 G/DL (ref 3.5–5)
ALP SERPL-CCNC: 49 U/L (ref 34–104)
ALT SERPL W P-5'-P-CCNC: 10 U/L (ref 7–52)
ANION GAP SERPL CALCULATED.3IONS-SCNC: 4 MMOL/L
AST SERPL W P-5'-P-CCNC: 15 U/L (ref 13–39)
BASOPHILS # BLD AUTO: 0.02 THOUSANDS/ÂΜL (ref 0–0.1)
BASOPHILS NFR BLD AUTO: 0 % (ref 0–1)
BILIRUB SERPL-MCNC: 0.46 MG/DL (ref 0.2–1)
BUN SERPL-MCNC: 18 MG/DL (ref 5–25)
CALCIUM SERPL-MCNC: 8.5 MG/DL (ref 8.4–10.2)
CHLORIDE SERPL-SCNC: 109 MMOL/L (ref 96–108)
CO2 SERPL-SCNC: 27 MMOL/L (ref 21–32)
CREAT SERPL-MCNC: 0.93 MG/DL (ref 0.6–1.3)
EOSINOPHIL # BLD AUTO: 0.23 THOUSAND/ÂΜL (ref 0–0.61)
EOSINOPHIL NFR BLD AUTO: 5 % (ref 0–6)
ERYTHROCYTE [DISTWIDTH] IN BLOOD BY AUTOMATED COUNT: 15.8 % (ref 11.6–15.1)
GFR SERPL CREATININE-BSD FRML MDRD: 85 ML/MIN/1.73SQ M
GLUCOSE SERPL-MCNC: 89 MG/DL (ref 65–140)
HCT VFR BLD AUTO: 35.7 % (ref 36.5–49.3)
HGB BLD-MCNC: 12.1 G/DL (ref 12–17)
IMM GRANULOCYTES # BLD AUTO: 0.02 THOUSAND/UL (ref 0–0.2)
IMM GRANULOCYTES NFR BLD AUTO: 0 % (ref 0–2)
LYMPHOCYTES # BLD AUTO: 0.84 THOUSANDS/ÂΜL (ref 0.6–4.47)
LYMPHOCYTES NFR BLD AUTO: 17 % (ref 14–44)
MCH RBC QN AUTO: 33.3 PG (ref 26.8–34.3)
MCHC RBC AUTO-ENTMCNC: 33.9 G/DL (ref 31.4–37.4)
MCV RBC AUTO: 98 FL (ref 82–98)
MONOCYTES # BLD AUTO: 0.61 THOUSAND/ÂΜL (ref 0.17–1.22)
MONOCYTES NFR BLD AUTO: 12 % (ref 4–12)
NEUTROPHILS # BLD AUTO: 3.31 THOUSANDS/ÂΜL (ref 1.85–7.62)
NEUTS SEG NFR BLD AUTO: 66 % (ref 43–75)
NRBC BLD AUTO-RTO: 0 /100 WBCS
PLATELET # BLD AUTO: 217 THOUSANDS/UL (ref 149–390)
PMV BLD AUTO: 9.3 FL (ref 8.9–12.7)
POTASSIUM SERPL-SCNC: 4 MMOL/L (ref 3.5–5.3)
PROT SERPL-MCNC: 6.7 G/DL (ref 6.4–8.4)
RBC # BLD AUTO: 3.63 MILLION/UL (ref 3.88–5.62)
SODIUM SERPL-SCNC: 140 MMOL/L (ref 135–147)
T3FREE SERPL-MCNC: 2.94 PG/ML (ref 2.5–3.9)
TSH SERPL DL<=0.05 MIU/L-ACNC: 2.2 UIU/ML (ref 0.45–4.5)
WBC # BLD AUTO: 5.03 THOUSAND/UL (ref 4.31–10.16)

## 2024-02-16 PROCEDURE — 84443 ASSAY THYROID STIM HORMONE: CPT | Performed by: INTERNAL MEDICINE

## 2024-02-16 PROCEDURE — 80053 COMPREHEN METABOLIC PANEL: CPT | Performed by: INTERNAL MEDICINE

## 2024-02-16 PROCEDURE — 84481 FREE ASSAY (FT-3): CPT | Performed by: INTERNAL MEDICINE

## 2024-02-16 PROCEDURE — 85025 COMPLETE CBC W/AUTO DIFF WBC: CPT | Performed by: INTERNAL MEDICINE

## 2024-02-16 RX ORDER — LIDOCAINE AND PRILOCAINE 25; 25 MG/G; MG/G
CREAM TOPICAL
Qty: 30 G | Refills: 0 | Status: SHIPPED | OUTPATIENT
Start: 2024-02-16

## 2024-02-16 NOTE — PROGRESS NOTES
Port accessed and labs drawn as ordered.     Claude Austin  tolerated treatment well with no complications.      Claude Austin is aware of future appt on 2/19/24 at 0900.     AVS Declined by Claude Austin

## 2024-02-19 ENCOUNTER — HOSPITAL ENCOUNTER (OUTPATIENT)
Dept: INFUSION CENTER | Facility: HOSPITAL | Age: 69
Discharge: HOME | End: 2024-02-19
Attending: INTERNAL MEDICINE
Payer: MEDICARE

## 2024-02-19 VITALS
TEMPERATURE: 97 F | HEART RATE: 78 BPM | DIASTOLIC BLOOD PRESSURE: 68 MMHG | RESPIRATION RATE: 18 BRPM | SYSTOLIC BLOOD PRESSURE: 106 MMHG | BODY MASS INDEX: 21.34 KG/M2 | HEIGHT: 70 IN | WEIGHT: 149.03 LBS

## 2024-02-19 DIAGNOSIS — C34.91 NON-SMALL CELL CANCER OF RIGHT LUNG (HCC): Primary | ICD-10-CM

## 2024-02-19 PROCEDURE — 96413 CHEMO IV INFUSION 1 HR: CPT

## 2024-02-19 PROCEDURE — 96367 TX/PROPH/DG ADDL SEQ IV INF: CPT

## 2024-02-19 PROCEDURE — 96417 CHEMO IV INFUS EACH ADDL SEQ: CPT

## 2024-02-19 RX ORDER — SODIUM CHLORIDE 9 MG/ML
20 INJECTION, SOLUTION INTRAVENOUS ONCE
Status: COMPLETED | OUTPATIENT
Start: 2024-02-19 | End: 2024-02-19

## 2024-02-19 RX ADMIN — DEXAMETHASONE SODIUM PHOSPHATE: 10 INJECTION, SOLUTION INTRAMUSCULAR; INTRAVENOUS at 09:23

## 2024-02-19 RX ADMIN — SODIUM CHLORIDE 200 MG: 9 INJECTION, SOLUTION INTRAVENOUS at 10:03

## 2024-02-19 RX ADMIN — SODIUM CHLORIDE 20 ML/HR: 0.9 INJECTION, SOLUTION INTRAVENOUS at 09:23

## 2024-02-19 RX ADMIN — PEMETREXED DISODIUM 846 MG: 500 INJECTION, POWDER, LYOPHILIZED, FOR SOLUTION INTRAVENOUS at 10:57

## 2024-02-19 NOTE — PROGRESS NOTES
Claude Austin  tolerated treatment well with no complications.      Claude Austin is aware of future appt on 3/8/24 at 1330.     AVS declined by Claude Austin.

## 2024-02-25 DIAGNOSIS — C34.91 NON-SMALL CELL CANCER OF RIGHT LUNG (HCC): ICD-10-CM

## 2024-02-26 RX ORDER — FOLIC ACID 1 MG/1
1000 TABLET ORAL DAILY
Qty: 90 TABLET | Refills: 0 | Status: SHIPPED | OUTPATIENT
Start: 2024-02-26

## 2024-02-29 ENCOUNTER — DOCUMENTATION (OUTPATIENT)
Dept: HEMATOLOGY ONCOLOGY | Facility: CLINIC | Age: 69
End: 2024-02-29

## 2024-02-29 ENCOUNTER — HOSPITAL ENCOUNTER (OUTPATIENT)
Dept: RADIOLOGY | Facility: HOSPITAL | Age: 69
Discharge: HOME | End: 2024-02-29
Attending: INTERNAL MEDICINE
Payer: MEDICARE

## 2024-02-29 DIAGNOSIS — C34.91 NON-SMALL CELL CANCER OF RIGHT LUNG (HCC): ICD-10-CM

## 2024-02-29 PROCEDURE — 70553 MRI BRAIN STEM W/O & W/DYE: CPT

## 2024-02-29 PROCEDURE — A9585 GADOBUTROL INJECTION: HCPCS | Performed by: INTERNAL MEDICINE

## 2024-02-29 PROCEDURE — G1004 CDSM NDSC: HCPCS

## 2024-02-29 RX ORDER — GADOBUTROL 604.72 MG/ML
6 INJECTION INTRAVENOUS
Status: COMPLETED | OUTPATIENT
Start: 2024-02-29 | End: 2024-02-29

## 2024-02-29 RX ADMIN — GADOBUTROL 6 ML: 604.72 INJECTION INTRAVENOUS at 12:34

## 2024-03-04 ENCOUNTER — DOCUMENTATION (OUTPATIENT)
Dept: HEMATOLOGY ONCOLOGY | Facility: CLINIC | Age: 69
End: 2024-03-04

## 2024-03-04 ENCOUNTER — TELEPHONE (OUTPATIENT)
Age: 69
End: 2024-03-04

## 2024-03-04 DIAGNOSIS — C34.91 NON-SMALL CELL CANCER OF RIGHT LUNG (HCC): Primary | ICD-10-CM

## 2024-03-04 RX ORDER — PEMBROLIZUMAB 25 MG/ML
200 INJECTION, SOLUTION INTRAVENOUS
Qty: 8 ML | Refills: 5 | Status: SHIPPED | OUTPATIENT
Start: 2024-03-04

## 2024-03-04 NOTE — PROGRESS NOTES
Called [facility] & spoke to kisha she sd that she can set up the next shipement of keytruda.. the keytruda kathie be  shipped out 03/04/24 for delivery 03/05/24 she did say that there is [facility] 1 refill left on the script. They will need a new script faxed to them at [phone].. emailed that info to bentley & tom  Emailed the team about the shipment  Pt only has medicare & no supplemental plan & no other rx coverage..

## 2024-03-08 ENCOUNTER — HOSPITAL ENCOUNTER (OUTPATIENT)
Dept: INFUSION CENTER | Facility: HOSPITAL | Age: 69
End: 2024-03-08
Payer: MEDICARE

## 2024-03-08 DIAGNOSIS — C34.91 NON-SMALL CELL CANCER OF RIGHT LUNG (HCC): Primary | ICD-10-CM

## 2024-03-08 LAB
ALBUMIN SERPL BCP-MCNC: 3.7 G/DL (ref 3.5–5)
ALP SERPL-CCNC: 52 U/L (ref 34–104)
ALT SERPL W P-5'-P-CCNC: 12 U/L (ref 7–52)
ANION GAP SERPL CALCULATED.3IONS-SCNC: 7 MMOL/L
AST SERPL W P-5'-P-CCNC: 15 U/L (ref 13–39)
BASOPHILS # BLD AUTO: 0.03 THOUSANDS/ÂΜL (ref 0–0.1)
BASOPHILS NFR BLD AUTO: 1 % (ref 0–1)
BILIRUB SERPL-MCNC: 0.35 MG/DL (ref 0.2–1)
BUN SERPL-MCNC: 21 MG/DL (ref 5–25)
CALCIUM SERPL-MCNC: 9 MG/DL (ref 8.4–10.2)
CHLORIDE SERPL-SCNC: 104 MMOL/L (ref 96–108)
CO2 SERPL-SCNC: 27 MMOL/L (ref 21–32)
CREAT SERPL-MCNC: 0.93 MG/DL (ref 0.6–1.3)
EOSINOPHIL # BLD AUTO: 0.27 THOUSAND/ÂΜL (ref 0–0.61)
EOSINOPHIL NFR BLD AUTO: 5 % (ref 0–6)
ERYTHROCYTE [DISTWIDTH] IN BLOOD BY AUTOMATED COUNT: 14.5 % (ref 11.6–15.1)
GFR SERPL CREATININE-BSD FRML MDRD: 85 ML/MIN/1.73SQ M
GLUCOSE SERPL-MCNC: 106 MG/DL (ref 65–140)
HCT VFR BLD AUTO: 36.6 % (ref 36.5–49.3)
HGB BLD-MCNC: 12.1 G/DL (ref 12–17)
IMM GRANULOCYTES # BLD AUTO: 0.02 THOUSAND/UL (ref 0–0.2)
IMM GRANULOCYTES NFR BLD AUTO: 0 % (ref 0–2)
LYMPHOCYTES # BLD AUTO: 1.01 THOUSANDS/ÂΜL (ref 0.6–4.47)
LYMPHOCYTES NFR BLD AUTO: 18 % (ref 14–44)
MCH RBC QN AUTO: 33.2 PG (ref 26.8–34.3)
MCHC RBC AUTO-ENTMCNC: 33.1 G/DL (ref 31.4–37.4)
MCV RBC AUTO: 100 FL (ref 82–98)
MONOCYTES # BLD AUTO: 0.7 THOUSAND/ÂΜL (ref 0.17–1.22)
MONOCYTES NFR BLD AUTO: 13 % (ref 4–12)
NEUTROPHILS # BLD AUTO: 3.54 THOUSANDS/ÂΜL (ref 1.85–7.62)
NEUTS SEG NFR BLD AUTO: 63 % (ref 43–75)
NRBC BLD AUTO-RTO: 0 /100 WBCS
PLATELET # BLD AUTO: 257 THOUSANDS/UL (ref 149–390)
PMV BLD AUTO: 9.6 FL (ref 8.9–12.7)
POTASSIUM SERPL-SCNC: 3.4 MMOL/L (ref 3.5–5.3)
PROT SERPL-MCNC: 6.7 G/DL (ref 6.4–8.4)
RBC # BLD AUTO: 3.65 MILLION/UL (ref 3.88–5.62)
SODIUM SERPL-SCNC: 138 MMOL/L (ref 135–147)
T3FREE SERPL-MCNC: 2.46 PG/ML (ref 2.5–3.9)
TSH SERPL DL<=0.05 MIU/L-ACNC: 2.32 UIU/ML (ref 0.45–4.5)
WBC # BLD AUTO: 5.57 THOUSAND/UL (ref 4.31–10.16)

## 2024-03-08 PROCEDURE — 84481 FREE ASSAY (FT-3): CPT | Performed by: INTERNAL MEDICINE

## 2024-03-08 PROCEDURE — 84443 ASSAY THYROID STIM HORMONE: CPT | Performed by: INTERNAL MEDICINE

## 2024-03-08 PROCEDURE — 85025 COMPLETE CBC W/AUTO DIFF WBC: CPT | Performed by: INTERNAL MEDICINE

## 2024-03-08 PROCEDURE — 80053 COMPREHEN METABOLIC PANEL: CPT | Performed by: INTERNAL MEDICINE

## 2024-03-08 NOTE — PROGRESS NOTES
Claude Austin  tolerated treatment well with no complications.  Pt reports some fatigue    Claude Austin is aware of future appt on 3/11 at 1030.     AVS printed and given to Claude Austin:  No (Declined by Claude Austin)

## 2024-03-11 ENCOUNTER — HOSPITAL ENCOUNTER (OUTPATIENT)
Dept: INFUSION CENTER | Facility: HOSPITAL | Age: 69
Discharge: HOME | End: 2024-03-11
Attending: INTERNAL MEDICINE
Payer: MEDICARE

## 2024-03-11 VITALS
HEART RATE: 80 BPM | WEIGHT: 148.15 LBS | SYSTOLIC BLOOD PRESSURE: 124 MMHG | HEIGHT: 70 IN | DIASTOLIC BLOOD PRESSURE: 80 MMHG | TEMPERATURE: 97.6 F | RESPIRATION RATE: 18 BRPM | BODY MASS INDEX: 21.21 KG/M2

## 2024-03-11 DIAGNOSIS — C34.91 NON-SMALL CELL CANCER OF RIGHT LUNG (HCC): Primary | ICD-10-CM

## 2024-03-11 PROCEDURE — 96367 TX/PROPH/DG ADDL SEQ IV INF: CPT

## 2024-03-11 PROCEDURE — 96413 CHEMO IV INFUSION 1 HR: CPT

## 2024-03-11 PROCEDURE — 96411 CHEMO IV PUSH ADDL DRUG: CPT

## 2024-03-11 RX ORDER — CYANOCOBALAMIN 1000 UG/ML
1000 INJECTION, SOLUTION INTRAMUSCULAR; SUBCUTANEOUS
Status: DISCONTINUED | OUTPATIENT
Start: 2024-03-11 | End: 2024-03-14 | Stop reason: HOSPADM

## 2024-03-11 RX ORDER — CYANOCOBALAMIN 1000 UG/ML
1000 INJECTION, SOLUTION INTRAMUSCULAR; SUBCUTANEOUS
Status: CANCELLED
Start: 2024-03-11

## 2024-03-11 RX ORDER — SODIUM CHLORIDE 9 MG/ML
20 INJECTION, SOLUTION INTRAVENOUS ONCE
Status: COMPLETED | OUTPATIENT
Start: 2024-03-11 | End: 2024-03-11

## 2024-03-11 RX ADMIN — SODIUM CHLORIDE 200 MG: 9 INJECTION, SOLUTION INTRAVENOUS at 11:25

## 2024-03-11 RX ADMIN — DEXAMETHASONE SODIUM PHOSPHATE: 10 INJECTION, SOLUTION INTRAMUSCULAR; INTRAVENOUS at 10:49

## 2024-03-11 RX ADMIN — SODIUM CHLORIDE 20 ML/HR: 0.9 INJECTION, SOLUTION INTRAVENOUS at 10:49

## 2024-03-11 RX ADMIN — CYANOCOBALAMIN 1000 MCG: 1000 INJECTION, SOLUTION INTRAMUSCULAR at 12:29

## 2024-03-11 RX ADMIN — PEMETREXED DISODIUM 846 MG: 100 INJECTION, POWDER, LYOPHILIZED, FOR SOLUTION INTRAVENOUS at 12:18

## 2024-03-11 NOTE — PROGRESS NOTES
Claude Austin  tolerated treatment well with no complications.      Claude Austin is aware of future appt on 3/29 at 330.     AVS printed and given to Claude Austin:    No (Declined by Claude Austin)

## 2024-03-21 ENCOUNTER — DOCUMENTATION (OUTPATIENT)
Dept: HEMATOLOGY ONCOLOGY | Facility: CLINIC | Age: 69
End: 2024-03-21

## 2024-03-21 DIAGNOSIS — C34.91 NON-SMALL CELL CANCER OF RIGHT LUNG (HCC): ICD-10-CM

## 2024-03-21 RX ORDER — PEMBROLIZUMAB 25 MG/ML
INJECTION, SOLUTION INTRAVENOUS
Qty: 8 ML | Refills: 5 | Status: SHIPPED | OUTPATIENT
Start: 2024-03-21

## 2024-03-21 NOTE — PROGRESS NOTES
Pt has [facility] from 1/19/23 to 05/19/24 per email from thai pt doesn't need free drug.  Will halt on the upfront medication at this time

## 2024-03-25 ENCOUNTER — DOCUMENTATION (OUTPATIENT)
Dept: HEMATOLOGY ONCOLOGY | Facility: CLINIC | Age: 69
End: 2024-03-25

## 2024-03-25 NOTE — PROGRESS NOTES
Called medicare & spoke to tom call ref# [id number] she sd that part A is active effective 12/01/22 part B is active effective 02/01/24 the keytruda coverage needs no auth & they cover 80% & the pt will be resp for 20%.  Called pt to see if there is any supplemental plans & spoke to sherry she sd no because the pt can't  afford a supplemental plan. Talked to her about an advantage plan & she sd that she will have the pt look into that.. she thanked me for the call

## 2024-03-28 ENCOUNTER — OFFICE VISIT (OUTPATIENT)
Dept: PALLIATIVE MEDICINE | Facility: CLINIC | Age: 69
End: 2024-03-28
Payer: MEDICARE

## 2024-03-28 VITALS
SYSTOLIC BLOOD PRESSURE: 120 MMHG | TEMPERATURE: 97.2 F | BODY MASS INDEX: 21.79 KG/M2 | DIASTOLIC BLOOD PRESSURE: 70 MMHG | OXYGEN SATURATION: 99 % | WEIGHT: 151.9 LBS | HEART RATE: 81 BPM

## 2024-03-28 DIAGNOSIS — C34.91 NON-SMALL CELL CANCER OF RIGHT LUNG (HCC): Primary | ICD-10-CM

## 2024-03-28 DIAGNOSIS — T45.1X5A CHEMOTHERAPY-INDUCED FATIGUE: ICD-10-CM

## 2024-03-28 DIAGNOSIS — L28.2 PRURITIC RASH: ICD-10-CM

## 2024-03-28 DIAGNOSIS — R53.83 CHEMOTHERAPY-INDUCED FATIGUE: ICD-10-CM

## 2024-03-28 DIAGNOSIS — C79.31 METASTASIS TO BRAIN (HCC): ICD-10-CM

## 2024-03-28 DIAGNOSIS — R53.1 WEAKNESS: ICD-10-CM

## 2024-03-28 DIAGNOSIS — Z51.5 PALLIATIVE CARE BY SPECIALIST: ICD-10-CM

## 2024-03-28 DIAGNOSIS — K59.00 CONSTIPATION, UNSPECIFIED CONSTIPATION TYPE: ICD-10-CM

## 2024-03-28 DIAGNOSIS — Z72.0 TOBACCO USE: ICD-10-CM

## 2024-03-28 PROCEDURE — G2211 COMPLEX E/M VISIT ADD ON: HCPCS | Performed by: INTERNAL MEDICINE

## 2024-03-28 PROCEDURE — 99215 OFFICE O/P EST HI 40 MIN: CPT | Performed by: INTERNAL MEDICINE

## 2024-03-28 NOTE — PROGRESS NOTES
Palliative and Supportive Care   Claude Austin 68 y.o. male [id number]    Assessment/Plan:  1. Non-small cell cancer of right lung (HCC)    2. Metastasis to brain (HCC)    3. Palliative care by specialist    4. Chemotherapy-induced fatigue    5. Constipation, unspecified constipation type    6. Pruritic rash    7. Tobacco use      For fatigue, discussed addition of stimulant or steroids, declined.  Recommended starting with PRN green tea for a gentle natural caffeine source rather than a prescribed pharmaceutical.  Appears to have some truncal instability and global weakness. Was previously successful with PT.  Referral placed.  Rash appears to be drug-induced.  May use OTC hydrocortisone 1% +/- barrier cream such as remedy.  Unable to find a sample, but able to purchase on amazon relatively inexpensively.  Talked about general skin care, especially with the rapid upcoming seasonal temperature increase.  Constipation intermittent, but will most likely benefit from scheduled low-dose senna or miralax.  Otherwise reviewed recommendations for travel, including frequent breaks and ambulation.  Follow up in 2-3 months.       Requested Prescriptions      No prescriptions requested or ordered in this encounter     There are no discontinued medications.    Representatives have queried the patient's controlled substance dispensing history in the Prescription Drug Monitoring Program in compliance with regulations before I have prescribed any controlled substances.  The prescription history is consistent with prescribed therapy and our practice policies.      I have spent a total time of 44 minutes on 03/28/24 in caring for this patient including Risks and benefits of tx options, Instructions for management, Patient and family education, Importance of tx compliance, Risk factor reductions, Counseling / Coordination of care, Documenting in the medical record, Reviewing / ordering tests, medicine, procedures  , and Obtaining  or reviewing history  .     No follow-ups on file.    Subjective:   Chief Complaint  Follow up visit for:  symptom management  HPI     Claude Austin is a 68 y.o. male with metastatic NSCLC who presents for follow up.  Since our last visit, scans have been overall favorable, representing treatment response.  Unfortunately, fatigue continues to be a significant challenge.  Note documentation from Dr. Ruelas's office re: dose adjustments.     Patient has several additional complaints.  Fatigue has been more severe over the past month.  Reports weakness that worsens with standing on his feet for more than a few minutes.  Has had intermittent constipation, which has responded to senna, and too well to a full dose of miralax with resultant diarrhea.  Occassional aches and pains treated with tylenol.  Has developed a pruritic scaly rash on his abdomen, back, and left arm concerning for dermatitis 2/2 current tx regimen.  Advised to discss with Dr. Ruelas.  Further recs as above.     The following portions of the medical history were reviewed: past medical history, problem list, medication list, and social history.    Current Outpatient Medications:     acetaminophen (TYLENOL) 500 mg tablet, Take 500 mg by mouth every 4 (four) hours as needed for mild pain, Disp: , Rfl:     apixaban (ELIQUIS) 5 mg, Take 1 tablet (5 mg total) by mouth 2 (two) times a day, Disp: 60 tablet, Rfl: 5    fluticasone (FLONASE) 50 mcg/act nasal spray, 1 spray into each nostril 2 (two) times a day, Disp: 16 g, Rfl: 1    folic acid (FOLVITE) 1 mg tablet, Take 1 tablet (1,000 mcg total) by mouth daily, Disp: 90 tablet, Rfl: 0    gabapentin (NEURONTIN) 300 mg capsule, Take 300 mg in the AM and 600 mg in the Pm, Disp: 270 capsule, Rfl: 1    hydroCHLOROthiazide 12.5 mg tablet, Take 1 tablet (12.5 mg total) by mouth daily, Disp: 90 tablet, Rfl: 3    Keytruda 100 MG/4ML injection, INFUSE 200MG VIA IVPB OVER 30 MIN EVERY 21 DAYS, Disp: 8 mL, Rfl: 5     lidocaine-prilocaine (EMLA) cream, Apply to port 30 to 60 minutes prior to use, Disp: 30 g, Rfl: 0    dexamethasone (DECADRON) 2 mg tablet, Take 1 tablet (2 mg total) by mouth 2 (two) times a day with meals, Disp: 60 tablet, Rfl: 0  Review of Systems    All other systems negative    Objective:  Vital Signs  /70 (BP Location: Left arm, Patient Position: Sitting, Cuff Size: Standard)   Pulse 81   Temp (!) 97.2 °F (36.2 °C) (Temporal)   Wt 68.9 kg (151 lb 14.4 oz)   SpO2 99%   BMI 21.79 kg/m²    Physical Exam    Constitutional: Appears well-developed and well-nourished. In no acute distress.   Head: Normocephalic and atraumatic.   Eyes: EOM are normal. Right eye exhibits no discharge. Left eye exhibits no discharge. No scleral icterus.   Pulmonary/Chest: Effort normal. No stridor. No respiratory distress.   Abdominal: No distension.   Musculoskeletal: No edema. Gait intact, but unsteady on his feet with eyes closed and standing.   Neurological: Alert, oriented and appropriately conversant.   Skin: Skin is dry, not diaphoretic. Dry skin with intermittent papules on his L forearm and abdomen.  No open or oozing sores/pustules.   Psychiatric: Displays a normal mood and affect. Behavior, judgement and thought content appear normal.   Vitals reviewed.

## 2024-03-29 ENCOUNTER — HOSPITAL ENCOUNTER (OUTPATIENT)
Dept: INFUSION CENTER | Facility: HOSPITAL | Age: 69
End: 2024-03-29
Payer: MEDICARE

## 2024-03-29 DIAGNOSIS — C34.91 NON-SMALL CELL CANCER OF RIGHT LUNG (HCC): Primary | ICD-10-CM

## 2024-03-29 LAB
ALBUMIN SERPL BCP-MCNC: 3.5 G/DL (ref 3.5–5)
ALP SERPL-CCNC: 47 U/L (ref 34–104)
ALT SERPL W P-5'-P-CCNC: 14 U/L (ref 7–52)
ANION GAP SERPL CALCULATED.3IONS-SCNC: 7 MMOL/L (ref 4–13)
AST SERPL W P-5'-P-CCNC: 17 U/L (ref 13–39)
BASOPHILS # BLD AUTO: 0.03 THOUSANDS/ÂΜL (ref 0–0.1)
BASOPHILS NFR BLD AUTO: 1 % (ref 0–1)
BILIRUB SERPL-MCNC: 0.38 MG/DL (ref 0.2–1)
BUN SERPL-MCNC: 20 MG/DL (ref 5–25)
CALCIUM SERPL-MCNC: 8.6 MG/DL (ref 8.4–10.2)
CHLORIDE SERPL-SCNC: 105 MMOL/L (ref 96–108)
CO2 SERPL-SCNC: 27 MMOL/L (ref 21–32)
CREAT SERPL-MCNC: 0.84 MG/DL (ref 0.6–1.3)
EOSINOPHIL # BLD AUTO: 0.39 THOUSAND/ÂΜL (ref 0–0.61)
EOSINOPHIL NFR BLD AUTO: 7 % (ref 0–6)
ERYTHROCYTE [DISTWIDTH] IN BLOOD BY AUTOMATED COUNT: 13.9 % (ref 11.6–15.1)
GFR SERPL CREATININE-BSD FRML MDRD: 91 ML/MIN/1.73SQ M
GLUCOSE SERPL-MCNC: 152 MG/DL (ref 65–140)
HCT VFR BLD AUTO: 37.5 % (ref 36.5–49.3)
HGB BLD-MCNC: 12.4 G/DL (ref 12–17)
IMM GRANULOCYTES # BLD AUTO: 0.01 THOUSAND/UL (ref 0–0.2)
IMM GRANULOCYTES NFR BLD AUTO: 0 % (ref 0–2)
LYMPHOCYTES # BLD AUTO: 0.99 THOUSANDS/ÂΜL (ref 0.6–4.47)
LYMPHOCYTES NFR BLD AUTO: 19 % (ref 14–44)
MCH RBC QN AUTO: 33.2 PG (ref 26.8–34.3)
MCHC RBC AUTO-ENTMCNC: 33.1 G/DL (ref 31.4–37.4)
MCV RBC AUTO: 101 FL (ref 82–98)
MONOCYTES # BLD AUTO: 0.57 THOUSAND/ÂΜL (ref 0.17–1.22)
MONOCYTES NFR BLD AUTO: 11 % (ref 4–12)
NEUTROPHILS # BLD AUTO: 3.33 THOUSANDS/ÂΜL (ref 1.85–7.62)
NEUTS SEG NFR BLD AUTO: 62 % (ref 43–75)
NRBC BLD AUTO-RTO: 0 /100 WBCS
PLATELET # BLD AUTO: 229 THOUSANDS/UL (ref 149–390)
PMV BLD AUTO: 9.6 FL (ref 8.9–12.7)
POTASSIUM SERPL-SCNC: 3.6 MMOL/L (ref 3.5–5.3)
PROT SERPL-MCNC: 6.6 G/DL (ref 6.4–8.4)
RBC # BLD AUTO: 3.73 MILLION/UL (ref 3.88–5.62)
SODIUM SERPL-SCNC: 139 MMOL/L (ref 135–147)
T3FREE SERPL-MCNC: 3.01 PG/ML (ref 2.5–3.9)
TSH SERPL DL<=0.05 MIU/L-ACNC: 2.79 UIU/ML (ref 0.45–4.5)
WBC # BLD AUTO: 5.32 THOUSAND/UL (ref 4.31–10.16)

## 2024-03-29 PROCEDURE — 84481 FREE ASSAY (FT-3): CPT | Performed by: INTERNAL MEDICINE

## 2024-03-29 PROCEDURE — 84443 ASSAY THYROID STIM HORMONE: CPT | Performed by: INTERNAL MEDICINE

## 2024-03-29 PROCEDURE — 85025 COMPLETE CBC W/AUTO DIFF WBC: CPT | Performed by: INTERNAL MEDICINE

## 2024-03-29 PROCEDURE — 80053 COMPREHEN METABOLIC PANEL: CPT | Performed by: INTERNAL MEDICINE

## 2024-03-29 NOTE — PROGRESS NOTES
Claude Austin  tolerated treatment well with no complications.      Claude Austin is aware of future appt on 4/1/24 at 0900.     AVS declined by Claude Austin.

## 2024-04-01 ENCOUNTER — HOSPITAL ENCOUNTER (OUTPATIENT)
Dept: INFUSION CENTER | Facility: HOSPITAL | Age: 69
Discharge: HOME | End: 2024-04-01
Attending: INTERNAL MEDICINE
Payer: MEDICARE

## 2024-04-01 VITALS
HEART RATE: 85 BPM | DIASTOLIC BLOOD PRESSURE: 79 MMHG | RESPIRATION RATE: 20 BRPM | BODY MASS INDEX: 21.71 KG/M2 | TEMPERATURE: 97.6 F | SYSTOLIC BLOOD PRESSURE: 114 MMHG | WEIGHT: 151.68 LBS | HEIGHT: 70 IN

## 2024-04-01 DIAGNOSIS — C34.91 NON-SMALL CELL CANCER OF RIGHT LUNG (HCC): Primary | ICD-10-CM

## 2024-04-01 PROCEDURE — 96413 CHEMO IV INFUSION 1 HR: CPT

## 2024-04-01 PROCEDURE — 96411 CHEMO IV PUSH ADDL DRUG: CPT

## 2024-04-01 PROCEDURE — 96367 TX/PROPH/DG ADDL SEQ IV INF: CPT

## 2024-04-01 RX ORDER — SODIUM CHLORIDE 9 MG/ML
20 INJECTION, SOLUTION INTRAVENOUS ONCE
Status: COMPLETED | OUTPATIENT
Start: 2024-04-01 | End: 2024-04-01

## 2024-04-01 RX ADMIN — SODIUM CHLORIDE 20 ML/HR: 0.9 INJECTION, SOLUTION INTRAVENOUS at 09:11

## 2024-04-01 RX ADMIN — SODIUM CHLORIDE 200 MG: 9 INJECTION, SOLUTION INTRAVENOUS at 09:41

## 2024-04-01 RX ADMIN — DEXAMETHASONE SODIUM PHOSPHATE: 10 INJECTION, SOLUTION INTRAMUSCULAR; INTRAVENOUS at 09:05

## 2024-04-01 RX ADMIN — PEMETREXED DISODIUM 752 MG: 500 INJECTION, POWDER, LYOPHILIZED, FOR SOLUTION INTRAVENOUS at 10:36

## 2024-04-02 ENCOUNTER — DOCUMENTATION (OUTPATIENT)
Dept: HEMATOLOGY ONCOLOGY | Facility: CLINIC | Age: 69
End: 2024-04-02

## 2024-04-02 NOTE — PROGRESS NOTES
Lolis reached out with the following email:     Shadia,  I note that Rosa Ms Eliquis assistance expires next month on 5/24/24.  Do we need to reapply?  We are not sure he will still be eligible.  Since the last application Mateo's Medicare Part B has kicked in as of February 2024 and his monthly income has been decreased by the Part B premium so he now receives $1377.00 per month and my income has not changed so our household income is about the same as on the previous application.  Please let me know what you can find out.  I have attached the application that will be expiring for your reference.  Thank you in advance for your help.  Lolis Austin    This writer responded to Lolis:    Eddie Danielle,     Thanks for reaching out!   I sent this to Mateo's  and will see that his re-enrollment gets processed. I am not sure if he will qualify, but we can apply. Do you have the financial documents EX: 1040 from taxes?    Thanks!    This writer sent the application and email to Claude Hernandez's advocate, as he is a [employer] PT.   The email was as follows:    Lolis Espinal reached out on behalf of Claude ROOT regarding Eliquis re-enrollment.     The application is attached here. It is almost fully executed, just awaiting Dr. Ruelas's signature and financial documents.     I wanted to pass this along so that the final components could be obtained!      If information is sent to this writer, it will be forwarded to Erica Waterman, MPH  Oncology     [facility]    [facility] [address]  [address] [address]  [phone]  [phone]

## 2024-04-04 ENCOUNTER — EVALUATION (OUTPATIENT)
Dept: PHYSICAL THERAPY | Age: 69
End: 2024-04-04
Payer: MEDICARE

## 2024-04-04 DIAGNOSIS — R53.1 WEAKNESS: Primary | ICD-10-CM

## 2024-04-04 DIAGNOSIS — R26.89 BALANCE DISORDER: ICD-10-CM

## 2024-04-04 PROCEDURE — 97161 PT EVAL LOW COMPLEX 20 MIN: CPT | Performed by: PHYSICAL THERAPIST

## 2024-04-04 PROCEDURE — 97110 THERAPEUTIC EXERCISES: CPT | Performed by: PHYSICAL THERAPIST

## 2024-04-04 NOTE — PROGRESS NOTES
PT Evaluation     Today's date: 2024  Patient name: Claude Austin  : 1955  MRN: [id number]  Referring provider: Mak Feliciano MD  Dx:   Encounter Diagnosis     ICD-10-CM    1. Weakness  R53.1 Ambulatory Referral to Physical Therapy      2. Balance disorder  R26.89           Start Time: 1400  Stop Time: 1445  Total time in clinic (min): 45 minutes    Assessment  Assessment details: Pt is a 67 y/o male who presents with generalized pain and weakness from chemotherapy. No further referral is necessary at this time Pt's primary goal is to perform ADL's without difficulty.  Pt is experiencing pain, decreased strength, and decreased ROM. Pt has a positive prognosis based on positive response to PT in past. Pt's presentation is very similar to prior to last episode of PT. Pt would benefit from PT to address these impairments leading to increased functional capacity and improved quality of life.    Impairments: abnormal or restricted ROM, impaired physical strength, lacks appropriate home exercise program, pain with function, poor posture  and poor body mechanics  Understanding of Dx/Px/POC: good   Prognosis: good    Goals  Short Term Goals: to be achieved by 4 weeks  1) Patient to be independent with basic HEP.  2) Decrease pain to 2/10 at its worst.  3) Increase lumbar ROM to minimal deficits   4) Increase LE strength by 1/2 MMT grade in all deficient planes.    Long Term Goals: to be achieved by discharge  1) FOTO equal to or greater than 59.  2) Ambulation to improve to maximal level of function  3) Stair negotiation will improve to reciprocal.  4) Sit to stand transfers will improve to maximal level of function     Plan  Patient would benefit from: skilled physical therapy  Planned modality interventions: cryotherapy and thermotherapy: hydrocollator packs  Planned therapy interventions: neuromuscular re-education, patient education, stretching, strengthening, therapeutic activities, therapeutic  exercise, therapeutic training, home exercise program and graded activity  Frequency: Twice a week for 10 weeks.  Treatment plan discussed with: patient        Subjective Evaluation    Quality of life: good    Patient Goals  Patient goals for therapy: decreased pain, independence with ADLs/IADLs, return to sport/leisure activities, increased strength and increased motion    Pain  Current pain ratin  At best pain ratin  At worst pain ratin  Aggravating factors: stair climbing, walking, standing, sitting and lifting    Hand dominance: right          Objective     Strength/Myotome Testing     Lumbar   Left   Heel walk: normal, difficulty due to balance    Right   Heel walk: normal difficulty due to balance    Left Hip   Planes of Motion   Flexion: 4  Abduction: 4  External rotation: 4    Right Hip   Planes of Motion   Flexion: 4-  Abduction: 4-  External rotation: 4-    General Comments:  Patient demonstrates     Lumbar Comments  SLS: Moderate sway 13 secs b/l  5 STS: 27 secs  TU secs  Tandem stance foam: mod sway 30 secs  Stair navigation: Step to pattern with stair descent          Precautions: Precautions: Stage IV cancer, thoracic metastasis avoid lifting from flexed positions, NO GRADE 5 MOBS, avoid bridges      Manuals 4/4                                                                Neuro Re-Ed             SLR             Mini squats             Standing hip abd             LTR             Tandem stance             Low rows                          Ther Ex             Nu step ROM             Standing lumbar extension HEP            Nerve glides HEP            Seated shoulder flexion HEP            Seated Marches HEP                                                   Ther Activity                                       Gait Training                                       Modalities

## 2024-04-05 ENCOUNTER — OFFICE VISIT (OUTPATIENT)
Dept: HEMATOLOGY ONCOLOGY | Facility: CLINIC | Age: 69
End: 2024-04-05
Payer: MEDICARE

## 2024-04-05 VITALS
HEIGHT: 70 IN | HEART RATE: 95 BPM | TEMPERATURE: 98.2 F | BODY MASS INDEX: 21.02 KG/M2 | WEIGHT: 146.8 LBS | DIASTOLIC BLOOD PRESSURE: 72 MMHG | RESPIRATION RATE: 17 BRPM | SYSTOLIC BLOOD PRESSURE: 110 MMHG | OXYGEN SATURATION: 100 %

## 2024-04-05 DIAGNOSIS — C34.91 NON-SMALL CELL CANCER OF RIGHT LUNG (HCC): Primary | ICD-10-CM

## 2024-04-05 PROCEDURE — G2211 COMPLEX E/M VISIT ADD ON: HCPCS | Performed by: INTERNAL MEDICINE

## 2024-04-05 PROCEDURE — 99214 OFFICE O/P EST MOD 30 MIN: CPT | Performed by: INTERNAL MEDICINE

## 2024-04-05 RX ORDER — SODIUM CHLORIDE 9 MG/ML
20 INJECTION, SOLUTION INTRAVENOUS ONCE
OUTPATIENT
Start: 2024-06-03

## 2024-04-05 RX ORDER — SODIUM CHLORIDE 9 MG/ML
20 INJECTION, SOLUTION INTRAVENOUS ONCE
OUTPATIENT
Start: 2024-05-13

## 2024-04-07 NOTE — PROGRESS NOTES
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     Caris - PD-L1 0%, TMB 60, no other targetable mutations     2023 - palliative RT to T spine and brain     2023 - start carbo/pemetrexed/pembrolizumab     2023 suspected Rad/Immuno Pneumonitis Grade 1/2 (mild fever)      2023 discontinued Carbo d/t fatigue; and holding Keytruda in Cycle 4 dt Pneumonitis; will re-challenge in cycle 5      2023 - add back keytruda for cycle 6     2023 - dose reduce alimta by 10% due to fatigue     Thoracic spine tumor   2023 Initial Diagnosis    Thoracic spine tumor     Non-small cell cancer of right lung (HCC)   3/29/2023 -  Cancer Staged    Staging form: Lung, AJCC 8th Edition  - Clinical stage from 3/29/2023: Stage DORI (cT4, cN1, cM1b) - Signed by Nancy Ruelas DO on 2023 Biopsy    Lung, right upper lobe, biopsy:     - Rare detached single and small clusters of atypical, degenerated epithelial cells.     - Predominantly fibroelastotic stromal fibrosis.     2023 Biopsy    Lung, right upper lobe mass, biopsy:      - Poorly differentiated non-small cell carcinoma most compatible with adenocarcinoma of the lung     2023 - 2023 Radiation    Course: C1    Plan ID Energy Fractions Dose per Fraction (cGy) Dose Correction (cGy) Total Dose Delivered (cGy) Elapsed Days   [id number]_R Lung 10X/6X 10 / 10 300 0 3,000 11   HCS_WhBrain 6X 10 / 10 300 0 3,000 11      Dr. Ro Genao     2023 Initial Diagnosis    Non-small cell cancer of right lung (HCC)     2023 -  Chemotherapy    cyanocobalamin, 1,000 mcg, Intramuscular, Once, 7 of 7 cycles  Administration: 1,000 mcg (5/15/2023), 1,000 mcg  (7/3/2023), 1,000 mcg (9/5/2023), 1,000 mcg (11/6/2023), 1,000 mcg (1/8/2024), 1,000 mcg (3/11/2024)  alteplase (CATHFLO), 2 mg, Intracatheter, Every 1 Minute as needed, 16 of 22 cycles  Administration: 2 mg (11/29/2023), 2 mg (12/19/2023)  fosaprepitant (EMEND) IVPB, 150 mg, Intravenous, Once, 3 of 3 cycles  Administration: 150 mg (5/22/2023), 150 mg (7/3/2023), 150 mg (6/12/2023)  CARBOplatin (PARAPLATIN) IVPB ([facility] AUC DOSING), 544 mg, Intravenous, Once, 3 of 3 cycles  Administration: 544 mg (5/22/2023), 554 mg (7/3/2023), 544 mg (6/12/2023)  pemetrexed (ALIMTA) chemo infusion, 940 mg, Intravenous, Once, 16 of 22 cycles  Dose modification: 450 mg/m2 (original dose 500 mg/m2, Cycle 9, Reason: Dose Not Tolerated, Comment: 10% dose reduction due to fatigue), 450 mg/m2 (original dose 500 mg/m2, Cycle 10, Reason: Dose modified as per discussion with consulting physician), 400 mg/m2 (original dose 500 mg/m2, Cycle 16, Reason: Dose Not Tolerated, Comment: total 20% dose reduction due to fatigue)  Administration: 900 mg (5/22/2023), 900 mg (7/3/2023), 900 mg (9/5/2023), 900 mg (6/12/2023), 900 mg (8/14/2023), 900 mg (7/24/2023), 900 mg (9/25/2023), 900 mg (10/16/2023), 846 mg (11/6/2023), 846 mg (11/29/2023), 846 mg (12/19/2023), 846 mg (1/8/2024), 846 mg (1/29/2024), 846 mg (2/19/2024), 846 mg (3/11/2024), 752 mg (4/1/2024)  pembrolizumab (KEYTRUDA) IVPB, 200 mg, Intravenous, Once, 15 of 21 cycles  Administration: 200 mg (5/22/2023), 200 mg (7/3/2023), 200 mg (9/5/2023), 200 mg (6/12/2023), 200 mg (8/14/2023), 200 mg (9/25/2023), 200 mg (10/16/2023), 200 mg (11/6/2023), 200 mg (11/29/2023), 200 mg (12/19/2023), 200 mg (1/8/2024), 200 mg (1/29/2024), 200 mg (2/19/2024), 200 mg (3/11/2024), 200 mg (4/1/2024)     7/12/2023 Adverse Reaction    Hospitalization - Suspected Pneumonitis    7/12-14/2023 due to mild fever with CT chest redemonstrating left upper lobe inflammatory changes, previously seen on study 04/04/2023, ID  and oncology evaluated, more suggestive of radiation/Keytruda induced pneumonitis grade 1/2; mild fever resolved; other differential/contributing etiology include recent tapering of steroid       7/21/2023 -  Chemotherapy    Recently suspected pneumonitis 7/12/2023 and reports fatigue 7/21/2023  Discontinuing carboplatin and holding Keytruda of cycle 4; with potential plan to rechallenge Keytruda in cycle 5     Metastasis to brain (HCC)   5/30/2023 Initial Diagnosis    Metastasis to brain (HCC)         Interval History: Patient presents for follow up of his stage IV adenocarcinoma of the lung.  He remains on maintenance Alimta and Keytruda.  He had an MRI of the brain prior to this visit that shows stability to slight improvement of his brain metastasis.  He has had no further issues with syncopal episodes.  His weight is fluctuating but stable.  His lower extremity edema is better with the diuretics given to him by his PCP.  He is following with palliative care as well and was referred by Dr. Feliciano to restart physical therapy.  He has had a lot of deconditioning over the winter.  He has some constipation and is also starting to take daily senna.  He has fatigue for about 4 to 5 days after each chemotherapy treatment.         REVIEW OF SYSTEMS:  Please note that a 14-point review of systems was performed to include Constitutional, HEENT, Respiratory, CVS, GI, , Musculoskeletal, Integumentary, Neurologic, Rheumatologic, Endocrinologic, Psychiatric, Lymphatic, and Hematologic/Oncologic systems were reviewed and are negative unless otherwise stated in HPI. Positive and negative findings pertinent to this evaluation are incorporated into the history of present illness.      ECOG PS: 1    PROBLEM LIST:  Patient Active Problem List   Diagnosis    Multiple subsegmental pulmonary emboli without acute cor pulmonale (HCC)    Tobacco use    DDD (degenerative disc disease), cervical    Pleural mass    Brain lesions     Thoracic spine tumor    Advanced care planning/counseling discussion    Non-small cell cancer of right lung (HCC)    Metastasis to brain (HCC)    Lesion of left lung    Chemotherapy-induced fatigue    Drug-induced pneumonitis    Current every day smoker    Bloating    Lower extremity edema    Palliative care by specialist    Peripheral vascular disease (HCC)    Constipation    Pruritic rash    Weakness       Assessment / Plan: 68-year-old male with stage IV adenocarcinoma of the lung on maintenance Alimta and Keytruda.  It Does cause him some fatigue but his disease has remained under good control from a CNS standpoint.  He will be due for another CT scan of the chest abdomen pelvis in 6 weeks and I will see him at that time.  We will continue treatment every 3 weeks.  I am in agreement with him pursuing physical therapy.  There is a certain amount of deconditioning that has occurred with everything he has been through.  I encouraged him to push himself a little bit more that the weather is getting nicer.  He knows to call in the interim with any questions or concerns.       I spent 30 minutes on chart review, face to face counseling time, coordination of care and documentation.    Past Medical History:   has a past medical history of Cancer (HCC).    PAST SURGICAL HISTORY:   has a past surgical history that includes IR biopsy lung (4/6/2023); IR biopsy lung (4/26/2023); and IR port placement (5/16/2023).    CURRENT MEDICATIONS  Current Outpatient Medications   Medication Sig Dispense Refill    acetaminophen (TYLENOL) 500 mg tablet Take 500 mg by mouth every 4 (four) hours as needed for mild pain      apixaban (ELIQUIS) 5 mg Take 1 tablet (5 mg total) by mouth 2 (two) times a day 60 tablet 5    fluticasone (FLONASE) 50 mcg/act nasal spray 1 spray into each nostril 2 (two) times a day 16 g 1    folic acid (FOLVITE) 1 mg tablet Take 1 tablet (1,000 mcg total) by mouth daily 90 tablet 0    gabapentin (NEURONTIN) 300 mg  "capsule Take 300 mg in the AM and 600 mg in the Pm 270 capsule 1    hydroCHLOROthiazide 12.5 mg tablet Take 1 tablet (12.5 mg total) by mouth daily 90 tablet 3    Keytruda 100 MG/4ML injection INFUSE 200MG VIA IVPB OVER 30 MIN EVERY 21 DAYS 8 mL 5    lidocaine-prilocaine (EMLA) cream Apply to port 30 to 60 minutes prior to use 30 g 0    dexamethasone (DECADRON) 2 mg tablet Take 1 tablet (2 mg total) by mouth 2 (two) times a day with meals 60 tablet 0     No current facility-administered medications for this visit.     [unfilled]    SOCIAL HISTORY:   reports that he has been smoking cigarettes. He started smoking about 51 years ago. He has a 25.6 pack-year smoking history. He has never used smokeless tobacco. He reports that he does not currently use alcohol. He reports that he does not use drugs.     FAMILY HISTORY:  family history includes Stroke in his father.     ALLERGIES:  has No Known Allergies.      Physical Exam:  Vital Signs:   Visit Vitals  /72 (BP Location: Left arm, Patient Position: Sitting, Cuff Size: Adult)   Pulse 95   Temp 98.2 °F (36.8 °C)   Resp 17   Ht 5' 10\" (1.778 m)   Wt 66.6 kg (146 lb 12.8 oz)   SpO2 100%   BMI 21.06 kg/m²   Smoking Status Every Day   BSA 1.83 m²     Body mass index is 21.06 kg/m².  Body surface area is 1.83 meters squared.    GEN: Alert, awake oriented x3, in no acute distress  HEENT- No pallor, icterus, cyanosis, no oral mucosal lesions,   LAD - no palpable cervical, clavicle, axillary, inguinal LAD  Heart- normal S1 S2, regular rate and rhythm, No murmur, rubs.   Lungs- clear breathing sound bilateral.   Abdomen- soft, Non tender, bowel sounds present  Extremities- No cyanosis, clubbing, edema  Neuro- No focal neurological deficit    Labs:  Lab Results   Component Value Date    WBC 5.32 03/29/2024    HGB 12.4 03/29/2024    HCT 37.5 03/29/2024     (H) 03/29/2024     03/29/2024     Lab Results   Component Value Date    SODIUM 139 03/29/2024    K 3.6 " 03/29/2024     03/29/2024    CO2 27 03/29/2024    AGAP 7 03/29/2024    BUN 20 03/29/2024    CREATININE 0.84 03/29/2024    GLUC 152 (H) 03/29/2024    GLUF 103 (H) 05/10/2023    CALCIUM 8.6 03/29/2024    AST 17 03/29/2024    ALT 14 03/29/2024    ALKPHOS 47 03/29/2024    TP 6.6 03/29/2024    TBILI 0.38 03/29/2024    EGFR 91 03/29/2024

## 2024-04-08 ENCOUNTER — PATIENT OUTREACH (OUTPATIENT)
Dept: HEMATOLOGY ONCOLOGY | Facility: CLINIC | Age: 69
End: 2024-04-08

## 2024-04-08 NOTE — PROGRESS NOTES
Pt's wife called and left a vm.  LSW returned wife's call this day.  Pt's wife voicemail was received and a detailed message was left.

## 2024-04-09 ENCOUNTER — DOCUMENTATION (OUTPATIENT)
Dept: HEMATOLOGY ONCOLOGY | Facility: CLINIC | Age: 69
End: 2024-04-09

## 2024-04-09 NOTE — PROGRESS NOTES
Recvd email from jared asking about the eliquis application for [phone].  Called [employer] & spoke to maia & erlin sd that they have the new enrollment form but they can't process it until 04/24/24.gave that info to jared

## 2024-04-10 ENCOUNTER — DOCUMENTATION (OUTPATIENT)
Dept: HEMATOLOGY ONCOLOGY | Facility: CLINIC | Age: 69
End: 2024-04-10

## 2024-04-10 ENCOUNTER — OFFICE VISIT (OUTPATIENT)
Dept: PHYSICAL THERAPY | Age: 69
End: 2024-04-10
Payer: MEDICARE

## 2024-04-10 DIAGNOSIS — R53.1 WEAKNESS: Primary | ICD-10-CM

## 2024-04-10 DIAGNOSIS — R26.89 BALANCE DISORDER: ICD-10-CM

## 2024-04-10 PROCEDURE — 97110 THERAPEUTIC EXERCISES: CPT

## 2024-04-10 NOTE — PROGRESS NOTES
Called [facility] at [phone] and spoke with Lauren to provide SLN and EXP for Dr. Ruelas. They wouldn't take it via phone.   Faxed to [phone].      Shadia Waterman MPH  Phone:[phone]  Email: [email or web address]

## 2024-04-10 NOTE — PROGRESS NOTES
Daily Note     Today's date: 4/10/2024  Patient name: Claude Austin  : 1955  MRN: [id number]  Referring provider: Mak Feliciano MD  Dx:   Encounter Diagnosis     ICD-10-CM    1. Weakness  R53.1       2. Balance disorder  R26.89                      Subjective: pt reports feeling good. Reports was deconditioned after the winter months.         Objective: See treatment diary below      Assessment: 3/10 RPE reported with exercises today.  Paced breathing utilized for HR recovery and decrease vasovagal response.  Pt challenged with program today.   Tolerated treatment well. Patient demonstrated fatigue post treatment, exhibited good technique with therapeutic exercises, and would benefit from continued PT      Plan: Continue per plan of care.  Progress treatment as tolerated.       Precautions: Precautions: Stage IV cancer, thoracic metastasis avoid lifting from flexed positions, NO GRADE 5 MOBS, avoid bridges    Visit/Unit Tracking  AUTH Status:  Date  4/10/24             Medicare Used                Remaining                  1/10 2/10                   Manuals 4/4 4/10                                                               Neuro Re-Ed             SLR             Mini squats             Standing hip abd             LTR             Tandem stance  nv           Low rows             Toe taps on step/  HT  3x10           Rocker board  (Cue toe flex during neck ext)                                                    Ther Ex             Nu step ROM  Nu  L1  8'           Standing lumbar extension HEP            Nerve glides HEP            Seated shoulder flexion HEP            Seated Marches HEP UE/LE  3x10           Wall push up    2x10           Modified hip extension  2x10                            Ther Activity                                       Gait Training                                       Modalities

## 2024-04-12 ENCOUNTER — OFFICE VISIT (OUTPATIENT)
Dept: PHYSICAL THERAPY | Age: 69
End: 2024-04-12
Payer: MEDICARE

## 2024-04-12 ENCOUNTER — TELEPHONE (OUTPATIENT)
Dept: HEMATOLOGY ONCOLOGY | Facility: CLINIC | Age: 69
End: 2024-04-12

## 2024-04-12 DIAGNOSIS — R53.1 WEAKNESS: Primary | ICD-10-CM

## 2024-04-12 DIAGNOSIS — R26.89 BALANCE DISORDER: ICD-10-CM

## 2024-04-12 PROCEDURE — 97112 NEUROMUSCULAR REEDUCATION: CPT | Performed by: PHYSICAL THERAPIST

## 2024-04-12 PROCEDURE — 97110 THERAPEUTIC EXERCISES: CPT | Performed by: PHYSICAL THERAPIST

## 2024-04-12 NOTE — PROGRESS NOTES
Daily Note     Today's date: 2024  Patient name: Claude Austin  : 1955  MRN: [id number]  Referring provider: Mak Feliciano MD  Dx:   Encounter Diagnosis     ICD-10-CM    1. Weakness  R53.1       2. Balance disorder  R26.89           Start Time: 1430  Stop Time: 1515  Total time in clinic (min): 45 minutes    Subjective: Pt report no new symptoms.       Objective: See treatment diary below      Assessment: Tolerated treatment well. POC progressed to include more neural dynamics. Patient demonstrated fatigue post treatment and would benefit from continued PT      Plan: Continue per plan of care.      Precautions: Precautions: Stage IV cancer, thoracic metastasis avoid lifting from flexed positions, NO GRADE 5 MOBS, avoid bridges    Visit/Unit Tracking  AUTH Status:  Date  4/10/24 4/12            Medicare Used                Remaining                  1/10 2/10 3/10                  Manuals 4/4 4/10 4/12                                                              Neuro Re-Ed             SLR   2x10 b/l          Mini squats             Standing hip abd   2x10 b/l          LTR   2x10          Tandem stance  nv nv          Low rows             Toe taps on step/  HT  3x10           Rocker board  (Cue toe flex during neck ext)                                                    Ther Ex             Nu step ROM  Nu  L1  8' L1 10'          Standing lumbar extension HEP            Nerve glides HEP            Seated shoulder flexion HEP            Seated Marches HEP UE/LE  3x10 2x10          Wall push up    2x10 2x10          Modified hip extension  2x10     2x10                       Ther Activity                                       Gait Training                                       Modalities                                                     Initial (On Arrival)

## 2024-04-12 NOTE — TELEPHONE ENCOUNTER
Appointment Change  Cancel, Reschedule, Change to Virtual      Who are you speaking with? Patient   If it is not the patient, is the caller listed on the communication consent form? Yes   Which provider is the appointment scheduled with? Dr. Ruelas   When was the original appointment scheduled?    Please list date and time 5/24/24   At which location is the appointment scheduled to take place? [place]   Was the appointment rescheduled?     Was the appointment changed from an in person visit to a virtual visit?    If so, please list the details of the change. 6/21/24   What is the reason for the appointment change? provider

## 2024-04-16 ENCOUNTER — OFFICE VISIT (OUTPATIENT)
Dept: PHYSICAL THERAPY | Age: 69
End: 2024-04-16
Payer: MEDICARE

## 2024-04-16 DIAGNOSIS — R26.89 BALANCE DISORDER: ICD-10-CM

## 2024-04-16 DIAGNOSIS — R53.1 WEAKNESS: Primary | ICD-10-CM

## 2024-04-16 PROCEDURE — 97112 NEUROMUSCULAR REEDUCATION: CPT

## 2024-04-16 PROCEDURE — 97110 THERAPEUTIC EXERCISES: CPT

## 2024-04-16 NOTE — PROGRESS NOTES
"Daily Note     Today's date: 2024  Patient name: Claude Austin  : 1955  MRN: [id number]  Referring provider: Mak Feliciano MD  Dx:   Encounter Diagnosis     ICD-10-CM    1. Weakness  R53.1       2. Balance disorder  R26.89                      Subjective: Pt reports that he was feeling pretty good overall following last session and was very active over the weekend. Notes that he feels as if he overdid it and is feeling it today. Notes that he is having a rougher day since he is sore.     Objective: See treatment diary below      Assessment: Tolerated treatment well beginning on the nu step as an active warmup. Held any progressions this session since he was feeling sore. He was challenged with SLR's today due to quad weakness L>R but was able to complete.  Patient demonstrated fatigue post treatment and would benefit from continued PT      Plan: Continue per plan of care.      Precautions: Precautions: Stage IV cancer, thoracic metastasis avoid lifting from flexed positions, NO GRADE 5 MOBS, avoid bridges    Visit/Unit Tracking  AUTH Status:  Date  4/10/24 4/12 4/16           Medicare Used                Remaining                  1/10 2/10 3/10 4/10                 Manuals 4/4 4/10 4/12 4/16                                                             Neuro Re-Ed             SLR   2x10 b/l 2x10 b/l         Mini squats             Standing hip abd   2x10 b/l 2x10 b/l         LTR   2x10 2x10         Tandem stance  nv nv 20\"x3         Low rows             Toe taps on step/  HT  3x10           Rocker board  (Cue toe flex during neck ext)                                                    Ther Ex             Nu step ROM  Nu  L1  8' L1 10' L1 10'         Standing lumbar extension HEP   2x10         Nerve glides HEP            Seated shoulder flexion HEP            Seated Marches HEP UE/LE  3x10 2x10 2x10         Wall push up    2x10 2x10 2x10         Modified hip extension  2x10     2x10 2x10       "                Ther Activity                                       Gait Training                                       Modalities

## 2024-04-18 ENCOUNTER — TELEPHONE (OUTPATIENT)
Dept: HEMATOLOGY ONCOLOGY | Facility: CLINIC | Age: 69
End: 2024-04-18

## 2024-04-18 NOTE — TELEPHONE ENCOUNTER
Patient's cycle 16 treatment with Alimta and Keytruda on 4/1- Alimta was dose reduced for patient tolerance-patient reporting increased fatigue. Patient's wife reporting today that the first two weeks after cycle 16  treatment, patient seemed to have more energy. However, starting this Sunday, 4/14 patient has been noticeably more weaker and has been intermittently napping more. Patient and patient's wife was educated about the cumulative effect of chemotherapy, and that fatigue can be a common side effect. Patient is agreeable to staying at the same dose of Alimta on 4/22 that he got on 4/1, as he has concerns about treatment effect if dose is to continue to be lowered. Reassured patient that I would make Dr. Ruelas aware.

## 2024-04-18 NOTE — TELEPHONE ENCOUNTER
Patient Call    Who are you speaking with? Spouse    If it is not the patient, are they listed on an active communication consent form? Yes   What is the reason for this call? Questions about treatment plan   Does this require a call back? Yes   If a call back is required, please list best call back number [phone]    If a call back is required, advise that a message will be forwarded to their care team and someone will return their call as soon as possible.   Did you relay this information to the patient? Yes

## 2024-04-19 ENCOUNTER — HOSPITAL ENCOUNTER (OUTPATIENT)
Dept: INFUSION CENTER | Facility: HOSPITAL | Age: 69
Discharge: HOME | End: 2024-04-19
Payer: MEDICARE

## 2024-04-19 ENCOUNTER — OFFICE VISIT (OUTPATIENT)
Dept: PHYSICAL THERAPY | Age: 69
End: 2024-04-19
Payer: MEDICARE

## 2024-04-19 DIAGNOSIS — R53.1 WEAKNESS: Primary | ICD-10-CM

## 2024-04-19 DIAGNOSIS — C34.91 NON-SMALL CELL CANCER OF RIGHT LUNG (HCC): Primary | ICD-10-CM

## 2024-04-19 DIAGNOSIS — R26.89 BALANCE DISORDER: ICD-10-CM

## 2024-04-19 LAB
ALBUMIN SERPL BCP-MCNC: 3.6 G/DL (ref 3.5–5)
ALP SERPL-CCNC: 51 U/L (ref 34–104)
ALT SERPL W P-5'-P-CCNC: 15 U/L (ref 7–52)
ANION GAP SERPL CALCULATED.3IONS-SCNC: 5 MMOL/L (ref 4–13)
AST SERPL W P-5'-P-CCNC: 17 U/L (ref 13–39)
BASOPHILS # BLD AUTO: 0.04 THOUSANDS/ÂΜL (ref 0–0.1)
BASOPHILS NFR BLD AUTO: 1 % (ref 0–1)
BILIRUB SERPL-MCNC: 0.4 MG/DL (ref 0.2–1)
BUN SERPL-MCNC: 19 MG/DL (ref 5–25)
CALCIUM SERPL-MCNC: 8.6 MG/DL (ref 8.4–10.2)
CHLORIDE SERPL-SCNC: 105 MMOL/L (ref 96–108)
CO2 SERPL-SCNC: 26 MMOL/L (ref 21–32)
CREAT SERPL-MCNC: 1.01 MG/DL (ref 0.6–1.3)
EOSINOPHIL # BLD AUTO: 0.65 THOUSAND/ÂΜL (ref 0–0.61)
EOSINOPHIL NFR BLD AUTO: 12 % (ref 0–6)
ERYTHROCYTE [DISTWIDTH] IN BLOOD BY AUTOMATED COUNT: 13.9 % (ref 11.6–15.1)
GFR SERPL CREATININE-BSD FRML MDRD: 77 ML/MIN/1.73SQ M
GLUCOSE SERPL-MCNC: 154 MG/DL (ref 65–140)
HCT VFR BLD AUTO: 36.9 % (ref 36.5–49.3)
HGB BLD-MCNC: 12.3 G/DL (ref 12–17)
IMM GRANULOCYTES # BLD AUTO: 0.02 THOUSAND/UL (ref 0–0.2)
IMM GRANULOCYTES NFR BLD AUTO: 0 % (ref 0–2)
LYMPHOCYTES # BLD AUTO: 0.81 THOUSANDS/ÂΜL (ref 0.6–4.47)
LYMPHOCYTES NFR BLD AUTO: 15 % (ref 14–44)
MCH RBC QN AUTO: 33.6 PG (ref 26.8–34.3)
MCHC RBC AUTO-ENTMCNC: 33.3 G/DL (ref 31.4–37.4)
MCV RBC AUTO: 101 FL (ref 82–98)
MONOCYTES # BLD AUTO: 0.62 THOUSAND/ÂΜL (ref 0.17–1.22)
MONOCYTES NFR BLD AUTO: 12 % (ref 4–12)
NEUTROPHILS # BLD AUTO: 3.22 THOUSANDS/ÂΜL (ref 1.85–7.62)
NEUTS SEG NFR BLD AUTO: 60 % (ref 43–75)
NRBC BLD AUTO-RTO: 0 /100 WBCS
PLATELET # BLD AUTO: 251 THOUSANDS/UL (ref 149–390)
PMV BLD AUTO: 9.4 FL (ref 8.9–12.7)
POTASSIUM SERPL-SCNC: 3.6 MMOL/L (ref 3.5–5.3)
PROT SERPL-MCNC: 6.6 G/DL (ref 6.4–8.4)
RBC # BLD AUTO: 3.66 MILLION/UL (ref 3.88–5.62)
SODIUM SERPL-SCNC: 136 MMOL/L (ref 135–147)
T3FREE SERPL-MCNC: 3.19 PG/ML (ref 2.5–3.9)
TSH SERPL DL<=0.05 MIU/L-ACNC: 2.54 UIU/ML (ref 0.45–4.5)
WBC # BLD AUTO: 5.36 THOUSAND/UL (ref 4.31–10.16)

## 2024-04-19 PROCEDURE — 84443 ASSAY THYROID STIM HORMONE: CPT | Performed by: INTERNAL MEDICINE

## 2024-04-19 PROCEDURE — 80053 COMPREHEN METABOLIC PANEL: CPT | Performed by: INTERNAL MEDICINE

## 2024-04-19 PROCEDURE — 97110 THERAPEUTIC EXERCISES: CPT

## 2024-04-19 PROCEDURE — 84481 FREE ASSAY (FT-3): CPT | Performed by: INTERNAL MEDICINE

## 2024-04-19 PROCEDURE — 85025 COMPLETE CBC W/AUTO DIFF WBC: CPT | Performed by: INTERNAL MEDICINE

## 2024-04-19 PROCEDURE — 97112 NEUROMUSCULAR REEDUCATION: CPT

## 2024-04-19 NOTE — PROGRESS NOTES
"Daily Note     Today's date: 2024  Patient name: Claude Austin  : 1955  MRN: [id number]  Referring provider: Mak Feliciano MD  Dx:   Encounter Diagnosis     ICD-10-CM    1. Weakness  R53.1       2. Balance disorder  R26.89                      Subjective: Pt reports that he felt good following last session and yesterday however he did note that his legs gave out on him this morning and felt wobbly. He presents to therapy today stating that he is feeling pretty good with no complaints in his legs.      Objective: See treatment diary below      Assessment: Tolerated treatment well beginning on the nu step as an active warmup. Pt continues to respond well ti his current POC, feeling better when he leaves compared to when he walks in.  Patient demonstrated fatigue post treatment and would benefit from continued PT      Plan: Continue per plan of care.      Precautions: Precautions: Stage IV cancer, thoracic metastasis avoid lifting from flexed positions, NO GRADE 5 MOBS, avoid bridges    Visit/Unit Tracking  AUTH Status:  Date  4/10/24 4/12 4/16 4/19          Medicare Used                Remaining                  1/10 2/10 3/10 4/10 5/10                Manuals 4/4 4/10 4/12 4/16 4/19                                                            Neuro Re-Ed             SLR   2x10 b/l 2x10 b/l 2x10 b/l        Mini squats             Standing hip abd   2x10 b/l 2x10 b/l 2x10 b/l        LTR   2x10 2x10 2x10        Tandem stance  nv nv 20\"x3 20\"x3        Low rows             Toe taps on step/  HT  3x10           Rocker board  (Cue toe flex during neck ext)                                                    Ther Ex             Nu step ROM  Nu  L1  8' L1 10' L1 10' L1 10'        Standing lumbar extension HEP   2x10 2x10        Nerve glides HEP            Seated shoulder flexion HEP            Seated Marches HEP UE/LE  3x10 2x10 2x10 2x10        Wall push up    2x10 2x10 2x10 2x10        Modified hip " extension  2x10     2x10 2x10 2x10                     Ther Activity                                       Gait Training                                       Modalities

## 2024-04-19 NOTE — PROGRESS NOTES
Claude Austin  tolerated treatment well with no complications.      Claude Austin is aware of future appt on 4/22 at 9am.     AVS printed and given to Claude Austin:  No (Declined by Claude Austin)

## 2024-04-22 ENCOUNTER — HOSPITAL ENCOUNTER (OUTPATIENT)
Dept: INFUSION CENTER | Facility: HOSPITAL | Age: 69
Discharge: HOME | End: 2024-04-22
Attending: INTERNAL MEDICINE
Payer: MEDICARE

## 2024-04-22 VITALS
TEMPERATURE: 97.8 F | DIASTOLIC BLOOD PRESSURE: 64 MMHG | WEIGHT: 149.69 LBS | HEIGHT: 70 IN | RESPIRATION RATE: 18 BRPM | BODY MASS INDEX: 21.43 KG/M2 | HEART RATE: 75 BPM | SYSTOLIC BLOOD PRESSURE: 102 MMHG

## 2024-04-22 DIAGNOSIS — C34.91 NON-SMALL CELL CANCER OF RIGHT LUNG (HCC): Primary | ICD-10-CM

## 2024-04-22 PROCEDURE — 96411 CHEMO IV PUSH ADDL DRUG: CPT

## 2024-04-22 PROCEDURE — 96413 CHEMO IV INFUSION 1 HR: CPT

## 2024-04-22 PROCEDURE — 96367 TX/PROPH/DG ADDL SEQ IV INF: CPT

## 2024-04-22 RX ORDER — SODIUM CHLORIDE 9 MG/ML
20 INJECTION, SOLUTION INTRAVENOUS ONCE
Status: COMPLETED | OUTPATIENT
Start: 2024-04-22 | End: 2024-04-22

## 2024-04-22 RX ADMIN — PEMETREXED DISODIUM 752 MG: 500 INJECTION, POWDER, LYOPHILIZED, FOR SOLUTION INTRAVENOUS at 11:08

## 2024-04-22 RX ADMIN — SODIUM CHLORIDE 200 MG: 9 INJECTION, SOLUTION INTRAVENOUS at 10:16

## 2024-04-22 RX ADMIN — DEXAMETHASONE SODIUM PHOSPHATE: 10 INJECTION, SOLUTION INTRAMUSCULAR; INTRAVENOUS at 09:47

## 2024-04-22 RX ADMIN — SODIUM CHLORIDE 20 ML/HR: 0.9 INJECTION, SOLUTION INTRAVENOUS at 09:47

## 2024-04-22 NOTE — PROGRESS NOTES
Claude Austin  tolerated treatment well with no complications.      Claude Austin is aware of future appt on 05/10/24 at 10.     AVS printed and given to Claude Austin:  Y No (Declined by Claude Austin)

## 2024-04-23 ENCOUNTER — OFFICE VISIT (OUTPATIENT)
Dept: PHYSICAL THERAPY | Age: 69
End: 2024-04-23
Payer: MEDICARE

## 2024-04-23 DIAGNOSIS — R26.89 BALANCE DISORDER: ICD-10-CM

## 2024-04-23 DIAGNOSIS — R53.1 WEAKNESS: Primary | ICD-10-CM

## 2024-04-23 PROCEDURE — 97112 NEUROMUSCULAR REEDUCATION: CPT

## 2024-04-23 PROCEDURE — 97110 THERAPEUTIC EXERCISES: CPT

## 2024-04-23 NOTE — PROGRESS NOTES
"Daily Note     Today's date: 2024  Patient name: Claude Austin  : 1955  MRN: [id number]  Referring provider: Mak Feliciano MD  Dx:   Encounter Diagnosis     ICD-10-CM    1. Weakness  R53.1       2. Balance disorder  R26.89                      Subjective: Pt reports that he feels as if he had a lot more energy the last few days. He is feeling pretty good today but notes that he is fatigued at the start of session.       Objective: See treatment diary below      Assessment: Tolerated treatment well beginning on the nu step as an active warmup. Continued with all exercises today avoiding any progressions due to feeling tire from his treatment yesterday. Patient demonstrated fatigue post treatment and would benefit from continued PT. Will progress pt nv as appropriate.       Plan: Continue per plan of care.      Precautions: Precautions: Stage IV cancer, thoracic metastasis avoid lifting from flexed positions, NO GRADE 5 MOBS, avoid bridges    Visit/Unit Tracking  AUTH Status:  Date  4/10/24 4/12 4/16 4/19 4/23         Medicare Used                Remaining                  1/10 2/10 3/10 4/10 5/10 6/10               Manuals 4/4 4/10 4/12 4/16 4/19 4/23                                                           Neuro Re-Ed             SLR   2x10 b/l 2x10 b/l 2x10 b/l 2x10 b/l       Mini squats             Standing hip abd   2x10 b/l 2x10 b/l 2x10 b/l 2x10 b/ll       LTR   2x10 2x10 2x10 2x10       Tandem stance  nv nv 20\"x3 20\"x3 20\"x3       Low rows             Toe taps on step/  HT  3x10           Rocker board  (Cue toe flex during neck ext)                                                    Ther Ex             Nu step ROM  Nu  L1  8' L1 10' L1 10' L1 10' L1 10'       Standing lumbar extension HEP   2x10 2x10 2x10       Nerve glides HEP            Seated shoulder flexion HEP            Seated Marches HEP UE/LE  3x10 2x10 2x10 2x10 X25 ea       Wall push up    2x10 2x10 2x10 2x10 Plinth 2x10     "   Modified hip extension  2x10     2x10 2x10 2x10 2x10                    Ther Activity                                       Gait Training                                       Modalities

## 2024-04-26 ENCOUNTER — DOCUMENTATION (OUTPATIENT)
Dept: HEMATOLOGY ONCOLOGY | Facility: CLINIC | Age: 69
End: 2024-04-26

## 2024-04-26 ENCOUNTER — OFFICE VISIT (OUTPATIENT)
Dept: PHYSICAL THERAPY | Age: 69
End: 2024-04-26
Payer: MEDICARE

## 2024-04-26 DIAGNOSIS — R26.89 BALANCE DISORDER: ICD-10-CM

## 2024-04-26 DIAGNOSIS — R53.1 WEAKNESS: Primary | ICD-10-CM

## 2024-04-26 PROCEDURE — 97112 NEUROMUSCULAR REEDUCATION: CPT

## 2024-04-26 PROCEDURE — 97110 THERAPEUTIC EXERCISES: CPT

## 2024-04-26 NOTE — PROGRESS NOTES
"Daily Note     Today's date: 2024  Patient name: Claude Austin  : 1955  MRN: [id number]  Referring provider: Mak Feliciano MD  Dx:   Encounter Diagnosis     ICD-10-CM    1. Weakness  R53.1       2. Balance disorder  R26.89                      Subjective: Pt reports that he is feeling more fatigued today from the chemo treatment. Notes that overall he felt pretty good following last session.       Objective: See treatment diary below      Assessment: Tolerated treatment well beginning on the nu step as an active warmup. Continued with all exercises today avoiding any progressions. Patient demonstrated fatigue post treatment and would benefit from continued PT. Will progress pt nv as appropriate.       Plan: Continue per plan of care.      Precautions: Precautions: Stage IV cancer, thoracic metastasis avoid lifting from flexed positions, NO GRADE 5 MOBS, avoid bridges    Visit/Unit Tracking  AUTH Status:  Date  4/10/24 4/12 4/16 4/19 4/23 4/26        Medicare Used                Remaining                  1/10 2/10 3/10 4/10 5/10 6/10 710              Manuals 4/4 4/10 4/12 4/16 4/19 4/23 4/26                                                          Neuro Re-Ed             SLR   2x10 b/l 2x10 b/l 2x10 b/l 2x10 b/l 2x10 b/l      Mini squats             Standing hip abd   2x10 b/l 2x10 b/l 2x10 b/l 2x10 b/ll 2x10 b/l      LTR   2x10 2x10 2x10 2x10 2x10      Tandem stance  nv nv 20\"x3 20\"x3 20\"x3 20\"x3      Low rows             Toe taps on step/  HT  3x10           Rocker board  (Cue toe flex during neck ext)                                                    Ther Ex             Nu step ROM  Nu  L1  8' L1 10' L1 10' L1 10' L1 10' L1 10'      Standing lumbar extension HEP   2x10 2x10 2x10 2x10      Nerve glides HEP            Seated shoulder flexion HEP            Seated Marches HEP UE/LE  3x10 2x10 2x10 2x10 X25 ea 2x10      Wall push up    2x10 2x10 2x10 2x10 Plinth 2x10 Plinth 2x10      Modified " hip extension  2x10     2x10 2x10 2x10 2x10 2x10                   Ther Activity                                       Gait Training                                       Modalities

## 2024-04-29 ENCOUNTER — TELEPHONE (OUTPATIENT)
Age: 69
End: 2024-04-29

## 2024-04-29 ENCOUNTER — OFFICE VISIT (OUTPATIENT)
Dept: INTERNAL MEDICINE CLINIC | Age: 69
End: 2024-04-29
Payer: MEDICARE

## 2024-04-29 ENCOUNTER — PATIENT OUTREACH (OUTPATIENT)
Dept: HEMATOLOGY ONCOLOGY | Facility: CLINIC | Age: 69
End: 2024-04-29

## 2024-04-29 ENCOUNTER — DOCUMENTATION (OUTPATIENT)
Dept: HEMATOLOGY ONCOLOGY | Facility: CLINIC | Age: 69
End: 2024-04-29

## 2024-04-29 VITALS
OXYGEN SATURATION: 99 % | TEMPERATURE: 99.8 F | DIASTOLIC BLOOD PRESSURE: 62 MMHG | WEIGHT: 147 LBS | SYSTOLIC BLOOD PRESSURE: 90 MMHG | HEIGHT: 70 IN | BODY MASS INDEX: 21.05 KG/M2 | HEART RATE: 63 BPM

## 2024-04-29 DIAGNOSIS — R60.0 BILATERAL LOWER EXTREMITY EDEMA: Primary | ICD-10-CM

## 2024-04-29 PROCEDURE — 99213 OFFICE O/P EST LOW 20 MIN: CPT | Performed by: STUDENT IN AN ORGANIZED HEALTH CARE EDUCATION/TRAINING PROGRAM

## 2024-04-29 PROCEDURE — G2211 COMPLEX E/M VISIT ADD ON: HCPCS | Performed by: STUDENT IN AN ORGANIZED HEALTH CARE EDUCATION/TRAINING PROGRAM

## 2024-04-29 NOTE — TELEPHONE ENCOUNTER
Patients wife calling in regarding concerns with HCT medication. States that since starting  has had increased confusion, and weakness. Requesting an appt. Warm transferred to office to make appt for today. Patient verbalized understanding.

## 2024-04-29 NOTE — PROGRESS NOTES
Emailed PT's wife that PT did not qualify for Eliquis free drug, due to excess income per program requirements.     Shadia Waterman MPH  Phone:[phone]  Email: [email or web address]

## 2024-04-29 NOTE — PROGRESS NOTES
LSW made outreach to pt's wife this day to offer support and to assess any needs.  Pt's wife sharing her concerns with the pt being on Eliquis and he has always received funding for this.  She states that she has 2 bottles remaining and she has yet to hear if her spouse was approved for the program.  Pt's wife expressed concern as she does not have the financial ability to pay for this.  She is working and the pt is on SSI.  As per the chart notes, it appears that an application was sent out, it does not indicate any further update.  LSW will inquire with finance.    Her next concern was the pt's Keytruda.  She states that her  is on a program where the  orders this for him and has it shipped to the infusion center. Pt's wife described some type of mars he is receiving, however after the bill arrived for his past treatment, she expressed concern that it is not going through the mars.  The pt is scheduled for his next infusion on 5/13 and pt's wife is feeling anxious if this will be ordered under the mars.  LSW will speak with finance.     Lastly, pt's wife states that the pt is declining and getting weaker, physically.  They live in a mobile home and he struggles with getting in and out of the tub.  She has hired a company that works with mobile home owners, to replace the tub with a walk in shower.  The cost of this will be $7000 and they have given a $2200 deposit.  Pt's wife is asking if there are any resources to assist as they will not be able to have the work completed until this is paid and they are slowly making payments.  LSW will discuss with management to learn how much the cancer funds can assist with will follow up with her.  Vonnie Sistas was also discussed as a resource and she was open to a referrer being placed.  Overall, she described her hector and family as her coping mechanisms as she has been watching the pt decline.  She was appropriately tearful at times and she  states that she is a private person and does not wishes to go to counseling.  She does speak with her  and they both feel well supported by their [employer].  Significant support was offered and LSW will plan to follow up when information is gathered.

## 2024-04-29 NOTE — PROGRESS NOTES
PT needs to write a letter of financial hardship (why bills are tight, income vs outgoing) and taxes and send the letter and taxes to this writer. Upon receiving, this writer will fax it all to appeals at: [phone]. Called [facility] and disuccsed Keytruda and Eliquis. Eliquis is appeal is unsuccessful, Xarelto should be considered (request sent to HCP to see if PT is a clinical candidate).     Keytruda should be buy and bill via pharmacy, bill insurance, and whatever amount remains to saray care NOT free drug process.   PT was informed about ANTON balance to call [facility] to rectify this.  [employer] Care renewal begins in May.      Shadia Waterman MPH  Phone:[phone]  Email: [email or web address]

## 2024-04-30 ENCOUNTER — OFFICE VISIT (OUTPATIENT)
Dept: PHYSICAL THERAPY | Age: 69
End: 2024-04-30
Payer: MEDICARE

## 2024-04-30 DIAGNOSIS — R53.1 WEAKNESS: Primary | ICD-10-CM

## 2024-04-30 DIAGNOSIS — R26.89 BALANCE DISORDER: ICD-10-CM

## 2024-04-30 PROCEDURE — 97112 NEUROMUSCULAR REEDUCATION: CPT

## 2024-04-30 PROCEDURE — 97110 THERAPEUTIC EXERCISES: CPT

## 2024-04-30 NOTE — PROGRESS NOTES
"Daily Note     Today's date: 2024  Patient name: Claude Austin  : 1955  MRN: [id number]  Referring provider: Mak Feliciano MD  Dx:   Encounter Diagnosis     ICD-10-CM    1. Weakness  R53.1       2. Balance disorder  R26.89                      Subjective: Pt reports that he seen the MD yesterday about his recent fatigue levels etc and he ended up changing his water pills which caused his BP to drop. Pt feels fatigued still to start session.       Objective: See treatment diary below      Assessment: Tolerated treatment well beginning on the nu step as an active warmup. Slight progressions were made as charted below to promote LE strengthening. Challenged with repeated SLR's due to muscular fatigue but able to complete. Patient demonstrated fatigue post treatment and would benefit from continued PT. Will progress pt nv as appropriate.       Plan: Continue per plan of care.      Precautions: Precautions: Stage IV cancer, thoracic metastasis avoid lifting from flexed positions, NO GRADE 5 MOBS, avoid bridges    Visit/Unit Tracking  AUTH Status:  Date  4/10/24 4/12 4/16 4/19 4/23 4/26 4/30       Medicare Used                Remaining                  1/10 2/10 3/10 4/10 5/10 6/10 7/10 8/10             Manuals 4/4 4/10 4/12 4/16 4/19 4/23 4/26 4/30                                                         Neuro Re-Ed             SLR   2x10 b/l 2x10 b/l 2x10 b/l 2x10 b/l 2x10 b/l 3x10 b/l     Mini squats             Standing hip abd   2x10 b/l 2x10 b/l 2x10 b/l 2x10 b/ll 2x10 b/l 2x10 b/l     LTR   2x10 2x10 2x10 2x10 2x10 2x10     Tandem stance  nv nv 20\"x3 20\"x3 20\"x3 20\"x3 Foam 20\"x3     Low rows             Toe taps on step/  HT  3x10           Rocker board  (Cue toe flex during neck ext)                                                    Ther Ex             Nu step ROM  Nu  L1  8' L1 10' L1 10' L1 10' L1 10' L1 10' L1 10'     Standing lumbar extension HEP   2x10 2x10 2x10 2x10 2x10     Nerve " glides HEP            Seated shoulder flexion HEP            Seated Marches HEP UE/LE  3x10 2x10 2x10 2x10 X25 ea 2x10 30x ea     Wall push up    2x10 2x10 2x10 2x10 Plinth 2x10 Plinth 2x10 Plinth 2x10     Modified hip extension  2x10     2x10 2x10 2x10 2x10 2x10 2x10                  Ther Activity                                       Gait Training                                       Modalities

## 2024-05-03 ENCOUNTER — OFFICE VISIT (OUTPATIENT)
Dept: PHYSICAL THERAPY | Age: 69
End: 2024-05-03
Payer: MEDICARE

## 2024-05-03 DIAGNOSIS — R26.89 BALANCE DISORDER: ICD-10-CM

## 2024-05-03 DIAGNOSIS — R53.1 WEAKNESS: Primary | ICD-10-CM

## 2024-05-03 PROCEDURE — 97110 THERAPEUTIC EXERCISES: CPT

## 2024-05-03 PROCEDURE — 97112 NEUROMUSCULAR REEDUCATION: CPT

## 2024-05-03 NOTE — PROGRESS NOTES
"Daily Note     Today's date: 5/3/2024  Patient name: Claude Austin  : 1955  MRN: [id number]  Referring provider: Mak Feliciano MD  Dx:   Encounter Diagnosis     ICD-10-CM    1. Weakness  R53.1       2. Balance disorder  R26.89                      Subjective: Pt reports that he is feeling a lot better compared to the last few visits. Notes that he feels as if he has more energy since he has not been on the water pill but does not some increased swelling coming back.       Objective: See treatment diary below      Assessment: Tolerated treatment well beginning on the nu step as an active warmup. Slight progressions were made as charted below again today, being challenged but able to complete. Added in functional and repeated STS's with muscular fatigue noted. Patient demonstrated fatigue post treatment and would benefit from continued PT. Will progress pt nv as appropriate.       Plan: Continue per plan of care. Pt to be a RE nv.     Precautions: Precautions: Stage IV cancer, thoracic metastasis avoid lifting from flexed positions, NO GRADE 5 MOBS, avoid bridges    Visit/Unit Tracking  AUTH Status:  Date  4/10/24 4/12 4/16 4/19 4/23 4/26 4/30 5/3      Medicare Used                Remaining                  1/10 2/10 3/10 4/10 5/10 6/10 7/10 8/10 910            Manuals 4/4 4/10 4/12 4/16 4/19 4/23 4/26 4/30 5/3                                                        Neuro Re-Ed             SLR   2x10 b/l 2x10 b/l 2x10 b/l 2x10 b/l 2x10 b/l 3x10 b/l 2# 2x10    Mini squats             Standing hip abd   2x10 b/l 2x10 b/l 2x10 b/l 2x10 b/ll 2x10 b/l 2x10 b/l X25 ea 2#    LTR   2x10 2x10 2x10 2x10 2x10 2x10 2x10    Tandem stance  nv nv 20\"x3 20\"x3 20\"x3 20\"x3 Foam 20\"x3     Low row         GTB 2x10    TB Ext         RTB 2x10    Toe taps on step/  HT  3x10           Rocker board  (Cue toe flex during neck ext)                                                    Ther Ex             Nu step ROM  Nu  L1  8' L1 " 10' L1 10' L1 10' L1 10' L1 10' L1 10' L1 10'    Standing lumbar extension HEP   2x10 2x10 2x10 2x10 2x10 2x10    Nerve glides HEP            Seated shoulder flexion HEP            Seated Marches HEP UE/LE  3x10 2x10 2x10 2x10 X25 ea 2x10 30x ea 2# 30x    Wall push up    2x10 2x10 2x10 2x10 Plinth 2x10 Plinth 2x10 Plinth 2x10     Modified hip extension  2x10     2x10 2x10 2x10 2x10 2x10 2x10 2# 2x10    STS         2x10                              Ther Activity                                       Gait Training                                       Modalities

## 2024-05-07 ENCOUNTER — EVALUATION (OUTPATIENT)
Dept: PHYSICAL THERAPY | Age: 69
End: 2024-05-07
Payer: MEDICARE

## 2024-05-07 DIAGNOSIS — R26.89 BALANCE DISORDER: ICD-10-CM

## 2024-05-07 DIAGNOSIS — L27.0 DERMATITIS DUE TO DRUG REACTION: Primary | ICD-10-CM

## 2024-05-07 DIAGNOSIS — R53.1 WEAKNESS: Primary | ICD-10-CM

## 2024-05-07 PROCEDURE — 97112 NEUROMUSCULAR REEDUCATION: CPT

## 2024-05-07 PROCEDURE — 97110 THERAPEUTIC EXERCISES: CPT

## 2024-05-07 RX ORDER — TRIAMCINOLONE ACETONIDE 5 MG/G
CREAM TOPICAL 2 TIMES DAILY
Qty: 15 G | Refills: 3 | Status: SHIPPED | OUTPATIENT
Start: 2024-05-07 | End: 2024-05-10 | Stop reason: SDUPTHER

## 2024-05-07 NOTE — PROGRESS NOTES
PT Evaluation     Today's date: 2024  Patient name: Claude Austin  : 1955  MRN: [id number]  Referring provider: Mak Feliciano MD  Dx:   Encounter Diagnosis     ICD-10-CM    1. Weakness  R53.1       2. Balance disorder  R26.89                        Assessment  Assessment details:     Pt has been seen for 10 vists of PT and is making excellent progress.  He demonstrates improved strength in his legs per functional testing as well as improving balance.  Some R sided impaired endurance of LE with SLS but this may continue to improve.     Pt would benefit from PT to address these impairments leading to increased functional capacity and improved quality of life.    Impairments: abnormal or restricted ROM, impaired physical strength, lacks appropriate home exercise program, pain with function, poor posture  and poor body mechanics  Understanding of Dx/Px/POC: good   Prognosis: good    Goals  Short Term Goals: to be achieved by 4 weeks  1) Patient to be independent with basic HEP.   2) Decrease pain to 2/10 at its worst.  3) Increase lumbar ROM to minimal deficits   4) Increase LE strength by 1/2 MMT grade in all deficient planes.    Long Term Goals: to be achieved by discharge  1) FOTO equal to or greater than 59.  2) Ambulation to improve to maximal level of function ONGOING  3) Stair negotiation will improve to reciprocal. ONGOING  4) Sit to stand transfers will improve to maximal level of function MET  5) Pt will be independent with pacing and energy conservation techniques based on medical diagnosis. NEW GOAL, ONGOING    Plan  Patient would benefit from: skilled physical therapy  Planned modality interventions: cryotherapy and thermotherapy: hydrocollator packs  Planned therapy interventions: neuromuscular re-education, patient education, stretching, strengthening, therapeutic activities, therapeutic exercise, therapeutic training, home exercise program and graded activity  Frequency: Twice a week  "for 10 weeks.  Treatment plan discussed with: patient        Subjective Evaluation    Quality of life: good    Patient Goals  Patient goals for therapy: decreased pain, independence with ADLs/IADLs, return to sport/leisure activities, increased strength and increased motion    Pain  Current pain ratin  At best pain ratin  At worst pain ratin  Aggravating factors: stair climbing, walking, standing, sitting and lifting    Hand dominance: right        Objective     Strength/Myotome Testing     Lumbar   Left   Heel walk: normal, difficulty due to balance    Right   Heel walk: normal difficulty due to balance    Left Hip   Planes of Motion   Flexion: 4  Abduction: 4  External rotation: 4    Right Hip   Planes of Motion   Flexion: 4-  Abduction: 4-  External rotation: 4-    General Comments:  Patient demonstrates     Lumbar Comments      SLS: Moderate sway  RLE: 13\"  LLE: 30\"    5 STS: 14 secs  TU secs  Tandem stance foam: mod sway 30 secs  Stair navigation: Step to pattern with stair descent          Precautions: Precautions: Stage IV cancer, thoracic metastasis avoid lifting from flexed positions, NO GRADE 5 MOBS, avoid bridges    Visit/Unit Tracking  AUTH Status:  Date               Medicare Used 1/10               Remaining                  RE                    Manuals 4/4 4/10 4/12 4/16 4/19 4/23 4/26 4/30 5/3 5/7                                                       Neuro Re-Ed             SLR   2x10 b/l 2x10 b/l 2x10 b/l 2x10 b/l 2x10 b/l 3x10 b/l 2# 2x10 nv   Mini squats             Standing hip abd   2x10 b/l 2x10 b/l 2x10 b/l 2x10 b/ll 2x10 b/l 2x10 b/l X25 ea 2# 3#  nv   LTR   2x10 2x10 2x10 2x10 2x10 2x10 2x10    Tandem stance  nv nv 20\"x3 20\"x3 20\"x3 20\"x3 Foam 20\"x3     Low row         GTB 2x10 BTB    3x10   TB Ext         RTB 2x10    Toe taps on step/  HT  3x10           Rocker board  (Cue toe flex during neck ext)                                                    Ther Ex           "   Nu step ROM  Nu  L1  8' L1 10' L1 10' L1 10' L1 10' L1 10' L1 10' L1 10' L2    10'   Standing lumbar extension HEP   2x10 2x10 2x10 2x10 2x10 2x10 nv   Nerve glides HEP            Seated shoulder flexion HEP            Seated Marches HEP UE/LE  3x10 2x10 2x10 2x10 X25 ea 2x10 30x ea 2# 30x 3#    3x10   Wall push up    2x10 2x10 2x10 2x10 Plinth 2x10 Plinth 2x10 Plinth 2x10     Modified hip extension  2x10     2x10 2x10 2x10 2x10 2x10 2x10 2# 2x10 3#    3x10   STS         2x10                              Ther Activity                                       Gait Training                                       Modalities

## 2024-05-08 ENCOUNTER — TELEPHONE (OUTPATIENT)
Dept: HEMATOLOGY ONCOLOGY | Facility: CLINIC | Age: 69
End: 2024-05-08

## 2024-05-10 ENCOUNTER — OFFICE VISIT (OUTPATIENT)
Dept: PHYSICAL THERAPY | Age: 69
End: 2024-05-10
Payer: MEDICARE

## 2024-05-10 ENCOUNTER — HOSPITAL ENCOUNTER (OUTPATIENT)
Dept: INFUSION CENTER | Facility: HOSPITAL | Age: 69
End: 2024-05-10
Payer: MEDICARE

## 2024-05-10 DIAGNOSIS — C34.91 NON-SMALL CELL CANCER OF RIGHT LUNG (HCC): Primary | ICD-10-CM

## 2024-05-10 DIAGNOSIS — R53.1 WEAKNESS: Primary | ICD-10-CM

## 2024-05-10 DIAGNOSIS — R26.89 BALANCE DISORDER: ICD-10-CM

## 2024-05-10 LAB
ALBUMIN SERPL BCP-MCNC: 3.5 G/DL (ref 3.5–5)
ALP SERPL-CCNC: 56 U/L (ref 34–104)
ALT SERPL W P-5'-P-CCNC: 15 U/L (ref 7–52)
ANION GAP SERPL CALCULATED.3IONS-SCNC: 6 MMOL/L (ref 4–13)
AST SERPL W P-5'-P-CCNC: 18 U/L (ref 13–39)
BASOPHILS # BLD AUTO: 0.05 THOUSANDS/ÂΜL (ref 0–0.1)
BASOPHILS NFR BLD AUTO: 1 % (ref 0–1)
BILIRUB SERPL-MCNC: 0.36 MG/DL (ref 0.2–1)
BUN SERPL-MCNC: 17 MG/DL (ref 5–25)
CALCIUM SERPL-MCNC: 8.8 MG/DL (ref 8.4–10.2)
CHLORIDE SERPL-SCNC: 109 MMOL/L (ref 96–108)
CO2 SERPL-SCNC: 24 MMOL/L (ref 21–32)
CREAT SERPL-MCNC: 0.91 MG/DL (ref 0.6–1.3)
EOSINOPHIL # BLD AUTO: 0.97 THOUSAND/ÂΜL (ref 0–0.61)
EOSINOPHIL NFR BLD AUTO: 15 % (ref 0–6)
ERYTHROCYTE [DISTWIDTH] IN BLOOD BY AUTOMATED COUNT: 14.5 % (ref 11.6–15.1)
GFR SERPL CREATININE-BSD FRML MDRD: 87 ML/MIN/1.73SQ M
GLUCOSE SERPL-MCNC: 111 MG/DL (ref 65–140)
HCT VFR BLD AUTO: 37.3 % (ref 36.5–49.3)
HGB BLD-MCNC: 12.3 G/DL (ref 12–17)
IMM GRANULOCYTES # BLD AUTO: 0.03 THOUSAND/UL (ref 0–0.2)
IMM GRANULOCYTES NFR BLD AUTO: 1 % (ref 0–2)
LYMPHOCYTES # BLD AUTO: 0.92 THOUSANDS/ÂΜL (ref 0.6–4.47)
LYMPHOCYTES NFR BLD AUTO: 14 % (ref 14–44)
MCH RBC QN AUTO: 34 PG (ref 26.8–34.3)
MCHC RBC AUTO-ENTMCNC: 33 G/DL (ref 31.4–37.4)
MCV RBC AUTO: 103 FL (ref 82–98)
MONOCYTES # BLD AUTO: 0.75 THOUSAND/ÂΜL (ref 0.17–1.22)
MONOCYTES NFR BLD AUTO: 12 % (ref 4–12)
NEUTROPHILS # BLD AUTO: 3.83 THOUSANDS/ÂΜL (ref 1.85–7.62)
NEUTS SEG NFR BLD AUTO: 57 % (ref 43–75)
NRBC BLD AUTO-RTO: 0 /100 WBCS
PLATELET # BLD AUTO: 276 THOUSANDS/UL (ref 149–390)
PMV BLD AUTO: 9.3 FL (ref 8.9–12.7)
POTASSIUM SERPL-SCNC: 3.8 MMOL/L (ref 3.5–5.3)
PROT SERPL-MCNC: 6.5 G/DL (ref 6.4–8.4)
RBC # BLD AUTO: 3.62 MILLION/UL (ref 3.88–5.62)
SODIUM SERPL-SCNC: 139 MMOL/L (ref 135–147)
T3FREE SERPL-MCNC: 2.92 PG/ML (ref 2.5–3.9)
TSH SERPL DL<=0.05 MIU/L-ACNC: 4.06 UIU/ML (ref 0.45–4.5)
WBC # BLD AUTO: 6.55 THOUSAND/UL (ref 4.31–10.16)

## 2024-05-10 PROCEDURE — 85025 COMPLETE CBC W/AUTO DIFF WBC: CPT | Performed by: INTERNAL MEDICINE

## 2024-05-10 PROCEDURE — 97112 NEUROMUSCULAR REEDUCATION: CPT

## 2024-05-10 PROCEDURE — 84481 FREE ASSAY (FT-3): CPT | Performed by: INTERNAL MEDICINE

## 2024-05-10 PROCEDURE — 84443 ASSAY THYROID STIM HORMONE: CPT | Performed by: INTERNAL MEDICINE

## 2024-05-10 PROCEDURE — 97110 THERAPEUTIC EXERCISES: CPT

## 2024-05-10 PROCEDURE — 80053 COMPREHEN METABOLIC PANEL: CPT | Performed by: INTERNAL MEDICINE

## 2024-05-10 NOTE — PLAN OF CARE
Problem: Potential for Falls  Goal: Patient will remain free of falls  Description: INTERVENTIONS:  - Educate patient/family on patient safety including physical limitations  - Instruct patient to call for assistance with activity   - Consult OT/PT to assist with strengthening/mobility   - Keep Call bell within reach  - Keep bed low and locked with side rails adjusted as appropriate  - Apply yellow socks and bracelet for high fall risk patients  - Consider moving patient to room near nurses station  Outcome: Progressing

## 2024-05-10 NOTE — PROGRESS NOTES
"Daily Note     Today's date: 5/10/2024  Patient name: Claude Austin  : 1955  MRN: [id number]  Referring provider: Mak Feliciano MD  Dx:   Encounter Diagnosis     ICD-10-CM    1. Weakness  R53.1       2. Balance disorder  R26.89                      Subjective: Pt reports that he is doing so so today feeling out of it today blaming it on his medication changes. Notes that he had some difficulty getting his legs underneath him this morning. Swelling present in both legs today since he stopped his diuretic pills.        Objective: See treatment diary below      Assessment: Tolerated treatment well. Held any progressions this session since pt was not feeling the best but overall did well with adding back in standing hip abd. Patient demonstrated fatigue post treatment, exhibited good technique with therapeutic exercises, and would benefit from continued PT      Plan: Continue per plan of care.      Precautions: Precautions: Stage IV cancer, thoracic metastasis avoid lifting from flexed positions, NO GRADE 5 MOBS, avoid bridges    Visit/Unit Tracking  AUTH Status:  Date 5/7 5/10             Medicare Used 1/10 2/10              Remaining                  RE                    Manuals 4/19 4/23 4/26 4/30 5/3 5/7 5/10                                           Neuro Re-Ed          SLR 2x10 b/l 2x10 b/l 2x10 b/l 3x10 b/l 2# 2x10 nv 2# 2x10   Mini squats          Standing hip abd 2x10 b/l 2x10 b/ll 2x10 b/l 2x10 b/l X25 ea 2# 3#  nv 3# 2x10   LTR 2x10 2x10 2x10 2x10 2x10     Tandem stance 20\"x3 20\"x3 20\"x3 Foam 20\"x3      Low row     GTB 2x10 BTB    3x10 BTB 3x10   TB Ext     RTB 2x10  BTB 3x10   Toe taps on step/  HT          Rocker board  (Cue toe flex during neck ext)                                        Ther Ex          Nu step ROM L1 10' L1 10' L1 10' L1 10' L1 10' L2    10' L2 10'   Standing lumbar extension 2x10 2x10 2x10 2x10 2x10 nv 2x10   Nerve glides          Seated shoulder flexion        "   Seated Marches 2x10 X25 ea 2x10 30x ea 2# 30x 3#    3x10 3# 3x10   Wall push up   2x10 Plinth 2x10 Plinth 2x10 Plinth 2x10      Modified hip extension 2x10 2x10 2x10 2x10 2# 2x10 3#    3x10 3# 3x10   STS     2x10                         Ther Activity                              Gait Training                              Modalities

## 2024-05-10 NOTE — PROGRESS NOTES
Port accessed and labs drawn as ordered. Pt tolerated well.  Claude Austin  tolerated treatment well with no complications.      Claude Austin is aware of future appt on 5/13/24 at 0930.     AVS Declined by Claude Austin

## 2024-05-13 ENCOUNTER — HOSPITAL ENCOUNTER (OUTPATIENT)
Dept: INFUSION CENTER | Facility: HOSPITAL | Age: 69
Discharge: HOME | End: 2024-05-13
Attending: INTERNAL MEDICINE
Payer: MEDICARE

## 2024-05-13 VITALS
SYSTOLIC BLOOD PRESSURE: 104 MMHG | DIASTOLIC BLOOD PRESSURE: 68 MMHG | HEIGHT: 70 IN | WEIGHT: 152.34 LBS | BODY MASS INDEX: 21.81 KG/M2 | RESPIRATION RATE: 16 BRPM | TEMPERATURE: 97.4 F | HEART RATE: 77 BPM

## 2024-05-13 DIAGNOSIS — C34.91 NON-SMALL CELL CANCER OF RIGHT LUNG (HCC): Primary | ICD-10-CM

## 2024-05-13 RX ORDER — CYANOCOBALAMIN 1000 UG/ML
1000 INJECTION, SOLUTION INTRAMUSCULAR; SUBCUTANEOUS
Status: CANCELLED
Start: 2024-05-13

## 2024-05-13 RX ORDER — CYANOCOBALAMIN 1000 UG/ML
1000 INJECTION, SOLUTION INTRAMUSCULAR; SUBCUTANEOUS
Status: DISCONTINUED | OUTPATIENT
Start: 2024-05-13 | End: 2024-05-16 | Stop reason: HOSPADM

## 2024-05-13 RX ORDER — SODIUM CHLORIDE 9 MG/ML
20 INJECTION, SOLUTION INTRAVENOUS ONCE
Status: COMPLETED | OUTPATIENT
Start: 2024-05-13 | End: 2024-05-13

## 2024-05-13 RX ADMIN — PEMETREXED DISODIUM 752 MG: 500 INJECTION, POWDER, LYOPHILIZED, FOR SOLUTION INTRAVENOUS at 11:57

## 2024-05-13 RX ADMIN — SODIUM CHLORIDE 200 MG: 9 INJECTION, SOLUTION INTRAVENOUS at 11:07

## 2024-05-13 RX ADMIN — CYANOCOBALAMIN 1000 MCG: 1000 INJECTION, SOLUTION INTRAMUSCULAR at 12:22

## 2024-05-13 RX ADMIN — SODIUM CHLORIDE 20 ML/HR: 0.9 INJECTION, SOLUTION INTRAVENOUS at 10:28

## 2024-05-13 RX ADMIN — DEXAMETHASONE SODIUM PHOSPHATE: 10 INJECTION, SOLUTION INTRAMUSCULAR; INTRAVENOUS at 10:28

## 2024-05-13 NOTE — PROGRESS NOTES
Claude Austin  tolerated treatment well with no complications.      Claude Austin is aware of future appt on 05/31/24 at 9am.     AVS printed and given to Claude Austin:  No (Declined by Claude Austin)

## 2024-05-14 ENCOUNTER — TELEPHONE (OUTPATIENT)
Dept: HEMATOLOGY ONCOLOGY | Facility: CLINIC | Age: 69
End: 2024-05-14

## 2024-05-14 ENCOUNTER — OFFICE VISIT (OUTPATIENT)
Dept: PHYSICAL THERAPY | Age: 69
End: 2024-05-14
Payer: MEDICARE

## 2024-05-14 DIAGNOSIS — R53.1 WEAKNESS: Primary | ICD-10-CM

## 2024-05-14 DIAGNOSIS — R26.89 BALANCE DISORDER: ICD-10-CM

## 2024-05-14 DIAGNOSIS — C34.91 NON-SMALL CELL CANCER OF RIGHT LUNG (HCC): Primary | ICD-10-CM

## 2024-05-14 PROCEDURE — 97112 NEUROMUSCULAR REEDUCATION: CPT

## 2024-05-14 PROCEDURE — 97110 THERAPEUTIC EXERCISES: CPT

## 2024-05-14 NOTE — PROGRESS NOTES
"Daily Note     Today's date: 2024  Patient name: Claude Austin  : 1955  MRN: [id number]  Referring provider: Mak Feliciano MD  Dx:   Encounter Diagnosis     ICD-10-CM    1. Weakness  R53.1       2. Balance disorder  R26.89                      Subjective:  Patient states he was really sore after last visit.       Objective: See treatment diary below      Assessment: Tolerated treatment well. Continued with POC. No progressions were made secondary to subjective.  Patient demonstrated fatigue post treatment, exhibited good technique with therapeutic exercises, and would benefit from continued PT      Plan: Continue per plan of care.      Precautions: Precautions: Stage IV cancer, thoracic metastasis avoid lifting from flexed positions, NO GRADE 5 MOBS, avoid bridges    Visit/Unit Tracking  AUTH Status:  Date 5/7 5/10 5/14            Medicare Used 1/10 2/10 3/10             Remaining                  RE                    Manuals 5/14 4/23 4/26 4/30 5/3 5/7 5/10                                           Neuro Re-Ed          SLR 2# 2x10  2x10 b/l 2x10 b/l 3x10 b/l 2# 2x10 nv 2# 2x10   Mini squats          Standing hip abd 3# 2x10  2x10 b/ll 2x10 b/l 2x10 b/l X25 ea 2# 3#  nv 3# 2x10   LTR  2x10 2x10 2x10 2x10     Tandem stance  20\"x3 20\"x3 Foam 20\"x3      Low row BTB 3x10     GTB 2x10 BTB    3x10 BTB 3x10   TB Ext BTB 3x10    RTB 2x10  BTB 3x10   Toe taps on step/  HT          Rocker board  (Cue toe flex during neck ext)                                        Ther Ex          Nu step ROM L2 10'  L1 10' L1 10' L1 10' L1 10' L2    10' L2 10'   Standing lumbar extension 2x10  2x10 2x10 2x10 2x10 nv 2x10   Nerve glides          Seated shoulder flexion          Seated Marches 3# 3x10  X25 ea 2x10 30x ea 2# 30x 3#    3x10 3# 3x10   Wall push up    Plinth 2x10 Plinth 2x10 Plinth 2x10      Modified hip extension 3# 3x10  2x10 2x10 2x10 2# 2x10 3#    3x10 3# 3x10   STS     2x10                         Ther " Activity                              Gait Training                              Modalities

## 2024-05-17 ENCOUNTER — HOSPITAL ENCOUNTER (OUTPATIENT)
Dept: RADIOLOGY | Facility: HOSPITAL | Age: 69
Discharge: HOME | End: 2024-05-17
Attending: INTERNAL MEDICINE
Payer: MEDICARE

## 2024-05-17 ENCOUNTER — APPOINTMENT (OUTPATIENT)
Dept: PHYSICAL THERAPY | Age: 69
End: 2024-05-17
Payer: MEDICARE

## 2024-05-17 ENCOUNTER — TELEPHONE (OUTPATIENT)
Dept: HEMATOLOGY ONCOLOGY | Facility: CLINIC | Age: 69
End: 2024-05-17

## 2024-05-17 DIAGNOSIS — C34.91 NON-SMALL CELL CANCER OF RIGHT LUNG (HCC): ICD-10-CM

## 2024-05-17 PROCEDURE — 71260 CT THORAX DX C+: CPT

## 2024-05-17 PROCEDURE — 74177 CT ABD & PELVIS W/CONTRAST: CPT

## 2024-05-17 RX ADMIN — IOHEXOL 85 ML: 350 INJECTION, SOLUTION INTRAVENOUS at 09:53

## 2024-05-20 ENCOUNTER — OFFICE VISIT (OUTPATIENT)
Dept: INTERNAL MEDICINE CLINIC | Age: 69
End: 2024-05-20
Payer: MEDICARE

## 2024-05-20 VITALS
TEMPERATURE: 97.2 F | BODY MASS INDEX: 20.9 KG/M2 | OXYGEN SATURATION: 99 % | HEART RATE: 77 BPM | WEIGHT: 146 LBS | SYSTOLIC BLOOD PRESSURE: 120 MMHG | DIASTOLIC BLOOD PRESSURE: 80 MMHG | HEIGHT: 70 IN

## 2024-05-20 DIAGNOSIS — Z00.00 WELCOME TO MEDICARE PREVENTIVE VISIT: Primary | ICD-10-CM

## 2024-05-20 DIAGNOSIS — D84.9 IMMUNOCOMPROMISED (HCC): ICD-10-CM

## 2024-05-20 DIAGNOSIS — C79.31 METASTASIS TO BRAIN (HCC): ICD-10-CM

## 2024-05-20 DIAGNOSIS — C34.91 NON-SMALL CELL CANCER OF RIGHT LUNG (HCC): ICD-10-CM

## 2024-05-20 DIAGNOSIS — I73.9 PERIPHERAL VASCULAR DISEASE (HCC): ICD-10-CM

## 2024-05-20 PROCEDURE — G0403 EKG FOR INITIAL PREVENT EXAM: HCPCS | Performed by: INTERNAL MEDICINE

## 2024-05-20 PROCEDURE — G0402 INITIAL PREVENTIVE EXAM: HCPCS | Performed by: INTERNAL MEDICINE

## 2024-05-20 PROCEDURE — 99213 OFFICE O/P EST LOW 20 MIN: CPT | Performed by: INTERNAL MEDICINE

## 2024-05-20 NOTE — PROGRESS NOTES
Ambulatory Visit  Name: Claude Austin      : 1955      MRN: [id number]  Encounter Provider: Jose James MD  Encounter Date: 2024   Encounter department: [facility]    Assessment & Plan   1. Welcome to Medicare preventive visit  -     POCT ECG  2. Immunocompromised (HCC)  3. Non-small cell cancer of right lung (HCC)  4. Metastasis to brain (HCC)  5. Peripheral vascular disease (HCC)       Preventive health issues were discussed with patient, and age appropriate screening tests were ordered as noted in patient's After Visit Summary. Personalized health advice and appropriate referrals for health education or preventive services given if needed, as noted in patient's After Visit Summary.    History of Present Illness   This is a very pleasant 68 years young gentleman who is here today for the regular follow-up and also the Medicare initial visit for welcome to Medicare visit he is under the care of hematology oncology for lung cancer with metastasis he is on Keytruda  The big complaint is constipation the wife has a good chart that he sometimes does not go for 3 to 4 days he is taking Senokot and also MiraLAX he does not take any narcotic medication for the pain so basically this is the problem with the regular side effects of the medication chemotherapy or just the slow bowel movements he is drinking enough fluid he is active around his house.  Doing the colonoscopy does not make a sense he has a CT scan of the abdomen and the pelvis done but the results are not back if the result shows anything in the colon or rectum or something then we can do further testing with the colonoscopy but at this point I do not see any reason for that I discussed with the patient and also his wife at bedside.    He is immune no compromise because of his chemotherapy no signs of any infection.    COPD is a stable he is he is still cutting down his number of cigarettes smoking but is  still smoking somewhat unable to quit     Peripheral vascular disease due to the problems with the smoking in the past no complaints continue to follow    The patient has given above            Patient Care Team:  Jose James MD as PCP - General (Internal Medicine)  Mak Feliciano MD (Palliative Care)  Nancy Ruelas DO (Oncology)  Ro Genao MD (Radiation Oncology)  Kristyn Magaña MSW as  Care Manager (Oncology)  Shadia Waterman as  (Oncology)    Review of Systems   Constitutional:  Positive for fatigue. Negative for appetite change and fever.   HENT:  Negative for congestion, ear pain, hearing loss, nosebleeds, sneezing, tinnitus and voice change.    Eyes:  Negative for pain, discharge and redness.   Respiratory:  Positive for shortness of breath (Leg swelling is better). Negative for cough, chest tightness and wheezing.    Cardiovascular:  Negative for chest pain, palpitations and leg swelling.   Gastrointestinal:  Positive for constipation. Negative for abdominal pain, blood in stool, diarrhea, nausea and vomiting.   Genitourinary:  Negative for difficulty urinating, dysuria, hematuria and urgency.   Musculoskeletal:  Positive for back pain. Negative for arthralgias, gait problem and joint swelling.   Skin:  Negative for rash and wound.        itching   Allergic/Immunologic: Negative for environmental allergies.   Neurological:  Negative for dizziness, tremors, seizures, weakness, light-headedness and numbness.   Hematological:  Negative for adenopathy. Does not bruise/bleed easily.   Psychiatric/Behavioral:  Negative for behavioral problems and confusion. The patient is not nervous/anxious.      Medical History Reviewed by provider this encounter:       Annual Wellness Visit Questionnaire   Claude is here for his Welcome to Medicare visit. Last Medicare Wellness visit information reviewed, patient interviewed and updates made to the record today.      Health  Risk Assessment:   Patient rates overall health as good. Patient feels that their physical health rating is slightly worse. Patient is satisfied with their life. Eyesight was rated as same. Hearing was rated as same. Patient feels that their emotional and mental health rating is same. Patients states they are never, rarely angry. Patient states they are sometimes unusually tired/fatigued. Pain experienced in the last 7 days has been some. Patient's pain rating has been 4/10. Patient states that he has experienced no weight loss or gain in last 6 months.     Depression Screening:   PHQ-2 Score: 0      Fall Risk Screening:   In the past year, patient has experienced: no history of falling in past year      Home Safety:  Patient does not have trouble with stairs inside or outside of their home. Patient has working smoke alarms and has working carbon monoxide detector. Home safety hazards include: none. difficulty getting in and out of shower    Nutrition:   Current diet is Regular.     Medications:   Patient is currently taking over-the-counter supplements. OTC medications include: see medication list. Patient is able to manage medications. wife assists    Activities of Daily Living (ADLs)/Instrumental Activities of Daily Living (IADLs):   Walk and transfer into and out of bed and chair?: Yes  Dress and groom yourself?: Yes    Bathe or shower yourself?: Yes    Feed yourself? Yes  Do your laundry/housekeeping?: Yes  Manage your money, pay your bills and track your expenses?: Yes  Make your own meals?: Yes    Do your own shopping?: Yes    ADL comments: with assistance as needed due to cancer treatment effects    Previous Hospitalizations:   Any hospitalizations or ED visits within the last 12 months?: Yes    How many hospitalizations have you had in the last year?: 1-2    Hospitalization Comments: cancer related    Advance Care Planning:   Living will: Yes    Durable POA for healthcare: Yes    Advanced directive: Yes     Advanced directive counseling given: Yes      Cognitive Screening:   Provider or family/friend/caregiver concerned regarding cognition?: No    PREVENTIVE SCREENINGS      Cardiovascular Screening:    General: Screening Current      Diabetes Screening:     General: Screening Current      Colorectal Cancer Screening:     General: Risks and Benefits Discussed      Prostate Cancer Screening:    General: Risks and Benefits Discussed and Screening Current      Osteoporosis Screening:    General: Risks and Benefits Discussed and Screening Not Indicated      Abdominal Aortic Aneurysm (AAA) Screening:    Risk factors include: age between 65-76 yo and tobacco use        General: Screening Current and Risks and Benefits Discussed      Lung Cancer Screening:     General: Screening Not Indicated and History Lung Cancer      Hepatitis C Screening:      Hep C Screening Accepted: Yes      Screening, Brief Intervention, and Referral to Treatment (SBIRT)    Screening  Typical number of drinks in a day: 0  Typical number of drinks in a week: 0  Interpretation: Low risk drinking behavior.    AUDIT-C Screenin) How often did you have a drink containing alcohol in the past year? monthly or less  2) How many drinks did you have on a typical day when you were drinking in the past year? 1 to 2  3) How often did you have 6 or more drinks on one occasion in the past year? never    AUDIT-C Score: 1  Interpretation: Score 0-3 (male): Negative screen for alcohol misuse    Single Item Drug Screening:  How often have you used an illegal drug (including marijuana) or a prescription medication for non-medical reasons in the past year? never    Single Item Drug Screen Score: 0  Interpretation: Negative screen for possible drug use disorder    Other Counseling Topics:   Car/seat belt/driving safety and calcium and vitamin D intake and regular weightbearing exercise.     Social Determinants of Health     Food Insecurity: Patient Declined  "(5/20/2024)    Hunger Vital Sign     Worried About Running Out of Food in the Last Year: Patient declined     Ran Out of Food in the Last Year: Patient declined   Transportation Needs: Patient Declined (5/20/2024)    PRAPARE - Transportation     Lack of Transportation (Medical): Patient declined     Lack of Transportation (Non-Medical): Patient declined   Housing Stability: Patient Declined (5/20/2024)    Housing Stability Vital Sign     Unable to Pay for Housing in the Last Year: Patient declined     Homeless in the Last Year: Patient declined   Utilities: Patient Declined (5/20/2024)    [facility] Utilities     Threatened with loss of utilities: Patient declined     Vision Screening    Right eye Left eye Both eyes   Without correction 20/40 20/50 20/40   With correction          Objective     /80 (BP Location: Left arm, Patient Position: Sitting, Cuff Size: Standard)   Pulse 77   Temp (!) 97.2 °F (36.2 °C) (Temporal)   Ht 5' 10\" (1.778 m)   Wt 66.2 kg (146 lb)   SpO2 99%   BMI 20.95 kg/m²     Physical Exam  Vitals and nursing note reviewed.   Constitutional:       General: He is not in acute distress.     Appearance: He is well-developed.   HENT:      Head: Normocephalic and atraumatic.   Eyes:      Conjunctiva/sclera: Conjunctivae normal.   Cardiovascular:      Rate and Rhythm: Normal rate and regular rhythm.      Heart sounds: No murmur heard.  Pulmonary:      Effort: Pulmonary effort is normal. No respiratory distress.      Breath sounds: Decreased air movement present. Decreased breath sounds present.   Abdominal:      Palpations: Abdomen is soft.      Tenderness: There is no abdominal tenderness.   Musculoskeletal:         General: No swelling.      Cervical back: Neck supple.   Skin:     General: Skin is warm and dry.      Capillary Refill: Capillary refill takes less than 2 seconds.   Neurological:      Mental Status: He is alert.   Psychiatric:         Mood and Affect: Mood normal.         "

## 2024-05-20 NOTE — PATIENT INSTRUCTIONS
Medicare Preventive Visit Patient Instructions  Thank you for completing your Welcome to Medicare Visit or Medicare Annual Wellness Visit today. Your next wellness visit will be due in one year (5/21/2025).  The screening/preventive services that you may require over the next 5-10 years are detailed below. Some tests may not apply to you based off risk factors and/or age. Screening tests ordered at today's visit but not completed yet may show as past due. Also, please note that scanned in results may not display below.  Preventive Screenings:  Service Recommendations Previous Testing/Comments   Colorectal Cancer Screening  Colonoscopy    Fecal Occult Blood Test (FOBT)/Fecal Immunochemical Test (FIT)  Fecal DNA/Cologuard Test  Flexible Sigmoidoscopy Age: 45-75 years old   Colonoscopy: every 10 years (May be performed more frequently if at higher risk)  OR  FOBT/FIT: every 1 year  OR  Cologuard: every 3 years  OR  Sigmoidoscopy: every 5 years  Screening may be recommended earlier than age 45 if at higher risk for colorectal cancer. Also, an individualized decision between you and your healthcare provider will decide whether screening between the ages of 76-85 would be appropriate. Colonoscopy: Not on file  FOBT/FIT: Not on file  Cologuard: Not on file  Sigmoidoscopy: Not on file          Prostate Cancer Screening Individualized decision between patient and health care provider in men between ages of 55-69   Medicare will cover every 12 months beginning on the day after your 50th birthday PSA: No results in last 5 years           Hepatitis C Screening Once for adults born between 1945 and 1965  More frequently in patients at high risk for Hepatitis C Hep C Antibody: Not on file        Diabetes Screening 1-2 times per year if you're at risk for diabetes or have pre-diabetes Fasting glucose: 103 mg/dL (5/10/2023)  A1C: 5.6 % (3/31/2023)  Screening Current   Cholesterol Screening Once every 5 years if you don't have a  lipid disorder. May order more often based on risk factors. Lipid panel: 03/31/2023  Screening Current      Other Preventive Screenings Covered by Medicare:  Abdominal Aortic Aneurysm (AAA) Screening: covered once if your at risk. You're considered to be at risk if you have a family history of AAA or a male between the age of 65-75 who smoking at least 100 cigarettes in your lifetime.  Lung Cancer Screening: covers low dose CT scan once per year if you meet all of the following conditions: (1) Age 55-77; (2) No signs or symptoms of lung cancer; (3) Current smoker or have quit smoking within the last 15 years; (4) You have a tobacco smoking history of at least 20 pack years (packs per day x number of years you smoked); (5) You get a written order from a healthcare provider.  Glaucoma Screening: covered annually if you're considered high risk: (1) You have diabetes OR (2) Family history of glaucoma OR (3)  aged 50 and older OR (4)  American aged 65 and older  Osteoporosis Screening: covered every 2 years if you meet one of the following conditions: (1) Have a vertebral abnormality; (2) On glucocorticoid therapy for more than 3 months; (3) Have primary hyperparathyroidism; (4) On osteoporosis medications and need to assess response to drug therapy.  HIV Screening: covered annually if you're between the age of 15-65. Also covered annually if you are younger than 15 and older than 65 with risk factors for HIV infection. For pregnant patients, it is covered up to 3 times per pregnancy.    Immunizations:  Immunization Recommendations   Influenza Vaccine Annual influenza vaccination during flu season is recommended for all persons aged >= 6 months who do not have contraindications   Pneumococcal Vaccine   * Pneumococcal conjugate vaccine = PCV13 (Prevnar 13), PCV15 (Vaxneuvance), PCV20 (Prevnar 20)  * Pneumococcal polysaccharide vaccine = PPSV23 (Pneumovax) Adults 19-63 yo with certain risk factors  or if 65+ yo  If never received any pneumonia vaccine: recommend Prevnar 20 (PCV20)  Give PCV20 if previously received 1 dose of PCV13 or PPSV23   Hepatitis B Vaccine 3 dose series if at intermediate or high risk (ex: diabetes, end stage renal disease, liver disease)   Respiratory syncytial virus (RSV) Vaccine - COVERED BY MEDICARE PART D  * RSVPreF3 (Arexvy) CDC recommends that adults 60 years of age and older may receive a single dose of RSV vaccine using shared clinical decision-making (SCDM)   Tetanus (Td) Vaccine - COST NOT COVERED BY MEDICARE PART B Following completion of primary series, a booster dose should be given every 10 years to maintain immunity against tetanus. Td may also be given as tetanus wound prophylaxis.   Tdap Vaccine - COST NOT COVERED BY MEDICARE PART B Recommended at least once for all adults. For pregnant patients, recommended with each pregnancy.   Shingles Vaccine (Shingrix) - COST NOT COVERED BY MEDICARE PART B  2 shot series recommended in those 19 years and older who have or will have weakened immune systems or those 50 years and older     Health Maintenance Due:      Topic Date Due   • Hepatitis C Screening  Never done   • Colorectal Cancer Screening  Never done     Immunizations Due:      Topic Date Due   • COVID-19 Vaccine (1) Never done   • Pneumococcal Vaccine: 65+ Years (1 of 2 - PCV) Never done     Advance Directives   What are advance directives?  Advance directives are legal documents that state your wishes and plans for medical care. These plans are made ahead of time in case you lose your ability to make decisions for yourself. Advance directives can apply to any medical decision, such as the treatments you want, and if you want to donate organs.   What are the types of advance directives?  There are many types of advance directives, and each state has rules about how to use them. You may choose a combination of any of the following:  Living will:  This is a written record  of the treatment you want. You can also choose which treatments you do not want, which to limit, and which to stop at a certain time. This includes surgery, medicine, IV fluid, and tube feedings.   Durable power of  for healthcare (DPAHC):  This is a written record that states who you want to make healthcare choices for you when you are unable to make them for yourself. This person, called a proxy, is usually a family member or a friend. You may choose more than 1 proxy.  Do not resuscitate (DNR) order:  A DNR order is used in case your heart stops beating or you stop breathing. It is a request not to have certain forms of treatment, such as CPR. A DNR order may be included in other types of advance directives.  Medical directive:  This covers the care that you want if you are in a coma, near death, or unable to make decisions for yourself. You can list the treatments you want for each condition. Treatment may include pain medicine, surgery, blood transfusions, dialysis, IV or tube feedings, and a ventilator (breathing machine).  Values history:  This document has questions about your views, beliefs, and how you feel and think about life. This information can help others choose the care that you would choose.  Why are advance directives important?  An advance directive helps you control your care. Although spoken wishes may be used, it is better to have your wishes written down. Spoken wishes can be misunderstood, or not followed. Treatments may be given even if you do not want them. An advance directive may make it easier for your family to make difficult choices about your care.   Cigarette Smoking and Your Health   Risks to your health if you smoke:  Nicotine and other chemicals found in tobacco damage every cell in your body. Even if you are a light smoker, you have an increased risk for cancer, heart disease, and lung disease. If you are pregnant or have diabetes, smoking increases your risk for  complications.   Benefits to your health if you stop smoking:   You decrease respiratory symptoms such as coughing, wheezing, and shortness of breath.   You reduce your risk for cancers of the lung, mouth, throat, kidney, bladder, pancreas, stomach, and cervix. If you already have cancer, you increase the benefits of chemotherapy. You also reduce your risk for cancer returning or a second cancer from developing.   You reduce your risk for heart disease, blood clots, heart attack, and stroke.   You reduce your risk for lung infections, and diseases such as pneumonia, asthma, chronic bronchitis, and emphysema.  Your circulation improves. More oxygen can be delivered to your body. If you have diabetes, you lower your risk for complications, such as kidney, artery, and eye diseases. You also lower your risk for nerve damage. Nerve damage can lead to amputations, poor vision, and blindness.  You improve your body's ability to heal and to fight infections.  For more information and support to stop smoking:   Smokefree.gov  Phone: 6- 416 - 121-4787  Web Address: www.[employer]     © Copyright [employer] 2018 Information is for End User's use only and may not be sold, redistributed or otherwise used for commercial purposes. All illustrations and images included in CareNotes® are the copyrighted property of [employer] Inc[employer] or [employer]

## 2024-05-21 ENCOUNTER — OFFICE VISIT (OUTPATIENT)
Dept: PHYSICAL THERAPY | Age: 69
End: 2024-05-21
Payer: MEDICARE

## 2024-05-21 DIAGNOSIS — R53.1 WEAKNESS: Primary | ICD-10-CM

## 2024-05-21 DIAGNOSIS — R26.89 BALANCE DISORDER: ICD-10-CM

## 2024-05-21 PROCEDURE — 97112 NEUROMUSCULAR REEDUCATION: CPT | Performed by: PHYSICAL THERAPIST

## 2024-05-21 PROCEDURE — 97110 THERAPEUTIC EXERCISES: CPT | Performed by: PHYSICAL THERAPIST

## 2024-05-21 NOTE — ADDENDUM NOTE
Addended by: MARIA DEL CARMEN MONTOYA on: 5/21/2024 10:53 AM     Modules accepted: Level of Service

## 2024-05-21 NOTE — PROGRESS NOTES
Daily Note     Today's date: 2024  Patient name: Claude Austin  : 1955  MRN: [id number]  Referring provider: Mak Feliciano MD  Dx:   Encounter Diagnosis     ICD-10-CM    1. Weakness  R53.1       2. Balance disorder  R26.89           Start Time: 1145  Stop Time: 1230  Total time in clinic (min): 45 minutes    Subjective: Pt reports improvements from side effects of chemo but still tired.       Objective: See treatment diary below      Assessment: Tolerated treatment well. Pt's POC was progressed to include more closed chain strengthening. Patient demonstrated fatigue post treatment and would benefit from continued PT      Plan: Continue per plan of care.      Precautions: Precautions: Stage IV cancer, thoracic metastasis avoid lifting from flexed positions, NO GRADE 5 MOBS, avoid bridges    Visit/Unit Tracking  AUTH Status:  Date 5/7 5/10 5/14 5/21           Medicare Used 1/10 2/10 3/10 4/10            Remaining                  RE                    Manuals                                                  Neuro Re-Ed          SLR 2x10  b/l         Mini squats          Standing hip abd 2x10          LTR          Tandem stance          Low row BTB 3x10          TB Ext BTB 3x10         Toe taps on step/  HT          Rocker board  (Cue toe flex during neck ext)                                        Ther Ex          Nu step ROM L2 10'          Standing lumbar extension           Nerve glides          Seated shoulder flexion          Seated Marches 3# 3x10          Wall push up            Modified hip extension 3# 3x10          STS 10x    2x10                         Ther Activity                              Gait Training                              Modalities

## 2024-05-23 DIAGNOSIS — C34.91 NON-SMALL CELL CANCER OF RIGHT LUNG (HCC): ICD-10-CM

## 2024-05-23 RX ORDER — FOLIC ACID 1 MG/1
1000 TABLET ORAL DAILY
Qty: 90 TABLET | Refills: 0 | Status: SHIPPED | OUTPATIENT
Start: 2024-05-23

## 2024-05-24 ENCOUNTER — OFFICE VISIT (OUTPATIENT)
Dept: PHYSICAL THERAPY | Age: 69
End: 2024-05-24
Payer: MEDICARE

## 2024-05-24 DIAGNOSIS — R26.89 BALANCE DISORDER: ICD-10-CM

## 2024-05-24 DIAGNOSIS — R53.1 WEAKNESS: Primary | ICD-10-CM

## 2024-05-24 PROCEDURE — 97112 NEUROMUSCULAR REEDUCATION: CPT | Performed by: PHYSICAL THERAPIST

## 2024-05-24 PROCEDURE — 97110 THERAPEUTIC EXERCISES: CPT | Performed by: PHYSICAL THERAPIST

## 2024-05-24 NOTE — PROGRESS NOTES
Daily Note     Today's date: 2024  Patient name: Claude Austin  : 1955  MRN: [id number]  Referring provider: Mak Feliciano MD  Dx:   Encounter Diagnosis     ICD-10-CM    1. Weakness  R53.1       2. Balance disorder  R26.89           Start Time: 1015  Stop Time: 1100  Total time in clinic (min): 45 minutes    Subjective: Pt reports no new symptoms.       Objective: See treatment diary below      Assessment: Tolerated treatment well. Patient demonstrated fatigue post treatment and would benefit from continued PT      Plan: Continue per plan of care.      Precautions: Precautions: Stage IV cancer, thoracic metastasis avoid lifting from flexed positions, NO GRADE 5 MOBS, avoid bridges    Visit/Unit Tracking  AUTH Status:  Date 5/7 5/10 5/14 5/21 5/24          Medicare Used 1/10 2/10 3/10 4/10 5/10           Remaining                  RE                    Manuals                                                  Neuro Re-Ed          SLR 2x10  b/l         Mini squats          Standing hip abd 2x10          LTR          Tandem stance          Low row BTB 3x10          TB Ext BTB 3x10         Toe taps on step/  HT          Rocker board  (Cue toe flex during neck ext)                                        Ther Ex          Nu step ROM L2 10'          Standing lumbar extension           Nerve glides          Seated shoulder flexion          Seated Marches 3# 3x10          Wall push up            Modified hip extension 3# 3x10          STS 10x    2x10                         Ther Activity                              Gait Training                              Modalities

## 2024-05-28 ENCOUNTER — TELEPHONE (OUTPATIENT)
Dept: HEMATOLOGY ONCOLOGY | Facility: CLINIC | Age: 69
End: 2024-05-28

## 2024-05-29 ENCOUNTER — APPOINTMENT (OUTPATIENT)
Dept: PHYSICAL THERAPY | Age: 69
End: 2024-05-29
Payer: MEDICARE

## 2024-05-31 ENCOUNTER — OFFICE VISIT (OUTPATIENT)
Dept: PHYSICAL THERAPY | Age: 69
End: 2024-05-31
Payer: MEDICARE

## 2024-05-31 ENCOUNTER — HOSPITAL ENCOUNTER (OUTPATIENT)
Dept: INFUSION CENTER | Facility: HOSPITAL | Age: 69
End: 2024-05-31
Payer: MEDICARE

## 2024-05-31 VITALS — TEMPERATURE: 97 F

## 2024-05-31 DIAGNOSIS — R53.1 WEAKNESS: Primary | ICD-10-CM

## 2024-05-31 DIAGNOSIS — C34.91 NON-SMALL CELL CANCER OF RIGHT LUNG (HCC): Primary | ICD-10-CM

## 2024-05-31 DIAGNOSIS — R26.89 BALANCE DISORDER: ICD-10-CM

## 2024-05-31 LAB
ALBUMIN SERPL BCP-MCNC: 3.7 G/DL (ref 3.5–5)
ALP SERPL-CCNC: 51 U/L (ref 34–104)
ALT SERPL W P-5'-P-CCNC: 13 U/L (ref 7–52)
ANION GAP SERPL CALCULATED.3IONS-SCNC: 4 MMOL/L (ref 4–13)
AST SERPL W P-5'-P-CCNC: 15 U/L (ref 13–39)
BASOPHILS # BLD AUTO: 0.05 THOUSANDS/ÂΜL (ref 0–0.1)
BASOPHILS NFR BLD AUTO: 1 % (ref 0–1)
BILIRUB SERPL-MCNC: 0.43 MG/DL (ref 0.2–1)
BUN SERPL-MCNC: 19 MG/DL (ref 5–25)
CALCIUM SERPL-MCNC: 8.6 MG/DL (ref 8.4–10.2)
CHLORIDE SERPL-SCNC: 110 MMOL/L (ref 96–108)
CO2 SERPL-SCNC: 24 MMOL/L (ref 21–32)
CREAT SERPL-MCNC: 0.94 MG/DL (ref 0.6–1.3)
EOSINOPHIL # BLD AUTO: 0.89 THOUSAND/ÂΜL (ref 0–0.61)
EOSINOPHIL NFR BLD AUTO: 13 % (ref 0–6)
ERYTHROCYTE [DISTWIDTH] IN BLOOD BY AUTOMATED COUNT: 14.8 % (ref 11.6–15.1)
GFR SERPL CREATININE-BSD FRML MDRD: 84 ML/MIN/1.73SQ M
GLUCOSE SERPL-MCNC: 90 MG/DL (ref 65–140)
HCT VFR BLD AUTO: 37.9 % (ref 36.5–49.3)
HGB BLD-MCNC: 12.4 G/DL (ref 12–17)
IMM GRANULOCYTES # BLD AUTO: 0.03 THOUSAND/UL (ref 0–0.2)
IMM GRANULOCYTES NFR BLD AUTO: 0 % (ref 0–2)
LYMPHOCYTES # BLD AUTO: 0.8 THOUSANDS/ÂΜL (ref 0.6–4.47)
LYMPHOCYTES NFR BLD AUTO: 12 % (ref 14–44)
MCH RBC QN AUTO: 33.7 PG (ref 26.8–34.3)
MCHC RBC AUTO-ENTMCNC: 32.7 G/DL (ref 31.4–37.4)
MCV RBC AUTO: 103 FL (ref 82–98)
MONOCYTES # BLD AUTO: 0.73 THOUSAND/ÂΜL (ref 0.17–1.22)
MONOCYTES NFR BLD AUTO: 11 % (ref 4–12)
NEUTROPHILS # BLD AUTO: 4.32 THOUSANDS/ÂΜL (ref 1.85–7.62)
NEUTS SEG NFR BLD AUTO: 63 % (ref 43–75)
NRBC BLD AUTO-RTO: 0 /100 WBCS
PLATELET # BLD AUTO: 219 THOUSANDS/UL (ref 149–390)
PMV BLD AUTO: 9.2 FL (ref 8.9–12.7)
POTASSIUM SERPL-SCNC: 4 MMOL/L (ref 3.5–5.3)
PROT SERPL-MCNC: 6.9 G/DL (ref 6.4–8.4)
RBC # BLD AUTO: 3.68 MILLION/UL (ref 3.88–5.62)
SODIUM SERPL-SCNC: 138 MMOL/L (ref 135–147)
T3FREE SERPL-MCNC: 3.2 PG/ML (ref 2.5–3.9)
TSH SERPL DL<=0.05 MIU/L-ACNC: 4.5 UIU/ML (ref 0.45–4.5)
WBC # BLD AUTO: 6.82 THOUSAND/UL (ref 4.31–10.16)

## 2024-05-31 PROCEDURE — 97112 NEUROMUSCULAR REEDUCATION: CPT | Performed by: PHYSICAL THERAPIST

## 2024-05-31 PROCEDURE — 84443 ASSAY THYROID STIM HORMONE: CPT | Performed by: INTERNAL MEDICINE

## 2024-05-31 PROCEDURE — 85025 COMPLETE CBC W/AUTO DIFF WBC: CPT | Performed by: INTERNAL MEDICINE

## 2024-05-31 PROCEDURE — 84481 FREE ASSAY (FT-3): CPT | Performed by: INTERNAL MEDICINE

## 2024-05-31 PROCEDURE — 97110 THERAPEUTIC EXERCISES: CPT | Performed by: PHYSICAL THERAPIST

## 2024-05-31 PROCEDURE — 80053 COMPREHEN METABOLIC PANEL: CPT | Performed by: INTERNAL MEDICINE

## 2024-05-31 NOTE — PROGRESS NOTES
Daily Note     Today's date: 2024  Patient name: Claude Austin  : 1955  MRN: [id number]  Referring provider: Mak Feliciano MD  Dx:   Encounter Diagnosis     ICD-10-CM    1. Weakness  R53.1       2. Balance disorder  R26.89           Start Time: 1015  Stop Time: 1100  Total time in clinic (min): 45 minutes     Subjective: Pt reports improvements in symptoms from oncology treatment      Objective: See treatment diary below      Assessment: Tolerated treatment well. Patient demonstrated fatigue post treatment and would benefit from continued PT      Plan: Continue per plan of care.      Precautions: Precautions: Stage IV cancer, thoracic metastasis avoid lifting from flexed positions, NO GRADE 5 MOBS, avoid bridges    Visit/Unit Tracking  AUTH Status:  Date 5/7 5/10 5/14 5/21 5/24 5/31         Medicare Used 1/10 2/10 3/10 4/10 5/10 6/10          Remaining                  RE                    Manuals                                                 Neuro Re-Ed          SLR 2x10  b/l 3x10        Mini squats  3x10        Standing hip abd 2x10  2x10 b/l        LTR          Tandem stance          Low row BTB 3x10  nv        TB Ext BTB 3x10         Toe taps on step/  HT          Rocker board  (Cue toe flex during neck ext)                                        Ther Ex          Nu step ROM L2 10'  L2 10'        Standing lumbar extension           Nerve glides          Seated shoulder flexion          Seated Marches 3# 3x10  3x10        Wall push up            Modified hip extension 3# 3x10          STS 10x 2x10   2x10                         Ther Activity                              Gait Training                              Modalities

## 2024-06-03 ENCOUNTER — HOSPITAL ENCOUNTER (OUTPATIENT)
Dept: INFUSION CENTER | Facility: HOSPITAL | Age: 69
Discharge: HOME | End: 2024-06-03
Attending: INTERNAL MEDICINE
Payer: MEDICARE

## 2024-06-03 VITALS
RESPIRATION RATE: 18 BRPM | HEART RATE: 74 BPM | WEIGHT: 149.03 LBS | SYSTOLIC BLOOD PRESSURE: 135 MMHG | DIASTOLIC BLOOD PRESSURE: 84 MMHG | HEIGHT: 70 IN | TEMPERATURE: 97.9 F | BODY MASS INDEX: 21.34 KG/M2

## 2024-06-03 DIAGNOSIS — C34.91 NON-SMALL CELL CANCER OF RIGHT LUNG (HCC): Primary | ICD-10-CM

## 2024-06-03 RX ORDER — SODIUM CHLORIDE 9 MG/ML
20 INJECTION, SOLUTION INTRAVENOUS ONCE
Status: COMPLETED | OUTPATIENT
Start: 2024-06-03 | End: 2024-06-03

## 2024-06-03 RX ADMIN — DEXAMETHASONE SODIUM PHOSPHATE: 10 INJECTION, SOLUTION INTRAMUSCULAR; INTRAVENOUS at 09:29

## 2024-06-03 RX ADMIN — SODIUM CHLORIDE 20 ML/HR: 0.9 INJECTION, SOLUTION INTRAVENOUS at 09:29

## 2024-06-03 RX ADMIN — SODIUM CHLORIDE 200 MG: 9 INJECTION, SOLUTION INTRAVENOUS at 10:24

## 2024-06-03 RX ADMIN — PEMETREXED DISODIUM 752 MG: 500 INJECTION, POWDER, LYOPHILIZED, FOR SOLUTION INTRAVENOUS at 11:13

## 2024-06-03 NOTE — PROGRESS NOTES
Claude Austin  tolerated Chemo well with no complications.      Claude Austin is aware of future appt on 6/20 at 1530.     AVS printed:   No (Declined by Claude Austin)

## 2024-06-04 ENCOUNTER — OFFICE VISIT (OUTPATIENT)
Dept: PHYSICAL THERAPY | Age: 69
End: 2024-06-04
Payer: MEDICARE

## 2024-06-04 DIAGNOSIS — R53.1 WEAKNESS: Primary | ICD-10-CM

## 2024-06-04 DIAGNOSIS — R26.89 BALANCE DISORDER: ICD-10-CM

## 2024-06-04 PROCEDURE — 97112 NEUROMUSCULAR REEDUCATION: CPT

## 2024-06-04 PROCEDURE — 97110 THERAPEUTIC EXERCISES: CPT

## 2024-06-04 NOTE — PROGRESS NOTES
"Daily Note     Today's date: 2024  Patient name: Claude Austin  : 1955  MRN: [id number]  Referring provider: Mak Feliciano MD  Dx:   Encounter Diagnosis     ICD-10-CM    1. Weakness  R53.1       2. Balance disorder  R26.89           Start Time: 1100  Stop Time: 1142  Total time in clinic (min): 42 minutes    Subjective: Patient reports that he was fatigued after chemo yesterday, but feels better today. He reports improved endurance since beginning PT as well as improving strength and balance/coordination. He does still feels off balance occasionally, especially first thing in the morning.       Objective: See treatment diary below      Assessment: Added foam stance to standing hip abduction and added bridges for glut strength as indicated below. Patient was appropriately challenged by program and appeared to tolerate treatment well. Patient demonstrated fatigue post treatment, exhibited good technique with therapeutic exercises, and would benefit from continued PT He may benefit from addition of dynamic balance and stability activities as well as UBE for UE strength and further endurance.       Plan: Continue per plan of care.  Progress treatment as tolerated.       Precautions: Precautions: Stage IV cancer, thoracic metastasis avoid lifting from flexed positions, NO GRADE 5 MOBS, avoid bridges    Visit/Unit Tracking  AUTH Status:  Date 5/7 5/10 5/14 5/21 5/24 5/31 6/4        Medicare Used 1/10 2/10 3/10 4/10 5/10 6/10 7/10         Remaining                  RE                    Manuals                                                Neuro Re-Ed          SLR 2x10  b/l 3x10 3\" 3x10       Mini squats  3x10        Standing hip abd 2x10  2x10 b/l Foam 2x10 ea BLE       LTR          Tandem stance          Low row BTB 3x10  nv Blue 3\" 3x10       TB Ext BTB 3x10  Blue 3\" 3x10       Toe taps on step/  HT          Rocker board  (Cue toe flex during neck ext)                                   "      Ther Ex          Nu step ROM L2 10'  L2 10' L2 10 min       Standing lumbar extension           Nerve glides          Seated shoulder flexion          Seated Marches 3# 3x10  3x10        Wall push up            Modified hip extension 3# 3x10          STS 10x 2x10 2x10       Bridges                    Ther Activity                              Gait Training                              Modalities

## 2024-06-05 NOTE — PROGRESS NOTES
Palliative and Supportive Care   Claude Austin 68 y.o. male [id number]    Assessment/Plan:  1. Chemotherapy-induced fatigue    2. Non-small cell cancer of right lung (HCC)    3. Metastasis to brain (HCC)    4. Palliative care by specialist    5. Drug rash    6. Cancer related pain    7. Therapeutic opioid-induced constipation (OIC)    Restart decadron 1mg BID for rash, likely related to ongoing keytruda use.  Discussed risks/benefits of restart steroids.  Plan is to trial for 2 weeks, then revisit discussion with his oncologist Dr. Ruelas.  Given his failure with over-the-counter bowel medications, at this time I am going to trial lactulose up to 20 mg 3 times a day for constipation.  Discussed staying hydrated, good oral intake.  He will continue with physical therapy.  Advised him to discuss his concerns about his stability with his physical therapy team.  Follow-up in this office in 2 months, but of course may call with any questions or concerns.    Requested Prescriptions     Signed Prescriptions Disp Refills    lactulose (CEPHULAC) 20 g packet 30 each 0     Sig: Take 1 packet (20 g total) by mouth 3 (three) times a day as needed (constipation)     There are no discontinued medications.    Representatives have queried the patient's controlled substance dispensing history in the Prescription Drug Monitoring Program in compliance with regulations before I have prescribed any controlled substances.  The prescription history is consistent with prescribed therapy and our practice policies.      I have spent a total time of 40 minutes on 06/06/24 in caring for this patient including Risks and benefits of tx options, Instructions for management, Patient and family education, Importance of tx compliance, Impressions, Counseling / Coordination of care, Documenting in the medical record, Reviewing / ordering tests, medicine, procedures  , and Obtaining or reviewing history  .     No follow-ups on file.    Subjective:    Chief Complaint  Follow up visit for:  symptom management  HPI     Claude Austin is a 68 y.o. male with metastatic NSCLC who presents in symptom mgmt follow up.  Since our last visit I ahd spoken with Mateo and Lolis about his rash as well as ongoing constipation.  Agreed on a senna uptaper and prescribed kenalog cream for his rash.  Most recent CTAP appears to show disease stability.  For his fatigue, he continues to work with physical therapy.    Patient has 3 main concerns at this visit.  Continues with pruritic upper torso rash.  Does respond to topical steroids but only for a few hours.  Continues to work with physical therapy, but is having ongoing stability issues at home.  Continues to have constipation and has gone up to 10-day stretches no bowel movement despite trying over-the-counter medications including bisacodyl, Senokot, MiraLAX.  Did have some relief with a suppository but is not interested in using that regularly.  Otherwise denies any pain.  Sleep is disrupted due to the pruritus.    The following portions of the medical history were reviewed: past medical history, problem list, medication list, and social history.    Current Outpatient Medications:     acetaminophen (TYLENOL) 500 mg tablet, Take 500 mg by mouth every 4 (four) hours as needed for mild pain, Disp: , Rfl:     apixaban (ELIQUIS) 5 mg, Take 1 tablet (5 mg total) by mouth 2 (two) times a day, Disp: 60 tablet, Rfl: 5    dexamethasone (DECADRON) 1 mg tablet, Take 1 mg by mouth 2 (two) times a day with meals, Disp: , Rfl:     folic acid (FOLVITE) 1 mg tablet, Take 1 tablet (1,000 mcg total) by mouth daily, Disp: 90 tablet, Rfl: 0    gabapentin (NEURONTIN) 300 mg capsule, Take 300 mg in the AM and 600 mg in the Pm, Disp: 270 capsule, Rfl: 1    Keytruda 100 MG/4ML injection, INFUSE 200MG VIA IVPB OVER 30 MIN EVERY 21 DAYS, Disp: 8 mL, Rfl: 5    lactulose (CEPHULAC) 20 g packet, Take 1 packet (20 g total) by mouth 3 (three) times a day as  needed (constipation), Disp: 30 each, Rfl: 0    lidocaine-prilocaine (EMLA) cream, Apply to port 30 to 60 minutes prior to use, Disp: 30 g, Rfl: 0    triamcinolone (KENALOG) 0.5 % cream, Apply topically 2 (two) times a day, Disp: 454 g, Rfl: 3  Review of Systems    All other systems negative    Objective:  Vital Signs  /60 (BP Location: Left arm, Patient Position: Sitting, Cuff Size: Standard)   Pulse 86   Temp (!) 97.3 °F (36.3 °C) (Temporal)   Resp 18   Wt 66.9 kg (147 lb 7.8 oz)   SpO2 98%   BMI 21.16 kg/m²    Physical Exam    Constitutional: Appears well-developed and well-nourished. In no acute distress.   Head: Normocephalic and atraumatic.   Eyes: EOM are normal. Right eye exhibits no discharge. Left eye exhibits no discharge. No scleral icterus.   Pulmonary/Chest: Effort normal. No stridor. No respiratory distress.   Abdominal: No distension.   Musculoskeletal: No edema.   Neurological: Alert, oriented and appropriately conversant.   Skin: Skin is dry, not diaphoretic.  Erythematous rash with excoriations visible on patient's neck and forearms.  Psychiatric: Displays a normal mood and affect. Behavior, judgement and thought content appear normal.   Vitals reviewed.

## 2024-06-06 ENCOUNTER — OFFICE VISIT (OUTPATIENT)
Dept: PALLIATIVE MEDICINE | Facility: CLINIC | Age: 69
End: 2024-06-06
Payer: MEDICARE

## 2024-06-06 VITALS
RESPIRATION RATE: 18 BRPM | WEIGHT: 147.49 LBS | SYSTOLIC BLOOD PRESSURE: 121 MMHG | BODY MASS INDEX: 21.16 KG/M2 | HEART RATE: 86 BPM | TEMPERATURE: 97.3 F | OXYGEN SATURATION: 98 % | DIASTOLIC BLOOD PRESSURE: 60 MMHG

## 2024-06-06 DIAGNOSIS — R53.83 CHEMOTHERAPY-INDUCED FATIGUE: Primary | ICD-10-CM

## 2024-06-06 DIAGNOSIS — C79.31 METASTASIS TO BRAIN (HCC): ICD-10-CM

## 2024-06-06 DIAGNOSIS — G89.3 CANCER RELATED PAIN: ICD-10-CM

## 2024-06-06 DIAGNOSIS — L27.0 DRUG RASH: ICD-10-CM

## 2024-06-06 DIAGNOSIS — Z51.5 PALLIATIVE CARE BY SPECIALIST: ICD-10-CM

## 2024-06-06 DIAGNOSIS — K59.03 THERAPEUTIC OPIOID-INDUCED CONSTIPATION (OIC): ICD-10-CM

## 2024-06-06 DIAGNOSIS — T40.2X5A THERAPEUTIC OPIOID-INDUCED CONSTIPATION (OIC): ICD-10-CM

## 2024-06-06 DIAGNOSIS — C34.91 NON-SMALL CELL CANCER OF RIGHT LUNG (HCC): ICD-10-CM

## 2024-06-06 DIAGNOSIS — T45.1X5A CHEMOTHERAPY-INDUCED FATIGUE: Primary | ICD-10-CM

## 2024-06-06 PROCEDURE — 99215 OFFICE O/P EST HI 40 MIN: CPT | Performed by: INTERNAL MEDICINE

## 2024-06-06 RX ORDER — DEXAMETHASONE 1 MG
1 TABLET ORAL 2 TIMES DAILY WITH MEALS
COMMUNITY

## 2024-06-07 ENCOUNTER — OFFICE VISIT (OUTPATIENT)
Dept: PHYSICAL THERAPY | Age: 69
End: 2024-06-07
Payer: MEDICARE

## 2024-06-07 DIAGNOSIS — R26.89 BALANCE DISORDER: ICD-10-CM

## 2024-06-07 DIAGNOSIS — R53.1 WEAKNESS: Primary | ICD-10-CM

## 2024-06-07 PROCEDURE — 97112 NEUROMUSCULAR REEDUCATION: CPT

## 2024-06-07 PROCEDURE — 97110 THERAPEUTIC EXERCISES: CPT

## 2024-06-07 NOTE — PROGRESS NOTES
"Daily Note     Today's date: 2024  Patient name: Claude Austin  : 1955  MRN: [id number]  Referring provider: Mak Feliciano MD  Dx:   Encounter Diagnosis     ICD-10-CM    1. Weakness  R53.1       2. Balance disorder  R26.89           Start Time: 1100  Stop Time: 1148  Total time in clinic (min): 48 minutes    Subjective: Patient reports that his Palliative Care MD has started him on Steroids due to Chemo induced itching and rash symptoms. He reports being a little more tired today. He is wondering if he has made any progress as he continues to have unsteadiness in the morning.       Objective: See treatment diary below      Assessment: Focused on balance and stability more so this session with good challenge and tolerance. Tolerated treatment well. Patient demonstrated fatigue post treatment, exhibited good technique with therapeutic exercises, and would benefit from continued PT      Plan: Continue per plan of care.  Progress treatment as tolerated.       Precautions: Precautions: Stage IV cancer, thoracic metastasis avoid lifting from flexed positions, NO GRADE 5 MOBS, avoid bridges    Visit/Unit Tracking  AUTH Status:  Date 5/7 5/10 5/14 5/21 5/24 5/31 6/4 6/7       Medicare Used 1/10 2/10 3/10 4/10 5/10 6/10 7/10 8/10        Remaining                  RE                    Manuals                                               Neuro Re-Ed          SLR 2x10  b/l 3x10 3\" 3x10       Mini squats  3x10        Standing hip abd 2x10  2x10 b/l Foam 2x10 ea BLE Foam abd/ext 2x10 ea      LTR          Tandem stance    20\"x3 ea      Low row BTB 3x10  nv Blue 3\" 3x10       TB Ext BTB 3x10  Blue 3\" 3x10       Toe taps on step/  HT          Rocker board  (Cue toe flex during neck ext)    AP/ML taps 20x ea      Tandem ambulation    20'x4      FTEC on foam    20\"x3 CG                Ther Ex          Nu step ROM L2 10'  L2 10' L2 10 min L2 10'      Standing lumbar extension           Nerve " glides          Seated shoulder flexion          Seated Marches 3# 3x10  3x10  2x10      Wall push up            Modified hip extension 3# 3x10          STS 10x 2x10 2x10 2x10      Bridges                    Ther Activity                              Gait Training                              Modalities

## 2024-06-09 DIAGNOSIS — C34.91 NON-SMALL CELL CANCER OF RIGHT LUNG (HCC): ICD-10-CM

## 2024-06-09 DIAGNOSIS — M50.30 DDD (DEGENERATIVE DISC DISEASE), CERVICAL: ICD-10-CM

## 2024-06-09 DIAGNOSIS — G89.3 CANCER ASSOCIATED PAIN: ICD-10-CM

## 2024-06-09 DIAGNOSIS — D49.2 THORACIC SPINE TUMOR: ICD-10-CM

## 2024-06-09 RX ORDER — GABAPENTIN 300 MG/1
CAPSULE ORAL
Qty: 270 CAPSULE | Refills: 0 | Status: SHIPPED | OUTPATIENT
Start: 2024-06-09

## 2024-06-11 ENCOUNTER — OFFICE VISIT (OUTPATIENT)
Dept: PHYSICAL THERAPY | Age: 69
End: 2024-06-11
Payer: MEDICARE

## 2024-06-11 DIAGNOSIS — R26.89 BALANCE DISORDER: ICD-10-CM

## 2024-06-11 DIAGNOSIS — R53.1 WEAKNESS: Primary | ICD-10-CM

## 2024-06-11 PROCEDURE — 97110 THERAPEUTIC EXERCISES: CPT

## 2024-06-11 PROCEDURE — 97112 NEUROMUSCULAR REEDUCATION: CPT

## 2024-06-11 NOTE — PROGRESS NOTES
"Daily Note     Today's date: 2024  Patient name: Claude Austin  : 1955  MRN: [id number]  Referring provider: Mak Feliciano MD  Dx:   Encounter Diagnosis     ICD-10-CM    1. Weakness  R53.1       2. Balance disorder  R26.89           Start Time: 1405  Stop Time: 1450  Total time in clinic (min): 45 minutes    Subjective: Patient reports that he feels good today. No new complaints. He does feel that the steroid is helping.       Objective: See treatment diary below      Assessment: Progressed tandem stance to foam, added retro tandem ambulation and standing marches with cuff weight as indicated below. Increased reps where indicated below. Patient appeared to tolerate treatment well but demonstrated fatigue afterwards. Patient demonstrated fatigue post treatment, exhibited good technique with therapeutic exercises, and would benefit from continued PT      Plan: Continue per plan of care.  Progress treatment as tolerated.       Precautions: Precautions: Stage IV cancer, thoracic metastasis avoid lifting from flexed positions, NO GRADE 5 MOBS, avoid bridges    Visit/Unit Tracking  AUTH Status:  Date 5/7 5/10 5/14 5/21 5/24 5/31 6/4 6/7 6/11      Medicare Used 1/10 2/10 3/10 4/10 5/10 6/10 7/10 8/10 9/10       Remaining                  RE                    Manuals                                              Neuro Re-Ed          SLR 2x10  b/l 3x10 3\" 3x10       Mini squats  3x10        Standing hip abd 2x10  2x10 b/l Foam 2x10 ea BLE Foam abd/ext 2x10 ea Foam abd/ext 3x10 ea BLE     LTR          Tandem stance    20\"x3 ea Foam 20\"x3 CS     Low row BTB 3x10  nv Blue 3\" 3x10       TB Ext BTB 3x10  Blue 3\" 3x10       Toe taps on step/  HT          Rocker board  (Cue toe flex during neck ext)    AP/ML taps 20x ea AP/ML taps 20x ea     Tandem ambulation    20'x4 F/B 20' x2 laps     FTEC on foam    20\"x3 CG 20\"x3 CG               Ther Ex          Nu step ROM L2 10'  L2 10' L2 10 min " L2 10' LE only L2 10min     Standing lumbar extension           Nerve glides          Seated shoulder flexion          Seated Marches 3# 3x10  3x10  2x10 Standing 2# 2x10 ea     Wall push up            Modified hip extension 3# 3x10          STS 10x 2x10 2x10 2x10 2x12     Bridges                    Ther Activity                              Gait Training                              Modalities

## 2024-06-13 ENCOUNTER — OFFICE VISIT (OUTPATIENT)
Dept: PHYSICAL THERAPY | Age: 69
End: 2024-06-13
Payer: MEDICARE

## 2024-06-13 DIAGNOSIS — R26.89 BALANCE DISORDER: ICD-10-CM

## 2024-06-13 DIAGNOSIS — R53.1 WEAKNESS: Primary | ICD-10-CM

## 2024-06-13 PROCEDURE — 97112 NEUROMUSCULAR REEDUCATION: CPT

## 2024-06-13 PROCEDURE — 97110 THERAPEUTIC EXERCISES: CPT

## 2024-06-13 NOTE — PROGRESS NOTES
"Daily Note     Today's date: 2024  Patient name: Claude Austin  : 1955  MRN: [id number]  Referring provider: Mak Feliciano MD  Dx:   Encounter Diagnosis     ICD-10-CM    1. Weakness  R53.1       2. Balance disorder  R26.89                      Subjective: pt reports therapy is helping improve his mobility and strength      Objective: See treatment diary below      Assessment: Tolerated treatment well. Patient demonstrated fatigue post treatment and would benefit from continued PT, pt required frequent, brief rest periods, occasional unsteadiness at times noted during balance ex      Plan: Continue per plan of care.  Progress treatment as tolerated.       Precautions: Precautions: Stage IV cancer, thoracic metastasis avoid lifting from flexed positions, NO GRADE 5 MOBS, avoid bridges    Visit/Unit Tracking  AUTH Status:  Date 5/7 5/10 5/14 5/21 5/24 5/31 6/4 6/7 6/11 6/13     Medicare Used 1/10 2/10 3/10 4/10 5/10 6/10 7/10 8/10 9/10 10/10      Remaining                  RE                    Manuals                                         Neuro Re-Ed         SLR 2x10  b/l 3x10 3\" 3x10      Mini squats  3x10       Standing hip abd 2x10  2x10 b/l Foam 2x10 ea BLE Foam abd/ext 2x10 ea Foam abd/ext 3x10 ea BLE Foam 3x10 ea BL   LTR         Tandem stance    20\"x3 ea Foam 20\"x3 CS Foam 2x20\" ea B/L   Low row BTB 3x10  nv Blue 3\" 3x10      TB Ext BTB 3x10  Blue 3\" 3x10      Toe taps on step/  HT         Rocker board  (Cue toe flex during neck ext)    AP/ML taps 20x ea AP/ML taps 20x ea AP/ML 20x ea   Tandem ambulation    20'x4 F/B 20' x2 laps 2x20' ea    FTEC on foam    20\"x3 CG 20\"x3 CG             Ther Ex         Nu step ROM L2 10'  L2 10' L2 10 min L2 10' LE only L2 10min 10'   Standing lumbar extension          Nerve glides         Seated shoulder flexion         Seated Marches 3# 3x10  3x10  2x10 Standing 2# 2x10 ea    Wall push up           Modified hip extension 3# " 3x10         STS 10x 2x10 2x10 2x10 2x12 2x12   Bridges                  Ther Activity                           Gait Training                           Modalities

## 2024-06-18 ENCOUNTER — APPOINTMENT (OUTPATIENT)
Dept: PHYSICAL THERAPY | Age: 69
End: 2024-06-18
Payer: MEDICARE

## 2024-06-20 ENCOUNTER — TELEPHONE (OUTPATIENT)
Dept: HEMATOLOGY ONCOLOGY | Facility: CLINIC | Age: 69
End: 2024-06-20

## 2024-06-20 ENCOUNTER — HOSPITAL ENCOUNTER (OUTPATIENT)
Dept: INFUSION CENTER | Facility: HOSPITAL | Age: 69
Discharge: HOME | End: 2024-06-20
Payer: MEDICARE

## 2024-06-20 ENCOUNTER — OFFICE VISIT (OUTPATIENT)
Dept: HEMATOLOGY ONCOLOGY | Facility: CLINIC | Age: 69
End: 2024-06-20
Payer: MEDICARE

## 2024-06-20 VITALS
OXYGEN SATURATION: 99 % | WEIGHT: 142.6 LBS | SYSTOLIC BLOOD PRESSURE: 118 MMHG | RESPIRATION RATE: 17 BRPM | DIASTOLIC BLOOD PRESSURE: 64 MMHG | HEART RATE: 71 BPM | HEIGHT: 70 IN | BODY MASS INDEX: 20.41 KG/M2 | TEMPERATURE: 97.7 F

## 2024-06-20 DIAGNOSIS — C34.91 NON-SMALL CELL CANCER OF RIGHT LUNG (HCC): Primary | ICD-10-CM

## 2024-06-20 DIAGNOSIS — D53.9 NUTRITIONAL ANEMIA: ICD-10-CM

## 2024-06-20 LAB
ALBUMIN SERPL BCG-MCNC: 3.7 G/DL (ref 3.5–5)
ALP SERPL-CCNC: 42 U/L (ref 34–104)
ALT SERPL W P-5'-P-CCNC: 23 U/L (ref 7–52)
ANION GAP SERPL CALCULATED.3IONS-SCNC: 7 MMOL/L (ref 4–13)
AST SERPL W P-5'-P-CCNC: 13 U/L (ref 13–39)
BASOPHILS # BLD AUTO: 0 THOUSANDS/ÂΜL (ref 0–0.1)
BASOPHILS NFR BLD AUTO: 0 % (ref 0–1)
BILIRUB SERPL-MCNC: 0.33 MG/DL (ref 0.2–1)
BUN SERPL-MCNC: 24 MG/DL (ref 5–25)
CALCIUM SERPL-MCNC: 8.4 MG/DL (ref 8.4–10.2)
CHLORIDE SERPL-SCNC: 106 MMOL/L (ref 96–108)
CO2 SERPL-SCNC: 26 MMOL/L (ref 21–32)
CREAT SERPL-MCNC: 0.92 MG/DL (ref 0.6–1.3)
EOSINOPHIL # BLD AUTO: 0.02 THOUSAND/ÂΜL (ref 0–0.61)
EOSINOPHIL NFR BLD AUTO: 0 % (ref 0–6)
ERYTHROCYTE [DISTWIDTH] IN BLOOD BY AUTOMATED COUNT: 15.3 % (ref 11.6–15.1)
GFR SERPL CREATININE-BSD FRML MDRD: 86 ML/MIN/1.73SQ M
GLUCOSE SERPL-MCNC: 93 MG/DL (ref 65–140)
HCT VFR BLD AUTO: 38.5 % (ref 36.5–49.3)
HGB BLD-MCNC: 12.6 G/DL (ref 12–17)
IMM GRANULOCYTES # BLD AUTO: 0.09 THOUSAND/UL (ref 0–0.2)
IMM GRANULOCYTES NFR BLD AUTO: 1 % (ref 0–2)
LYMPHOCYTES # BLD AUTO: 0.71 THOUSANDS/ÂΜL (ref 0.6–4.47)
LYMPHOCYTES NFR BLD AUTO: 9 % (ref 14–44)
MCH RBC QN AUTO: 34.7 PG (ref 26.8–34.3)
MCHC RBC AUTO-ENTMCNC: 32.7 G/DL (ref 31.4–37.4)
MCV RBC AUTO: 106 FL (ref 82–98)
MONOCYTES # BLD AUTO: 0.75 THOUSAND/ÂΜL (ref 0.17–1.22)
MONOCYTES NFR BLD AUTO: 9 % (ref 4–12)
NEUTROPHILS # BLD AUTO: 6.74 THOUSANDS/ÂΜL (ref 1.85–7.62)
NEUTS SEG NFR BLD AUTO: 81 % (ref 43–75)
NRBC BLD AUTO-RTO: 0 /100 WBCS
PLATELET # BLD AUTO: 212 THOUSANDS/UL (ref 149–390)
PMV BLD AUTO: 9.3 FL (ref 8.9–12.7)
POTASSIUM SERPL-SCNC: 4.4 MMOL/L (ref 3.5–5.3)
PROT SERPL-MCNC: 6.5 G/DL (ref 6.4–8.4)
RBC # BLD AUTO: 3.63 MILLION/UL (ref 3.88–5.62)
SODIUM SERPL-SCNC: 139 MMOL/L (ref 135–147)
T3FREE SERPL-MCNC: 2.49 PG/ML (ref 2.5–3.9)
TSH SERPL DL<=0.05 MIU/L-ACNC: 0.89 UIU/ML (ref 0.45–4.5)
WBC # BLD AUTO: 8.31 THOUSAND/UL (ref 4.31–10.16)

## 2024-06-20 PROCEDURE — 84443 ASSAY THYROID STIM HORMONE: CPT | Performed by: INTERNAL MEDICINE

## 2024-06-20 PROCEDURE — 85025 COMPLETE CBC W/AUTO DIFF WBC: CPT | Performed by: INTERNAL MEDICINE

## 2024-06-20 PROCEDURE — 84481 FREE ASSAY (FT-3): CPT | Performed by: INTERNAL MEDICINE

## 2024-06-20 PROCEDURE — 99214 OFFICE O/P EST MOD 30 MIN: CPT | Performed by: INTERNAL MEDICINE

## 2024-06-20 PROCEDURE — 80053 COMPREHEN METABOLIC PANEL: CPT | Performed by: INTERNAL MEDICINE

## 2024-06-20 PROCEDURE — G2211 COMPLEX E/M VISIT ADD ON: HCPCS | Performed by: INTERNAL MEDICINE

## 2024-06-20 RX ORDER — SODIUM CHLORIDE 9 MG/ML
20 INJECTION, SOLUTION INTRAVENOUS ONCE
Status: CANCELLED | OUTPATIENT
Start: 2024-06-24

## 2024-06-20 RX ORDER — SODIUM CHLORIDE 9 MG/ML
20 INJECTION, SOLUTION INTRAVENOUS ONCE
OUTPATIENT
Start: 2024-08-26

## 2024-06-20 RX ORDER — SODIUM CHLORIDE 9 MG/ML
20 INJECTION, SOLUTION INTRAVENOUS ONCE
OUTPATIENT
Start: 2024-07-15

## 2024-06-20 RX ORDER — CYANOCOBALAMIN 1000 UG/ML
1000 INJECTION, SOLUTION INTRAMUSCULAR; SUBCUTANEOUS
Start: 2024-07-15

## 2024-06-20 RX ORDER — CYANOCOBALAMIN 1000 UG/ML
1000 INJECTION, SOLUTION INTRAMUSCULAR; SUBCUTANEOUS ONCE
OUTPATIENT
Start: 2024-07-15

## 2024-06-20 RX ORDER — SODIUM CHLORIDE 9 MG/ML
20 INJECTION, SOLUTION INTRAVENOUS ONCE
OUTPATIENT
Start: 2024-08-05

## 2024-06-20 NOTE — PROGRESS NOTES
Claude Austin  tolerated treatment well with no complications.      Claude Austin is aware of future appt on 6/24 at 1130am.     AVS printed and given to Claude Austin:    No (Declined by Claude Austin)

## 2024-06-21 ENCOUNTER — OFFICE VISIT (OUTPATIENT)
Dept: PHYSICAL THERAPY | Age: 69
End: 2024-06-21
Payer: MEDICARE

## 2024-06-21 DIAGNOSIS — R26.89 BALANCE DISORDER: ICD-10-CM

## 2024-06-21 DIAGNOSIS — R53.1 WEAKNESS: Primary | ICD-10-CM

## 2024-06-21 PROCEDURE — 97110 THERAPEUTIC EXERCISES: CPT

## 2024-06-21 PROCEDURE — 97112 NEUROMUSCULAR REEDUCATION: CPT

## 2024-06-21 NOTE — PROGRESS NOTES
"Daily Note     Today's date: 2024  Patient name: Claude Austin  : 1955  MRN: [id number]  Referring provider: Mak Feliciano MD  Dx:   Encounter Diagnosis     ICD-10-CM    1. Weakness  R53.1       2. Balance disorder  R26.89                      Subjective: Mateo has noticed an improvement in his balance, but still feels \"shaky.\" He is c/o R shoulder pain, she has been painting/sanding his new shower.       Objective: See treatment diary below      Assessment: Tolerated treatment well. Introduced shoulder/postural and T/s posterior chain strengthening tasks to ad, well tolerated. His static and dynamic balance need work, lateral LOB with EC requiring Cam. Overall, good endurance noted this session requiring little rest break. Continued PT would be beneficial to improve function.          Plan: Continue per plan of care.       Precautions: Precautions: Stage IV cancer, thoracic metastasis avoid lifting from flexed positions, NO GRADE 5 MOBS, avoid bridges    Visit/Unit Tracking  AUTH Status:  Date 5/7 5/10 5/14 5/21 5/24 5/31 6/4 6/7 6/11 6/13 6/21    Medicare Used 1/10 2/10 3/10 4/10 5/10 6/10 7/10 8/10 9/10 10/10 1/10     Remaining                  RE                    Manuals                                         Neuro Re-Ed         SLR  3x10 3\" 3x10      Mini squats  3x10       Standing hip abd Foam abd  2x10 ea 2x10 b/l Foam 2x10 ea BLE Foam abd/ext 2x10 ea Foam abd/ext 3x10 ea BLE Foam 3x10 ea BL   LTR         Tandem stance Foam 2x20\" ea B/L   20\"x3 ea Foam 20\"x3 CS Foam 2x20\" ea B/L   Low row Blue 3\" 3x10 nv Blue 3\" 3x10      TB Ext Blue 3\" 3x10  Blue 3\" 3x10      Toe taps on step/  HT         Rocker board  (Cue toe flex during neck ext) AP/ML 20x ea   AP/ML taps 20x ea AP/ML taps 20x ea AP/ML 20x ea   Tandem ambulation 2x20' ea    20'x4 F/B 20' x2 laps 2x20' ea    FTEC on foam 20\"x3 CG   20\"x3 CG 20\"x3 CG             Ther Ex         Nu step ROM LE only L2 " 10min 0.58 mile L2 10' L2 10 min L2 10' LE only L2 10min 10'   Standing lumbar extension         Nerve glides         Seated shoulder flexion 3x10 1#         Standing shoulder flexion         Seated Marches  3x10  2x10 Standing 2# 2x10 ea    Wall push up           Modified hip extension         STS 2x12 2x10 2x10 2x10 2x12 2x12   Bridges                  Ther Activity                           Gait Training                           Modalities

## 2024-06-23 NOTE — PROGRESS NOTES
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     Caris - PD-L1 0%, TMB 60, no other targetable mutations     2023 - palliative RT to T spine and brain     2023 - start carbo/pemetrexed/pembrolizumab     2023 suspected Rad/Immuno Pneumonitis Grade 1/2 (mild fever)      2023 discontinued Carbo d/t fatigue; and holding Keytruda in Cycle 4 dt Pneumonitis; will re-challenge in cycle 5      2023 - add back keytruda for cycle 6     2023 - dose reduce alimta by 10% due to fatigue     Thoracic spine tumor   2023 Initial Diagnosis    Thoracic spine tumor     Non-small cell cancer of right lung (HCC)   3/29/2023 -  Cancer Staged    Staging form: Lung, AJCC 8th Edition  - Clinical stage from 3/29/2023: Stage DORI (cT4, cN1, cM1b) - Signed by Nancy Ruelas DO on 2023 Biopsy    Lung, right upper lobe, biopsy:     - Rare detached single and small clusters of atypical, degenerated epithelial cells.     - Predominantly fibroelastotic stromal fibrosis.     2023 Biopsy    Lung, right upper lobe mass, biopsy:      - Poorly differentiated non-small cell carcinoma most compatible with adenocarcinoma of the lung     2023 - 2023 Radiation    Course: C1    Plan ID Energy Fractions Dose per Fraction (cGy) Dose Correction (cGy) Total Dose Delivered (cGy) Elapsed Days   [id number]_R Lung 10X/6X 10 / 10 300 0 3,000 11   HCS_WhBrain 6X 10 / 10 300 0 3,000 11      Dr. Ro Genao     2023 Initial Diagnosis    Non-small cell cancer of right lung (HCC)     2023 -  Chemotherapy    cyanocobalamin, 1,000 mcg, Intramuscular, Once, 8 of 9 cycles  Administration: 1,000 mcg (5/15/2023), 1,000  mcg (7/3/2023), 1,000 mcg (9/5/2023), 1,000 mcg (11/6/2023), 1,000 mcg (1/8/2024), 1,000 mcg (3/11/2024), 1,000 mcg (5/13/2024)  alteplase (CATHFLO), 2 mg, Intracatheter, Every 1 Minute as needed, 19 of 25 cycles  Administration: 2 mg (11/29/2023), 2 mg (12/19/2023)  fosaprepitant (EMEND) IVPB, 150 mg, Intravenous, Once, 3 of 3 cycles  Administration: 150 mg (5/22/2023), 150 mg (7/3/2023), 150 mg (6/12/2023)  CARBOplatin (PARAPLATIN) IVPB ([facility] AUC DOSING), 544 mg, Intravenous, Once, 3 of 3 cycles  Administration: 544 mg (5/22/2023), 554 mg (7/3/2023), 544 mg (6/12/2023)  pemetrexed (ALIMTA) chemo infusion, 940 mg, Intravenous, Once, 19 of 25 cycles  Dose modification: 450 mg/m2 (original dose 500 mg/m2, Cycle 9, Reason: Dose Not Tolerated, Comment: 10% dose reduction due to fatigue), 450 mg/m2 (original dose 500 mg/m2, Cycle 10, Reason: Dose modified as per discussion with consulting physician), 400 mg/m2 (original dose 500 mg/m2, Cycle 16, Reason: Dose Not Tolerated, Comment: total 20% dose reduction due to fatigue)  Administration: 900 mg (5/22/2023), 900 mg (7/3/2023), 900 mg (9/5/2023), 900 mg (6/12/2023), 900 mg (8/14/2023), 900 mg (7/24/2023), 900 mg (9/25/2023), 900 mg (10/16/2023), 846 mg (11/6/2023), 846 mg (11/29/2023), 846 mg (12/19/2023), 846 mg (1/8/2024), 846 mg (1/29/2024), 846 mg (2/19/2024), 846 mg (3/11/2024), 752 mg (4/1/2024), 752 mg (4/22/2024), 752 mg (5/13/2024), 752 mg (6/3/2024)  pembrolizumab (KEYTRUDA) IVPB, 200 mg, Intravenous, Once, 18 of 24 cycles  Administration: 200 mg (5/22/2023), 200 mg (7/3/2023), 200 mg (9/5/2023), 200 mg (6/12/2023), 200 mg (8/14/2023), 200 mg (9/25/2023), 200 mg (10/16/2023), 200 mg (11/6/2023), 200 mg (11/29/2023), 200 mg (12/19/2023), 200 mg (1/8/2024), 200 mg (1/29/2024), 200 mg (2/19/2024), 200 mg (3/11/2024), 200 mg (4/1/2024), 200 mg (4/22/2024), 200 mg (5/13/2024), 200 mg (6/3/2024)     7/12/2023 Adverse Reaction    Hospitalization - Suspected Pneumonitis     7/12-14/2023 due to mild fever with CT chest redemonstrating left upper lobe inflammatory changes, previously seen on study 04/04/2023, ID and oncology evaluated, more suggestive of radiation/Keytruda induced pneumonitis grade 1/2; mild fever resolved; other differential/contributing etiology include recent tapering of steroid       7/21/2023 -  Chemotherapy    Recently suspected pneumonitis 7/12/2023 and reports fatigue 7/21/2023  Discontinuing carboplatin and holding Keytruda of cycle 4; with potential plan to rechallenge Keytruda in cycle 5     Metastasis to brain (HCC)   5/30/2023 Initial Diagnosis    Metastasis to brain (HCC)         Interval History: Patient presents for follow up of  is stage IV adenocarcinoma of the lung.  He is having a lot of persistent itching from Keytruda that was severe.  He was started on dexamethasone 1 mg twice daily by palliative care and this is much better.  He also tried lactulose for constipation and had some watery stools.  He is just taking it as needed at this point.  He had a CT scan prior to this visit that shows stable disease systemically.  There was also comment on a distal pancreatic tail cystic lesion that looked like it was getting more complex.  He is tolerating the Alimta and Keytruda maintenance better with the dose reduction of the Alimta from a fatigue standpoint.  He has not had any falls.  Unfortunately also he found out that now that he is  their income is too high and he no longer qualifies for Eliquis assistance.  He has been doing physical therapy for his balance.  68-year-old male with stage IV adenocarcinoma of the lung.         REVIEW OF SYSTEMS:  Please note that a 14-point review of systems was performed to include Constitutional, HEENT, Respiratory, CVS, GI, , Musculoskeletal, Integumentary, Neurologic, Rheumatologic, Endocrinologic, Psychiatric, Lymphatic, and Hematologic/Oncologic systems were reviewed and are negative unless otherwise  stated in HPI. Positive and negative findings pertinent to this evaluation are incorporated into the history of present illness.      ECOG PS: 1    PROBLEM LIST:  Patient Active Problem List   Diagnosis    Multiple subsegmental pulmonary emboli without acute cor pulmonale (HCC)    Tobacco use    DDD (degenerative disc disease), cervical    Pleural mass    Brain lesions    Thoracic spine tumor    Cancer related pain    Advanced care planning/counseling discussion    Non-small cell cancer of right lung (HCC)    Metastasis to brain (HCC)    Lesion of left lung    Chemotherapy-induced fatigue    Drug-induced pneumonitis    Current every day smoker    Bloating    Lower extremity edema    Palliative care by specialist    Peripheral vascular disease (HCC)    Constipation    Pruritic rash    Weakness    Immunocompromised (HCC)    Drug rash    Nutritional anemia       Assessment / Plan: 68-year-old male with stage IV adenocarcinoma of the lung.  He is tolerating  maintenance Alimta and Keytruda.  I agree with adding the dexamethasone for the itch induced by the Keytruda.  He has been doing physical therapy and his wife is concerned that his balance might be slightly worse.  It has been about 4 months since his last MRI of the brain so we will get a new one now to make sure there is no worsening of his CNS disease that could be contributing to his balance issues.  I will call him with that result.  I will see him back in 2 months for ongoing evaluation on maintenance therapy.  I also gave him 2 and half months worth of Eliquis samples at 5 mg ID.  I do often have a lot of samples so we will try to supplement him with those.  I asked him to let me know at least a month then advance when he is getting well on supplies so that I can make sure I have a supply for him.       I spent 30 minutes on chart review, face to face counseling time, coordination of care and documentation.    Past Medical History:   has a past medical  "history of Cancer (HCC).    PAST SURGICAL HISTORY:   has a past surgical history that includes IR biopsy lung (4/6/2023); IR biopsy lung (4/26/2023); and IR port placement (5/16/2023).    CURRENT MEDICATIONS  Current Outpatient Medications   Medication Sig Dispense Refill    acetaminophen (TYLENOL) 500 mg tablet Take 500 mg by mouth every 4 (four) hours as needed for mild pain      apixaban (ELIQUIS) 5 mg Take 1 tablet (5 mg total) by mouth 2 (two) times a day 60 tablet 5    dexamethasone (DECADRON) 1 mg tablet Take 1 mg by mouth 2 (two) times a day with meals      folic acid (FOLVITE) 1 mg tablet Take 1 tablet (1,000 mcg total) by mouth daily 90 tablet 0    gabapentin (NEURONTIN) 300 mg capsule Take 300 mg in the AM and 600 mg in the Pm 270 capsule 0    Keytruda 100 MG/4ML injection INFUSE 200MG VIA IVPB OVER 30 MIN EVERY 21 DAYS 8 mL 5    lactulose (CEPHULAC) 20 g packet Take 1 packet (20 g total) by mouth 3 (three) times a day as needed (constipation) 30 each 0    lidocaine-prilocaine (EMLA) cream Apply to port 30 to 60 minutes prior to use 30 g 0    triamcinolone (KENALOG) 0.5 % cream Apply topically 2 (two) times a day 454 g 3     No current facility-administered medications for this visit.     [unfilled]    SOCIAL HISTORY:   reports that he has been smoking cigarettes. He started smoking about 51 years ago. He has a 25.7 pack-year smoking history. He has never used smokeless tobacco. He reports current alcohol use. He reports that he does not use drugs.     FAMILY HISTORY:  family history includes Stroke in his father.     ALLERGIES:  has No Known Allergies.      Physical Exam:  Vital Signs:   Visit Vitals  /64 (BP Location: Left arm, Patient Position: Sitting, Cuff Size: Adult)   Pulse 71   Temp 97.7 °F (36.5 °C)   Resp 17   Ht 5' 10\" (1.778 m)   Wt 64.7 kg (142 lb 9.6 oz)   SpO2 99%   BMI 20.46 kg/m²   Smoking Status Every Day   BSA 1.81 m²     Body mass index is 20.46 kg/m².  Body surface area is 1.81 " meters squared.    GEN: Alert, awake oriented x3, in no acute distress  HEENT- No pallor, icterus, cyanosis, no oral mucosal lesions,   LAD - no palpable cervical, clavicle, axillary, inguinal LAD  Heart- normal S1 S2, regular rate and rhythm, No murmur, rubs.   Lungs- clear breathing sound bilateral.   Abdomen- soft, Non tender, bowel sounds present  Extremities- No cyanosis, clubbing, edema  Neuro- No focal neurological deficit    Labs:  Lab Results   Component Value Date    WBC 8.31 06/20/2024    HGB 12.6 06/20/2024    HCT 38.5 06/20/2024     (H) 06/20/2024     06/20/2024     Lab Results   Component Value Date    SODIUM 139 06/20/2024    K 4.4 06/20/2024     06/20/2024    CO2 26 06/20/2024    AGAP 7 06/20/2024    BUN 24 06/20/2024    CREATININE 0.92 06/20/2024    GLUC 93 06/20/2024    GLUF 103 (H) 05/10/2023    CALCIUM 8.4 06/20/2024    AST 13 06/20/2024    ALT 23 06/20/2024    ALKPHOS 42 06/20/2024    TP 6.5 06/20/2024    TBILI 0.33 06/20/2024    EGFR 86 06/20/2024

## 2024-06-24 ENCOUNTER — HOSPITAL ENCOUNTER (OUTPATIENT)
Dept: INFUSION CENTER | Facility: HOSPITAL | Age: 69
Discharge: HOME | End: 2024-06-24
Attending: INTERNAL MEDICINE
Payer: MEDICARE

## 2024-06-24 VITALS
WEIGHT: 146.61 LBS | TEMPERATURE: 97.3 F | DIASTOLIC BLOOD PRESSURE: 62 MMHG | RESPIRATION RATE: 18 BRPM | HEIGHT: 70 IN | HEART RATE: 60 BPM | BODY MASS INDEX: 20.99 KG/M2 | SYSTOLIC BLOOD PRESSURE: 95 MMHG

## 2024-06-24 DIAGNOSIS — C34.91 NON-SMALL CELL CANCER OF RIGHT LUNG (HCC): Primary | ICD-10-CM

## 2024-06-24 RX ORDER — SODIUM CHLORIDE 9 MG/ML
20 INJECTION, SOLUTION INTRAVENOUS ONCE
Status: COMPLETED | OUTPATIENT
Start: 2024-06-24 | End: 2024-06-24

## 2024-06-24 RX ADMIN — PEMETREXED DISODIUM 752 MG: 500 INJECTION, POWDER, LYOPHILIZED, FOR SOLUTION INTRAVENOUS at 13:28

## 2024-06-24 RX ADMIN — SODIUM CHLORIDE 20 ML/HR: 0.9 INJECTION, SOLUTION INTRAVENOUS at 12:03

## 2024-06-24 RX ADMIN — DEXAMETHASONE SODIUM PHOSPHATE: 10 INJECTION, SOLUTION INTRAMUSCULAR; INTRAVENOUS at 12:03

## 2024-06-24 RX ADMIN — SODIUM CHLORIDE 200 MG: 9 INJECTION, SOLUTION INTRAVENOUS at 12:35

## 2024-06-24 NOTE — PROGRESS NOTES
Claude Austin  tolerated treatment well with no complications.      Claude Austin is aware of future appt on 07/01/24 at 8am.     AVS printed and given to Claude Austin:    No (Declined by Claude Austin)

## 2024-06-25 ENCOUNTER — OFFICE VISIT (OUTPATIENT)
Dept: PHYSICAL THERAPY | Age: 69
End: 2024-06-25
Payer: MEDICARE

## 2024-06-25 DIAGNOSIS — R26.89 BALANCE DISORDER: ICD-10-CM

## 2024-06-25 DIAGNOSIS — R53.1 WEAKNESS: Primary | ICD-10-CM

## 2024-06-25 PROCEDURE — 97112 NEUROMUSCULAR REEDUCATION: CPT | Performed by: PHYSICAL THERAPIST

## 2024-06-25 PROCEDURE — 97110 THERAPEUTIC EXERCISES: CPT | Performed by: PHYSICAL THERAPIST

## 2024-06-25 NOTE — PROGRESS NOTES
"Daily Note     Today's date: 2024  Patient name: Claude Austin  : 1955  MRN: [id number]  Referring provider: Mak Feliciano MD  Dx:   Encounter Diagnosis     ICD-10-CM    1. Weakness  R53.1       2. Balance disorder  R26.89           Start Time: 1145  Stop Time: 1230  Total time in clinic (min): 45 minutes    Subjective: Pt reports improvements with balance interventions.       Objective: See treatment diary below      Assessment: Tolerated treatment well. PT's POC was progressed to include more dynamic balance interventions to decrease risk of falls. Patient demonstrated fatigue post treatment and would benefit from continued PT      Plan: Continue per plan of care.      Precautions: Precautions: Stage IV cancer, thoracic metastasis avoid lifting from flexed positions, NO GRADE 5 MOBS, avoid bridges    Visit/Unit Tracking  AUTH Status:  Date 5/7 5/10 5/14 5/21 5/24 5/31 6/4 6/7 6/11 6/13 6/21 6/25   Medicare Used 1/10 2/10 3/10 4/10 5/10 6/10 7/10 8/10 9/10 10/10 1/10 2/10    Remaining                  RE                    Manuals                                            Neuro Re-Ed         SLR         Mini squats         Standing hip abd Foam abd  2x10 ea Foam abd  2x10 ea       LTR         Tandem stance Foam 2x20\" ea B/L Foam 2x20\" ea B/L       Low row Blue 3\" 3x10 Blue 3\" 3x10       TB Ext Blue 3\" 3x10 Blue 3\" 3x10       Toe taps on step/  HT         Rocker board  (Cue toe flex during neck ext) AP/ML 20x ea        Tandem ambulation 2x20' ea  2x20' ea       FTEC on foam 20\"x3 CG 20\"x3 CG                Ther Ex         Nu step ROM LE only L2 10min 0.58 mile        Standing lumbar extension         Nerve glides         Seated shoulder flexion 3x10 1#  3x10 1#       Standing shoulder flexion         Seated Marches         Wall push up           Modified hip extension         STS 2x12        Bridges                  Ther Activity                           Gait Training                "            Modalities

## 2024-06-28 ENCOUNTER — OFFICE VISIT (OUTPATIENT)
Dept: PHYSICAL THERAPY | Age: 69
End: 2024-06-28
Payer: MEDICARE

## 2024-06-28 DIAGNOSIS — R53.1 WEAKNESS: Primary | ICD-10-CM

## 2024-06-28 DIAGNOSIS — R26.89 BALANCE DISORDER: ICD-10-CM

## 2024-06-28 PROCEDURE — 97112 NEUROMUSCULAR REEDUCATION: CPT

## 2024-06-28 PROCEDURE — 97110 THERAPEUTIC EXERCISES: CPT

## 2024-06-28 NOTE — PROGRESS NOTES
PT Evaluation     Today's date: 2024  Patient name: Claude Austin  : 1955  MRN: [id number]  Referring provider: Mak Feliciano MD  Dx:   Encounter Diagnosis     ICD-10-CM    1. Weakness  R53.1       2. Balance disorder  R26.89               Start Time: 1300  Stop Time: 1353  Total time in clinic (min): 53 minutes    Assessment  Assessment details:     Pt has been seen for 20 vists of PT and continues to be making progress, despite slowed progression. He demonstrates improved RLE strength and decreased postural sway during balance interventions.  He continues to demonstrate decreased balance and increased postural sway/instability during SLS on LLE compared to RLE. Noted increased time required to perform 5x STS, though pt was able to perform safely, without use of armrests, and with good technique.    Pt would benefit from PT to address these impairments leading to increased functional capacity and improved quality of life.    Impairments: abnormal or restricted ROM, impaired physical strength, lacks appropriate home exercise program, pain with function, poor posture  and poor body mechanics  Understanding of Dx/Px/POC: good   Prognosis: good    Goals  Short Term Goals: to be achieved by 4 weeks  1) Patient to be independent with basic HEP. ONGOING  2) Decrease pain to 2/10 at its worst. MET  3) Increase lumbar ROM to minimal deficits ONGOING  4) Increase LE strength by 1/2 MMT grade in all deficient planes. ONGOING    Long Term Goals: to be achieved by discharge  1) FOTO equal to or greater than 59.  2) Ambulation to improve to maximal level of function ONGOING  3) Stair negotiation will improve to reciprocal. ONGOING  4) Sit to stand transfers will improve to maximal level of function MET  5) Pt will be independent with pacing and energy conservation techniques based on medical diagnosis. ONGOING    Plan  Patient would benefit from: skilled physical therapy  Planned modality interventions:  "cryotherapy and thermotherapy: hydrocollator packs  Planned therapy interventions: neuromuscular re-education, patient education, stretching, strengthening, therapeutic activities, therapeutic exercise, therapeutic training, home exercise program and graded activity  Frequency: Twice a week for 10 weeks.  Treatment plan discussed with: patient        Subjective Evaluation    Quality of life: good    Patient Goals  Patient goals for therapy: decreased pain, independence with ADLs/IADLs, return to sport/leisure activities, increased strength and increased motion    Pain  Current pain ratin  At best pain ratin  At worst pain ratin  Aggravating factors: stair climbing, walking, standing, sitting and lifting    Hand dominance: right        Objective     Strength/Myotome Testing     Lumbar   Left   Heel walk: normal, difficulty due to balance    Right   Heel walk: normal difficulty due to balance    Left Hip   Planes of Motion   Flexion: 4  Abduction: 4  External rotation: 4    Right Hip   Planes of Motion   Flexion: 4-  Abduction: 4  External rotation: 4    General Comments:  Patient demonstrates     Lumbar Comments      SLS: Moderate sway, slightly improved from previous re-eval  RLE: 7\"  LLE: 30\"    5 STS: 19 secs  TU secs  Tandem stance foam: mod sway 30 secs  Stair navigation: Step to pattern with stair descent     Precautions: Precautions: Stage IV cancer, thoracic metastasis avoid lifting from flexed positions, NO GRADE 5 MOBS, avoid bridges    Visit/Unit Tracking  AUTH Status:  Date 5/7 5/10 5/14 5/21 5/24 5/31 6/4 6/7 6/11 6/13 6/21 6/25   Medicare Used 1/10 2/10 3/10 4/10 5/10 6/10 7/10 8/10 9/10 10/10 1/10 2/10    Remaining                  RE                    Manuals                                           Neuro Re-Ed         SLR         Mini squats         Standing hip abd Foam abd  2x10 ea Foam abd  2x10 ea Foam abd  2x10 ea      LTR         Tandem stance Foam 2x20\" ea B/L " "Foam 2x20\" ea B/L       Low row Blue 3\" 3x10 Blue 3\" 3x10 Blue 3\" 3x10      TB Ext Blue 3\" 3x10 Blue 3\" 3x10       Toe taps on step/  HT         Rocker board  (Cue toe flex during neck ext) AP/ML 20x ea        Tandem ambulation 2x20' ea  2x20' ea 2x20' ea      FTEC on foam 20\"x3 CG 20\"x3 CG 20\"x3 CG               Ther Ex         Nu step ROM LE only L2 10min 0.58 mile  UB x10min      Standing lumbar extension         Nerve glides         Seated shoulder flexion 3x10 1#  3x10 1#       Standing shoulder flexion         Seated Marches         Wall push up           Modified hip extension         STS 2x12        Bridges         Assessment of LE strength and balance   ES               Ther Activity                           Gait Training                           Modalities                                          "

## 2024-07-01 ENCOUNTER — HOSPITAL ENCOUNTER (OUTPATIENT)
Dept: INFUSION CENTER | Facility: HOSPITAL | Age: 69
Discharge: HOME | End: 2024-07-01
Payer: MEDICARE

## 2024-07-01 ENCOUNTER — HOSPITAL ENCOUNTER (OUTPATIENT)
Dept: RADIOLOGY | Facility: HOSPITAL | Age: 69
Discharge: HOME | End: 2024-07-01
Attending: INTERNAL MEDICINE
Payer: MEDICARE

## 2024-07-01 DIAGNOSIS — C34.91 NON-SMALL CELL CANCER OF RIGHT LUNG (HCC): ICD-10-CM

## 2024-07-01 DIAGNOSIS — C34.91 NON-SMALL CELL CANCER OF RIGHT LUNG (HCC): Primary | ICD-10-CM

## 2024-07-01 PROCEDURE — A9585 GADOBUTROL INJECTION: HCPCS | Performed by: INTERNAL MEDICINE

## 2024-07-01 PROCEDURE — 70553 MRI BRAIN STEM W/O & W/DYE: CPT

## 2024-07-01 RX ORDER — GADOBUTROL 604.72 MG/ML
6 INJECTION INTRAVENOUS
Status: COMPLETED | OUTPATIENT
Start: 2024-07-01 | End: 2024-07-01

## 2024-07-01 RX ADMIN — GADOBUTROL 6 ML: 604.72 INJECTION INTRAVENOUS at 09:37

## 2024-07-02 ENCOUNTER — OFFICE VISIT (OUTPATIENT)
Dept: PHYSICAL THERAPY | Age: 69
End: 2024-07-02
Payer: MEDICARE

## 2024-07-02 DIAGNOSIS — R26.89 BALANCE DISORDER: ICD-10-CM

## 2024-07-02 DIAGNOSIS — R53.1 WEAKNESS: Primary | ICD-10-CM

## 2024-07-02 PROCEDURE — 97112 NEUROMUSCULAR REEDUCATION: CPT

## 2024-07-02 NOTE — PROGRESS NOTES
"Daily Note     Today's date: 2024  Patient name: Claude Austin  : 1955  MRN: [id number]  Referring provider: Mak Feliciano MD  Dx:   Encounter Diagnosis     ICD-10-CM    1. Weakness  R53.1       2. Balance disorder  R26.89           Start Time: 08  Stop Time: 09  Total time in clinic (min): 31 minutes    Subjective: Pt reports feeling good today; states he had an active weekend of housework and travel, with no significant feelings of weakness or excessive fatigue.      Objective: See treatment diary below      Assessment: Continued focusing interventions on balance and LE strengthening, with pt reporting slight fatigue following session. Despite fatigue, pt displayed improved balance compared to previous session, as evidenced by decreased postural sway and less use of handrail for UE support. Tolerated treatment well. Patient demonstrated fatigue post treatment, exhibited good technique with therapeutic exercises, and would benefit from continued PT      Plan: Continue per plan of care.  Progress treatment as tolerated.       Precautions: Precautions: Stage IV cancer, thoracic metastasis avoid lifting from flexed positions, NO GRADE 5 MOBS, avoid bridges    Manuals                                          Neuro Re-Ed         SLR         Mini squats         Standing hip abd Foam abd  2x10 ea Foam abd  2x10 ea Foam abd  2x10 ea Foam abd  2x10 ea     LTR         Tandem stance Foam 2x20\" ea B/L Foam 2x20\" ea B/L  Foam 2x30\" ea B/L     Low row Blue 3\" 3x10 Blue 3\" 3x10 Blue 3\" 3x10 Blue 3\" 3x10     TB Ext Blue 3\" 3x10 Blue 3\" 3x10       Toe taps on step/  HT         Rocker board  (Cue toe flex during neck ext) AP/ML 20x ea   AP/ML 20x ea     Tandem ambulation 2x20' ea  2x20' ea 2x20' ea 2x20' ea     FTEC on foam 20\"x3 CG 20\"x3 CG 20\"x3 CG 20\"x3 CG              Ther Ex         Nu step ROM LE only L2 10min 0.58 mile  UB x10min UB x5min     Standing lumbar extension         Nerve " glides         Seated shoulder flexion 3x10 1#  3x10 1#       Standing shoulder flexion         Seated Marches         Wall push up           Modified hip extension         STS 2x12        Bridges         Assessment of LE strength and balance   ES               Ther Activity                           Gait Training                           Modalities

## 2024-07-05 ENCOUNTER — OFFICE VISIT (OUTPATIENT)
Dept: PHYSICAL THERAPY | Age: 69
End: 2024-07-05
Payer: MEDICARE

## 2024-07-05 DIAGNOSIS — R53.1 WEAKNESS: ICD-10-CM

## 2024-07-05 DIAGNOSIS — R26.89 BALANCE DISORDER: Primary | ICD-10-CM

## 2024-07-05 PROCEDURE — 97112 NEUROMUSCULAR REEDUCATION: CPT

## 2024-07-05 NOTE — PROGRESS NOTES
"Daily Note     Today's date: 2024  Patient name: Claude Austin  : 1955  MRN: [id number]  Referring provider: Mak Feliciano MD  Dx:   Encounter Diagnosis     ICD-10-CM    1. Balance disorder  R26.89       2. Weakness  R53.1           Start Time: 837  Stop Time: 909  Total time in clinic (min): 32 minutes    Subjective: Pt states that he feels \"wobbly\" today, as he has been very active the past 5-6 days and is beginning to feel a little tired.      Objective: See treatment diary below      Assessment: Pt's balance appeared improved this session compared to previous. Less postural sway noted during foam balance interventions, with exception of 1 posterior LoB during eyes closed on foam. Plan to progress rows NV due to reported ease following the intervention today. Tolerated treatment well. Patient demonstrated fatigue post treatment and would benefit from continued PT      Plan: Continue per plan of care.  Progress treatment as tolerated.       Precautions: Precautions: Stage IV cancer, thoracic metastasis avoid lifting from flexed positions, NO GRADE 5 MOBS, avoid bridges    Manuals                                         Neuro Re-Ed         SLR         Mini squats         Standing hip abd Foam abd  2x10 ea Foam abd  2x10 ea Foam abd  2x10 ea Foam abd  2x10 ea Foam abd  3x10 ea    LTR         Tandem stance Foam 2x20\" ea B/L Foam 2x20\" ea B/L  Foam 2x30\" ea B/L Foam 2x30\" ea B/L    Low row Blue 3\" 3x10 Blue 3\" 3x10 Blue 3\" 3x10 Blue 3\" 3x10 Blue 3\" 3x10    TB Ext Blue 3\" 3x10 Blue 3\" 3x10       Toe taps on step/  HT         Rocker board  (Cue toe flex during neck ext) AP/ML 20x ea   AP/ML 20x ea     Tandem ambulation 2x20' ea  2x20' ea 2x20' ea 2x20' ea 2x20' ea    FTEC on foam 20\"x3 CG 20\"x3 CG 20\"x3 CG 20\"x3 CG 20\"x3 CG             Ther Ex         Nu step ROM LE only L2 10min 0.58 mile  UB x10min UB x5min UB x5min    Standing lumbar extension         Nerve glides       "   Seated shoulder flexion 3x10 1#  3x10 1#       Standing shoulder flexion         Seated Marches         Wall push up           Modified hip extension         STS 2x12        [facility]         Assessment of LE strength and balance   ES               Ther Activity                           Gait Training                           Modalities

## 2024-07-08 DIAGNOSIS — T40.2X5A THERAPEUTIC OPIOID-INDUCED CONSTIPATION (OIC): ICD-10-CM

## 2024-07-08 DIAGNOSIS — K59.03 THERAPEUTIC OPIOID-INDUCED CONSTIPATION (OIC): ICD-10-CM

## 2024-07-09 ENCOUNTER — OFFICE VISIT (OUTPATIENT)
Dept: PHYSICAL THERAPY | Age: 69
End: 2024-07-09
Payer: MEDICARE

## 2024-07-09 ENCOUNTER — TELEPHONE (OUTPATIENT)
Age: 69
End: 2024-07-09

## 2024-07-09 DIAGNOSIS — T40.2X5A THERAPEUTIC OPIOID-INDUCED CONSTIPATION (OIC): ICD-10-CM

## 2024-07-09 DIAGNOSIS — K59.03 THERAPEUTIC OPIOID-INDUCED CONSTIPATION (OIC): ICD-10-CM

## 2024-07-09 DIAGNOSIS — R53.1 WEAKNESS: ICD-10-CM

## 2024-07-09 DIAGNOSIS — K59.00 CONSTIPATION, UNSPECIFIED CONSTIPATION TYPE: Primary | ICD-10-CM

## 2024-07-09 DIAGNOSIS — R26.89 BALANCE DISORDER: Primary | ICD-10-CM

## 2024-07-09 PROCEDURE — 97110 THERAPEUTIC EXERCISES: CPT

## 2024-07-09 PROCEDURE — 97530 THERAPEUTIC ACTIVITIES: CPT

## 2024-07-09 PROCEDURE — 97112 NEUROMUSCULAR REEDUCATION: CPT

## 2024-07-09 RX ORDER — LACTULOSE 10 G/15ML
20 SOLUTION ORAL 2 TIMES DAILY
Qty: 1800 ML | Refills: 0 | Status: SHIPPED | OUTPATIENT
Start: 2024-07-09

## 2024-07-09 NOTE — TELEPHONE ENCOUNTER
Call from patient's wife, Lolis. She is asking if the lactulose prescription can be reordered by Dr. Feliciano for a whole month's supply as the last prescription sent was for only a quantity of 10 of which he will use in 5 days since he takes it twice daily. She had no other questions and/or concerns at this time.    Message sent to Clinical Pool Dr. Feliciano.   ICU Progress Note        Subjective: Overnight events noted. Sitting comfortably on the bed. Vital Signs:    Visit Vitals  BP (!) 116/51   Pulse (!) 51   Temp 97.9 °F (36.6 °C)   Resp 21   Ht 5' 5\" (1.651 m)   Wt 69.2 kg (152 lb 8.9 oz)   SpO2 95%   BMI 25.39 kg/m²       O2 Device: Nasal cannula   O2 Flow Rate (L/min): 2 l/min   Temp (24hrs), Av.1 °F (36.7 °C), Min:97.7 °F (36.5 °C), Max:98.6 °F (37 °C)       Intake/Output:   Last shift:      701 -  190  In: -   Out: 300 [Urine:300]  Last 3 shifts: 1901 -  0700  In: 780 [P.O.:600; I.V.:180]  Out: 2200 [Urine:2200]    Intake/Output Summary (Last 24 hours) at 2022 0847  Last data filed at 2022 0811  Gross per 24 hour   Intake 780 ml   Output 1650 ml   Net -870 ml       Physical Exam:    General: Alert, awake and oriented.  Not in acute distress  HEENT:  Anicteric sclerae; pink palpebral conjunctivae; mucosa moist  Resp:  Bilateral air entry +, no crackles or wheeze  CV:  S1, S2 present  GI:  Abdomen soft, non-tender; (+) active bowel sounds  Extremities:  +2 pulses on all extremities; no edema/ cyanosis/ clubbing noted  Skin:  Warm; no rashes/ lesions noted  Neurologic:  Non-focal    DATA:     Current Facility-Administered Medications   Medication Dose Route Frequency    losartan (COZAAR) tablet 25 mg  25 mg Oral DAILY    polyethylene glycol (MIRALAX) packet 17 g  17 g Oral DAILY    senna-docusate (PERICOLACE) 8.6-50 mg per tablet 1 Tablet  1 Tablet Oral DAILY    aspirin chewable tablet 81 mg  81 mg Oral DAILY    ticagrelor (BRILINTA) tablet 90 mg  90 mg Oral Q12H    rosuvastatin (CRESTOR) tablet 20 mg  20 mg Oral QHS    nitroglycerin (NITROBID) 2 % ointment 1 Inch  1 Inch Topical BID PRN         Labs: Results:       Chemistry Recent Labs     22  0437 22  1703 22  0431 22  1803 22  1803   * 109* 143*   < > 123*    137 136   < > 137   K 4.3 4.3 3.5   < > 4.6   * 111* 108   < > 108   CO2 19* 21 16*   < > 20*   BUN 45* 42* 33*   < > 32*   CREA 1.58* 1.52* 1.08*   < > 1.13*   CA 8.7 8.5 8.9   < > 9.0   AGAP 11 5 12   < > 9   BUCR 28* 28* 31*   < > 28*   AP  --   --   --   --  141*   TP  --   --   --   --  7.9   ALB  --   --   --   --  3.5   GLOB  --   --   --   --  4.4*   AGRAT  --   --   --   --  0.8*    < > = values in this interval not displayed. CBC w/Diff Recent Labs     02/21/22  0437 02/20/22  0431 02/19/22 1939   WBC 12.1* 12.9* 12.5*   RBC 3.98 4.04 4.09   HGB 10.7* 10.8* 11.0*   HCT 32.1* 32.7* 34.0*    327 350   GRANS 69 86* 79*   LYMPH 19 6* 13   EOS 2 0 1      Coagulation Recent Labs     02/19/22 1939   APTT >130.0*       Liver Enzymes Recent Labs     02/19/22  1803   TP 7.9   ALB 3.5   *      ABG No results found for: PH, PHI, PCO2, PCO2I, PO2, PO2I, HCO3, HCO3I, FIO2, FIO2I   Microbiology No results for input(s): CULT in the last 72 hours. maging:  CXR Results  (Last 48 hours)               02/19/22 1732  XR CHEST PORT Final result    Impression:  Diffuse increased interstitial prominence which can be seen with edema and   atypical (viral) infection. Narrative:  EXAM: XR CHEST PORT       HISTORY: Chest Pain. COMPARISON: 3/3/2010       FINDINGS: Single view(s) of the chest. The heart is on the upper limits of   normal for size. There is diffuse interstitial prominence that has increased. The patient is status post bilateral breast implants. CT Results  (Last 48 hours)    None               Assessment and Plan:    Flaca Camp is a 69 y. o. female with a past medical history significant for hypertension who presents to the ED with worsening shortness of breath. She was found to have NSTEMI, initially admitted to hospitalist service but after left heart cath she needed temporary pacemaker hence transferred to ICU 02/20. NSTEMI/Acute systolic heart failure/Hypertension - S/p left heart cath on 02/19.  Single vessel CAD in mid LAD noted. S/p ANAYA x 1. Cont. Dual antiplatelet therapy along with statin for CAD and ACEi/ARB and spironolactone (if renal functio allows) for acute systolic heart failure. Beta blockers held due to heart block. EF 20% on ECHO 02/20 with akinetic anterior and inferior wall. Cardiology recommends appreciated. Second degree HB - Mobitz type II with distal block (left anterior fascicular). Currently have temporary pacemaker. Will be evaluated by Dr. Dana Munoz (EP) for permanent pacemaker today. Plan of care discussed with the patient,  at bedside and Cardiology team.    CCM time - 40 minutes.      Darrel Huerta MD, FCCP, ATSF, FACP, DAABIP  Interventional Pulmonology/Critical [address] [place] [address]

## 2024-07-09 NOTE — TELEPHONE ENCOUNTER
Pharmacy calling.    HIPAA info confirmed.    Asking if powered form could be replaced by liquid LACTULOSE    [phone] direct to pharmacy

## 2024-07-09 NOTE — PROGRESS NOTES
"Daily Note     Today's date: 2024  Patient name: Claude Austin  : 1955  MRN: [id number]  Referring provider: Mak Feliciano MD  Dx:   Encounter Diagnosis     ICD-10-CM    1. Balance disorder  R26.89       2. Weakness  R53.1           Start Time: 929  Stop Time: 1015  Total time in clinic (min): 46 minutes    Subjective: Pt reports that he feels good today, just \"a little wobbly\" on his feet. Feels that he woke up too early and is slightly tired today.      Objective: See treatment diary below      Assessment: Pt appeared more stable during foam balance interventions than previous session. He reported tht hie R leg felt like it was going to buckle during forward tandem ambulation, but that he felt better when going backwards. Noted improved stability when ambulating backwards. Reintroduced wall pushups and sit<>stands for UE strengthening and LE functional strengthening, respectively. Added eleazar walkouts to aid in functional strengthening of LEs and core. Pt was able to perform new interventions with good technique and no significant increase in pain/discomfort/symptoms. Tolerated treatment well. Patient demonstrated fatigue post treatment and would benefit from continued PT      Plan: Continue per plan of care.      Precautions: Precautions: Stage IV cancer, thoracic metastasis avoid lifting from flexed positions, NO GRADE 5 MOBS, avoid bridges    Manuals                                        Neuro Re-Ed         SLR         Mini squats         Standing hip abd Foam abd  2x10 ea Foam abd  2x10 ea Foam abd  2x10 ea Foam abd  2x10 ea Foam abd  3x10 ea Foam abd  3x10 ea   LTR         Tandem stance Foam 2x20\" ea B/L Foam 2x20\" ea B/L  Foam 2x30\" ea B/L Foam 2x30\" ea B/L Foam 2x30\" ea B/L   Low row Blue 3\" 3x10 Blue 3\" 3x10 Blue 3\" 3x10 Blue 3\" 3x10 Blue 3\" 3x10 Blue 3\" 3x10   TB Ext Blue 3\" 3x10 Blue 3\" 3x10       Toe taps on step/  HT         Rocker board  (Cue toe flex " "during neck ext) AP/ML 20x ea   AP/ML 20x ea  AP/ML 20x ea   Tandem ambulation 2x20' ea  2x20' ea 2x20' ea 2x20' ea 2x20' ea 2x20' ea   FTEC on foam 20\"x3 CG 20\"x3 CG 20\"x3 CG 20\"x3 CG 20\"x3 CG 30\"x3 CG            Ther Ex         Nu step ROM LE only L2 10min 0.58 mile  UB x10min UB x5min UB x5min UB x10min   Standing lumbar extension         Nerve glides         Seated shoulder flexion 3x10 1#  3x10 1#       Standing shoulder flexion         Seated Marches         Wall push up        3x10   Modified hip extension         STS 2x12        Bridges         Assessment of LE strength and balance   ES               Ther Activity         Kurt Walkouts      Backwards 10#  2x10   STS      x10            Gait Training                           Modalities                                    "

## 2024-07-12 ENCOUNTER — OFFICE VISIT (OUTPATIENT)
Dept: PHYSICAL THERAPY | Age: 69
End: 2024-07-12
Payer: MEDICARE

## 2024-07-12 ENCOUNTER — HOSPITAL ENCOUNTER (OUTPATIENT)
Dept: INFUSION CENTER | Facility: HOSPITAL | Age: 69
End: 2024-07-12
Payer: MEDICARE

## 2024-07-12 DIAGNOSIS — C34.91 NON-SMALL CELL CANCER OF RIGHT LUNG (HCC): Primary | ICD-10-CM

## 2024-07-12 DIAGNOSIS — R53.1 WEAKNESS: ICD-10-CM

## 2024-07-12 DIAGNOSIS — R26.89 BALANCE DISORDER: Primary | ICD-10-CM

## 2024-07-12 LAB
ALBUMIN SERPL BCG-MCNC: 3.8 G/DL (ref 3.5–5)
ALP SERPL-CCNC: 36 U/L (ref 34–104)
ALT SERPL W P-5'-P-CCNC: 24 U/L (ref 7–52)
ANION GAP SERPL CALCULATED.3IONS-SCNC: 5 MMOL/L (ref 4–13)
AST SERPL W P-5'-P-CCNC: 14 U/L (ref 13–39)
BASOPHILS # BLD AUTO: 0.03 THOUSANDS/ÂΜL (ref 0–0.1)
BASOPHILS NFR BLD AUTO: 1 % (ref 0–1)
BILIRUB SERPL-MCNC: 0.39 MG/DL (ref 0.2–1)
BUN SERPL-MCNC: 27 MG/DL (ref 5–25)
CALCIUM SERPL-MCNC: 8.6 MG/DL (ref 8.4–10.2)
CHLORIDE SERPL-SCNC: 108 MMOL/L (ref 96–108)
CO2 SERPL-SCNC: 24 MMOL/L (ref 21–32)
CREAT SERPL-MCNC: 1.11 MG/DL (ref 0.6–1.3)
EOSINOPHIL # BLD AUTO: 0.04 THOUSAND/ÂΜL (ref 0–0.61)
EOSINOPHIL NFR BLD AUTO: 1 % (ref 0–6)
ERYTHROCYTE [DISTWIDTH] IN BLOOD BY AUTOMATED COUNT: 15.8 % (ref 11.6–15.1)
GFR SERPL CREATININE-BSD FRML MDRD: 68 ML/MIN/1.73SQ M
GLUCOSE SERPL-MCNC: 95 MG/DL (ref 65–140)
HCT VFR BLD AUTO: 41.5 % (ref 36.5–49.3)
HGB BLD-MCNC: 13.8 G/DL (ref 12–17)
IMM GRANULOCYTES # BLD AUTO: 0.15 THOUSAND/UL (ref 0–0.2)
IMM GRANULOCYTES NFR BLD AUTO: 2 % (ref 0–2)
LYMPHOCYTES # BLD AUTO: 0.58 THOUSANDS/ÂΜL (ref 0.6–4.47)
LYMPHOCYTES NFR BLD AUTO: 9 % (ref 14–44)
MCH RBC QN AUTO: 35.4 PG (ref 26.8–34.3)
MCHC RBC AUTO-ENTMCNC: 33.3 G/DL (ref 31.4–37.4)
MCV RBC AUTO: 106 FL (ref 82–98)
MONOCYTES # BLD AUTO: 0.62 THOUSAND/ÂΜL (ref 0.17–1.22)
MONOCYTES NFR BLD AUTO: 10 % (ref 4–12)
NEUTROPHILS # BLD AUTO: 4.82 THOUSANDS/ÂΜL (ref 1.85–7.62)
NEUTS SEG NFR BLD AUTO: 77 % (ref 43–75)
NRBC BLD AUTO-RTO: 0 /100 WBCS
PLATELET # BLD AUTO: 176 THOUSANDS/UL (ref 149–390)
PMV BLD AUTO: 9.3 FL (ref 8.9–12.7)
POTASSIUM SERPL-SCNC: 4.3 MMOL/L (ref 3.5–5.3)
PROT SERPL-MCNC: 6.8 G/DL (ref 6.4–8.4)
RBC # BLD AUTO: 3.9 MILLION/UL (ref 3.88–5.62)
SODIUM SERPL-SCNC: 137 MMOL/L (ref 135–147)
T3FREE SERPL-MCNC: 2.72 PG/ML (ref 2.5–3.9)
TSH SERPL DL<=0.05 MIU/L-ACNC: 1.84 UIU/ML (ref 0.45–4.5)
WBC # BLD AUTO: 6.24 THOUSAND/UL (ref 4.31–10.16)

## 2024-07-12 PROCEDURE — 97112 NEUROMUSCULAR REEDUCATION: CPT

## 2024-07-12 PROCEDURE — 80053 COMPREHEN METABOLIC PANEL: CPT | Performed by: INTERNAL MEDICINE

## 2024-07-12 PROCEDURE — 85025 COMPLETE CBC W/AUTO DIFF WBC: CPT | Performed by: INTERNAL MEDICINE

## 2024-07-12 PROCEDURE — 84443 ASSAY THYROID STIM HORMONE: CPT | Performed by: INTERNAL MEDICINE

## 2024-07-12 PROCEDURE — 84481 FREE ASSAY (FT-3): CPT | Performed by: INTERNAL MEDICINE

## 2024-07-12 PROCEDURE — 97110 THERAPEUTIC EXERCISES: CPT

## 2024-07-12 NOTE — PROGRESS NOTES
"Daily Note     Today's date: 2024  Patient name: Claude Austin  : 1955  MRN: [id number]  Referring provider: Mak Feliciano MD  Dx:   Encounter Diagnosis     ICD-10-CM    1. Balance disorder  R26.89       2. Weakness  R53.1           Start Time: 1015  Stop Time: 1102  Total time in clinic (min): 47 minutes    Subjective: Pt states he is feeling okay today; reports he finally received his MRI results, which showed \"good results\", including 1 less brain lesion, 2 that remained stable, and 1 that is shrinking.      Objective: See treatment diary below      Assessment: Continued interventions from previous session, with pt appearing challenged. Noted increased stability during tandem balance with R foot in front compared to L foot in front. Pt continues to improve technique with tandem ambulation, Progressed wall push ups to performing leaning on table; pt able to complete reps with good technique and slight fatigue towards the end. Tolerated treatment well. Patient demonstrated fatigue post treatment and would benefit from continued PT      Plan: Continue per plan of care.      Precautions: Precautions: Stage IV cancer, thoracic metastasis avoid lifting from flexed positions, NO GRADE 5 MOBS, avoid bridges    Manuals                                            Neuro Re-Ed          SLR          Mini squats          Standing hip abd Foam abd  2x10 ea Foam abd  2x10 ea Foam abd  2x10 ea Foam abd  2x10 ea Foam abd  3x10 ea Foam abd  3x10 ea Foam abd  3x10 ea   LTR          Tandem stance Foam 2x20\" ea B/L Foam 2x20\" ea B/L  Foam 2x30\" ea B/L Foam 2x30\" ea B/L Foam 2x30\" ea B/L Foam 2x30\" ea B/L   Low row Blue 3\" 3x10 Blue 3\" 3x10 Blue 3\" 3x10 Blue 3\" 3x10 Blue 3\" 3x10 Blue 3\" 3x10 Blue 3\" 3x10   TB Ext Blue 3\" 3x10 Blue 3\" 3x10        Toe taps on step/  HT          Rocker board  (Cue toe flex during neck ext) AP/ML 20x ea   AP/ML 20x ea  AP/ML 20x ea AP/ML 30x ea   Tandem " "ambulation 2x20' ea  2x20' ea 2x20' ea 2x20' ea 2x20' ea 2x20' ea 2x20' ea   FTEC on foam 20\"x3 CG 20\"x3 CG 20\"x3 CG 20\"x3 CG 20\"x3 CG 30\"x3 CG 30\"x3 CG             Ther Ex          Nu step ROM LE only L2 10min 0.58 mile  UB x10min UB x5min UB x5min UB x10min UB x5min   Standing lumbar extension          Nerve glides          Seated shoulder flexion 3x10 1#  3x10 1#        Standing shoulder flexion          Seated Marches          Wall push up        3x10 At table 3x10   Modified hip extension          STS 2x12         Bridges          Assessment of LE strength and balance   ES                 Ther Activity          Kurt Walkouts      Backwards 10#  2x10 Backwards 10#  2x10   STS      x10              Gait Training                              Modalities                                         "

## 2024-07-12 NOTE — PROGRESS NOTES
Claude Austin  tolerated treatment well with no complications.      Claude Austin is aware of future appt on 07/15/24 at 9am.     AVS printed and given to Claude Austin:  No (Declined by Claude Austin)

## 2024-07-15 ENCOUNTER — HOSPITAL ENCOUNTER (OUTPATIENT)
Dept: INFUSION CENTER | Facility: HOSPITAL | Age: 69
Discharge: HOME | End: 2024-07-15
Attending: INTERNAL MEDICINE
Payer: MEDICARE

## 2024-07-15 VITALS
WEIGHT: 151.24 LBS | SYSTOLIC BLOOD PRESSURE: 100 MMHG | HEIGHT: 70 IN | TEMPERATURE: 97.1 F | BODY MASS INDEX: 21.65 KG/M2 | DIASTOLIC BLOOD PRESSURE: 64 MMHG | HEART RATE: 76 BPM | RESPIRATION RATE: 16 BRPM

## 2024-07-15 DIAGNOSIS — D53.9 NUTRITIONAL ANEMIA: ICD-10-CM

## 2024-07-15 DIAGNOSIS — C34.91 NON-SMALL CELL CANCER OF RIGHT LUNG (HCC): Primary | ICD-10-CM

## 2024-07-15 PROCEDURE — 96372 THER/PROPH/DIAG INJ SC/IM: CPT

## 2024-07-15 PROCEDURE — 96411 CHEMO IV PUSH ADDL DRUG: CPT

## 2024-07-15 PROCEDURE — 96413 CHEMO IV INFUSION 1 HR: CPT

## 2024-07-15 PROCEDURE — 96367 TX/PROPH/DG ADDL SEQ IV INF: CPT

## 2024-07-15 RX ORDER — CYANOCOBALAMIN 1000 UG/ML
1000 INJECTION, SOLUTION INTRAMUSCULAR; SUBCUTANEOUS
Status: DISCONTINUED | OUTPATIENT
Start: 2024-07-15 | End: 2024-07-18 | Stop reason: HOSPADM

## 2024-07-15 RX ORDER — SODIUM CHLORIDE 9 MG/ML
20 INJECTION, SOLUTION INTRAVENOUS ONCE
Status: COMPLETED | OUTPATIENT
Start: 2024-07-15 | End: 2024-07-15

## 2024-07-15 RX ADMIN — PEMETREXED DISODIUM 752 MG: 500 INJECTION, POWDER, LYOPHILIZED, FOR SOLUTION INTRAVENOUS at 10:55

## 2024-07-15 RX ADMIN — SODIUM CHLORIDE 20 ML/HR: 0.9 INJECTION, SOLUTION INTRAVENOUS at 09:36

## 2024-07-15 RX ADMIN — SODIUM CHLORIDE 200 MG: 9 INJECTION, SOLUTION INTRAVENOUS at 10:00

## 2024-07-15 RX ADMIN — CYANOCOBALAMIN 1000 MCG: 1000 INJECTION, SOLUTION INTRAMUSCULAR at 09:36

## 2024-07-15 RX ADMIN — DEXAMETHASONE SODIUM PHOSPHATE: 10 INJECTION, SOLUTION INTRAMUSCULAR; INTRAVENOUS at 09:24

## 2024-07-16 ENCOUNTER — OFFICE VISIT (OUTPATIENT)
Dept: PHYSICAL THERAPY | Age: 69
End: 2024-07-16
Payer: MEDICARE

## 2024-07-16 DIAGNOSIS — R53.1 WEAKNESS: ICD-10-CM

## 2024-07-16 DIAGNOSIS — R26.89 BALANCE DISORDER: Primary | ICD-10-CM

## 2024-07-16 PROCEDURE — 97110 THERAPEUTIC EXERCISES: CPT | Performed by: PHYSICAL THERAPIST

## 2024-07-16 PROCEDURE — 97112 NEUROMUSCULAR REEDUCATION: CPT | Performed by: PHYSICAL THERAPIST

## 2024-07-16 NOTE — PROGRESS NOTES
"Daily Note     Today's date: 2024  Patient name: Claude Austin  : 1955  MRN: [id number]  Referring provider: Mak Feliciano MD  Dx:   Encounter Diagnosis     ICD-10-CM    1. Balance disorder  R26.89       2. Weakness  R53.1           Start Time: 1015  Stop Time: 1055  Total time in clinic (min): 40 minutes    Subjective: Patient presented to the clinic reporting weakness and fatigue. Patient reported that this morning he felt dizziness when doing some work around the house that later went away. After treatment, patient reported that he felt \"worn out.\"      Objective: See treatment diary below      Assessment: Tolerated treatment well. Patient was educated on side effects of oncology treatment and explained to patient symptoms were likely caused by infusion from yesterday. Patient was given exercises on a foam pad in order to increase balance and improve ambulation. Patient was also given resistance exercises to improve overall strength. Patient demonstrated fatigue post treatment, exhibited good technique with therapeutic exercises, and would benefit from continued PT.      Plan: Continue per plan of care. Balance and strengthening exercises should continue to be given in order to improve patient's ADLs.      Precautions: Precautions: Stage IV cancer, thoracic metastasis avoid lifting from flexed positions, NO GRADE 5 MOBS, avoid bridges    Manuals                                                Neuro Re-Ed           SLR           Mini squats           Standing hip abd Foam abd  2x10 ea Foam abd  2x10 ea Foam abd  2x10 ea Foam abd  2x10 ea Foam abd  3x10 ea Foam abd  3x10 ea Foam abd  3x10 ea Foam abd  3x10 ea   LTR           Tandem stance Foam 2x20\" ea B/L Foam 2x20\" ea B/L  Foam 2x30\" ea B/L Foam 2x30\" ea B/L Foam 2x30\" ea B/L Foam 2x30\" ea B/L Foam   3x30\"  ea   Low row Blue 3\" 3x10 Blue 3\" 3x10 Blue 3\" 3x10 Blue 3\" 3x10 Blue 3\" 3x10 Blue 3\" 3x10 Blue 3\" " "3x10 Blue  3\"  3x10   TB Ext Blue 3\" 3x10 Blue 3\" 3x10         Toe taps on step/  HT           Rocker board  (Cue toe flex during neck ext) AP/ML 20x ea   AP/ML 20x ea  AP/ML 20x ea AP/ML 30x ea    Tandem ambulation 2x20' ea  2x20' ea 2x20' ea 2x20' ea 2x20' ea 2x20' ea 2x20' ea    FTEC on foam 20\"x3 CG 20\"x3 CG 20\"x3 CG 20\"x3 CG 20\"x3 CG 30\"x3 CG 30\"x3 CG 30\"x3  CG              Ther Ex           Nu step ROM LE only L2 10min 0.58 mile  UB x10min UB x5min UB x5min UB x10min UB x5min UB  X10 min   Standing lumbar extension           Nerve glides           Seated shoulder flexion 3x10 1#  3x10 1#         Standing shoulder flexion           Seated Marches           Wall push up        3x10 At table 3x10    Modified hip extension           STS 2x12          Bridges           Assessment of LE strength and balance   ES                   Ther Activity           Kurt Walkouts      Backwards 10#  2x10 Backwards 10#  2x10 Backwards  12#  2x10   STS      x10  x10              Gait Training                                 Modalities                                              "

## 2024-07-19 ENCOUNTER — APPOINTMENT (OUTPATIENT)
Dept: PHYSICAL THERAPY | Age: 69
End: 2024-07-19
Payer: MEDICARE

## 2024-07-23 ENCOUNTER — OFFICE VISIT (OUTPATIENT)
Dept: PHYSICAL THERAPY | Age: 69
End: 2024-07-23
Payer: MEDICARE

## 2024-07-23 DIAGNOSIS — R53.1 WEAKNESS: ICD-10-CM

## 2024-07-23 DIAGNOSIS — R26.89 BALANCE DISORDER: Primary | ICD-10-CM

## 2024-07-23 PROCEDURE — 97110 THERAPEUTIC EXERCISES: CPT

## 2024-07-23 PROCEDURE — 97112 NEUROMUSCULAR REEDUCATION: CPT

## 2024-07-23 PROCEDURE — 97530 THERAPEUTIC ACTIVITIES: CPT

## 2024-07-23 NOTE — PROGRESS NOTES
"Daily Note     Today's date: 2024  Patient name: Claude Austin  : 1955  MRN: [id number]  Referring provider: Mak Feliciano MD  Dx:   Encounter Diagnosis     ICD-10-CM    1. Balance disorder  R26.89       2. Weakness  R53.1           Start Time: 1100  Stop Time: 1143  Total time in clinic (min): 43 minutes    Subjective: Pt reports that he feels better than he did on Friday, when he had to cancel due to feeling ill from his medication. States he was active over the weekend and was able to keep busy yesterday without significant feelings of fatigue.      Objective: See treatment diary below      Assessment: Continued interventions focusing on balance and general strengthening. Pt demonstrated improved postural sway during foam balance interventions today. Noted increased trunk flexion during table push-ups this session, with pt placing his feet closer to the table than previous session. Pt reported that they felt difficult at first, so he moved closer to the table. Good technique with sit<>stands, with rest break required due to fatigue. Tolerated treatment well. Patient demonstrated fatigue post treatment and would benefit from continued PT      Plan: Continue per plan of care.  Progress treatment as tolerated.       Precautions: Precautions: Stage IV cancer, thoracic metastasis avoid lifting from flexed positions, NO GRADE 5 MOBS, avoid bridges    Manuals                                                Neuro Re-Ed           SLR           Mini squats           Standing hip abd Foam abd  3x10 ea      Foam abd  3x10 ea Foam abd  3x10 ea   LTR           Tandem stance Foam   3x30\"  ea      Foam 2x30\" ea B/L Foam   3x30\"  ea   Low row Blue  3\"  3x10      Blue 3\" 3x10 Blue  3\"  3x10   TB Ext           Toe taps on step/  HT           Rocker board  (Cue toe flex during neck ext)       AP/ML 30x ea    Tandem ambulation       2x20' ea    FTEC on foam 30\"x3  CG      30\"x3 CG 30\"x3  CG     "          Ther Ex           Nu step ROM UB x10'      UB x5min UB  X10 min   Standing lumbar extension           Nerve glides           Seated shoulder flexion           Standing shoulder flexion           Seated Marches           Wall push up   At table 3x10      At table 3x10    Modified hip extension           STS           Bridges           Assessment of LE strength and balance                      Ther Activity           [place] Walkouts Backwards  12#  2x10     Backwards 10#  2x10 Backwards 10#  2x10 Backwards  12#  2x10   STS 2x10     x10  x10              Gait Training                                 Modalities

## 2024-07-25 ENCOUNTER — OFFICE VISIT (OUTPATIENT)
Dept: PHYSICAL THERAPY | Age: 69
End: 2024-07-25
Payer: MEDICARE

## 2024-07-25 DIAGNOSIS — R26.89 BALANCE DISORDER: Primary | ICD-10-CM

## 2024-07-25 DIAGNOSIS — R53.1 WEAKNESS: ICD-10-CM

## 2024-07-25 PROCEDURE — 97112 NEUROMUSCULAR REEDUCATION: CPT

## 2024-07-25 PROCEDURE — 97530 THERAPEUTIC ACTIVITIES: CPT

## 2024-07-25 PROCEDURE — 97110 THERAPEUTIC EXERCISES: CPT

## 2024-07-25 NOTE — PROGRESS NOTES
"Daily Note     Today's date: 2024  Patient name: Claude Austin  : 1955  MRN: [id number]  Referring provider: Mak Feliciano MD  Dx:   Encounter Diagnosis     ICD-10-CM    1. Balance disorder  R26.89       2. Weakness  R53.1           Start Time: 1143  Stop Time: 1229  Total time in clinic (min): 46 minutes    Subjective: Pt reports he continues to \"have trouble getting my legs under me\" in the morning, but otherwise has been feeling good overall.       Objective: See treatment diary below      Assessment: Progressed balance with eyes closed to include pertubations to challenge reactive balance/postural control. Pt able to perform with no UE assist and min-mod postural sway. Slightly more difficulty with push-upsTolerated treatment well. Patient exhibited good technique with therapeutic exercises and would benefit from continued PT      Plan: Continue per plan of care.      Precautions: Precautions: Stage IV cancer, thoracic metastasis avoid lifting from flexed positions, NO GRADE 5 MOBS, avoid bridges    Manuals                                                Neuro Re-Ed           SLR           Mini squats           Standing hip abd Foam abd  3x10 ea Foam abd  3x10 ea     Foam abd  3x10 ea Foam abd  3x10 ea   LTR           Tandem stance Foam   3x30\"  ea Foam   3x30\"  ea     Foam 2x30\" ea B/L Foam   3x30\"  ea   Low row Blue  3\"  3x10 Blue  3\"  3x10     Blue 3\" 3x10 Blue  3\"  3x10   TB Ext           Toe taps on step/  HT           Rocker board  (Cue toe flex during neck ext)       AP/ML 30x ea    Tandem ambulation       2x20' ea    FTEC on foam 30\"x3  CG w/ pertubations  30\"x3  CG     30\"x3 CG 30\"x3  CG              Ther Ex           Nu step ROM UB x10' UB 10' lvl 2     UB x5min UB  X10 min   Standing lumbar extension           Nerve glides           Seated shoulder flexion           Standing shoulder flexion           Seated Marches           Wall push up   At table 3x10 At " table 3x10     At table 3x10    Modified hip extension           STS           Bridges           Assessment of LE strength and balance                      Ther Activity           [place] Walkouts Backwards  12#  2x10 Backwards  12#  3x10    Backwards 10#  2x10 Backwards 10#  2x10 Backwards  12#  2x10   STS 2x10 2x10    x10  x10              Gait Training                                 Modalities

## 2024-07-30 ENCOUNTER — OFFICE VISIT (OUTPATIENT)
Dept: PHYSICAL THERAPY | Age: 69
End: 2024-07-30
Payer: MEDICARE

## 2024-07-30 DIAGNOSIS — R53.1 WEAKNESS: ICD-10-CM

## 2024-07-30 DIAGNOSIS — R26.89 BALANCE DISORDER: Primary | ICD-10-CM

## 2024-07-30 DIAGNOSIS — C79.31 METASTASIS TO BRAIN (HCC): Primary | ICD-10-CM

## 2024-07-30 PROCEDURE — 97112 NEUROMUSCULAR REEDUCATION: CPT

## 2024-07-30 PROCEDURE — 97110 THERAPEUTIC EXERCISES: CPT

## 2024-07-30 PROCEDURE — 97530 THERAPEUTIC ACTIVITIES: CPT

## 2024-07-30 NOTE — PROGRESS NOTES
"Daily Note     Today's date: 2024  Patient name: Claude Austin  : 1955  MRN: [id number]  Referring provider: Mak Feliciano MD  Dx:   Encounter Diagnosis     ICD-10-CM    1. Balance disorder  R26.89       2. Weakness  R53.1           Start Time: 1102  Stop Time: 1152  Total time in clinic (min): 50 minutes    Subjective: Pt reports he had a busy weekend in [place], but was able to get through the trip without any major bouts of fatigue or \"episodes where he can't move\". States he was walking around a lot and was using a walking stick as necessary.      Objective: See treatment diary below      Assessment: Pt demonstrated increased postural sway with L lateral lean during foam balance with eyes closed. Required min Ax1 during 1 LoB to correct. Pt's otherwise displayed good technique with no other losses of balance throughout the session. Pt required several rest breaks due to reports of fatigue, but was able to complete all interventions as prescribed. Tolerated treatment well. Patient demonstrated fatigue post treatment and would benefit from continued PT      Plan: Continue per plan of care.      Precautions: Precautions: Stage IV cancer, thoracic metastasis avoid lifting from flexed positions, NO GRADE 5 MOBS, avoid bridges    Manuals                                                Neuro Re-Ed           SLR           Mini squats           Standing hip abd Foam abd  3x10 ea Foam abd  3x10 ea Foam abd  3x10 ea    Foam abd  3x10 ea Foam abd  3x10 ea   LTR           Tandem stance Foam   3x30\"  ea Foam   3x30\"  ea Foam   3x30\"  ea    Foam 2x30\" ea B/L Foam   3x30\"  ea   Low row Blue  3\"  3x10 Blue  3\"  3x10 Blue  3\"  3x10    Blue 3\" 3x10 Blue  3\"  3x10   TB Ext           Toe taps on step/  HT           Rocker board  (Cue toe flex during neck ext)       AP/ML 30x ea    Tandem ambulation       2x20' ea    FTEC on foam 30\"x3  CG w/ pertubations  30\"x3  CG w/ " "pertubations  30\"x3  CG    30\"x3 CG 30\"x3  CG              Ther Ex           Nu step ROM UB x10' UB 10' lvl 2 UB 10' lvl 3    UB x5min UB  X10 min   Standing lumbar extension           Nerve glides           Seated shoulder flexion           Standing shoulder flexion           Seated Marches           Wall push up   At table 3x10 At table 3x10 At table 3x10    At table 3x10    Modified hip extension           STS           Bridges           Assessment of LE strength and balance                      Ther Activity           [place]urt Walkouts Backwards  12#  2x10 Backwards  12#  3x10 Backwards  12#  3x10   Backwards 10#  2x10 Backwards 10#  2x10 Backwards  12#  2x10   STS 2x10 2x10 2x10   x10  x10              Gait Training                                 Modalities                                                    "

## 2024-07-31 RX ORDER — DEXAMETHASONE 1 MG
1 TABLET ORAL 2 TIMES DAILY WITH MEALS
Qty: 30 TABLET | Refills: 1 | Status: SHIPPED | OUTPATIENT
Start: 2024-07-31

## 2024-08-02 ENCOUNTER — OFFICE VISIT (OUTPATIENT)
Dept: PHYSICAL THERAPY | Age: 69
End: 2024-08-02
Payer: MEDICARE

## 2024-08-02 ENCOUNTER — HOSPITAL ENCOUNTER (OUTPATIENT)
Dept: INFUSION CENTER | Facility: HOSPITAL | Age: 69
End: 2024-08-02
Payer: MEDICARE

## 2024-08-02 DIAGNOSIS — R53.1 WEAKNESS: ICD-10-CM

## 2024-08-02 DIAGNOSIS — C34.91 NON-SMALL CELL CANCER OF RIGHT LUNG (HCC): Primary | ICD-10-CM

## 2024-08-02 DIAGNOSIS — R26.89 BALANCE DISORDER: Primary | ICD-10-CM

## 2024-08-02 LAB
ALBUMIN SERPL BCG-MCNC: 3.6 G/DL (ref 3.5–5)
ALP SERPL-CCNC: 35 U/L (ref 34–104)
ALT SERPL W P-5'-P-CCNC: 25 U/L (ref 7–52)
ANION GAP SERPL CALCULATED.3IONS-SCNC: 6 MMOL/L (ref 4–13)
ANISOCYTOSIS BLD QL SMEAR: PRESENT
AST SERPL W P-5'-P-CCNC: 14 U/L (ref 13–39)
BASOPHILS # BLD MANUAL: 0.08 THOUSAND/UL (ref 0–0.1)
BASOPHILS NFR MAR MANUAL: 1 % (ref 0–1)
BILIRUB SERPL-MCNC: 0.47 MG/DL (ref 0.2–1)
BUN SERPL-MCNC: 32 MG/DL (ref 5–25)
CALCIUM SERPL-MCNC: 8.4 MG/DL (ref 8.4–10.2)
CHLORIDE SERPL-SCNC: 109 MMOL/L (ref 96–108)
CO2 SERPL-SCNC: 23 MMOL/L (ref 21–32)
CREAT SERPL-MCNC: 1.09 MG/DL (ref 0.6–1.3)
EOSINOPHIL # BLD MANUAL: 0 THOUSAND/UL (ref 0–0.4)
EOSINOPHIL NFR BLD MANUAL: 0 % (ref 0–6)
ERYTHROCYTE [DISTWIDTH] IN BLOOD BY AUTOMATED COUNT: 16 % (ref 11.6–15.1)
GFR SERPL CREATININE-BSD FRML MDRD: 70 ML/MIN/1.73SQ M
GLUCOSE SERPL-MCNC: 96 MG/DL (ref 65–140)
HCT VFR BLD AUTO: 38.8 % (ref 36.5–49.3)
HGB BLD-MCNC: 13.1 G/DL (ref 12–17)
LYMPHOCYTES # BLD AUTO: 0.97 THOUSAND/UL (ref 0.6–4.47)
LYMPHOCYTES # BLD AUTO: 12 % (ref 14–44)
MACROCYTES BLD QL AUTO: PRESENT
MCH RBC QN AUTO: 35.4 PG (ref 26.8–34.3)
MCHC RBC AUTO-ENTMCNC: 33.8 G/DL (ref 31.4–37.4)
MCV RBC AUTO: 105 FL (ref 82–98)
METAMYELOCYTE ABSOLUTE CT: 0.16 THOUSAND/UL (ref 0–0.1)
METAMYELOCYTES NFR BLD MANUAL: 2 % (ref 0–1)
MONOCYTES # BLD AUTO: 0.57 THOUSAND/UL (ref 0–1.22)
MONOCYTES NFR BLD: 7 % (ref 4–12)
NEUTROPHILS # BLD MANUAL: 6.3 THOUSAND/UL (ref 1.85–7.62)
NEUTS BAND NFR BLD MANUAL: 4 % (ref 0–8)
NEUTS SEG NFR BLD AUTO: 74 % (ref 43–75)
PLATELET # BLD AUTO: 157 THOUSANDS/UL (ref 149–390)
PLATELET BLD QL SMEAR: ADEQUATE
PMV BLD AUTO: 9 FL (ref 8.9–12.7)
POLYCHROMASIA BLD QL SMEAR: PRESENT
POTASSIUM SERPL-SCNC: 4 MMOL/L (ref 3.5–5.3)
PROT SERPL-MCNC: 6.6 G/DL (ref 6.4–8.4)
RBC # BLD AUTO: 3.7 MILLION/UL (ref 3.88–5.62)
RBC MORPH BLD: PRESENT
SODIUM SERPL-SCNC: 138 MMOL/L (ref 135–147)
T3FREE SERPL-MCNC: 2.55 PG/ML (ref 2.5–3.9)
TSH SERPL DL<=0.05 MIU/L-ACNC: 2.5 UIU/ML (ref 0.45–4.5)
WBC # BLD AUTO: 8.08 THOUSAND/UL (ref 4.31–10.16)

## 2024-08-02 PROCEDURE — 80053 COMPREHEN METABOLIC PANEL: CPT | Performed by: INTERNAL MEDICINE

## 2024-08-02 PROCEDURE — 85007 BL SMEAR W/DIFF WBC COUNT: CPT | Performed by: INTERNAL MEDICINE

## 2024-08-02 PROCEDURE — 97530 THERAPEUTIC ACTIVITIES: CPT

## 2024-08-02 PROCEDURE — 84481 FREE ASSAY (FT-3): CPT | Performed by: INTERNAL MEDICINE

## 2024-08-02 PROCEDURE — 97110 THERAPEUTIC EXERCISES: CPT

## 2024-08-02 PROCEDURE — 97112 NEUROMUSCULAR REEDUCATION: CPT

## 2024-08-02 PROCEDURE — 85027 COMPLETE CBC AUTOMATED: CPT | Performed by: INTERNAL MEDICINE

## 2024-08-02 PROCEDURE — 84443 ASSAY THYROID STIM HORMONE: CPT | Performed by: INTERNAL MEDICINE

## 2024-08-02 NOTE — PROGRESS NOTES
"Daily Note     Today's date: 2024  Patient name: Claude Austin  : 1955  MRN: [id number]  Referring provider: Mak Feliciano MD  Dx:   Encounter Diagnosis     ICD-10-CM    1. Balance disorder  R26.89       2. Weakness  R53.1                      Subjective: Pt reports he is not doing so well today. Notes that he continues to have good days and bad days. Notes that his leg feel very fatigued at times like they want to give out on him. He does note having trouble with ambulation at times but denies any falls. Pt brought in a video of him walking with a posterior lateral gait pattern.       Objective: See treatment diary below      Assessment: Pt demonstrated moderate postural sway towards end reps with exercises. He took frequent rest breaks however was able to complete all exercises with no LOB. Added back in modified hip extensions. Tolerated treatment well. Patient demonstrated fatigue post treatment and would benefit from continued PT      Plan: Continue per plan of care.      Precautions: Precautions: Stage IV cancer, thoracic metastasis avoid lifting from flexed positions, NO GRADE 5 MOBS, avoid bridges    Manuals                                                Neuro Re-Ed           SLR           Mini squats           Standing hip abd Foam abd  3x10 ea Foam abd  3x10 ea Foam abd  3x10 ea Foam abd  3x10 ea   Foam abd  3x10 ea Foam abd  3x10 ea   LTR           Tandem stance Foam   3x30\"  ea Foam   3x30\"  ea Foam   3x30\"  ea Foam   3x30\"  ea   Foam 2x30\" ea B/L Foam   3x30\"  ea   Low row Blue  3\"  3x10 Blue  3\"  3x10 Blue  3\"  3x10 Blue  3\"  3x10   Blue 3\" 3x10 Blue  3\"  3x10   TB Ext           Toe taps on step/  HT           Rocker board  (Cue toe flex during neck ext)       AP/ML 30x ea    Tandem ambulation       2x20' ea    FTEC on foam 30\"x3  CG w/ pertubations  30\"x3  CG w/ pertubations  30\"x3  CG w/ pertubations  30\"x3  CG   30\"x3 CG 30\"x3  CG              Ther " Ex           Nu step ROM UB x10' UB 10' lvl 2 UB 10' lvl 3 UB 10' lvl 2   UB x5min UB  X10 min   Standing lumbar extension           Nerve glides           Seated shoulder flexion           Standing shoulder flexion           Seated Marches           Wall push up   At table 3x10 At table 3x10 At table 3x10 At table 3x10   At table 3x10    Modified hip extension    2x10       STS           Bridges           Assessment of LE strength and balance                      Ther Activity           [place] Walkouts Backwards  12#  2x10 Backwards  12#  3x10 Backwards  12#  3x10 Backwards  12#  3x10  Backwards 10#  2x10 Backwards 10#  2x10 Backwards  12#  2x10   STS 2x10 2x10 2x10 2x10  x10  x10              Gait Training                                 Modalities

## 2024-08-05 ENCOUNTER — HOSPITAL ENCOUNTER (OUTPATIENT)
Dept: INFUSION CENTER | Facility: HOSPITAL | Age: 69
Discharge: HOME | End: 2024-08-05
Attending: INTERNAL MEDICINE
Payer: MEDICARE

## 2024-08-05 VITALS
HEIGHT: 70 IN | BODY MASS INDEX: 21.46 KG/M2 | RESPIRATION RATE: 18 BRPM | HEART RATE: 67 BPM | WEIGHT: 149.91 LBS | TEMPERATURE: 97 F | SYSTOLIC BLOOD PRESSURE: 118 MMHG | DIASTOLIC BLOOD PRESSURE: 73 MMHG

## 2024-08-05 DIAGNOSIS — C34.91 NON-SMALL CELL CANCER OF RIGHT LUNG (HCC): Primary | ICD-10-CM

## 2024-08-05 PROCEDURE — 96413 CHEMO IV INFUSION 1 HR: CPT

## 2024-08-05 PROCEDURE — 36593 DECLOT VASCULAR DEVICE: CPT

## 2024-08-05 PROCEDURE — 96411 CHEMO IV PUSH ADDL DRUG: CPT

## 2024-08-05 PROCEDURE — 96367 TX/PROPH/DG ADDL SEQ IV INF: CPT

## 2024-08-05 RX ORDER — CYANOCOBALAMIN 1000 UG/ML
1000 INJECTION, SOLUTION INTRAMUSCULAR; SUBCUTANEOUS
Status: CANCELLED
Start: 2024-08-05

## 2024-08-05 RX ORDER — CYANOCOBALAMIN 1000 UG/ML
1000 INJECTION, SOLUTION INTRAMUSCULAR; SUBCUTANEOUS ONCE
OUTPATIENT
Start: 2024-09-16

## 2024-08-05 RX ORDER — SODIUM CHLORIDE 9 MG/ML
20 INJECTION, SOLUTION INTRAVENOUS ONCE
Status: COMPLETED | OUTPATIENT
Start: 2024-08-05 | End: 2024-08-05

## 2024-08-05 RX ADMIN — SODIUM CHLORIDE 200 MG: 9 INJECTION, SOLUTION INTRAVENOUS at 12:00

## 2024-08-05 RX ADMIN — SODIUM CHLORIDE 20 ML/HR: 0.9 INJECTION, SOLUTION INTRAVENOUS at 11:25

## 2024-08-05 RX ADMIN — PEMETREXED DISODIUM 752 MG: 500 INJECTION, POWDER, LYOPHILIZED, FOR SOLUTION INTRAVENOUS at 12:51

## 2024-08-05 RX ADMIN — DEXAMETHASONE SODIUM PHOSPHATE: 10 INJECTION, SOLUTION INTRAMUSCULAR; INTRAVENOUS at 11:25

## 2024-08-05 RX ADMIN — ALTEPLASE 2 MG: 2.2 INJECTION, POWDER, LYOPHILIZED, FOR SOLUTION INTRAVENOUS at 09:23

## 2024-08-05 NOTE — PROGRESS NOTES
Pt tolerated treatment well with no complications. Port flushed and blood return noted before de accessing. Pt aware of future appt on 8/23/24   at 12 pm. AVS declined.

## 2024-08-06 ENCOUNTER — OFFICE VISIT (OUTPATIENT)
Dept: PHYSICAL THERAPY | Age: 69
End: 2024-08-06
Payer: MEDICARE

## 2024-08-06 DIAGNOSIS — R26.89 BALANCE DISORDER: Primary | ICD-10-CM

## 2024-08-06 DIAGNOSIS — R53.1 WEAKNESS: ICD-10-CM

## 2024-08-06 PROCEDURE — 97112 NEUROMUSCULAR REEDUCATION: CPT | Performed by: PHYSICAL THERAPIST

## 2024-08-06 PROCEDURE — 97110 THERAPEUTIC EXERCISES: CPT | Performed by: PHYSICAL THERAPIST

## 2024-08-06 NOTE — PROGRESS NOTES
"Daily Note     Today's date: 2024  Patient name: Claude Austin  : 1955  MRN: [id number]  Referring provider: Mak Feliciano MD  Dx:   Encounter Diagnosis     ICD-10-CM    1. Balance disorder  R26.89       2. Weakness  R53.1           Start Time: 1100  Stop Time: 1145  Total time in clinic (min): 45 minutes    Subjective: Patient is feeling better than last visit and notes that he \"has energy today.\"       Objective: See treatment diary below      Assessment: Tolerated treatment well. Patient was given balance and strengthening interventions to increase LE strength and decrease his risk of falls. Step ups were also implemented into POC and patient responded well to treatment. Patient demonstrated fatigue post treatment, exhibited good technique with therapeutic exercises, and would benefit from continued PT.      Plan: Continue per plan of care.  Progress treatment as tolerated.       Precautions: Precautions: Stage IV cancer, thoracic metastasis avoid lifting from flexed positions, NO GRADE 5 MOBS, avoid bridges    Manuals                                                Neuro Re-Ed           SLR           Mini squats           Standing hip abd Foam abd  3x10 ea Foam abd  3x10 ea Foam abd  3x10 ea Foam abd  3x10 ea   Foam abd  3x10 ea Foam abd  3x10 ea   LTR           Tandem stance Foam   3x30\"  ea Foam   3x30\"  ea Foam   3x30\"  ea Foam   3x30\"  ea Foam 2x30\" ea  Foam 2x30\" ea B/L Foam   3x30\"  ea   Low row Blue  3\"  3x10 Blue  3\"  3x10 Blue  3\"  3x10 Blue  3\"  3x10 Blue 3\"  3x10  Blue 3\" 3x10 Blue  3\"  3x10   TB Ext           Toe taps on step/  HT           Rocker board  (Cue toe flex during neck ext)       AP/ML 30x ea    Tandem ambulation     3x20'   2x20' ea    FTEC on foam 30\"x3  CG w/ pertubations  30\"x3  CG w/ pertubations  30\"x3  CG w/ pertubations  30\"x3  CG 3x30\" CG  30\"x3 CG 30\"x3  CG              Ther Ex           Nu step ROM UB x10' UB 10' lvl 2 UB 10' " "lvl 3 UB 10' lvl 2 UB 10'  lvl 2  UB x5min UB  X10 min   Standing lumbar extension           Nerve glides           Seated shoulder flexion           Standing shoulder flexion           Seated Marches           Wall push up   At table 3x10 At table 3x10 At table 3x10 At table 3x10 At table 3x10  At table 3x10    Modified hip extension    2x10       STS           Bridges           Assessment of LE strength and balance                      Ther Activity           [place] Walkouts Backwards  12#  2x10 Backwards  12#  3x10 Backwards  12#  3x10 Backwards  12#  3x10  Backwards 10#  2x10 Backwards 10#  2x10 Backwards  12#  2x10   STS 2x10 2x10 2x10 2x10 3x10 x10  x10   FSU     6\" x12 b/l      Gait Training                                 Modalities                                                      "

## 2024-08-09 ENCOUNTER — OFFICE VISIT (OUTPATIENT)
Dept: PHYSICAL THERAPY | Age: 69
End: 2024-08-09
Payer: MEDICARE

## 2024-08-09 DIAGNOSIS — R26.89 BALANCE DISORDER: Primary | ICD-10-CM

## 2024-08-09 DIAGNOSIS — R53.1 WEAKNESS: ICD-10-CM

## 2024-08-09 PROCEDURE — 97112 NEUROMUSCULAR REEDUCATION: CPT | Performed by: PHYSICAL THERAPIST

## 2024-08-09 PROCEDURE — 97110 THERAPEUTIC EXERCISES: CPT | Performed by: PHYSICAL THERAPIST

## 2024-08-09 NOTE — PROGRESS NOTES
"Daily Note     Today's date: 2024  Patient name: Claude Austin  : 1955  MRN: [id number]  Referring provider: Mak Feliciano MD  Dx:   Encounter Diagnosis     ICD-10-CM    1. Balance disorder  R26.89       2. Weakness  R53.1           Start Time: 1100  Stop Time: 1145  Total time in clinic (min): 45 minutes    Subjective: Patient presented to clinic feeling \"pretty good\" but has felt fatigued the past couple of days.       Objective: See treatment diary below      Assessment: Tolerated treatment well. Patient was feeling tired and weak during interventions and had to take breaks throughout. POC was focused on balancing and proprioceptive interventions, as well as interventions focusing on general strengthening. Patient's wife educated on strategies for preventing falls. Patient demonstrated fatigue post treatment, exhibited good technique with therapeutic exercises, and would benefit from continued PT.      Plan: Continue per plan of care.  Progress treatment as tolerated.       Precautions: Precautions: Stage IV cancer, thoracic metastasis avoid lifting from flexed positions, NO GRADE 5 MOBS, avoid bridges    Manuals                                                  Neuro Re-Ed           SLR      3x10 ea     Mini squats           Standing hip abd Foam abd  3x10 ea Foam abd  3x10 ea Foam abd  3x10 ea Foam abd  3x10 ea  2x10 ea     LTR           Tandem stance Foam   3x30\"  ea Foam   3x30\"  ea Foam   3x30\"  ea Foam   3x30\"  ea Foam 2x30\" ea Foam  2x30\"  ea     Low row Blue  3\"  3x10 Blue  3\"  3x10 Blue  3\"  3x10 Blue  3\"  3x10 Blue 3\"  3x10      TB Ext           Toe taps on step/  HT           Rocker board  (Cue toe flex during neck ext)           Tandem ambulation     3x20'  3x20'     FTEC on foam 30\"x3  CG w/ pertubations  30\"x3  CG w/ pertubations  30\"x3  CG w/ pertubations  30\"x3  CG 3x30\" CG 2x30\"  CG                Ther Ex           Nu step ROM UB x10' UB 10' lvl 2 UB " "10' lvl 3 UB 10' lvl 2 UB 10'  lvl 2 UB 10'  lvl2     Standing lumbar extension           Nerve glides           Seated shoulder flexion           Standing shoulder flexion           Seated Marches           Wall push up   At table 3x10 At table 3x10 At table 3x10 At table 3x10 At table 3x10 At table  3x10 At table 3x10    Modified hip extension    2x10       Bridges           Assessment of LE strength and balance           Leg Press      3x10   85#     Ther Activity           [place] Walkouts Backwards  12#  2x10 Backwards  12#  3x10 Backwards  12#  3x10 Backwards  12#  3x10   Backwards 10#  2x10 Backwards  12#  2x10   STS 2x10 2x10 2x10 2x10 3x10   x10   FSU     6\" x12 b/l      Gait Training                                 Modalities                                                        "

## 2024-08-13 ENCOUNTER — OFFICE VISIT (OUTPATIENT)
Dept: PHYSICAL THERAPY | Age: 69
End: 2024-08-13
Payer: MEDICARE

## 2024-08-13 DIAGNOSIS — R26.89 BALANCE DISORDER: Primary | ICD-10-CM

## 2024-08-13 DIAGNOSIS — R53.1 WEAKNESS: ICD-10-CM

## 2024-08-13 PROCEDURE — 97112 NEUROMUSCULAR REEDUCATION: CPT | Performed by: PHYSICAL THERAPIST

## 2024-08-13 PROCEDURE — 97110 THERAPEUTIC EXERCISES: CPT | Performed by: PHYSICAL THERAPIST

## 2024-08-13 NOTE — PROGRESS NOTES
"Daily Note     Today's date: 2024  Patient name: Claude Austin  : 1955  MRN: [id number]  Referring provider: Mak Feliciano MD  Dx:   Encounter Diagnosis     ICD-10-CM    1. Balance disorder  R26.89       2. Weakness  R53.1           Start Time: 1100  Stop Time: 1145  Total time in clinic (min): 45 minutes    Subjective: Patient reports feeling good today as well as the past couple of days.  Patient reported dizziness with overhead interventions.       Objective: See treatment diary below      Assessment: Tolerated treatment well. Patient was given dynamic balance interventions as well as LE strengthening. Interventions on foam added cervical extension to mitigate dizziness during activity. Patient demonstrated fatigue post treatment, exhibited good technique with therapeutic exercises, and would benefit from continued PT.      Plan: Continue per plan of care.  Progress treatment as tolerated.       Precautions: Precautions: Stage IV cancer, thoracic metastasis avoid lifting from flexed positions, NO GRADE 5 MOBS, avoid bridges    Manuals                                                 Neuro Re-Ed           SLR      3x10 ea 2x10 ea    Mini squats           Standing hip abd Foam abd  3x10 ea Foam abd  3x10 ea Foam abd  3x10 ea Foam abd  3x10 ea  2x10 ea 2x10 ea    LTR           Tandem stance Foam   3x30\"  ea Foam   3x30\"  ea Foam   3x30\"  ea Foam   3x30\"  ea Foam 2x30\" ea Foam  2x30\"  ea     Low row Blue  3\"  3x10 Blue  3\"  3x10 Blue  3\"  3x10 Blue  3\"  3x10 Blue 3\"  3x10      TB Ext           Toe taps on step/  HT           Rocker board  (Cue toe flex during neck ext)           Tandem ambulation     3x20'  3x20' 4x20' frdws and bckwds    FTEC on foam 30\"x3  CG w/ pertubations  30\"x3  CG w/ pertubations  30\"x3  CG w/ pertubations  30\"x3  CG 3x30\" CG 2x30\"  CG 2x10 looking up on foam    Stacking cones in cabinet       1 set    Ther Ex           Nu step ROM UB x10' UB " "10' lvl 2 UB 10' lvl 3 UB 10' lvl 2 UB 10'  lvl 2 UB 10'  lvl2 UB 10' lvl 2    Standing lumbar extension           Nerve glides           Seated shoulder flexion           Standing shoulder flexion           Seated Marches           Wall push up   At table 3x10 At table 3x10 At table 3x10 At table 3x10 At table 3x10 At table  3x10 At table 3x10    Modified hip extension    2x10       Bridges           Assessment of LE strength and balance           Leg Press      3x10   85#     Ther Activity           [place] Walkouts Backwards  12#  2x10 Backwards  12#  3x10 Backwards  12#  3x10 Backwards  12#  3x10   Backwards 12# 2x10    STS 2x10 2x10 2x10 2x10 3x10      FSU     6\" x12 b/l      Gait Training                                 Modalities                                                          "

## 2024-08-14 ENCOUNTER — OFFICE VISIT (OUTPATIENT)
Dept: PALLIATIVE MEDICINE | Facility: CLINIC | Age: 69
End: 2024-08-14

## 2024-08-14 VITALS
DIASTOLIC BLOOD PRESSURE: 70 MMHG | SYSTOLIC BLOOD PRESSURE: 120 MMHG | HEART RATE: 70 BPM | WEIGHT: 153 LBS | OXYGEN SATURATION: 96 % | BODY MASS INDEX: 21.95 KG/M2 | TEMPERATURE: 97 F

## 2024-08-14 DIAGNOSIS — L27.0 DRUG RASH: ICD-10-CM

## 2024-08-14 DIAGNOSIS — C34.91 NON-SMALL CELL CANCER OF RIGHT LUNG (HCC): Primary | ICD-10-CM

## 2024-08-14 DIAGNOSIS — C79.31 METASTASIS TO BRAIN (HCC): ICD-10-CM

## 2024-08-14 DIAGNOSIS — Z51.5 PALLIATIVE CARE BY SPECIALIST: ICD-10-CM

## 2024-08-14 DIAGNOSIS — G89.3 CANCER RELATED PAIN: ICD-10-CM

## 2024-08-14 DIAGNOSIS — K59.00 CONSTIPATION, UNSPECIFIED CONSTIPATION TYPE: ICD-10-CM

## 2024-08-14 RX ORDER — DEXAMETHASONE 0.5 MG/1
1 TABLET ORAL 2 TIMES DAILY WITH MEALS
Qty: 120 TABLET | Refills: 0 | Status: SHIPPED | OUTPATIENT
Start: 2024-08-14

## 2024-08-14 NOTE — PROGRESS NOTES
Ambulatory Visit  Name: Claude Austin      : 1955      MRN: [id number]  Encounter Provider: Mak Feliciano MD  Encounter Date: 2024   Encounter department: [facility]    Assessment & Plan   1. Non-small cell cancer of right lung (HCC)  2. Drug rash  Assessment & Plan:  Slow downtaper of decadron.  Prescribed 0.5mg tablets so he can do a slow wean.    3. Cancer related pain  4. Palliative care by specialist  Assessment & Plan:  Has made marked improvement in symptomatology.  Follow up in 2-3 months, but may reach out sooner with any concerns.   5. Constipation, unspecified constipation type  Assessment & Plan:  - fantastic response to lactulose.  May continue indefinitely.   6. Metastasis to brain (HCC)  -     dexamethasone (DECADRON) 0.5 mg tablet; Take 2 tablets (1 mg total) by mouth 2 (two) times a day with meals          PDMP Review: I have reviewed the patient's controlled substance dispensing history in the Prescription Drug Monitoring Program in compliance with the [facility] regulations before prescribing any controlled substances.    History of Present Illness     Claude Austin is a 68 y.o. male with metastatic lung cancer who presents for ongoing symptom management.  I am delighted to see his improvement overall.  Pruritis has vanished with the initiation of decadron.  Lactulose has finally improved his constipation without undue side effects.  Denies pain.  Functionally, he continues to be limited and has not had substantial improvement, but also notes weakness has not worsened at all.  He is very grateful to the efforts of PT.            Objective     /70 (BP Location: Left arm, Patient Position: Sitting, Cuff Size: Standard)   Pulse 70   Temp (!) 97 °F (36.1 °C) (Temporal)   Wt 69.4 kg (153 lb)   SpO2 96%   BMI 21.95 kg/m²     Physical Exam  Vitals and nursing note reviewed.   Constitutional:       General: He is not in acute distress.     Appearance:  He is not ill-appearing, toxic-appearing or diaphoretic.   HENT:      Head: Normocephalic and atraumatic.      Right Ear: External ear normal.      Left Ear: External ear normal.      Nose: Nose normal.      Mouth/Throat:      Mouth: Mucous membranes are moist.   Eyes:      General:         Right eye: No discharge.         Left eye: No discharge.   Cardiovascular:      Rate and Rhythm: Normal rate.   Pulmonary:      Effort: No respiratory distress.   Abdominal:      General: There is no distension.   Musculoskeletal:         General: Swelling present. No deformity.   Skin:     General: Skin is warm and dry.   Neurological:      General: No focal deficit present.      Mental Status: He is alert. Mental status is at baseline.   Psychiatric:         Mood and Affect: Mood normal.         Behavior: Behavior normal.         Recent labs:  Lab Results   Component Value Date/Time    SODIUM 138 08/02/2024 09:27 AM    SODIUM 138 03/18/2023 07:50 AM    K 4.0 08/02/2024 09:27 AM    K 4.8 03/18/2023 07:50 AM    BUN 32 (H) 08/02/2024 09:27 AM    BUN 28 03/18/2023 07:50 AM    CREATININE 1.09 08/02/2024 09:27 AM    CREATININE 1.05 03/18/2023 07:50 AM    GLUC 96 08/02/2024 09:27 AM    GLUC 98 03/18/2023 07:50 AM    CALCIUM 8.4 08/02/2024 09:27 AM    CALCIUM 8.8 03/18/2023 07:50 AM    AST 14 08/02/2024 09:27 AM    AST 13 03/18/2023 07:50 AM    ALT 25 08/02/2024 09:27 AM    ALT 10 03/18/2023 07:50 AM    ALB 3.6 08/02/2024 09:27 AM    ALB 3.9 03/18/2023 07:50 AM    TP 6.6 08/02/2024 09:27 AM    TP 7.1 03/18/2023 07:50 AM    EGFR 70 08/02/2024 09:27 AM    EGFR 79 03/18/2023 07:50 AM     Lab Results   Component Value Date/Time    HGB 13.1 08/02/2024 09:27 AM    WBC 8.08 08/02/2024 09:27 AM     08/02/2024 09:27 AM    INR 1.11 07/12/2023 05:58 PM    PTT 27 07/12/2023 05:58 PM     Lab Results   Component Value Date/Time    BFN0XUTXLIND 2.495 08/02/2024 09:27 AM       Recent Imaging:  Procedure: MRI brain w wo contrast    Result Date:  7/9/2024  Narrative: MRI BRAIN WITH AND WITHOUT CONTRAST INDICATION: C34.91: Malignant neoplasm of unspecified part of right bronchus or lung. COMPARISON: 2/29/2024 TECHNIQUE: Multiplanar, multisequence imaging of the brain was performed before and after gadolinium administration. IV Contrast:  6 mL of Gadobutrol injection (SINGLE-DOSE) IMAGE QUALITY:   Diagnostic. FINDINGS: BRAIN PARENCHYMA: Metastatic lesions are described below on series 12: - Right parasagittal frontoparietal junction metastatic lesion has decreased in size measuring 0.9 x 0.8 cm on image 83 and previously measured 1.3 x 0.9 cm. There is intrinsic T1 shortening noted in this region as well as susceptibility. - Left occipital lobe lesion measures 4 mm on image 62, stable. - Right occipital lesion measures 4 mm on image 53, stable. Mild associated FLAIR signal hyperintensity is stable. - Right temporal lobe lesion appears to have resolved since the prior exams. There are no new areas of intracranial metastatic disease identified. There is no evidence for acute infarction. There is no midline shift. There is no acute intracranial hemorrhage. Confluent white matter FLAIR signal hyperintensity is consistent with posttreatment change and progressed since the prior examination. Findings are likely superimposed on a background of chronic microvascular ischemic change. There is involvement of the nancy. VENTRICLES: Ventricles and extra-axial CSF spaces are prominent commensurate with the degree of volume loss.  No hydrocephalus.  No intraventricular hemorrhage. SELLA AND PITUITARY GLAND:  Normal. ORBITS:  Normal. PARANASAL SINUSES: There is patchy ethmoid air cell mucosal thickening. There are retention cysts versus polyps within the right maxillary sinus. Mild mucosal thickening of the left maxillary sinus. VASCULATURE:  Evaluation of the major intracranial vasculature demonstrates appropriate flow voids. CALVARIUM AND SKULL BASE: There are bilateral  mastoid air cell effusions EXTRACRANIAL SOFT TISSUES:  Normal.     Impression: Interval decrease in size of the right frontal parietal junction parasagittal metastatic lesion and resolution of the right temporal lobe lesion since prior exams. Bilateral occipital lobe lesions appear grossly stable. No definite new intracranial metastatic disease identified. Remainder of the findings, as described above. Workstation performed: [id number]     Procedure: CT chest abdomen pelvis w contrast    Result Date: 5/28/2024  Narrative: CT CHEST, ABDOMEN AND PELVIS WITH IV CONTRAST INDICATION: C34.91: Malignant neoplasm of unspecified part of right bronchus or lung. COMPARISON: Multiple prior chest/abdomen/pelvis CTs with the most recent being obtained 1/9/2024. TECHNIQUE: CT examination of the chest, abdomen and pelvis was performed. Multiplanar 2D reformatted images were created from the source data. This examination, like all CT scans performed in the [facility], was performed utilizing techniques to minimize radiation dose exposure, including the use of iterative reconstruction and automated exposure control. Radiation dose length product (DLP) for this visit: 577.61 mGy-cm IV Contrast: 85 mL of iohexol (OMNIPAQUE) Enteric Contrast: Not administered. FINDINGS: CHEST LUNGS: Upper lung zone predominant centrilobular and paraseptal emphysematous changes are again demonstrated. Spiculated mass within the posterior right apex measures 4.2 x 2.8 x 2.5 cm (604:39, 601:79), stable from prior study when accounting for slight  differences in measuring technique. The mass is contiguous with the posterior apical pleura. There is subpleural groundglass opacification within the bilateral lower lobes which is most consistent with atelectasis. Lingular nodule measuring 7 mm (604:166) is stable. No new or enlarging pulmonary nodules are identified. No evidence of discrete tracheal or endobronchial lesions. PLEURA:  Unremarkable. HEART/GREAT VESSELS: Heart is normal in size. Mild coronary calcifications are noted. Left-sided infusion catheter is seen with its terminus at the level of the superior cavoatrial junction. Mild atherosclerotic calcifications are noted within the thoracic aorta and branching vessels. MEDIASTINUM AND TE: Unremarkable. CHEST WALL AND LOWER NECK: Bilateral gynecomastia is noted. Left-sided infusion catheter as described above. Hypoattenuating left thyroid nodule measures 10 mm, stable. Incidental discovery of one or more thyroid nodule(s) measuring less than 1.5 cm and without suspicious features is noted in this patient who is above 35 years old; according to guidelines published in the February 2015 white paper on incidental thyroid nodules in the Journal of the American College of Radiology (JACR), no further evaluation is recommended. ABDOMEN LIVER/BILIARY TREE: Subcentimeter hypodensity within the right hepatic lobe is slightly less conspicuous as compared to prior study but is not appreciably changed in size measuring 4 mm (301:104). No new liver lesions identified within the confines of a single phase exam. GALLBLADDER: No calcified gallstones. No pericholecystic inflammatory change. SPLEEN: Cyst within the anterior spleen measuring 1.9 x 1.5 cm is slightly decreased in size from CT of January 2024 (previously 2.3 x 1.8 cm). Otherwise unremarkable. PANCREAS: Calcifications are again identified along the pancreatic uncinate process, possibly reflecting sequelae of prior pancreatitis. Round hypodensity within the pancreatic tail measures 1.1 x 0.9 cm. This is difficult to appreciate on CT of January 2024 and is not present on CT of October 2023. Thin internal septation versus debris is suggested within this finding. ADRENAL GLANDS: Unremarkable. KIDNEYS/URETERS: No hydronephrosis. Parapelvic cysts are suggested within the lower pole the left kidney. Otherwise unremarkable. STOMACH AND BOWEL: No  evidence of small bowel obstruction. There is mild sigmoid diverticulosis without evidence of acute diverticulitis. APPENDIX: No findings to suggest appendicitis. ABDOMINOPELVIC CAVITY: No ascites. No pneumoperitoneum. No lymphadenopathy. VESSELS: Unremarkable for patient's age. PELVIS REPRODUCTIVE ORGANS: Unremarkable for patient's age. URINARY BLADDER: Unremarkable. ABDOMINAL WALL/INGUINAL REGIONS: Small fat-containing umbilical hernia is noted.. BONES: No evidence of acute fracture or suspicious osseous lesion. There is stable sclerosis along the posterior aspects of the second and third ribs on the right which likely reflects postradiation changes.     Impression: 1. Stable appearance of posterior right apical lung mass as compared to CT of January 2024. Lingular nodule is also stable. No CT evidence of new metastatic disease within the chest. 2. Hypoattenuated lesion within the pancreatic tail measuring 10 mm. Finding likely represents an enlarging pancreatic cyst, however, it is not clearly seen on CT of October 2023 and appears mildly complex. Further characterization with MR abdomen with and without contrast is recommended. 3. No evidence of metastatic disease elsewhere within the abdomen or pelvis. The study was marked in EPIC for significant notification. Workstation performed: [id number]        Administrative Statements   I have spent a total time of 30 minutes in caring for this patient on the day of the visit/encounter including Risks and benefits of tx options, Instructions for management, Patient and family education, Importance of tx compliance, Counseling / Coordination of care, Documenting in the medical record, Reviewing / ordering tests, medicine, procedures  , Obtaining or reviewing history  , and Communicating with other healthcare professionals .

## 2024-08-14 NOTE — ASSESSMENT & PLAN NOTE
Has made marked improvement in symptomatology.  Follow up in 2-3 months, but may reach out sooner with any concerns.

## 2024-08-16 ENCOUNTER — OFFICE VISIT (OUTPATIENT)
Dept: PHYSICAL THERAPY | Age: 69
End: 2024-08-16
Payer: MEDICARE

## 2024-08-16 DIAGNOSIS — R53.1 WEAKNESS: ICD-10-CM

## 2024-08-16 DIAGNOSIS — R26.89 BALANCE DISORDER: Primary | ICD-10-CM

## 2024-08-16 PROCEDURE — 97110 THERAPEUTIC EXERCISES: CPT

## 2024-08-16 PROCEDURE — 97112 NEUROMUSCULAR REEDUCATION: CPT

## 2024-08-16 NOTE — PROGRESS NOTES
"Daily Note     Today's date: 2024  Patient name: Claude Austin  : 1955  MRN: [id number]  Referring provider: Mak Feliciano MD  Dx:   Encounter Diagnosis     ICD-10-CM    1. Balance disorder  R26.89       2. Weakness  R53.1                      Subjective: Pt reports that he is feeling slight soreness in his low back today, and that he has had \"trouble getting his legs under him\" this morning. He reports that he was moving toolboxes and other objects around his house yesterday, and thinks his fatigue today may be related to his increased activity yesterday.      Objective: See treatment diary below      Assessment: Pt displayed significant fatigue throughout session today; denied dizziness. He required frequent breaks due to fatigue. Pt displayed significant unsteadiness during tandem ambulation, exaggerated during backwards tandem walking. Appeared to display difficulty with initiation of backwards leg swing, as well as significant postural sway and 2 slight losses of balance requiring min Ax1 to recover. Held on several interventions as listed below, due to excessive fatigue this session. Tolerated treatment poor. Patient demonstrated fatigue post treatment and would benefit from continued PT      Plan: Continue per plan of care.      Precautions: Precautions: Stage IV cancer, thoracic metastasis avoid lifting from flexed positions, NO GRADE 5 MOBS, avoid bridges    Manuals 7/23 7/25 7/30 8/2 8/6 8/9 8/13 8/15                                               Neuro Re-Ed           SLR      3x10 ea 2x10 ea 2x10ea   Mini squats           Standing hip abd Foam abd  3x10 ea Foam abd  3x10 ea Foam abd  3x10 ea Foam abd  3x10 ea  2x10 ea 2x10 ea NV   LTR           Tandem stance Foam   3x30\"  ea Foam   3x30\"  ea Foam   3x30\"  ea Foam   3x30\"  ea Foam 2x30\" ea Foam  2x30\"  ea     Low row Blue  3\"  3x10 Blue  3\"  3x10 Blue  3\"  3x10 Blue  3\"  3x10 Blue 3\"  3x10      TB Ext           Toe taps on step/  HT   " "        Rocker board  (Cue toe flex during neck ext)           Tandem ambulation     3x20'  3x20' 4x20' frdws and bckwds 2x20' frdws and bckwds   FTEC on foam 30\"x3  CG w/ pertubations  30\"x3  CG w/ pertubations  30\"x3  CG w/ pertubations  30\"x3  CG 3x30\" CG 2x30\"  CG 2x10 looking up on foam 2x10 looking up on foam   Stacking cones in cabinet       1 set 1 set   Ther Ex           Nu step ROM UB x10' UB 10' lvl 2 UB 10' lvl 3 UB 10' lvl 2 UB 10'  lvl 2 UB 10'  lvl2 UB 10' lvl 2 UB 10' lvl 2   Standing lumbar extension           Nerve glides           Seated shoulder flexion           Standing shoulder flexion           Seated Marches           Wall push up   At table 3x10 At table 3x10 At table 3x10 At table 3x10 At table 3x10 At table  3x10 At table 3x10 At table 3x10   Modified hip extension    2x10       Bridges           Assessment of LE strength and balance           Leg Press      3x10   85#     Ther Activity           Moran Walkouts Backwards  12#  2x10 Backwards  12#  3x10 Backwards  12#  3x10 Backwards  12#  3x10   Backwards 12# 2x10 NV   STS 2x10 2x10 2x10 2x10 3x10      FSU     6\" x12 b/l      Gait Training                                 Modalities                                                            "

## 2024-08-20 ENCOUNTER — APPOINTMENT (OUTPATIENT)
Dept: PHYSICAL THERAPY | Age: 69
End: 2024-08-20
Payer: MEDICARE

## 2024-08-22 ENCOUNTER — OFFICE VISIT (OUTPATIENT)
Dept: PHYSICAL THERAPY | Age: 69
End: 2024-08-22
Payer: MEDICARE

## 2024-08-22 ENCOUNTER — OFFICE VISIT (OUTPATIENT)
Dept: HEMATOLOGY ONCOLOGY | Facility: CLINIC | Age: 69
End: 2024-08-22
Payer: MEDICARE

## 2024-08-22 VITALS
SYSTOLIC BLOOD PRESSURE: 144 MMHG | TEMPERATURE: 97.3 F | HEIGHT: 70 IN | DIASTOLIC BLOOD PRESSURE: 78 MMHG | HEART RATE: 83 BPM | OXYGEN SATURATION: 97 % | BODY MASS INDEX: 21.76 KG/M2 | WEIGHT: 152 LBS | RESPIRATION RATE: 17 BRPM

## 2024-08-22 DIAGNOSIS — D49.2 THORACIC SPINE TUMOR: ICD-10-CM

## 2024-08-22 DIAGNOSIS — C79.31 METASTASIS TO BRAIN (HCC): ICD-10-CM

## 2024-08-22 DIAGNOSIS — R26.89 LOSS OF BALANCE: ICD-10-CM

## 2024-08-22 DIAGNOSIS — C34.91 NON-SMALL CELL CANCER OF RIGHT LUNG (HCC): Primary | ICD-10-CM

## 2024-08-22 DIAGNOSIS — R26.89 BALANCE DISORDER: Primary | ICD-10-CM

## 2024-08-22 DIAGNOSIS — R53.1 WEAKNESS: ICD-10-CM

## 2024-08-22 PROCEDURE — 97110 THERAPEUTIC EXERCISES: CPT | Performed by: PHYSICAL THERAPIST

## 2024-08-22 PROCEDURE — 97112 NEUROMUSCULAR REEDUCATION: CPT | Performed by: PHYSICAL THERAPIST

## 2024-08-22 PROCEDURE — 99214 OFFICE O/P EST MOD 30 MIN: CPT

## 2024-08-22 NOTE — PROGRESS NOTES
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     Caris - PD-L1 0%, TMB 60, no other targetable mutations     2023 - palliative RT to T spine and brain     2023 - start carbo/pemetrexed/pembrolizumab     2023 suspected Rad/Immuno Pneumonitis Grade 1/2 (mild fever)      2023 discontinued Carbo d/t fatigue; and holding Keytruda in Cycle 4 dt Pneumonitis; will re-challenge in cycle 5      2023 - add back keytruda for cycle 6     2023 - dose reduce alimta by 10% due to fatigue     Thoracic spine tumor   2023 Initial Diagnosis    Thoracic spine tumor     Non-small cell cancer of right lung (HCC)   3/29/2023 -  Cancer Staged    Staging form: Lung, AJCC 8th Edition  - Clinical stage from 3/29/2023: Stage DORI (cT4, cN1, cM1b) - Signed by Nancy Ruelas DO on 2023 Biopsy    Lung, right upper lobe, biopsy:     - Rare detached single and small clusters of atypical, degenerated epithelial cells.     - Predominantly fibroelastotic stromal fibrosis.     2023 Biopsy    Lung, right upper lobe mass, biopsy:      - Poorly differentiated non-small cell carcinoma most compatible with adenocarcinoma of the lung     2023 - 2023 Radiation    Course: C1    Plan ID Energy Fractions Dose per Fraction (cGy) Dose Correction (cGy) Total Dose Delivered (cGy) Elapsed Days   C7_T4_R Lung 10X/6X 10 / 10 300 0 3,000 11   HCS_WhBrain 6X 10 / 10 300 0 3,000 11      Dr. Ro Genao     2023 Initial Diagnosis    Non-small cell cancer of right lung (HCC)     2023 -  Chemotherapy    cyanocobalamin, 1,000 mcg, Intramuscular, Once, 9 of 9 cycles  Administration: 1,000 mcg  (5/15/2023), 1,000 mcg (7/3/2023), 1,000 mcg (9/5/2023), 1,000 mcg (11/6/2023), 1,000 mcg (1/8/2024), 1,000 mcg (3/11/2024), 1,000 mcg (5/13/2024), 1,000 mcg (7/15/2024)  alteplase (CATHFLO), 2 mg, Intracatheter, Every 1 Minute as needed, 22 of 25 cycles  Administration: 2 mg (11/29/2023), 2 mg (12/19/2023), 2 mg (8/5/2024)  fosaprepitant (EMEND) IVPB, 150 mg, Intravenous, Once, 3 of 3 cycles  Administration: 150 mg (5/22/2023), 150 mg (7/3/2023), 150 mg (6/12/2023)  CARBOplatin (PARAPLATIN) IVPB (GOG AUC DOSING), 544 mg, Intravenous, Once, 3 of 3 cycles  Administration: 544 mg (5/22/2023), 554 mg (7/3/2023), 544 mg (6/12/2023)  pemetrexed (ALIMTA) chemo infusion, 940 mg, Intravenous, Once, 22 of 25 cycles  Dose modification: 450 mg/m2 (original dose 500 mg/m2, Cycle 9, Reason: Dose Not Tolerated, Comment: 10% dose reduction due to fatigue), 450 mg/m2 (original dose 500 mg/m2, Cycle 10, Reason: Dose modified as per discussion with consulting physician), 400 mg/m2 (original dose 500 mg/m2, Cycle 16, Reason: Dose Not Tolerated, Comment: total 20% dose reduction due to fatigue)  Administration: 900 mg (5/22/2023), 900 mg (7/3/2023), 900 mg (9/5/2023), 900 mg (6/12/2023), 900 mg (8/14/2023), 900 mg (7/24/2023), 900 mg (9/25/2023), 900 mg (10/16/2023), 846 mg (11/6/2023), 846 mg (11/29/2023), 846 mg (12/19/2023), 846 mg (1/8/2024), 846 mg (1/29/2024), 846 mg (2/19/2024), 846 mg (3/11/2024), 752 mg (4/1/2024), 752 mg (4/22/2024), 752 mg (5/13/2024), 752 mg (6/3/2024), 752 mg (6/24/2024), 752 mg (7/15/2024), 752 mg (8/5/2024)  pembrolizumab (KEYTRUDA) IVPB, 200 mg, Intravenous, Once, 21 of 24 cycles  Administration: 200 mg (5/22/2023), 200 mg (7/3/2023), 200 mg (9/5/2023), 200 mg (6/12/2023), 200 mg (8/14/2023), 200 mg (9/25/2023), 200 mg (10/16/2023), 200 mg (11/6/2023), 200 mg (11/29/2023), 200 mg (12/19/2023), 200 mg (1/8/2024), 200 mg (1/29/2024), 200 mg (2/19/2024), 200 mg (3/11/2024), 200 mg (4/1/2024), 200 mg  "(4/22/2024), 200 mg (5/13/2024), 200 mg (6/3/2024), 200 mg (6/24/2024), 200 mg (7/15/2024), 200 mg (8/5/2024)     7/12/2023 Adverse Reaction    Hospitalization - Suspected Pneumonitis    7/12-14/2023 due to mild fever with CT chest redemonstrating left upper lobe inflammatory changes, previously seen on study 04/04/2023, ID and oncology evaluated, more suggestive of radiation/Keytruda induced pneumonitis grade 1/2; mild fever resolved; other differential/contributing etiology include recent tapering of steroid       7/21/2023 -  Chemotherapy    Recently suspected pneumonitis 7/12/2023 and reports fatigue 7/21/2023  Discontinuing carboplatin and holding Keytruda of cycle 4; with potential plan to rechallenge Keytruda in cycle 5     Metastasis to brain (HCC)   5/30/2023 Initial Diagnosis    Metastasis to brain (HCC)         Interval History: Patient presents for follow-up of his stage IV adenocarcinoma of the lung prior to his cycle 23 of maintenance Alimta/Keytruda.  He is here with his wife.  Patient reports that his rash is resolved and he is currently on dexamethasone 1 mg daily.  Patient is currently on lactulose for his constipation and is having a bowel movement about every 2 to 3 days, which is an improvement for him.    Patient endorses fatigue, shortness of breath.  Denies any fevers, infection, cough, CP, palpitations.  He is currently in physical therapy due to his gait.  Patient reports his legs have been giving out persistently and has gotten worse in the past 2 months.  His last fall was on 8/17/2024 due to his legs \"not wanting to move.\"  He is walking very slowly and is only able to stand for short periods of time.  He reports he has no control over the legs.  Patient is currently smoking half a pack of cigarettes daily.    Informed patient I did receive a message from his physical therapist that he was having some tenderness in the L4-L5 region with a positive slump test which is tension to the " nervous system in a seated position. The patient sits in a slumped position and extends their LE. It indicates possibly radiculopathy or sciatic nerve irritation.  Upon palpation of that area, patient is noted to have some slight tenderness.  He is currently ambulating with a cane.     Patient will be obtaining labs tomorrow.      REVIEW OF SYSTEMS:  Please note that a 14-point review of systems was performed to include Constitutional, HEENT, Respiratory, CVS, GI, , Musculoskeletal, Integumentary, Neurologic, Rheumatologic, Endocrinologic, Psychiatric, Lymphatic, and Hematologic/Oncologic systems were reviewed and are negative unless otherwise stated in HPI. Positive and negative findings pertinent to this evaluation are incorporated into the history of present illness.      ECOG PS: 2    PROBLEM LIST:  Patient Active Problem List   Diagnosis    Multiple subsegmental pulmonary emboli without acute cor pulmonale (HCC)    Tobacco use    DDD (degenerative disc disease), cervical    Pleural mass    Brain lesions    Thoracic spine tumor    Cancer related pain    Advanced care planning/counseling discussion    Non-small cell cancer of right lung (HCC)    Metastasis to brain (HCC)    Lesion of left lung    Chemotherapy-induced fatigue    Drug-induced pneumonitis    Current every day smoker    Bloating    Lower extremity edema    Palliative care by specialist    Peripheral vascular disease (HCC)    Constipation    Pruritic rash    Weakness    Immunocompromised (HCC)    Drug rash    Nutritional anemia       Assessment / Plan: 68-year-old male with stage IV adenocarcinoma of the lung.  Patient is tolerating his maintenance Alimta/Keytruda.  We will proceed with next cycle at current dose pending labs.  We will obtain an MRI of his thoracic and lumbar spine to further evaluate his symptoms with his legs.  I will review these images with Dr. Ruelas once resulted and give them a call.  Also recommend patient use a walker as  it may provide more stability rather than a cane.  Advised him to discuss further with his physical therapist.      In regards to his pain, recommend Salonpas pain patches, 12 hours on and 12 hours off.  Informed patient and his wife not to place any heat in the area of the patch as it can cause a burn.    We will see patient back in the office in 2 months with Dr. Ruelas with CT chest abdomen pelvis with contrast for continued monitoring of patient's disease.  Patient is aware to contact us for any additional questions/concerns or worsening symptoms.            I spent 35 minutes on chart review, face to face counseling time, coordination of care and documentation.    Past Medical History:   has a past medical history of Cancer (HCC).    PAST SURGICAL HISTORY:   has a past surgical history that includes IR biopsy lung (4/6/2023); IR biopsy lung (4/26/2023); and IR port placement (5/16/2023).    CURRENT MEDICATIONS  Current Outpatient Medications   Medication Sig Dispense Refill    acetaminophen (TYLENOL) 500 mg tablet Take 500 mg by mouth every 4 (four) hours as needed for mild pain      apixaban (ELIQUIS) 5 mg Take 1 tablet (5 mg total) by mouth 2 (two) times a day 60 tablet 5    dexamethasone (DECADRON) 0.5 mg tablet Take 2 tablets (1 mg total) by mouth 2 (two) times a day with meals 120 tablet 0    folic acid (FOLVITE) 1 mg tablet Take 1 tablet (1,000 mcg total) by mouth daily 90 tablet 0    gabapentin (NEURONTIN) 300 mg capsule Take 300 mg in the AM and 600 mg in the Pm 270 capsule 0    Keytruda 100 MG/4ML injection INFUSE 200MG VIA IVPB OVER 30 MIN EVERY 21 DAYS 8 mL 5    lactulose (CHRONULAC) 10 g/15 mL solution Take 30 mL (20 g total) by mouth 2 (two) times a day 1800 mL 0    lidocaine-prilocaine (EMLA) cream Apply to port 30 to 60 minutes prior to use 30 g 0    triamcinolone (KENALOG) 0.5 % cream Apply topically 2 (two) times a day 454 g 3     No current facility-administered medications for this visit.  "    [unfilled]    SOCIAL HISTORY:   reports that he has been smoking cigarettes. He started smoking about 51 years ago. He has a 25.8 pack-year smoking history. He has never used smokeless tobacco. He reports current alcohol use. He reports that he does not use drugs.     FAMILY HISTORY:  family history includes Stroke in his father.     ALLERGIES:  has No Known Allergies.      Physical Exam:  Vital Signs:   Visit Vitals  /78 (BP Location: Left arm, Patient Position: Sitting, Cuff Size: Adult)   Pulse 83   Temp (!) 97.3 °F (36.3 °C)   Resp 17   Ht 5' 10\" (1.778 m)   Wt 68.9 kg (152 lb)   SpO2 97%   BMI 21.81 kg/m²   Smoking Status Every Day   BSA 1.86 m²     Body mass index is 21.81 kg/m².  Body surface area is 1.86 meters squared.    GEN: Alert, awake oriented x3, in no acute distress  HEENT- No pallor, icterus, cyanosis, no oral mucosal lesions,   LAD - no palpable cervical, clavicle, axillary, inguinal LAD  Heart- normal S1 S2, regular rate and rhythm, No murmur, rubs.   Lungs- clear breathing sound bilateral.   Abdomen- soft, Non tender, bowel sounds present  Extremities- No cyanosis, clubbing, edema  Neuro- No focal neurological deficit    Labs:  Lab Results   Component Value Date    WBC 8.08 08/02/2024    HGB 13.1 08/02/2024    HCT 38.8 08/02/2024     (H) 08/02/2024     08/02/2024     Lab Results   Component Value Date    SODIUM 138 08/02/2024    K 4.0 08/02/2024     (H) 08/02/2024    CO2 23 08/02/2024    AGAP 6 08/02/2024    BUN 32 (H) 08/02/2024    CREATININE 1.09 08/02/2024    GLUC 96 08/02/2024    GLUF 103 (H) 05/10/2023    CALCIUM 8.4 08/02/2024    AST 14 08/02/2024    ALT 25 08/02/2024    ALKPHOS 35 08/02/2024    TP 6.6 08/02/2024    TBILI 0.47 08/02/2024    EGFR 70 08/02/2024     "

## 2024-08-23 ENCOUNTER — HOSPITAL ENCOUNTER (OUTPATIENT)
Dept: INFUSION CENTER | Facility: HOSPITAL | Age: 69
End: 2024-08-23
Payer: MEDICARE

## 2024-08-23 DIAGNOSIS — C34.91 NON-SMALL CELL CANCER OF RIGHT LUNG (HCC): Primary | ICD-10-CM

## 2024-08-23 PROBLEM — R26.89 LOSS OF BALANCE: Status: ACTIVE | Noted: 2024-08-23

## 2024-08-23 LAB
ALBUMIN SERPL BCG-MCNC: 3.5 G/DL (ref 3.5–5)
ALP SERPL-CCNC: 49 U/L (ref 34–104)
ALT SERPL W P-5'-P-CCNC: 23 U/L (ref 7–52)
ANION GAP SERPL CALCULATED.3IONS-SCNC: 9 MMOL/L (ref 4–13)
ANISOCYTOSIS BLD QL SMEAR: PRESENT
AST SERPL W P-5'-P-CCNC: 18 U/L (ref 13–39)
BASOPHILS # BLD MANUAL: 0 THOUSAND/UL (ref 0–0.1)
BASOPHILS NFR MAR MANUAL: 0 % (ref 0–1)
BILIRUB SERPL-MCNC: 0.39 MG/DL (ref 0.2–1)
BUN SERPL-MCNC: 22 MG/DL (ref 5–25)
CALCIUM SERPL-MCNC: 8.5 MG/DL (ref 8.4–10.2)
CHLORIDE SERPL-SCNC: 109 MMOL/L (ref 96–108)
CO2 SERPL-SCNC: 22 MMOL/L (ref 21–32)
CREAT SERPL-MCNC: 0.94 MG/DL (ref 0.6–1.3)
EOSINOPHIL # BLD MANUAL: 0 THOUSAND/UL (ref 0–0.4)
EOSINOPHIL NFR BLD MANUAL: 0 % (ref 0–6)
ERYTHROCYTE [DISTWIDTH] IN BLOOD BY AUTOMATED COUNT: 15.5 % (ref 11.6–15.1)
GFR SERPL CREATININE-BSD FRML MDRD: 84 ML/MIN/1.73SQ M
GLUCOSE SERPL-MCNC: 107 MG/DL (ref 65–140)
HCT VFR BLD AUTO: 38.7 % (ref 36.5–49.3)
HGB BLD-MCNC: 12.7 G/DL (ref 12–17)
LYMPHOCYTES # BLD AUTO: 0.4 THOUSAND/UL (ref 0.6–4.47)
LYMPHOCYTES # BLD AUTO: 3 % (ref 14–44)
MACROCYTES BLD QL AUTO: PRESENT
MCH RBC QN AUTO: 34.7 PG (ref 26.8–34.3)
MCHC RBC AUTO-ENTMCNC: 32.8 G/DL (ref 31.4–37.4)
MCV RBC AUTO: 106 FL (ref 82–98)
MONOCYTES # BLD AUTO: 0.22 THOUSAND/UL (ref 0–1.22)
MONOCYTES NFR BLD: 5 % (ref 4–12)
MYELOCYTE ABSOLUTE CT: 0.04 THOUSAND/UL (ref 0–0.1)
MYELOCYTES NFR BLD MANUAL: 1 % (ref 0–1)
NEUTROPHILS # BLD MANUAL: 3.73 THOUSAND/UL (ref 1.85–7.62)
NEUTS BAND NFR BLD MANUAL: 9 % (ref 0–8)
NEUTS SEG NFR BLD AUTO: 76 % (ref 43–75)
OVALOCYTES BLD QL SMEAR: PRESENT
PLATELET # BLD AUTO: 205 THOUSANDS/UL (ref 149–390)
PLATELET BLD QL SMEAR: ADEQUATE
PMV BLD AUTO: 9.1 FL (ref 8.9–12.7)
POIKILOCYTOSIS BLD QL SMEAR: PRESENT
POTASSIUM SERPL-SCNC: 4 MMOL/L (ref 3.5–5.3)
PROT SERPL-MCNC: 6.9 G/DL (ref 6.4–8.4)
RBC # BLD AUTO: 3.66 MILLION/UL (ref 3.88–5.62)
RBC MORPH BLD: PRESENT
SODIUM SERPL-SCNC: 140 MMOL/L (ref 135–147)
T3FREE SERPL-MCNC: 2.37 PG/ML (ref 2.5–3.9)
TSH SERPL DL<=0.05 MIU/L-ACNC: 1.6 UIU/ML (ref 0.45–4.5)
VARIANT LYMPHS # BLD AUTO: 6 %
WBC # BLD AUTO: 4.39 THOUSAND/UL (ref 4.31–10.16)

## 2024-08-23 PROCEDURE — 85027 COMPLETE CBC AUTOMATED: CPT | Performed by: INTERNAL MEDICINE

## 2024-08-23 PROCEDURE — 84481 FREE ASSAY (FT-3): CPT | Performed by: INTERNAL MEDICINE

## 2024-08-23 PROCEDURE — 85007 BL SMEAR W/DIFF WBC COUNT: CPT | Performed by: INTERNAL MEDICINE

## 2024-08-23 PROCEDURE — 80053 COMPREHEN METABOLIC PANEL: CPT | Performed by: INTERNAL MEDICINE

## 2024-08-23 PROCEDURE — 84443 ASSAY THYROID STIM HORMONE: CPT | Performed by: INTERNAL MEDICINE

## 2024-08-23 NOTE — PROGRESS NOTES
Claude Austin  tolerated treatment well with no complications.  Port flushed and central labs drawn without incident.      Claude Austin is aware of future appt on 8/26 at 9am.     AVS printed and given to Claude Austin:    No (Declined by Claude Austin)

## 2024-08-24 DIAGNOSIS — K59.00 CONSTIPATION, UNSPECIFIED CONSTIPATION TYPE: ICD-10-CM

## 2024-08-24 DIAGNOSIS — C34.91 NON-SMALL CELL CANCER OF RIGHT LUNG (HCC): ICD-10-CM

## 2024-08-26 ENCOUNTER — HOSPITAL ENCOUNTER (OUTPATIENT)
Dept: INFUSION CENTER | Facility: HOSPITAL | Age: 69
Discharge: HOME | End: 2024-08-26
Attending: INTERNAL MEDICINE
Payer: MEDICARE

## 2024-08-26 VITALS
RESPIRATION RATE: 18 BRPM | HEIGHT: 70 IN | HEART RATE: 80 BPM | WEIGHT: 152.78 LBS | SYSTOLIC BLOOD PRESSURE: 127 MMHG | DIASTOLIC BLOOD PRESSURE: 79 MMHG | TEMPERATURE: 96 F | BODY MASS INDEX: 21.87 KG/M2

## 2024-08-26 DIAGNOSIS — C34.91 NON-SMALL CELL CANCER OF RIGHT LUNG (HCC): Primary | ICD-10-CM

## 2024-08-26 PROCEDURE — 96413 CHEMO IV INFUSION 1 HR: CPT

## 2024-08-26 PROCEDURE — 96367 TX/PROPH/DG ADDL SEQ IV INF: CPT

## 2024-08-26 PROCEDURE — 96417 CHEMO IV INFUS EACH ADDL SEQ: CPT

## 2024-08-26 RX ORDER — FOLIC ACID 1 MG/1
1000 TABLET ORAL DAILY
Qty: 90 TABLET | Refills: 1 | Status: SHIPPED | OUTPATIENT
Start: 2024-08-26

## 2024-08-26 RX ORDER — LACTULOSE 10 G/15ML
20 SOLUTION ORAL 2 TIMES DAILY
Qty: 1800 ML | Refills: 5 | Status: SHIPPED | OUTPATIENT
Start: 2024-08-26

## 2024-08-26 RX ORDER — SODIUM CHLORIDE 9 MG/ML
20 INJECTION, SOLUTION INTRAVENOUS ONCE
Status: COMPLETED | OUTPATIENT
Start: 2024-08-26 | End: 2024-08-26

## 2024-08-26 RX ADMIN — DEXAMETHASONE SODIUM PHOSPHATE: 10 INJECTION, SOLUTION INTRAMUSCULAR; INTRAVENOUS at 09:17

## 2024-08-26 RX ADMIN — SODIUM CHLORIDE 200 MG: 9 INJECTION, SOLUTION INTRAVENOUS at 09:47

## 2024-08-26 RX ADMIN — SODIUM CHLORIDE 20 ML/HR: 0.9 INJECTION, SOLUTION INTRAVENOUS at 09:17

## 2024-08-26 RX ADMIN — PEMETREXED DISODIUM 752 MG: 500 INJECTION, POWDER, LYOPHILIZED, FOR SOLUTION INTRAVENOUS at 10:41

## 2024-08-26 NOTE — PROGRESS NOTES
Claude Austin  tolerated treatment well with no complications.      Claude Austin is aware of future appt on 9/13 at 2pm for labs and 9/16 at 11 am for chemo.     AVS printed and given to Claude Austin:    No (Declined by Claude Austin), given calendar printout.

## 2024-08-27 ENCOUNTER — OFFICE VISIT (OUTPATIENT)
Dept: PHYSICAL THERAPY | Age: 69
End: 2024-08-27
Payer: MEDICARE

## 2024-08-27 DIAGNOSIS — R53.1 WEAKNESS: ICD-10-CM

## 2024-08-27 DIAGNOSIS — R26.89 BALANCE DISORDER: Primary | ICD-10-CM

## 2024-08-27 PROCEDURE — 97110 THERAPEUTIC EXERCISES: CPT

## 2024-08-27 PROCEDURE — 97530 THERAPEUTIC ACTIVITIES: CPT

## 2024-08-27 NOTE — PROGRESS NOTES
"Daily Note     Today's date: 2024  Patient name: Claude Austin  : 1955  MRN: [id number]  Referring provider: Mak Feliciano MD  Dx:   Encounter Diagnosis     ICD-10-CM    1. Balance disorder  R26.89       2. Weakness  R53.1           Start Time: 1103  Stop Time: 1151  Total time in clinic (min): 48 minutes    Subjective: Pt reports he is feeling okay today; states he got a walker and has been using his cane more frequently when walking around. Reports he has 2 MRIs and 2 CT scans scheduled for early-mid September after the Dr mentioned he may have a bulging disc in his back that is resulting in the \"wobbliness\" in his legs.      Objective: See treatment diary below      Assessment: Continued with interventions from previous session, with pt reporting feeling challenged.Demonstrated increased difficulty with SLRs on LLE, with increased quad lag and shaking from fatigue towards the end of the set. Pt performed eleazar walkouts with decreased weight compared to previous sessions; pt demonstrated good technique moving forward, however had decreased foot clearance during backwards movement. Noted increased difficulty walking following the itnervention, with pt displaying increased postural sway and unsteadiness. Required rest break following session before exiting. Tolerated treatment well. Patient demonstrated fatigue post treatment and would benefit from continued PT      Plan: Continue per plan of care.      Precautions: Precautions: Stage IV cancer, thoracic metastasis avoid lifting from flexed positions, NO GRADE 5 MOBS, avoid bridges    Manuals  8/                                                   Neuro Re-Ed            SLR      3x10 ea 2x10 ea 2x10ea 2x10ea   Mini squats        2x10    Standing hip abd Foam abd  3x10 ea Foam abd  3x10 ea Foam abd  3x10 ea Foam abd  3x10 ea  2x10 ea 2x10 ea NV    LTR        2x10 ea 2x10 ea   Tandem stance Foam   3x30\"  ea " "Foam   3x30\"  ea Foam   3x30\"  ea Foam   3x30\"  ea Foam 2x30\" ea Foam  2x30\"  ea      Low row Blue  3\"  3x10 Blue  3\"  3x10 Blue  3\"  3x10 Blue  3\"  3x10 Blue 3\"  3x10       TB Ext            Toe taps on step/  HT            Rocker board  (Cue toe flex during neck ext)            Tandem ambulation     3x20'  3x20' 4x20' frdws and bckwds     FTEC on foam 30\"x3  CG w/ pertubations  30\"x3  CG w/ pertubations  30\"x3  CG w/ pertubations  30\"x3  CG 3x30\" CG 2x30\"  CG 2x10 looking up on foam     Stacking cones in cabinet       1 set     Ther Ex            Nu step ROM UB x10' UB 10' lvl 2 UB 10' lvl 3 UB 10' lvl 2 UB 10'  lvl 2 UB 10'  lvl2 UB 10' lvl 2 UB 10' lvl 2 UB 10' lvl 2   Standing lumbar extension            Nerve glides            Seated shoulder flexion            Standing shoulder flexion            Seated Marches            Wall push up   At table 3x10 At table 3x10 At table 3x10 At table 3x10 At table 3x10 At table  3x10 At table 3x10 At table 3x10 At table 3x10   Modified hip extension    2x10        Bridges            Assessment of LE strength and balance            Leg Press      3x10   85#      Hip Adduction         Hooklying 20x5\"                           Ther Activity            Flushing Walkouts Backwards  12#  2x10 Backwards  12#  3x10 Backwards  12#  3x10 Backwards  12#  3x10   Backwards 12# 2x10 NV Backwards 9.5# 2x10   STS 2x10 2x10 2x10 2x10 3x10       FSU     6\" x12 b/l       Gait Training                                    Modalities                                                                   "

## 2024-08-30 ENCOUNTER — OFFICE VISIT (OUTPATIENT)
Dept: PHYSICAL THERAPY | Age: 69
End: 2024-08-30
Payer: MEDICARE

## 2024-08-30 DIAGNOSIS — R26.89 BALANCE DISORDER: Primary | ICD-10-CM

## 2024-08-30 DIAGNOSIS — R53.1 WEAKNESS: ICD-10-CM

## 2024-08-30 PROCEDURE — 97140 MANUAL THERAPY 1/> REGIONS: CPT | Performed by: PHYSICAL THERAPIST

## 2024-08-30 PROCEDURE — 97110 THERAPEUTIC EXERCISES: CPT | Performed by: PHYSICAL THERAPIST

## 2024-08-30 NOTE — PROGRESS NOTES
"Daily Note     Today's date: 2024  Patient name: Claude Austin  : 1955  MRN: [id number]  Referring provider: Mak Feliciano MD  Dx:   Encounter Diagnosis     ICD-10-CM    1. Balance disorder  R26.89       2. Weakness  R53.1           Start Time: 1140          Subjective: Pt reports improvements since last session.       Objective: See treatment diary below      Assessment: Tolerated treatment well. Pt's POC was progressed to include more core activation and neural dynamic interventions to reduce LBP. Patient undergoing MRI so lumbar spine will be assessed further after those results.  Patient demonstrated fatigue post treatment and would benefit from continued PT      Plan: Continue per plan of care.      Precautions: Precautions: Stage IV cancer, thoracic metastasis avoid lifting from flexed positions, NO GRADE 5 MOBS, avoid bridges    Manuals    90/90 sciatic nerv glides JF RLE                                               Neuro Re-Ed            Palloff press GTB 2x10 b/l           SLR 2x10 RLE        2x10ea   Mini squats            Standing hip abd            LTR 2x10 L side only        2x10 ea   Tandem stance            Low row            TB Ext            Toe taps on step/  HT            Rocker board  (Cue toe flex during neck ext)            Tandem ambulation            FTEC on foam            Stacking cones in cabinet            Ther Ex            Nu step ROM 10'        UB 10' lvl 2   Standing lumbar extension            Nerve glides 3x10 RLE seated            Seated shoulder flexion            Standing shoulder flexion            Seated Marches            Wall push up           At table 3x10   Modified hip extension            Bridges            Assessment of LE strength and balance            Leg Press            Hip Adduction         Hooklying 20x5\"                           Ther Activity            Kurt Walkouts         Backwards 9.5# 2x10   STS            FSU         "    Gait Training                                    Modalities

## 2024-09-03 ENCOUNTER — APPOINTMENT (EMERGENCY)
Dept: RADIOLOGY | Facility: HOSPITAL | Age: 69
End: 2024-09-03
Payer: MEDICARE

## 2024-09-03 ENCOUNTER — HOSPITAL ENCOUNTER (EMERGENCY)
Facility: HOSPITAL | Age: 69
Discharge: HOME | End: 2024-09-04
Attending: EMERGENCY MEDICINE
Payer: MEDICARE

## 2024-09-03 ENCOUNTER — NURSE TRIAGE (OUTPATIENT)
Age: 69
End: 2024-09-03

## 2024-09-03 ENCOUNTER — APPOINTMENT (OUTPATIENT)
Dept: PHYSICAL THERAPY | Age: 69
End: 2024-09-03
Payer: MEDICARE

## 2024-09-03 DIAGNOSIS — R53.1 WEAKNESS: Primary | ICD-10-CM

## 2024-09-03 DIAGNOSIS — K86.2 PANCREATIC CYST: ICD-10-CM

## 2024-09-03 DIAGNOSIS — D70.1 CHEMOTHERAPY INDUCED NEUTROPENIA (HCC): ICD-10-CM

## 2024-09-03 DIAGNOSIS — C34.91 NON-SMALL CELL CANCER OF RIGHT LUNG (HCC): Primary | ICD-10-CM

## 2024-09-03 DIAGNOSIS — T45.1X5A CHEMOTHERAPY INDUCED NEUTROPENIA (HCC): ICD-10-CM

## 2024-09-03 DIAGNOSIS — M62.50 MUSCLE WASTING: ICD-10-CM

## 2024-09-03 LAB
ALBUMIN SERPL BCG-MCNC: 3.4 G/DL (ref 3.5–5)
ALP SERPL-CCNC: 50 U/L (ref 34–104)
ALT SERPL W P-5'-P-CCNC: 44 U/L (ref 7–52)
ANION GAP SERPL CALCULATED.3IONS-SCNC: 7 MMOL/L (ref 4–13)
ANISOCYTOSIS BLD QL SMEAR: PRESENT
AST SERPL W P-5'-P-CCNC: 21 U/L (ref 13–39)
ATRIAL RATE: 71 BPM
BASOPHILS # BLD MANUAL: 0.04 THOUSAND/UL (ref 0–0.1)
BASOPHILS NFR MAR MANUAL: 2 % (ref 0–1)
BILIRUB SERPL-MCNC: 0.45 MG/DL (ref 0.2–1)
BUN SERPL-MCNC: 21 MG/DL (ref 5–25)
CALCIUM ALBUM COR SERPL-MCNC: 8.9 MG/DL (ref 8.3–10.1)
CALCIUM SERPL-MCNC: 8.4 MG/DL (ref 8.4–10.2)
CHLORIDE SERPL-SCNC: 107 MMOL/L (ref 96–108)
CO2 SERPL-SCNC: 24 MMOL/L (ref 21–32)
CREAT SERPL-MCNC: 0.82 MG/DL (ref 0.6–1.3)
EOSINOPHIL # BLD MANUAL: 0.06 THOUSAND/UL (ref 0–0.4)
EOSINOPHIL NFR BLD MANUAL: 3 % (ref 0–6)
ERYTHROCYTE [DISTWIDTH] IN BLOOD BY AUTOMATED COUNT: 14.5 % (ref 11.6–15.1)
GFR SERPL CREATININE-BSD FRML MDRD: 92 ML/MIN/1.73SQ M
GLUCOSE SERPL-MCNC: 132 MG/DL (ref 65–140)
HCT VFR BLD AUTO: 33.6 % (ref 36.5–49.3)
HGB BLD-MCNC: 11.5 G/DL (ref 12–17)
LYMPHOCYTES # BLD AUTO: 0.74 THOUSAND/UL (ref 0.6–4.47)
LYMPHOCYTES # BLD AUTO: 36 % (ref 14–44)
MACROCYTES BLD QL AUTO: PRESENT
MAGNESIUM SERPL-MCNC: 1.9 MG/DL (ref 1.9–2.7)
MCH RBC QN AUTO: 35.4 PG (ref 26.8–34.3)
MCHC RBC AUTO-ENTMCNC: 34.2 G/DL (ref 31.4–37.4)
MCV RBC AUTO: 103 FL (ref 82–98)
MONOCYTES # BLD AUTO: 0.28 THOUSAND/UL (ref 0–1.22)
MONOCYTES NFR BLD: 14 % (ref 4–12)
MYELOCYTE ABSOLUTE CT: 0.04 THOUSAND/UL (ref 0–0.1)
MYELOCYTES NFR BLD MANUAL: 2 % (ref 0–1)
NEUTROPHILS # BLD MANUAL: 0.84 THOUSAND/UL (ref 1.85–7.62)
NEUTS BAND NFR BLD MANUAL: 4 % (ref 0–8)
NEUTS SEG NFR BLD AUTO: 38 % (ref 43–75)
NRBC BLD AUTO-RTO: 1 /100 WBC (ref 0–2)
OVALOCYTES BLD QL SMEAR: PRESENT
P AXIS: 56 DEGREES
PHOSPHATE SERPL-MCNC: 3.2 MG/DL (ref 2.3–4.1)
PLATELET # BLD AUTO: 158 THOUSANDS/UL (ref 149–390)
PLATELET BLD QL SMEAR: ADEQUATE
PMV BLD AUTO: 9.4 FL (ref 8.9–12.7)
POIKILOCYTOSIS BLD QL SMEAR: PRESENT
POTASSIUM SERPL-SCNC: 3.8 MMOL/L (ref 3.5–5.3)
PR INTERVAL: 166 MS
PROT SERPL-MCNC: 6.5 G/DL (ref 6.4–8.4)
QRS AXIS: 82 DEGREES
QRSD INTERVAL: 86 MS
QT INTERVAL: 372 MS
QTC INTERVAL: 404 MS
RBC # BLD AUTO: 3.25 MILLION/UL (ref 3.88–5.62)
RBC MORPH BLD: PRESENT
SODIUM SERPL-SCNC: 138 MMOL/L (ref 135–147)
T WAVE AXIS: 50 DEGREES
VARIANT LYMPHS # BLD AUTO: 1 %
VENTRICULAR RATE: 71 BPM
WBC # BLD AUTO: 2.01 THOUSAND/UL (ref 4.31–10.16)

## 2024-09-03 PROCEDURE — 74177 CT ABD & PELVIS W/CONTRAST: CPT

## 2024-09-03 PROCEDURE — 83735 ASSAY OF MAGNESIUM: CPT

## 2024-09-03 PROCEDURE — 71260 CT THORAX DX C+: CPT

## 2024-09-03 PROCEDURE — 85027 COMPLETE CBC AUTOMATED: CPT

## 2024-09-03 PROCEDURE — 99285 EMERGENCY DEPT VISIT HI MDM: CPT | Performed by: EMERGENCY MEDICINE

## 2024-09-03 PROCEDURE — 80053 COMPREHEN METABOLIC PANEL: CPT

## 2024-09-03 PROCEDURE — 99285 EMERGENCY DEPT VISIT HI MDM: CPT

## 2024-09-03 PROCEDURE — 93010 ELECTROCARDIOGRAM REPORT: CPT | Performed by: INTERNAL MEDICINE

## 2024-09-03 PROCEDURE — 84100 ASSAY OF PHOSPHORUS: CPT

## 2024-09-03 PROCEDURE — 36415 COLL VENOUS BLD VENIPUNCTURE: CPT

## 2024-09-03 PROCEDURE — 85007 BL SMEAR W/DIFF WBC COUNT: CPT

## 2024-09-03 PROCEDURE — 93005 ELECTROCARDIOGRAM TRACING: CPT

## 2024-09-03 NOTE — TELEPHONE ENCOUNTER
Called in response to [employer] message. Spoke with patient's wife, Lolis, regarding patient's worsening fatigue/weakness and concerns with increase in constipation. Reports he saw Karen CLARKE last time and she ordered MRI spine which will be done this Saturday but with his fatigue/weakness worsening she is wondering if this should be done sooner or if there are any other recommendations. Reports he does have lower back pain which he did start using OTC lidocaine patches for. Reports this helps and has actually allowed him to take tylenol less frequently. However, reports he has been feeling more fatigued and weak and stayed in bed most of the day Wednesday-Friday. States he usually sleeps okay at night waking up 1-3 times to go to the bathroom and occasionally moving around. Denies dizziness/fever/shortness of breath/headaches.    Lolis reports that patient has had several times where he has caught himself or she has had to catch him because he will be walking or standing and his legs suddenly collapse. Reports there is little warning about this and it happens pretty quickly but he is usually strong enough to catch himself and pull himself back up. Reports he uses a cane when he is out of the house but sometimes tips toward opposite side of the cane so they planned to try rollator walker that they have at home. States he is hesitant to use it and probably will not use it in the house, but will try it outside of the home.     Reports he takes lactulose prescribed by Dr. Feliciano for constipation which was doing well controlling constipation and producing daily bowel movements. Reports he has been feeling more abdominal pressure and only passing stool every 2-3 days. Reports passing flatus normally. States the last few nights he has been up more with abdominal discomfort and has asked her for a heating pad and tums which usually relieves this. States he is not reporting significant abdominal pain but it is constantly  "bothering him and he tries to get more comfortable. Irina had bowel movement at 8:30 this morning. Reports he still has a good appetite and is eating/drinking well. States he can only be awake for a couple of hours before he has to rest.     Requests call back to discuss symptoms and whether MRI should be moved up, if decreasing chemotherapy dose moving forward might help, or other recommendations.      Reason for Disposition   MILD weakness or fatigue with acute minor illness (e.g., colds)    Answer Assessment - Initial Assessment Questions  1. DESCRIPTION: \"Describe how you are feeling.\"      Fatigued/weak    2. SEVERITY: \"How bad is it?\"  \"Can you stand and walk?\"    - MILD - Feels weak or tired, but does not interfere with work, school or normal activities    - MODERATE - Able to stand and walk; weakness interferes with work, school, or normal activities    - SEVERE - Unable to stand or walk    Moderate, able to stand and walk but sometimes feels like his legs give out/collapses. Reports he does catch himself and only fell once before seeing Karen a few weeks ago. States he has had some close calls with falling since    3. ONSET:  \"When did the weakness begin?\"  A few weeks ago, this chemo cycle has been the worst    4. CAUSE: \"What do you think is causing the weakness?\"      Unsure if related to chemo or back    5. MEDICINES: \"Have you recently started a new medicine or had a change in the amount of a medicine?\"      Lidocaine otc patches is the only new medication    6. OTHER SYMPTOMS: \"Do you have any other symptoms?\" (e.g., chest pain, fever, cough, SOB, vomiting, diarrhea, bleeding, other areas of pain)  Constipation 2-3 days between bowel movements, previously controlled with lactulose but now not having daily bowel movements again    Protocols used: Weakness (Generalized) and Fatigue-ADULT-OH    "

## 2024-09-03 NOTE — ED NOTES
Pt sitting with wife on bench outside while waiting for a room. No distress.      Corinne D Frick, RN  09/03/24 [id number]

## 2024-09-03 NOTE — TELEPHONE ENCOUNTER
Called patient's wife back in response to [employer] message received. All questions answered. Patient agreeable to going to ED and will head over there now to the ED at [facility].

## 2024-09-03 NOTE — TELEPHONE ENCOUNTER
Left detailed VM for patient's spouse to let her know that I spoke with Dr. Ruelas regarding the symptoms that the patient is experiencing. I informed her that Dr. Ruelas would like him to go to the ED to be evaluated since he is having increased fatigue and leg weakness. I advised her that it is very important she take him to be fully evaluated and I will enter an order for him to go there. Callback number left if needed.

## 2024-09-04 ENCOUNTER — APPOINTMENT (OUTPATIENT)
Dept: PHYSICAL THERAPY | Age: 69
End: 2024-09-04
Payer: MEDICARE

## 2024-09-04 VITALS
OXYGEN SATURATION: 96 % | HEART RATE: 88 BPM | DIASTOLIC BLOOD PRESSURE: 63 MMHG | SYSTOLIC BLOOD PRESSURE: 101 MMHG | RESPIRATION RATE: 16 BRPM | TEMPERATURE: 97.9 F

## 2024-09-04 RX ADMIN — IOHEXOL 85 ML: 350 INJECTION, SOLUTION INTRAVENOUS at 00:05

## 2024-09-04 NOTE — ED PROVIDER NOTES
History  Chief Complaint   Patient presents with    Weakness - Generalized     C/o of increased weakness from hips down and decreased balance. Eating and drinking per normal. Also has had a recent pain from his back to right knee, managing with lidocaine patch     Patient is a 68-year-old male with past medical history of pulmonary emboli, non-small cell lung cancer with metastases to the brain, metastasis to the spine, lower extremity edema, on chemotherapy who presents emergency department for generalized weakness.  Patient reported he has been on chemotherapy for over a year, reports no changes in his chemotherapy, is on Keytruda, was told that the Keytruda may cause generalized lower extremity weakness, has been following with PT/OT for this.  Wife present reporting that the patient normally has 1 to 2 days of generalized weakness after his chemotherapy about 1 week later, reports that the chemotherapy this last week has resulted in him having multiple days of generalized weakness, he has been progressively more reliant on his cane over the course last month, is now almost falling when standing, his knees begin to shake he knocks at the knees, is having coccydynia pain, no other pain complaint, is not having radicular pain, not having burning pain shoot down the legs, does not have chest pain, does not have shortness of breath, does not have abdominal pain nausea or vomiting, no recent fevers, has not taken anything at home for pain control aside from Tylenol, is complaining of constipation which has started to resolve with lactulose, reporting no loss of sensation, no loss of bowel or bladder continence, no reported urinary retention, no dysuria symptoms, no diarrhea.         Prior to Admission Medications   Prescriptions Last Dose Informant Patient Reported? Taking?   Keytruda 100 MG/4ML injection   No No   Sig: INFUSE 200MG VIA IVPB OVER 30 MIN EVERY 21 DAYS   acetaminophen (TYLENOL) 500 mg tablet  Self Yes  No   Sig: Take 500 mg by mouth every 4 (four) hours as needed for mild pain   apixaban (ELIQUIS) 5 mg  Self No No   Sig: Take 1 tablet (5 mg total) by mouth 2 (two) times a day   dexamethasone (DECADRON) 0.5 mg tablet   No No   Sig: Take 2 tablets (1 mg total) by mouth 2 (two) times a day with meals   folic acid (FOLVITE) 1 mg tablet   No No   Sig: Take 1 tablet (1,000 mcg total) by mouth daily   gabapentin (NEURONTIN) 300 mg capsule   No No   Sig: Take 300 mg in the AM and 600 mg in the Pm   lactulose (CHRONULAC) 10 g/15 mL solution   No No   Sig: Take 30 mL (20 g total) by mouth 2 (two) times a day   lidocaine-prilocaine (EMLA) cream   No No   Sig: Apply to port 30 to 60 minutes prior to use   triamcinolone (KENALOG) 0.5 % cream   No No   Sig: Apply topically 2 (two) times a day      Facility-Administered Medications: None       Past Medical History:   Diagnosis Date    Cancer (HCC)        Past Surgical History:   Procedure Laterality Date    IR BIOPSY LUNG  4/6/2023    IR BIOPSY LUNG  4/26/2023    IR PORT PLACEMENT  5/16/2023       Family History   Problem Relation Age of Onset    Stroke Father      I have reviewed and agree with the history as documented.    E-Cigarette/Vaping    E-Cigarette Use Never User      E-Cigarette/Vaping Substances    Nicotine No     THC No     CBD No     Flavoring No     Other No     Unknown No      Social History     Tobacco Use    Smoking status: Every Day     Current packs/day: 0.50     Average packs/day: 0.5 packs/day for 51.7 years (25.8 ttl pk-yrs)     Types: Cigarettes     Start date: 1/1/1973    Smokeless tobacco: Never    Tobacco comments:     Patient is trying to quit,     Vaping Use    Vaping status: Never Used   Substance Use Topics    Alcohol use: Yes     Comment: rarely    Drug use: Never        Review of Systems    Physical Exam  ED Triage Vitals   Temperature Pulse Respirations Blood Pressure SpO2   09/03/24 1619 09/03/24 1619 09/03/24 1619 09/03/24 1619 09/03/24 1619    97.9 °F (36.6 °C) 81 18 161/88 97 %      Temp Source Heart Rate Source Patient Position - Orthostatic VS BP Location FiO2 (%)   09/03/24 1619 09/03/24 1619 09/03/24 1945 09/03/24 1945 --   Temporal Monitor Lying Right arm       Pain Score       09/03/24 1619       4             Orthostatic Vital Signs  Vitals:    09/03/24 1945 09/03/24 2130 09/03/24 2330 09/04/24 0130   BP: 139/90 127/80 129/86 101/63   Pulse: 72 70 68 88   Patient Position - Orthostatic VS: Lying Lying Lying Lying       Physical Exam  Vitals and nursing note reviewed.   Constitutional:       General: He is not in acute distress.     Appearance: He is well-developed. He is not ill-appearing or toxic-appearing.      Comments: Patient is chronically ill-appearing/cachectic, has proximal lower extremity muscle wasting consistent with past medical history of chemotherapy x 1 year, no evidence of active infection, no evidence of trauma, evidence of lumbar spinal tenderness to palpation, otherwise palpation to the cervical and thoracic spine without evidence of deformity or tenderness, sensation is intact in the bilateral lower extremities, is able to lift both legs off of the bed without difficulty, has good antigravity, able to wiggle toes without difficulty, sensation intact, neurovascularly intact in the bilateral lower extremities, no evidence of urinary retention, abdomen is not distended or tender   HENT:      Head: Normocephalic and atraumatic.      Nose: No congestion or rhinorrhea.      Mouth/Throat:      Pharynx: No oropharyngeal exudate or posterior oropharyngeal erythema.   Eyes:      Conjunctiva/sclera: Conjunctivae normal.   Cardiovascular:      Rate and Rhythm: Normal rate and regular rhythm.      Heart sounds: No murmur heard.  Pulmonary:      Effort: Pulmonary effort is normal. No respiratory distress.      Breath sounds: Normal breath sounds. No wheezing, rhonchi or rales.   Abdominal:      Palpations: Abdomen is soft.       Tenderness: There is no abdominal tenderness. There is no guarding or rebound.   Musculoskeletal:         General: No swelling.      Cervical back: Neck supple.      Right lower leg: Edema present.      Left lower leg: Edema present.   Skin:     General: Skin is warm and dry.      Capillary Refill: Capillary refill takes less than 2 seconds.      Findings: No erythema or rash.   Neurological:      General: No focal deficit present.      Mental Status: He is alert and oriented to person, place, and time.   Psychiatric:         Mood and Affect: Mood normal.         ED Medications  Medications   iohexol (OMNIPAQUE) 350 MG/ML injection (MULTI-DOSE) 85 mL (85 mL Intravenous Given 9/4/24 0005)       Diagnostic Studies  Results Reviewed       Procedure Component Value Units Date/Time    RBC Morphology Reflex Test [[id number]] Collected: 09/03/24 2105    Lab Status: Final result Specimen: Blood from Central Venous Line Updated: 09/03/24 2301    CBC and differential [[id number]]  (Abnormal) Collected: 09/03/24 2105    Lab Status: Final result Specimen: Blood from Central Venous Line Updated: 09/03/24 2227     WBC 2.01 Thousand/uL      RBC 3.25 Million/uL      Hemoglobin 11.5 g/dL      Hematocrit 33.6 %       fL      MCH 35.4 pg      MCHC 34.2 g/dL      RDW 14.5 %      MPV 9.4 fL      Platelets 158 Thousands/uL     Narrative:      This is an appended report.  These results have been appended to a previously verified report.    Manual Differential(PHLEBS Do Not Order) [[id number]]  (Abnormal) Collected: 09/03/24 2105    Lab Status: Final result Specimen: Blood from Central Venous Line Updated: 09/03/24 2227     Segmented % 38 %      Bands % 4 %      Lymphocytes % 36 %      Monocytes % 14 %      Eosinophils % 3 %      Basophils % 2 %      Myelocytes % 2 %      Atypical Lymphocytes % 1 %      Absolute Neutrophils 0.84 Thousand/uL      Absolute Lymphocytes 0.74 Thousand/uL      Absolute Monocytes 0.28 Thousand/uL       Absolute Eosinophils 0.06 Thousand/uL      Absolute Basophils 0.04 Thousand/uL      Absolute Myelocytes 0.04 Thousand/uL      Total Counted --     nRBC 1 /100 WBC      RBC Morphology Present     Platelet Estimate Adequate     Anisocytosis Present     Macrocytes Present     Ovalocytes Present     Poikilocytes Present    Comprehensive metabolic panel [[id number]]  (Abnormal) Collected: 09/03/24 2105    Lab Status: Final result Specimen: Blood from Central Venous Line Updated: 09/03/24 2139     Sodium 138 mmol/L      Potassium 3.8 mmol/L      Chloride 107 mmol/L      CO2 24 mmol/L      ANION GAP 7 mmol/L      BUN 21 mg/dL      Creatinine 0.82 mg/dL      Glucose 132 mg/dL      Calcium 8.4 mg/dL      Corrected Calcium 8.9 mg/dL      AST 21 U/L      ALT 44 U/L      Alkaline Phosphatase 50 U/L      Total Protein 6.5 g/dL      Albumin 3.4 g/dL      Total Bilirubin 0.45 mg/dL      eGFR 92 ml/min/1.73sq m     Narrative:      National Kid[employer] Foundation guidelines for Chronic Kidney Disease (CKD):     Stage 1 with normal or high GFR (GFR > 90 mL/min/1.73 square meters)    Stage 2 Mild CKD (GFR = 60-89 mL/min/1.73 square meters)    Stage 3A Moderate CKD (GFR = 45-59 mL/min/1.73 square meters)    Stage 3B Moderate CKD (GFR = 30-44 mL/min/1.73 square meters)    Stage 4 Severe CKD (GFR = 15-29 mL/min/1.73 square meters)    Stage 5 End Stage CKD (GFR <15 mL/min/1.73 square meters)  Note: GFR calculation is accurate only with a steady state creatinine    Magnesium [[id number]]  (Normal) Collected: 09/03/24 2105    Lab Status: Final result Specimen: Blood from Central Venous Line Updated: 09/03/24 2133     Magnesium 1.9 mg/dL     Phosphorus [[id number]]  (Normal) Collected: 09/03/24 2105    Lab Status: Final result Specimen: Blood from Central Venous Line Updated: 09/03/24 2133     Phosphorus 3.2 mg/dL                    CT chest abdomen pelvis w contrast   Final Result by Kasi Munguia DO (09/04 0114)      Similar appearance of  right upper lobe mass. No evidence of disease progression.      Pancreatic cystic lesions. Recommend MRI with and without contrast for complete characterization on an outpatient basis.      The study was marked in EPIC for immediate notification.      Workstation performed: [id number]               Procedures  Procedures      ED Course  ED Course as of 09/05/24 1652   Tue Sep 03, 2024   2140 Patient's place evidence of pancytopenia, white blood cell count 2.01, hemoglobin 11.5, is on chemotherapeutics, decreased from prior values    Mag and Phos within normal limits, CMP within normal limits, pending CT                             SBIRT 22yo+      Flowsheet Row Most Recent Value   Initial Alcohol Screen: US AUDIT-C     1. How often do you have a drink containing alcohol? 0 Filed at: 09/03/2024 1946   2. How many drinks containing alcohol do you have on a typical day you are drinking?  0 Filed at: 09/03/2024 1946   3b. FEMALE Any Age, or MALE 65+: How often do you have 4 or more drinks on one occassion? 0 Filed at: 09/03/2024 1946   Audit-C Score 0 Filed at: 09/03/2024 1946   CRISTINA: How many times in the past year have you...    Used an illegal drug or used a prescription medication for non-medical reasons? Never Filed at: 09/03/2024 1946                  Medical Decision Making  Vital signs on arrival within normal limits. On exam patient is seen in the bed, alert, oriented, well-appearing, proximal muscle wasting/evidence of chronic malnutrition in setting of chemotherapy, otherwise well-appearing.    History and physical exam most consistent with proximal muscle wasting secondary to chemotherapy. However, differential diagnosis included but not limited to metastases, pancytopenia, electrolyte abnormality, viral infection. Plan after discussion with the patient, he does not wish to stay the night in the hospital, would prefer to go to outpatient MRI this weekend, advised the patient that he should receive at least a  laboratory investigation and a repeat imaging with CT chest and pelvis with contrast to look for evidence of new or worsening disease, patient agreeable that if he stays for these tests and there is evidence of new or worsening disease, evidence of infection, he may receive an emergent MRI.  Will obtain mag/Phos/CBC/CMP, ECG, CT chest and pelvis with contrast.    View ED course above for further discussion on patient workup.     Laboratory evaluation demonstrating mag and Phos within normal limits, CMP is normal, CBC with evidence of pancytopenia, consistent with chemotherapy x 1 week ago, no evidence of infection, consistent with patient's prior ECG without evidence of arrhythmia or features concerning for ACS.     Patient initially unwilling to stay for CT chest abdomen pelvis, now consenting to CT chest abdomen pelvis, imaging results:     IMPRESSION:     Similar appearance of right upper lobe mass. No evidence of disease progression.     Pancreatic cystic lesions. Recommend MRI with and without contrast for complete characterization on an outpatient basis.      All labs reviewed and utilized in the medical decision making process  All radiology studies independently viewed by me and interpreted by the radiologist.  I reviewed all testing with the patient.     Upon re-evaluation patient educated about the incidental findings consistent with pancreatic cystic lesions, patient educated concerning repeat imaging follow-up recommendations, return precautions given, PCP follow-up recommended, patient encouraged to continue with MRI follow-up as scheduled, return precautions given.      Amount and/or Complexity of Data Reviewed  Labs: ordered.  Radiology: ordered.    Risk  Prescription drug management.          Disposition  Final diagnoses:   Weakness   Chemotherapy induced neutropenia (HCC)   Muscle wasting   Pancreatic cyst     Time reflects when diagnosis was documented in both MDM as applicable and the Disposition  within this note       Time User Action Codes Description Comment    9/3/2024 11:45 PM Gregory Aleman [R53.1] Weakness     9/3/2024 11:49 PM Gregory Aleman [D70.1,  T45.1X5A] Chemotherapy induced neutropenia (HCC)     9/3/2024 11:50 PM Gregory Aleman [M62.50] Muscle wasting     9/4/2024  1:26 AM Gregory Aleman [K86.2] Pancreatic cyst           ED Disposition       ED Disposition   Discharge    Condition   Stable    Date/Time   Wed Sep 4, 2024 0126    Comment   Claude Austin discharge to home/self care.                   Follow-up Information       Follow up With Specialties Details Why Contact Info Additional Information    Jose James MD Internal Medicine Schedule an appointment as soon as possible for a visit in 1 week  [address] [place] [address] [phone]       [facility] Emergency Department Emergency Medicine Go to  If symptoms worsen [address] [place] [id number] [phone] [facility] Emergency Department, [address] [place] [address], [id number] [phone]            Discharge Medication List as of 9/4/2024  1:26 AM        CONTINUE these medications which have NOT CHANGED    Details   acetaminophen (TYLENOL) 500 mg tablet Take 500 mg by mouth every 4 (four) hours as needed for mild pain, Historical Med      apixaban (ELIQUIS) 5 mg Take 1 tablet (5 mg total) by mouth 2 (two) times a day, Starting Wed 5/31/2023, Print      dexamethasone (DECADRON) 0.5 mg tablet Take 2 tablets (1 mg total) by mouth 2 (two) times a day with meals, Starting Wed 8/14/2024, Normal      folic acid (FOLVITE) 1 mg tablet Take 1 tablet (1,000 mcg total) by mouth daily, Starting Mon 8/26/2024, Normal      gabapentin (NEURONTIN) 300 mg capsule Take 300 mg in the AM and 600 mg in the Pm, Normal      Keytruda 100 MG/4ML injection INFUSE 200MG VIA IVPB OVER 30 MIN EVERY 21 DAYS, Normal      lactulose (CHRONULAC) 10 g/15 mL solution Take 30  mL (20 g total) by mouth 2 (two) times a day, Starting Mon 8/26/2024, Normal      lidocaine-prilocaine (EMLA) cream Apply to port 30 to 60 minutes prior to use, Normal      triamcinolone (KENALOG) 0.5 % cream Apply topically 2 (two) times a day, Starting Fri 5/10/2024, Normal           No discharge procedures on file.    PDMP Review         Value Time User    PDMP Reviewed  Yes 6/6/2024  1:23 PM Mak Feliciano MD             ED Provider  Attending physically available and evaluated Claude Austin. I managed the patient along with the ED Attending.    Electronically Signed by           Gregory Aleman DO  09/05/24 1035

## 2024-09-04 NOTE — ED ATTENDING ATTESTATION
I, Herbie Yoder MD, saw and evaluated the patient. I have discussed the patient with the resident and agree with the resident's findings, Plan of Care, and MDM as documented in the resident's note, except where noted. All available labs and Radiology studies were reviewed.  I was present for key portions of any procedure(s) performed by the resident and I was immediately available to provide assistance.    At this point I agree with the current assessment done in the Emergency Department.  I have conducted an independent evaluation of this patient a history and physical is as follows:    69 yo male with a complicated past medical history including metastatic non-small cell lung cancer, brain metastases, PVD, prior PE, and chronic back pain brought to the ED by his spouse for evaluation of generalized weakness.    ROS: per resident physician note    Gen: chronically ill appearing, cachectic, AA&Ox3  HEENT: PERRL, EOMI  Neck: supple  CV: RRR  Lungs: CTA B/L  Abdomen: soft, NT/ND  Ext: no swelling or deformity  Neuro: 5/5 strength all extremities, sensation grossly intact  Skin: no rash    ED Course        Critical Care Time  Procedures    "dehydration vs anemia vs electrolyte disturbance? Will check EKG, basic labs, and CT T/A/P. The patient would prefer to go home \"if possible\" and feels comfortable getting the MRI as an outpatient. Will continue to monitor in the ED. Disposition per workup and reassessment.      Critical Care Time  Procedures   "

## 2024-09-04 NOTE — DISCHARGE INSTRUCTIONS
CT results: Similar appearance of right upper lobe mass. No evidence of disease progression. Pancreatic cystic lesions. Recommend MRI with and without contrast for complete characterization on an outpatient basis.    Please return to the emergency department if you develop new or worsening symptoms including fever, chest pain, shortness of breath, nausea and vomiting that does not resolve on its own, new onset loss of bowel or bladder control, loss of strength and sensation in the legs.    Please follow-up with your primary care provider for additional care and management of your back pain symptoms, please follow up with your oncology provider for further discussion of cancer and pancreatic cystic lesions.      Please obtain outpatient MRI as scheduled.    Please continue to take your home medications as prescribed.

## 2024-09-05 ENCOUNTER — APPOINTMENT (OUTPATIENT)
Dept: PHYSICAL THERAPY | Age: 69
End: 2024-09-05
Payer: MEDICARE

## 2024-09-06 ENCOUNTER — OFFICE VISIT (OUTPATIENT)
Dept: PHYSICAL THERAPY | Age: 69
End: 2024-09-06
Payer: MEDICARE

## 2024-09-06 DIAGNOSIS — R53.1 WEAKNESS: ICD-10-CM

## 2024-09-06 DIAGNOSIS — R26.89 BALANCE DISORDER: Primary | ICD-10-CM

## 2024-09-06 PROCEDURE — 97530 THERAPEUTIC ACTIVITIES: CPT

## 2024-09-06 PROCEDURE — 97112 NEUROMUSCULAR REEDUCATION: CPT

## 2024-09-06 NOTE — PROGRESS NOTES
Daily Note     Today's date: 2024  Patient name: Claude Austin  : 1955  MRN: [id number]  Referring provider: Mak Feliciano MD  Dx:   Encounter Diagnosis     ICD-10-CM    1. Balance disorder  R26.89       2. Weakness  R53.1           Start Time: 1129  Stop Time: 1200  Total time in clinic (min): 31 minutes    Subjective: Pt reports he went to the ER on Tuesday following 2 episodes of severe LE weakness and fatigue. States he called his oncologist, who suggested going to the ER for full exam. Per pt, the doctors at the hospital believe the weakness is a side effect of his chemotherapy, though pt is scheduled for MRI of lumbar spine on Saturday to determine other possible causes.      Objective: See treatment diary below      Assessment: Interventions today continued to focus on general strengthening. Pt displayed significantly greater difficulty with SLRs of LLE compared to RLE, with increased quad lad and shakiness. Pt unable to perform more than 1 set of hip adduction ball squeezes due to significant shaking and tremoring of legs during the intervention. Pt appeared more steady on his feet overall today, but still with slowed gait speed and occasional lateral lean. Tolerated treatment well. Patient demonstrated fatigue post treatment and would benefit from continued PT      Plan: Continue per plan of care.      Precautions: Precautions: Stage IV cancer, thoracic metastasis avoid lifting from flexed positions, NO GRADE 5 MOBS, avoid bridges    Manuals    90/90 sciatic nerv glides JF RLE                                               Neuro Re-Ed            Palloff press GTB 2x10 b/l           SLR 2x10 RLE x10ea       2x10ea   Mini squats            Standing hip abd            LTR 2x10 L side only 2x10 L side only       2x10 ea   Tandem stance            Low row  GTB 2x10          TB Ext            Toe taps on step/  HT            Rocker board  (Cue toe flex during neck ext)         "    Tandem ambulation            FTEC on foam            Stacking cones in cabinet            Ther Ex            Nu step ROM 10' 5'       UB 10' lvl 2   Standing lumbar extension            Nerve glides 3x10 RLE seated            Seated shoulder flexion            Standing shoulder flexion            Seated Marches            Wall push up           At table 3x10   Modified hip extension            Bridges            Assessment of LE strength and balance            Leg Press            Hip Adduction Hooklying 20x5\"        Hooklying 20x5\"                           Ther Activity            [place] Walkouts  Backwards 9.5# 2x10       Backwards 9.5# 2x10   STS            FSU            Gait Training                                    Modalities                                                                       "

## 2024-09-07 ENCOUNTER — HOSPITAL ENCOUNTER (OUTPATIENT)
Dept: RADIOLOGY | Facility: HOSPITAL | Age: 69
Discharge: HOME | End: 2024-09-07
Payer: MEDICARE

## 2024-09-07 DIAGNOSIS — C34.91 NON-SMALL CELL CANCER OF RIGHT LUNG (HCC): ICD-10-CM

## 2024-09-07 DIAGNOSIS — R26.89 LOSS OF BALANCE: ICD-10-CM

## 2024-09-07 DIAGNOSIS — D49.2 THORACIC SPINE TUMOR: ICD-10-CM

## 2024-09-07 PROCEDURE — A9585 GADOBUTROL INJECTION: HCPCS

## 2024-09-07 PROCEDURE — 72158 MRI LUMBAR SPINE W/O & W/DYE: CPT

## 2024-09-07 PROCEDURE — 72157 MRI CHEST SPINE W/O & W/DYE: CPT

## 2024-09-07 RX ORDER — GADOBUTROL 604.72 MG/ML
6 INJECTION INTRAVENOUS
Status: COMPLETED | OUTPATIENT
Start: 2024-09-07 | End: 2024-09-07

## 2024-09-07 RX ADMIN — GADOBUTROL 6 ML: 604.72 INJECTION INTRAVENOUS at 19:28

## 2024-09-09 ENCOUNTER — TELEPHONE (OUTPATIENT)
Age: 69
End: 2024-09-09

## 2024-09-09 ENCOUNTER — TELEPHONE (OUTPATIENT)
Dept: HEMATOLOGY ONCOLOGY | Facility: CLINIC | Age: 69
End: 2024-09-09

## 2024-09-09 ENCOUNTER — HOSPITAL ENCOUNTER (INPATIENT)
Facility: HOSPITAL | Age: 69
LOS: 2 days | Discharge: HOME | DRG: 055 | End: 2024-09-11
Attending: EMERGENCY MEDICINE | Admitting: INTERNAL MEDICINE
Payer: MEDICARE

## 2024-09-09 DIAGNOSIS — C34.90 PRIMARY MALIGNANT NEOPLASM OF LUNG METASTATIC TO OTHER SITE, UNSPECIFIED LATERALITY (HCC): Primary | ICD-10-CM

## 2024-09-09 DIAGNOSIS — C34.91 NON-SMALL CELL CANCER OF RIGHT LUNG (HCC): ICD-10-CM

## 2024-09-09 DIAGNOSIS — C34.91 NON-SMALL CELL CANCER OF RIGHT LUNG (HCC): Primary | ICD-10-CM

## 2024-09-09 DIAGNOSIS — G89.3 CANCER ASSOCIATED PAIN: ICD-10-CM

## 2024-09-09 DIAGNOSIS — C79.49: ICD-10-CM

## 2024-09-09 DIAGNOSIS — D49.2 THORACIC SPINE TUMOR: ICD-10-CM

## 2024-09-09 DIAGNOSIS — M50.30 DDD (DEGENERATIVE DISC DISEASE), CERVICAL: ICD-10-CM

## 2024-09-09 LAB
ALBUMIN SERPL BCG-MCNC: 3.4 G/DL (ref 3.5–5)
ALP SERPL-CCNC: 50 U/L (ref 34–104)
ALT SERPL W P-5'-P-CCNC: 28 U/L (ref 7–52)
ANION GAP SERPL CALCULATED.3IONS-SCNC: 8 MMOL/L (ref 4–13)
AST SERPL W P-5'-P-CCNC: 18 U/L (ref 13–39)
BASOPHILS # BLD MANUAL: 0 THOUSAND/UL (ref 0–0.1)
BASOPHILS NFR MAR MANUAL: 0 % (ref 0–1)
BILIRUB SERPL-MCNC: 0.29 MG/DL (ref 0.2–1)
BUN SERPL-MCNC: 25 MG/DL (ref 5–25)
CALCIUM ALBUM COR SERPL-MCNC: 8.9 MG/DL (ref 8.3–10.1)
CALCIUM SERPL-MCNC: 8.4 MG/DL (ref 8.4–10.2)
CHLORIDE SERPL-SCNC: 111 MMOL/L (ref 96–108)
CO2 SERPL-SCNC: 22 MMOL/L (ref 21–32)
CREAT SERPL-MCNC: 1.17 MG/DL (ref 0.6–1.3)
EOSINOPHIL # BLD MANUAL: 0.1 THOUSAND/UL (ref 0–0.4)
EOSINOPHIL NFR BLD MANUAL: 2 % (ref 0–6)
ERYTHROCYTE [DISTWIDTH] IN BLOOD BY AUTOMATED COUNT: 15.4 % (ref 11.6–15.1)
GFR SERPL CREATININE-BSD FRML MDRD: 64 ML/MIN/1.73SQ M
GLUCOSE SERPL-MCNC: 101 MG/DL (ref 65–140)
HCT VFR BLD AUTO: 34.9 % (ref 36.5–49.3)
HGB BLD-MCNC: 11.8 G/DL (ref 12–17)
LYMPHOCYTES # BLD AUTO: 0.64 THOUSAND/UL (ref 0.6–4.47)
LYMPHOCYTES # BLD AUTO: 13 % (ref 14–44)
MCH RBC QN AUTO: 35.4 PG (ref 26.8–34.3)
MCHC RBC AUTO-ENTMCNC: 33.8 G/DL (ref 31.4–37.4)
MCV RBC AUTO: 105 FL (ref 82–98)
MONOCYTES # BLD AUTO: 0.44 THOUSAND/UL (ref 0–1.22)
MONOCYTES NFR BLD: 9 % (ref 4–12)
MYELOCYTE ABSOLUTE CT: 0.05 THOUSAND/UL (ref 0–0.1)
MYELOCYTES NFR BLD MANUAL: 1 % (ref 0–1)
NEUTROPHILS # BLD MANUAL: 3.68 THOUSAND/UL (ref 1.85–7.62)
NEUTS BAND NFR BLD MANUAL: 6 % (ref 0–8)
NEUTS SEG NFR BLD AUTO: 69 % (ref 43–75)
PLATELET # BLD AUTO: 167 THOUSANDS/UL (ref 149–390)
PLATELET BLD QL SMEAR: ADEQUATE
PMV BLD AUTO: 9.1 FL (ref 8.9–12.7)
POTASSIUM SERPL-SCNC: 4.2 MMOL/L (ref 3.5–5.3)
PROT SERPL-MCNC: 6.5 G/DL (ref 6.4–8.4)
RBC # BLD AUTO: 3.33 MILLION/UL (ref 3.88–5.62)
RBC MORPH BLD: NORMAL
SODIUM SERPL-SCNC: 141 MMOL/L (ref 135–147)
WBC # BLD AUTO: 4.91 THOUSAND/UL (ref 4.31–10.16)

## 2024-09-09 PROCEDURE — 96361 HYDRATE IV INFUSION ADD-ON: CPT

## 2024-09-09 PROCEDURE — 85007 BL SMEAR W/DIFF WBC COUNT: CPT | Performed by: EMERGENCY MEDICINE

## 2024-09-09 PROCEDURE — 85027 COMPLETE CBC AUTOMATED: CPT | Performed by: EMERGENCY MEDICINE

## 2024-09-09 PROCEDURE — 96374 THER/PROPH/DIAG INJ IV PUSH: CPT

## 2024-09-09 PROCEDURE — 80053 COMPREHEN METABOLIC PANEL: CPT | Performed by: EMERGENCY MEDICINE

## 2024-09-09 PROCEDURE — 99285 EMERGENCY DEPT VISIT HI MDM: CPT | Performed by: EMERGENCY MEDICINE

## 2024-09-09 PROCEDURE — 93005 ELECTROCARDIOGRAM TRACING: CPT

## 2024-09-09 PROCEDURE — 36415 COLL VENOUS BLD VENIPUNCTURE: CPT | Performed by: EMERGENCY MEDICINE

## 2024-09-09 PROCEDURE — 99284 EMERGENCY DEPT VISIT MOD MDM: CPT

## 2024-09-09 RX ORDER — GABAPENTIN 300 MG/1
CAPSULE ORAL
Qty: 270 CAPSULE | Refills: 0 | Status: SHIPPED | OUTPATIENT
Start: 2024-09-09

## 2024-09-09 RX ORDER — ACETAMINOPHEN 325 MG/1
650 TABLET ORAL ONCE
Status: COMPLETED | OUTPATIENT
Start: 2024-09-09 | End: 2024-09-09

## 2024-09-09 RX ORDER — SODIUM CHLORIDE, SODIUM LACTATE, POTASSIUM CHLORIDE, CALCIUM CHLORIDE 600; 310; 30; 20 MG/100ML; MG/100ML; MG/100ML; MG/100ML
125 INJECTION, SOLUTION INTRAVENOUS CONTINUOUS
Status: DISCONTINUED | OUTPATIENT
Start: 2024-09-09 | End: 2024-09-11

## 2024-09-09 RX ORDER — DEXAMETHASONE SODIUM PHOSPHATE 4 MG/ML
4 INJECTION, SOLUTION INTRA-ARTICULAR; INTRALESIONAL; INTRAMUSCULAR; INTRAVENOUS; SOFT TISSUE ONCE
Status: COMPLETED | OUTPATIENT
Start: 2024-09-09 | End: 2024-09-09

## 2024-09-09 RX ADMIN — ACETAMINOPHEN 650 MG: 325 TABLET ORAL at 23:23

## 2024-09-09 RX ADMIN — SODIUM CHLORIDE, SODIUM LACTATE, POTASSIUM CHLORIDE, AND CALCIUM CHLORIDE 125 ML/HR: .6; .31; .03; .02 INJECTION, SOLUTION INTRAVENOUS at 22:09

## 2024-09-09 RX ADMIN — DEXAMETHASONE SODIUM PHOSPHATE 4 MG: 4 INJECTION INTRA-ARTICULAR; INTRALESIONAL; INTRAMUSCULAR; INTRAVENOUS; SOFT TISSUE at 21:54

## 2024-09-09 NOTE — TELEPHONE ENCOUNTER
I called the patient to review the MRI results of his lumbar spine.  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  I recommended that he go to the ER in [place] where we can expedite radiation oncology consult and hopefully start treatment soon.  The patient states that he will go tomorrow morning because they have something important to do for his wife's job.  I asked him in the meantime to start dexamethasone 4 mg p.o. every 6 and this should be continued IV once he gets there.  A stat radiation oncology consultation should be placed upon arrival.

## 2024-09-09 NOTE — TELEPHONE ENCOUNTER
Rose from [facility]'s Radiology Reading room called. Requested for someone to speak directly to the Radiologist regarding finding on this patient's MRI.     Attempted warm transfer to office but there was no answer.    Rose got Jewel Pleitez on the phone who relayed the below information from patient's MRI lumbar/thoracic spine from 9/7/24.    New lumbar lesion suspicious leptomeningeal mets  Multiple lesion in lumbar spine  Predominantly t12 and sacral area but other smaller ones throughout lumbar spine    Thoractic-T1/t2 history of mets  Osseous lesions decreased in size but still some residual lesions remaining at upper thoracic levels

## 2024-09-09 NOTE — Clinical Note
Case was discussed with eva and the patient's admission status was agreed to be Admission Status: inpatient status to the service of Dr. carter .

## 2024-09-10 ENCOUNTER — APPOINTMENT (INPATIENT)
Dept: MRI IMAGING | Facility: HOSPITAL | Age: 69
DRG: 055 | End: 2024-09-10
Payer: MEDICARE

## 2024-09-10 ENCOUNTER — APPOINTMENT (OUTPATIENT)
Dept: RADIATION ONCOLOGY | Facility: HOSPITAL | Age: 69
End: 2024-09-10
Attending: RADIOLOGY
Payer: MEDICARE

## 2024-09-10 ENCOUNTER — APPOINTMENT (OUTPATIENT)
Dept: PHYSICAL THERAPY | Age: 69
End: 2024-09-10
Payer: MEDICARE

## 2024-09-10 ENCOUNTER — DOCUMENTATION (OUTPATIENT)
Dept: HEMATOLOGY ONCOLOGY | Facility: CLINIC | Age: 69
End: 2024-09-10

## 2024-09-10 LAB
ANION GAP SERPL CALCULATED.3IONS-SCNC: 7 MMOL/L (ref 4–13)
ATRIAL RATE: 80 BPM
BUN SERPL-MCNC: 22 MG/DL (ref 5–25)
CALCIUM SERPL-MCNC: 8.4 MG/DL (ref 8.4–10.2)
CHLORIDE SERPL-SCNC: 110 MMOL/L (ref 96–108)
CO2 SERPL-SCNC: 20 MMOL/L (ref 21–32)
CREAT SERPL-MCNC: 0.9 MG/DL (ref 0.6–1.3)
ERYTHROCYTE [DISTWIDTH] IN BLOOD BY AUTOMATED COUNT: 15.2 % (ref 11.6–15.1)
GFR SERPL CREATININE-BSD FRML MDRD: 88 ML/MIN/1.73SQ M
GLUCOSE SERPL-MCNC: 134 MG/DL (ref 65–140)
HCT VFR BLD AUTO: 34.3 % (ref 36.5–49.3)
HGB BLD-MCNC: 11.6 G/DL (ref 12–17)
MCH RBC QN AUTO: 35.2 PG (ref 26.8–34.3)
MCHC RBC AUTO-ENTMCNC: 33.8 G/DL (ref 31.4–37.4)
MCV RBC AUTO: 104 FL (ref 82–98)
P AXIS: 66 DEGREES
PLATELET # BLD AUTO: 172 THOUSANDS/UL (ref 149–390)
PMV BLD AUTO: 9.4 FL (ref 8.9–12.7)
POTASSIUM SERPL-SCNC: 4.6 MMOL/L (ref 3.5–5.3)
PR INTERVAL: 164 MS
QRS AXIS: 86 DEGREES
QRSD INTERVAL: 90 MS
QT INTERVAL: 378 MS
QTC INTERVAL: 435 MS
RBC # BLD AUTO: 3.3 MILLION/UL (ref 3.88–5.62)
SODIUM SERPL-SCNC: 137 MMOL/L (ref 135–147)
T WAVE AXIS: 73 DEGREES
VENTRICULAR RATE: 80 BPM
WBC # BLD AUTO: 5.22 THOUSAND/UL (ref 4.31–10.16)

## 2024-09-10 PROCEDURE — 99223 1ST HOSP IP/OBS HIGH 75: CPT | Performed by: INTERNAL MEDICINE

## 2024-09-10 PROCEDURE — 85027 COMPLETE CBC AUTOMATED: CPT

## 2024-09-10 PROCEDURE — 36415 COLL VENOUS BLD VENIPUNCTURE: CPT

## 2024-09-10 PROCEDURE — 77290 THER RAD SIMULAJ FIELD CPLX: CPT | Performed by: RADIOLOGY

## 2024-09-10 PROCEDURE — A9585 GADOBUTROL INJECTION: HCPCS | Performed by: INTERNAL MEDICINE

## 2024-09-10 PROCEDURE — 72197 MRI PELVIS W/O & W/DYE: CPT

## 2024-09-10 PROCEDURE — 80048 BASIC METABOLIC PNL TOTAL CA: CPT

## 2024-09-10 PROCEDURE — 77263 THER RADIOLOGY TX PLNG CPLX: CPT | Performed by: RADIOLOGY

## 2024-09-10 PROCEDURE — 93010 ELECTROCARDIOGRAM REPORT: CPT | Performed by: INTERNAL MEDICINE

## 2024-09-10 PROCEDURE — 99233 SBSQ HOSP IP/OBS HIGH 50: CPT | Performed by: RADIOLOGY

## 2024-09-10 RX ORDER — GABAPENTIN 300 MG/1
600 CAPSULE ORAL
Status: DISCONTINUED | OUTPATIENT
Start: 2024-09-10 | End: 2024-09-11 | Stop reason: HOSPADM

## 2024-09-10 RX ORDER — GABAPENTIN 300 MG/1
600 CAPSULE ORAL
Status: DISCONTINUED | OUTPATIENT
Start: 2024-09-10 | End: 2024-09-10

## 2024-09-10 RX ORDER — PANTOPRAZOLE SODIUM 40 MG/1
40 TABLET, DELAYED RELEASE ORAL
Status: DISCONTINUED | OUTPATIENT
Start: 2024-09-10 | End: 2024-09-11 | Stop reason: HOSPADM

## 2024-09-10 RX ORDER — ACETAMINOPHEN 325 MG/1
975 TABLET ORAL EVERY 8 HOURS PRN
Status: DISCONTINUED | OUTPATIENT
Start: 2024-09-10 | End: 2024-09-11 | Stop reason: HOSPADM

## 2024-09-10 RX ORDER — GABAPENTIN 300 MG/1
300 CAPSULE ORAL
Status: DISCONTINUED | OUTPATIENT
Start: 2024-09-10 | End: 2024-09-11 | Stop reason: HOSPADM

## 2024-09-10 RX ORDER — GADOBUTROL 604.72 MG/ML
7 INJECTION INTRAVENOUS
Status: COMPLETED | OUTPATIENT
Start: 2024-09-10 | End: 2024-09-10

## 2024-09-10 RX ORDER — LACTULOSE 10 G/15ML
20 SOLUTION ORAL 2 TIMES DAILY
Status: DISCONTINUED | OUTPATIENT
Start: 2024-09-10 | End: 2024-09-11 | Stop reason: HOSPADM

## 2024-09-10 RX ORDER — DEXAMETHASONE SODIUM PHOSPHATE 4 MG/ML
4 INJECTION, SOLUTION INTRA-ARTICULAR; INTRALESIONAL; INTRAMUSCULAR; INTRAVENOUS; SOFT TISSUE EVERY 6 HOURS SCHEDULED
Status: DISCONTINUED | OUTPATIENT
Start: 2024-09-10 | End: 2024-09-11 | Stop reason: HOSPADM

## 2024-09-10 RX ORDER — FOLIC ACID 1 MG/1
1000 TABLET ORAL DAILY
Status: DISCONTINUED | OUTPATIENT
Start: 2024-09-10 | End: 2024-09-11 | Stop reason: HOSPADM

## 2024-09-10 RX ADMIN — DEXAMETHASONE SODIUM PHOSPHATE 4 MG: 4 INJECTION INTRA-ARTICULAR; INTRALESIONAL; INTRAMUSCULAR; INTRAVENOUS; SOFT TISSUE at 18:18

## 2024-09-10 RX ADMIN — GABAPENTIN 600 MG: 300 CAPSULE ORAL at 18:19

## 2024-09-10 RX ADMIN — LACTULOSE 20 G: 20 SOLUTION ORAL at 09:11

## 2024-09-10 RX ADMIN — SODIUM CHLORIDE, SODIUM LACTATE, POTASSIUM CHLORIDE, AND CALCIUM CHLORIDE 125 ML/HR: .6; .31; .03; .02 INJECTION, SOLUTION INTRAVENOUS at 23:13

## 2024-09-10 RX ADMIN — GADOBUTROL 7 ML: 604.72 INJECTION INTRAVENOUS at 18:14

## 2024-09-10 RX ADMIN — SODIUM CHLORIDE, SODIUM LACTATE, POTASSIUM CHLORIDE, AND CALCIUM CHLORIDE 125 ML/HR: .6; .31; .03; .02 INJECTION, SOLUTION INTRAVENOUS at 12:35

## 2024-09-10 RX ADMIN — APIXABAN 5 MG: 5 TABLET, FILM COATED ORAL at 18:18

## 2024-09-10 RX ADMIN — FOLIC ACID 1000 MCG: 1 TABLET ORAL at 09:12

## 2024-09-10 RX ADMIN — PANTOPRAZOLE SODIUM 40 MG: 40 TABLET, DELAYED RELEASE ORAL at 08:06

## 2024-09-10 RX ADMIN — DEXAMETHASONE SODIUM PHOSPHATE 4 MG: 4 INJECTION INTRA-ARTICULAR; INTRALESIONAL; INTRAMUSCULAR; INTRAVENOUS; SOFT TISSUE at 06:01

## 2024-09-10 RX ADMIN — GABAPENTIN 300 MG: 300 CAPSULE ORAL at 09:13

## 2024-09-10 RX ADMIN — ACETAMINOPHEN 975 MG: 325 TABLET ORAL at 06:02

## 2024-09-10 RX ADMIN — PANTOPRAZOLE SODIUM 40 MG: 40 TABLET, DELAYED RELEASE ORAL at 18:18

## 2024-09-10 RX ADMIN — APIXABAN 5 MG: 5 TABLET, FILM COATED ORAL at 09:14

## 2024-09-10 RX ADMIN — ACETAMINOPHEN 975 MG: 325 TABLET ORAL at 23:12

## 2024-09-10 RX ADMIN — DEXAMETHASONE SODIUM PHOSPHATE 4 MG: 4 INJECTION INTRA-ARTICULAR; INTRALESIONAL; INTRAMUSCULAR; INTRAVENOUS; SOFT TISSUE at 11:52

## 2024-09-10 RX ADMIN — ACETAMINOPHEN 975 MG: 325 TABLET ORAL at 14:17

## 2024-09-10 RX ADMIN — DEXAMETHASONE SODIUM PHOSPHATE 4 MG: 4 INJECTION INTRA-ARTICULAR; INTRALESIONAL; INTRAMUSCULAR; INTRAVENOUS; SOFT TISSUE at 02:52

## 2024-09-10 NOTE — ED PROVIDER NOTES
History  Chief Complaint   Patient presents with    Medical Problem     Reports small cell Ca treatments, reports worsening pain in lower back radiates down bottom right gluteal over the past month. Seen in [facility] last week, had CT and MRI completed over past week. Has mets to cervical spine and brain with new spread to sacral area and spinal cord.  Sent in by provider Dr. Ruelas for Prednisone and radiation consult due to scan results.        68 YR MALE-- WITH CURRENT METASTATIC SC LUNG CA- - ON CHEMO/IMMUNOTHERAPY -- OVER LAST 2 MONTHS WITH   LOW BACK PAIN- WITH PAIN IN R BUTTOCK TO R THIGH--  HAD RECENT MRI OF T L/S SPINE WITH WORSENING MET DX- TOLD TO COME TO ER FOR ADMIT FOR IV DEXAMETHASONE AND RADIATION ONCOLOGY CONSULT--HAS CHRONIC CONSTIPATION  WITH SOME STOOL LEAKAGE- NOTHING NEW- NO URINARY INCONTENANCE- NO CP- NO NEW /WORSENING SOB-- PT WALKS WITH KLEIN AT BASELINE--       History provided by:  Patient   used: No    Medical Problem  Severity:  Mild  Onset quality:  Gradual  Duration:  2 months  Timing:  Constant  Associated symptoms: no headaches and no myalgias        Prior to Admission Medications   Prescriptions Last Dose Informant Patient Reported? Taking?   Keytruda 100 MG/4ML injection   No No   Sig: INFUSE 200MG VIA IVPB OVER 30 MIN EVERY 21 DAYS   acetaminophen (TYLENOL) 500 mg tablet  Self Yes No   Sig: Take 500 mg by mouth every 4 (four) hours as needed for mild pain   apixaban (ELIQUIS) 5 mg  Self No No   Sig: Take 1 tablet (5 mg total) by mouth 2 (two) times a day   dexamethasone (DECADRON) 0.5 mg tablet   No No   Sig: Take 2 tablets (1 mg total) by mouth 2 (two) times a day with meals   folic acid (FOLVITE) 1 mg tablet   No No   Sig: Take 1 tablet (1,000 mcg total) by mouth daily   gabapentin (NEURONTIN) 300 mg capsule   No No   Sig: Take 300 mg in the AM and 600 mg in the Pm   lactulose (CHRONULAC) 10 g/15 mL solution   No No   Sig: Take 30 mL (20 g total) by mouth 2 (two)  times a day   lidocaine-prilocaine (EMLA) cream   No No   Sig: Apply to port 30 to 60 minutes prior to use   triamcinolone (KENALOG) 0.5 % cream   No No   Sig: Apply topically 2 (two) times a day      Facility-Administered Medications: None       Past Medical History:   Diagnosis Date    Cancer (HCC)     Small cell carcinoma (HCC)        Past Surgical History:   Procedure Laterality Date    IR BIOPSY LUNG  4/6/2023    IR BIOPSY LUNG  4/26/2023    IR PORT PLACEMENT  5/16/2023       Family History   Problem Relation Age of Onset    Stroke Father      I have reviewed and agree with the history as documented.    E-Cigarette/Vaping    E-Cigarette Use Never User      E-Cigarette/Vaping Substances    Nicotine No     THC No     CBD No     Flavoring No     Other No     Unknown No      Social History     Tobacco Use    Smoking status: Every Day     Current packs/day: 0.50     Average packs/day: 0.5 packs/day for 51.7 years (25.8 ttl pk-yrs)     Types: Cigarettes     Start date: 1/1/1973    Smokeless tobacco: Never    Tobacco comments:     Patient is trying to quit,     Vaping Use    Vaping status: Never Used   Substance Use Topics    Alcohol use: Yes     Comment: rarely    Drug use: Never       Review of Systems   Constitutional: Negative.    HENT: Negative.     Eyes: Negative.    Respiratory: Negative.     Cardiovascular: Negative.    Gastrointestinal: Negative.    Endocrine: Negative.    Genitourinary: Negative.    Musculoskeletal:  Positive for back pain and gait problem. Negative for arthralgias, joint swelling, myalgias, neck pain and neck stiffness.   Skin: Negative.    Allergic/Immunologic: Negative.    Neurological:  Negative for dizziness, tremors, seizures, syncope, facial asymmetry, speech difficulty, weakness, light-headedness, numbness and headaches.   Hematological: Negative.    Psychiatric/Behavioral: Negative.         Physical Exam  Physical Exam  Vitals and nursing note reviewed.   Constitutional:        General: He is not in acute distress.     Appearance: Normal appearance. He is not ill-appearing, toxic-appearing or diaphoretic.      Comments: AVSS- PULSE OX  99 % ON RA- INTERPRETATION IS NORMAL- NO INTERVENTION- - IN NAD   HENT:      Head: Normocephalic and atraumatic.      Nose: Nose normal.      Mouth/Throat:      Mouth: Mucous membranes are moist.   Eyes:      General: No scleral icterus.        Right eye: No discharge.         Left eye: No discharge.      Extraocular Movements: Extraocular movements intact.      Conjunctiva/sclera: Conjunctivae normal.      Pupils: Pupils are equal, round, and reactive to light.      Comments: MM PINK   Neck:      Vascular: No carotid bruit.      Comments: NO PMT C SPINE   Cardiovascular:      Rate and Rhythm: Normal rate and regular rhythm.      Pulses: Normal pulses.      Heart sounds: Normal heart sounds. No murmur heard.     No friction rub. No gallop.   Pulmonary:      Effort: Pulmonary effort is normal. No respiratory distress.      Breath sounds: No stridor. No wheezing, rhonchi or rales.      Comments: DECREASED BS-B- SYM BS-B   Chest:      Chest wall: No tenderness.   Abdominal:      General: Bowel sounds are normal. There is no distension.      Palpations: Abdomen is soft. There is no mass.      Tenderness: There is no abdominal tenderness. There is no right CVA tenderness, left CVA tenderness, guarding or rebound.      Hernia: No hernia is present.      Comments: SOFT NT/ND- NO HSM - NO CVA TENDERNESS- NO ASCITES- NO PERITONEAL SIGNS    Musculoskeletal:         General: No swelling, tenderness, deformity or signs of injury. Normal range of motion.      Cervical back: Normal range of motion and neck supple. No rigidity or tenderness.      Right lower leg: Edema present.      Left lower leg: Edema present.      Comments: EQUAL BILATERAL RADIAL/DP PULSES- 1 PLUS BEL PRETIBIAL EDEMA- NT- NO ASYM/ ERYTHEMA- GRAYISH BLE SKIN DISCOLORATION    Lymphadenopathy:       Cervical: No cervical adenopathy.   Skin:     General: Skin is warm.      Capillary Refill: Capillary refill takes less than 2 seconds.      Coloration: Skin is not jaundiced or pale.      Findings: No bruising, erythema, lesion or rash.   Neurological:      General: No focal deficit present.      Mental Status: He is alert and oriented to person, place, and time. Mental status is at baseline.      Cranial Nerves: No cranial nerve deficit.      Sensory: No sensory deficit.      Motor: No weakness.      Comments: NORMAL NON FOCAL NEURO EXAM    Psychiatric:         Mood and Affect: Mood normal.         Behavior: Behavior normal.         Thought Content: Thought content normal.         Judgment: Judgment normal.         Vital Signs  ED Triage Vitals [09/09/24 2055]   Temperature Pulse Respirations Blood Pressure SpO2   98.2 °F (36.8 °C) 86 16 117/82 99 %      Temp Source Heart Rate Source Patient Position - Orthostatic VS BP Location FiO2 (%)   Oral Monitor Sitting Right arm --      Pain Score       --           Vitals:    09/09/24 2055   BP: 117/82   Pulse: 86   Patient Position - Orthostatic VS: Sitting         Visual Acuity      ED Medications  Medications   dexamethasone (DECADRON) injection 4 mg (has no administration in time range)   lactated ringers infusion (has no administration in time range)       Diagnostic Studies  Results Reviewed       Procedure Component Value Units Date/Time    CBC and differential [[id number]] Collected: 09/09/24 2152    Lab Status: No result Specimen: Blood from Arm, Right     Comprehensive metabolic panel [[id number]] Collected: 09/09/24 2152    Lab Status: No result Specimen: Blood from Arm, Right                    No orders to display              Procedures  Procedures         ED Course  ED Course as of 09/09/24 2158   Mon Sep 09, 2024   2148 - TREY RAVI NOTE- RECENT LABS- IMAGIGN- ONCOLOGY NOTE ALL REVIEWED BY TREY RAVI                                               Medical Decision  Making  Pt with worsxening metastatic lung ca roxanna  spinal cord- but acute  neuro comps-- pt will need labs/ admit- iv dex-     Problems Addressed:  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     Details: All reviewed   Labs: ordered. Decision-making details documented in ED Course.     Details: All reviewed and compared   ECG/medicine tests: ordered and independent interpretation performed. Decision-making details documented in ED Course.     Details: All reviewed and comapred  Discussion of management or test interpretation with external provider(s): Moderate amount of er md thought complexity     Risk  OTC drugs.  Prescription drug management.  Decision regarding hospitalization.                 Disposition  Final diagnoses:   None     ED Disposition       None          Follow-up Information    None         Patient's Medications   Discharge Prescriptions    No medications on file       No discharge procedures on file.    PDMP Review         Value Time User    PDMP Reviewed  Yes 6/6/2024  1:23 PM Mak Feliciano MD            ED Provider  Electronically Signed by             Claude Pena MD  09/11/24 0853

## 2024-09-10 NOTE — ASSESSMENT & PLAN NOTE
Patient with history of multiple subsegmental pulmonary emboli that appear to have been diagnosed on 03/29/2023 and patient has been on anticoagulation since.    Plan  Continue Eliquis 5 mg twice daily

## 2024-09-10 NOTE — ASSESSMENT & PLAN NOTE
Patient reports chronic constipation with stool leakage for which uses lactulose.  He states this allows him to have a bowel movement 2-3 days.  Last bowel movement prior to arrival.     Plan:  Continue lactulose 20 g BID

## 2024-09-10 NOTE — ASSESSMENT & PLAN NOTE
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   Last chemo treatment 8/26/2024.  Next treatment scheduled for 9/16/2024. On Paraplatin, Alimta, and Keytruda.   Oncologist, Dr. 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 Multiple intradural extramedullary enhancing nodular lesions along the surface of the cauda equina nerve roots T12, L1, L3-L4, and sacral spinal canal -respectively there were tiny scattered areas of leptomeningeal sites of nodular enhancement along the cauda equina nerve roots the largest at T12 and S1-S2 levels.\received 1 treatment of radiation today    Plan  Follow-up on MRI of the cervical spine and brain  Dexamethasone 4 mg q6hrs   radiology oncology on board, appreciate recommendations  Neurosurgery on board, no intervention required  Monitor neuroexam  Pain control with home medications   Tylenol 975 mg TID PRN, gabapentin 300 mg in AM and 600 mg in PM  Consider lidocaine patch as this has helped in the past

## 2024-09-10 NOTE — PLAN OF CARE
Problem: PAIN - ADULT  Goal: Verbalizes/displays adequate comfort level or baseline comfort level  Description: Interventions:  - Encourage patient to monitor pain and request assistance  - Assess pain using appropriate pain scale  - Administer analgesics based on type and severity of pain and evaluate response  - Implement non-pharmacological measures as appropriate and evaluate response  - Consider cultural and social influences on pain and pain management  - Notify physician/advanced practitioner if interventions unsuccessful or patient reports new pain  Outcome: Progressing     Problem: INFECTION - ADULT  Goal: Absence or prevention of progression during hospitalization  Description: INTERVENTIONS:  - Assess and monitor for signs and symptoms of infection  - Monitor lab/diagnostic results  - Monitor all insertion sites, i.e. indwelling lines, tubes, and drains  - Monitor endotracheal if appropriate and nasal secretions for changes in amount and color  - [place] appropriate cooling/warming therapies per order  - Administer medications as ordered  - Instruct and encourage patient and family to use good hand hygiene technique  - Identify and instruct in appropriate isolation precautions for identified infection/condition  Outcome: Progressing     Problem: DISCHARGE PLANNING  Goal: Discharge to home or other facility with appropriate resources  Description: INTERVENTIONS:  - Identify barriers to discharge w/patient and caregiver  - Arrange for needed discharge resources and transportation as appropriate  - Identify discharge learning needs (meds, wound care, etc.)  - Arrange for interpretive services to assist at discharge as needed  - Refer to Case Management Department for coordinating discharge planning if the patient needs post-hospital services based on physician/advanced practitioner order or complex needs related to functional status, cognitive ability, or social support system  Outcome: Progressing      Problem: Knowledge Deficit  Goal: Patient/family/caregiver demonstrates understanding of disease process, treatment plan, medications, and discharge instructions  Description: Complete learning assessment and assess knowledge base.  Interventions:  - Provide teaching at level of understanding  - Provide teaching via preferred learning methods  Outcome: Progressing

## 2024-09-10 NOTE — ED PROCEDURE NOTE
PROCEDURE  ECG 12 Lead Documentation Only    Date/Time: 9/9/2024 10:06 PM    Performed by: Claude Pena MD  Authorized by: Claude Pena MD    Indications / Diagnosis:  ADMIT  ECG reviewed by me, the ED Provider: yes    Patient location:  ED  Previous ECG:     Previous ECG:  Compared to current    Comparison ECG info:  9/3/24    Similarity:  No change    Comparison to cardiac monitor: Yes    Interpretation:     Interpretation: non-specific    Rate:     ECG rate:  80    ECG rate assessment: normal    Rhythm:     Rhythm: sinus rhythm    Ectopy:     Ectopy: none    QRS:     QRS axis:  Normal    QRS intervals:  Normal  Conduction:     Conduction: normal    ST segments:     ST segments:  Normal  T waves:     T waves: flattening      Flattening:  AVL, V1 and V2  Other findings:     Other findings: poor R wave progression and U wave    Comments:      NO ECG SIGNS OF ISCHEMIA/ INJURY / R HEART STRAIN / KELSEY/PERICARDITIS        Claude Pena MD  09/09/24 5403

## 2024-09-10 NOTE — ASSESSMENT & PLAN NOTE
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   Last chemo treatment 8/26/2024.  Next treatment scheduled for 9/16/2024. On Paraplatin, Alimta, and Keytruda.   Oncologist, Dr. 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 Multiple intradural extramedullary enhancing nodular lesions along the surface of the cauda equina nerve roots T12, L1, L3-L4, and sacral spinal canal -respectively there were tiny scattered areas of leptomeningeal sites of nodular enhancement along the cauda equina nerve roots the largest at T12 and S1-S2 levels.    Plan    Dexamethasone 4 mg q6hrs   Consult radiology oncology, appreciate recommendations  Monitor neuroexam  Pain control with home medications   Tylenol 975 mg TID PRN, gabapentin 300 mg in AM and 600 mg in PM  Consider lidocaine patch as this has helped in the past

## 2024-09-10 NOTE — PROGRESS NOTES
In-basket message received from Dr. Ruelas to add patient to the neuro [facility] on 9/11/2024. Chart reviewed and prep completed.

## 2024-09-10 NOTE — CONSULTS
Consultation - Radiation Oncology   Claude Austin 68 y.o. male MRN: [id number]  Unit/Bed#: ED-41 Encounter: [id number]        History of Present Illness   Physician Requesting Consult: Zoey Manzo*  Reason for Consult / Principal Problem: Leptomeningeal disease    HPI: Claude Austin is a 68 y.o. male with known metastatic non-small cell lung cancer.      Per prior documentation, he was initially admitted due to right upper extremity symptoms.  With CT and MRI imaging notable for a 3.1 cm right upper lobe mass adjacent to the ribs with concern for extension to the epidural space, with thoracic spine MRI notable for an enhancing lesion involving the right T2 vertebral body, with involvement of adjacent posterior right first through third ribs, with the apical lung lesion extending into the right paravertebral soft tissue with involvement of the right anterior, lateral, and right posterior aspect of the epidural space spanning from T2 through mid T3, with effacement of the right T2-T3 foramen.  There is also small lesion at the posterior T1 vertebral body.  An MRI of the brain was obtained on 4/1/2023 which shows 6 metastatic lesions both in the cerebrum (bilaterally) and cerebellum. He was asymptomatic from the brain metastases.  He underwent palliative radiation therapy to the right upper lobe mass/ low cervical upper thoracic spine as well as whole brain radiation therapy, completing on 5/12/2023.     Following palliative radiation, he began carboplatin, pemetrexed and pembrolizumab.  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  He later discontinued carboplatin due to fatigue.  He was able to resume pembrolizumab for cycle 6.    CT chest/abdomen and pelvis from 9/4/24 demonstrated similar appearance of a right upper lobe mass with no evidence of disease progression.  There was a pancreatic cystic lesion.    He was evaluated by medical oncology on  "8/22/24.  He reported difficulty with mobility and falls.  This did not improve with physical therapy.  He had some tenderness in the L4-L5 region.  Conservative management was recommended.    On 9/3/24, he presented to the emergency department with generalized weakness.  Proximal lower extremity wasting was noted.      Outpatient MRI imaging was performed.    MRI thoracic and lumbar spine on 9/7/24 demonstrated  decreased soft tissue metastatic disease in the right lateral paravertebral space at T2-T3, extending through right neural T2-T3 foramen surrounding associated exiting nerve and decrease enhancing epidural soft tissue metastatic disease in the right anterolateral epidural space at T2 extending superiorly upwards towards the T1-T2 disc space.  There was decreased size of treated osseous metastatic lesions in the posterior T1 and posterior T2 vertebral bodies.  There was also decreased dsize of the right upper lobe pulmonary malignancy.   There was sacralized L5 vertebral body  and normal marrow signal int he visualized bony structures.  There were no discrete marrow lesions.  There were multiple intradural extramedullary enhancing nodular lesions along the surface of the cauda equina nerve roots T12, L1, L3-4 and sacral spinal canal with tiny scattered areas of leptomeningeal sites of nodular enhancement along the cauda equina nerve roots.  There was a dominant enhancing lesion at right T12 measuring 2.6 cm and a 3 cm enhancing nodular lesion at S1 to superior S2 level.      He has now been admitted for further management.  He has started dexamethasone 4 mg every 6 hours.    Upon interview, he endorses the above history. He has had several months of progressive generalized lower extremity weakness. He experienced 1 fall. He states his legs will just \"give out.\"  He denies focal numbness, weakness or tingling.  He is continent of bowel and bladder.  He is ambulatory with the assistance of a cane.  He is " without additional acute concerns.    Inpatient consult to Radiation Oncology  Consult performed by: Brandon Gaytan MD  Consult ordered by: Shirley Fragoso MD          Review of Systems Negative except as above.    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     Caris - PD-L1 0%, TMB 60, no other targetable mutations     5/2023 - palliative RT to T spine and brain     5/22/2023 - start carbo/pemetrexed/pembrolizumab     07/12/2023 suspected Rad/Immuno Pneumonitis Grade 1/2 (mild fever)      07/21/2023 discontinued Carbo d/t fatigue; and holding Keytruda in Cycle 4 dt Pneumonitis; will re-challenge in cycle 5      8/2023 - add back keytruda for cycle 6     11/2023 - dose reduce alimta by 10% due to fatigue     Thoracic spine tumor   4/2/2023 Initial Diagnosis    Thoracic spine tumor     Non-small cell cancer of right lung (HCC)   3/29/2023 -  Cancer Staged    Staging form: Lung, AJCC 8th Edition  - Clinical stage from 3/29/2023: Stage DORI (cT4, cN1, cM1b) - Signed by Nancy Ruelas DO on 6/16/2023 4/6/2023 Biopsy    Lung, right upper lobe, biopsy:     - Rare detached single and small clusters of atypical, degenerated epithelial cells.     - Predominantly fibroelastotic stromal fibrosis.     4/26/2023 Biopsy    Lung, right upper lobe mass, biopsy:      - Poorly differentiated non-small cell carcinoma most compatible with adenocarcinoma of the lung     5/1/2023 - 5/12/2023 Radiation    Course: C1    Plan ID Energy Fractions Dose per Fraction (cGy) Dose Correction (cGy) Total Dose Delivered (cGy) Elapsed Days   C7_T4_R Lung 10X/6X 10 / 10 300 0 3,000 11   HCS_WhBrain 6X 10 / 10 300 0 3,000 11      Dr. Ro Genao     5/9/2023 Initial Diagnosis    Non-small cell cancer of right lung (HCC)     5/22/2023 -  Chemotherapy    cyanocobalamin, 1,000 mcg, Intramuscular, Once, 9 of 9 cycles  Administration:  1,000 mcg (5/15/2023), 1,000 mcg (7/3/2023), 1,000 mcg (9/5/2023), 1,000 mcg (11/6/2023), 1,000 mcg (1/8/2024), 1,000 mcg (3/11/2024), 1,000 mcg (5/13/2024), 1,000 mcg (7/15/2024)  alteplase (CATHFLO), 2 mg, Intracatheter, Every 1 Minute as needed, 23 of 28 cycles  Administration: 2 mg (11/29/2023), 2 mg (12/19/2023), 2 mg (8/5/2024)  fosaprepitant (EMEND) IVPB, 150 mg, Intravenous, Once, 3 of 3 cycles  Administration: 150 mg (5/22/2023), 150 mg (7/3/2023), 150 mg (6/12/2023)  CARBOplatin (PARAPLATIN) IVPB ([facility] AUC DOSING), 544 mg, Intravenous, Once, 3 of 3 cycles  Administration: 544 mg (5/22/2023), 554 mg (7/3/2023), 544 mg (6/12/2023)  pemetrexed (ALIMTA) chemo infusion, 940 mg, Intravenous, Once, 23 of 28 cycles  Dose modification: 450 mg/m2 (original dose 500 mg/m2, Cycle 9, Reason: Dose Not Tolerated, Comment: 10% dose reduction due to fatigue), 450 mg/m2 (original dose 500 mg/m2, Cycle 10, Reason: Dose modified as per discussion with consulting physician), 400 mg/m2 (original dose 500 mg/m2, Cycle 16, Reason: Dose Not Tolerated, Comment: total 20% dose reduction due to fatigue)  Administration: 900 mg (5/22/2023), 900 mg (7/3/2023), 900 mg (9/5/2023), 900 mg (6/12/2023), 900 mg (8/14/2023), 900 mg (7/24/2023), 900 mg (9/25/2023), 900 mg (10/16/2023), 846 mg (11/6/2023), 846 mg (11/29/2023), 846 mg (12/19/2023), 846 mg (1/8/2024), 846 mg (1/29/2024), 846 mg (2/19/2024), 846 mg (3/11/2024), 752 mg (4/1/2024), 752 mg (4/22/2024), 752 mg (5/13/2024), 752 mg (6/3/2024), 752 mg (6/24/2024), 752 mg (7/15/2024), 752 mg (8/5/2024), 752 mg (8/26/2024)  pembrolizumab (KEYTRUDA) IVPB, 200 mg, Intravenous, Once, 22 of 27 cycles  Administration: 200 mg (5/22/2023), 200 mg (7/3/2023), 200 mg (9/5/2023), 200 mg (6/12/2023), 200 mg (8/14/2023), 200 mg (9/25/2023), 200 mg (10/16/2023), 200 mg (11/6/2023), 200 mg (11/29/2023), 200 mg (12/19/2023), 200 mg (1/8/2024), 200 mg (1/29/2024), 200 mg (2/19/2024), 200 mg (3/11/2024),  200 mg (4/1/2024), 200 mg (4/22/2024), 200 mg (5/13/2024), 200 mg (6/3/2024), 200 mg (6/24/2024), 200 mg (7/15/2024), 200 mg (8/5/2024), 200 mg (8/26/2024)     7/12/2023 Adverse Reaction    Hospitalization - Suspected Pneumonitis    7/12-14/2023 due to mild fever with CT chest redemonstrating left upper lobe inflammatory changes, previously seen on study 04/04/2023, ID and oncology evaluated, more suggestive of radiation/Keytruda induced pneumonitis grade 1/2; mild fever resolved; other differential/contributing etiology include recent tapering of steroid       7/21/2023 -  Chemotherapy    Recently suspected pneumonitis 7/12/2023 and reports fatigue 7/21/2023  Discontinuing carboplatin and holding Keytruda of cycle 4; with potential plan to rechallenge Keytruda in cycle 5     Metastasis to brain (HCC)   5/30/2023 Initial Diagnosis    Metastasis to brain (HCC)         Past Medical History:   Diagnosis Date    Cancer (HCC)     Small cell carcinoma (HCC)      Past Surgical History:   Procedure Laterality Date    IR BIOPSY LUNG  4/6/2023    IR BIOPSY LUNG  4/26/2023    IR PORT PLACEMENT  5/16/2023     Family History   Problem Relation Age of Onset    Stroke Father      Social History   Social History     Substance and Sexual Activity   Alcohol Use Yes    Comment: rarely     Social History     Substance and Sexual Activity   Drug Use Never     Social History     Tobacco Use   Smoking Status Every Day    Current packs/day: 0.50    Average packs/day: 0.5 packs/day for 51.7 years (25.8 ttl pk-yrs)    Types: Cigarettes    Start date: 1/1/1973   Smokeless Tobacco Never   Tobacco Comments    Patient is trying to quit,         Meds/Allergies     Current Facility-Administered Medications:     acetaminophen (TYLENOL) tablet 975 mg, 975 mg, Oral, Q8H PRN, Shirley Fragoso MD, 975 mg at 09/10/24 0602    apixaban (ELIQUIS) tablet 5 mg, 5 mg, Oral, BID, Shirley Fragoso MD    dexamethasone (DECADRON) injection 4 mg, 4 mg, Intravenous, Q6H  HUDSON, Shirley Fragoso MD, 4 mg at 09/10/24 0601    folic acid (FOLVITE) tablet 1,000 mcg, 1,000 mcg, Oral, Daily, Shirley Fragoso MD    gabapentin (NEURONTIN) capsule 300 mg, 300 mg, Oral, Daily, Shirley Fragoso MD    gabapentin (NEURONTIN) capsule 600 mg, 600 mg, Oral, Daily With Dinner, Pamela Puga MD    lactated ringers infusion, 125 mL/hr, Intravenous, Continuous, Shirley Fragoso MD, Last Rate: 125 mL/hr at 09/09/24 2209, 125 mL/hr at 09/09/24 2209    lactulose (CHRONULAC) oral solution 20 g, 20 g, Oral, BID, Shirley Fragoso MD    pantoprazole (PROTONIX) EC tablet 40 mg, 40 mg, Oral, BID AC, Pamela Puga MD    Current Outpatient Medications:     acetaminophen (TYLENOL) 500 mg tablet, Take 500 mg by mouth every 4 (four) hours as needed for mild pain, Disp: , Rfl:     apixaban (ELIQUIS) 5 mg, Take 1 tablet (5 mg total) by mouth 2 (two) times a day, Disp: 60 tablet, Rfl: 5    dexamethasone (DECADRON) 0.5 mg tablet, Take 2 tablets (1 mg total) by mouth 2 (two) times a day with meals (Patient taking differently: Take 1 mg by mouth daily with breakfast), Disp: 120 tablet, Rfl: 0    folic acid (FOLVITE) 1 mg tablet, Take 1 tablet (1,000 mcg total) by mouth daily, Disp: 90 tablet, Rfl: 1    gabapentin (NEURONTIN) 300 mg capsule, Take 300 mg in the AM and 600 mg in the Pm, Disp: 270 capsule, Rfl: 0    lactulose (CHRONULAC) 10 g/15 mL solution, Take 30 mL (20 g total) by mouth 2 (two) times a day, Disp: 1800 mL, Rfl: 5    Keytruda 100 MG/4ML injection, INFUSE 200MG VIA IVPB OVER 30 MIN EVERY 21 DAYS, Disp: 8 mL, Rfl: 5    lidocaine-prilocaine (EMLA) cream, Apply to port 30 to 60 minutes prior to use, Disp: 30 g, Rfl: 0      No Known Allergies    Objective   No intake or output data in the 24 hours ending 09/10/24 0720  Invasive Devices       Central Venous Catheter Line  Duration             Port A Cath 05/16/23 Left Internal jugular 482 days              Peripheral Intravenous Line  Duration             Peripheral IV  09/09/24 Right;Ventral (anterior) Forearm <1 day                  Physical Exam  HENT:      Head: Normocephalic and atraumatic.   Eyes:      General: No scleral icterus.     Extraocular Movements: Extraocular movements intact.   Cardiovascular:      Rate and Rhythm: Normal rate.   Pulmonary:      Effort: Pulmonary effort is normal.   Abdominal:      General: Abdomen is flat. There is no distension.   Genitourinary:     Comments: Deferred  Musculoskeletal:         General: Normal range of motion.      Cervical back: Normal range of motion.   Skin:     General: Skin is warm and dry.   Neurological:      Mental Status: He is alert.      Comments: 4/5 strength in bilateral lower extremities, sensation intact to light tough throughout, able to raise legs/wiggle toes.   Psychiatric:         Mood and Affect: Mood normal.        Lab Results: I have personally reviewed pertinent reports.    Imaging Studies: I have personally reviewed pertinent films in PACS  EKG, Pathology, and Other Studies: I have personally reviewed pertinent reports.      Assessment & Plan     Assessment and Plan:  Mr. Austin is a 68 year old man who has known metastatic non-small cell lung cancer.  He previously received palliative radiation to the upper thoracic spine and whole brain.  He later received systemic therapy but now has imaging concerning for leptomeningeal involvement of the lower thoracic, lumbar and sacral regions.    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  Due to the diffuse nature of involvement, surgical management is not likely to be recommended.   Intrathecal or systemic treatment options could be considered; however, radiotherapy to symptomatic sites of leptomeningeal involvement is generally performed.  The goal of this treatment is palliative rather than curative, attempting to delay further neurologic progression in the sites of  treatment for some period of time.  Overall, his prognosis is guarded and goals of care discussions are also recommended.  I discussed the possibility of LP with cytology and he declined.    I discussed the logistics of radiation treatment planning including the need for CT simulation with immobilization and tattoo marking.    I reviewed risk of radiation to the abdomen/pelvis which include but are not limited to nausea, diarrhea, skin irritation/peeling, urinary frequency/urgency, incontinence, kidney/liver injury, painful/difficult swallowing, fracture, bowel complications/obstruction, cytopenias, and secondary malignancy.    After considering the above, informed consent was obtained for palliative radiation targeting the lower thoracic spine through sacrum.  I recommend obtaining additional MRI imaging of the neuraxis, palliative care consultation, medical oncology consultation and continuing dexamethasone 4 mg every 6 hours with PPI.    Thank you for involving me in Mr. Austin's care.    Code Status: Level 1 - Full Code  Advance Directive and Living Will: Yes    Power of : Yes  POLST:      Counseling / Coordination of Care  Total floor / unit time spent today 50 minutes. Greater than 50% of total time was spent with the patient and / or family counseling and / or coordination of care. A description of the counseling / coordination of care: 50 minutes, reviewed prior radiotherapy and systemic therapy treatment, leptomeningeal disease and palliative role of radiation.

## 2024-09-10 NOTE — QUICK NOTE
Claude Austin is a 68 y.o. male with a PMHx pulmonary embolism on Eliquis and stage IV adenocarcinoma of the lung with mets to the brain and spine presents after being advised to report to the emergency department by his oncologist, Dr. 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     PLAN:  Follow-up with radio oncology recommendations  Follow-up of MRI pelvis  Consulted neurosurgery  Continue dexamethasone 4 Mg every 6 hours

## 2024-09-10 NOTE — PLAN OF CARE
Problem: PAIN - ADULT  Goal: Verbalizes/displays adequate comfort level or baseline comfort level  Description: Interventions:  - Encourage patient to monitor pain and request assistance  - Assess pain using appropriate pain scale  - Administer analgesics based on type and severity of pain and evaluate response  - Implement non-pharmacological measures as appropriate and evaluate response  - Consider cultural and social influences on pain and pain management  - Notify physician/advanced practitioner if interventions unsuccessful or patient reports new pain  Outcome: Progressing     Problem: INFECTION - ADULT  Goal: Absence or prevention of progression during hospitalization  Description: INTERVENTIONS:  - Assess and monitor for signs and symptoms of infection  - Monitor lab/diagnostic results  - Monitor all insertion sites, i.e. indwelling lines, tubes, and drains  - Monitor endotracheal if appropriate and nasal secretions for changes in amount and color  - [place] appropriate cooling/warming therapies per order  - Administer medications as ordered  - Instruct and encourage patient and family to use good hand hygiene technique  - Identify and instruct in appropriate isolation precautions for identified infection/condition  Outcome: Progressing     Problem: DISCHARGE PLANNING  Goal: Discharge to home or other facility with appropriate resources  Description: INTERVENTIONS:  - Identify barriers to discharge w/patient and caregiver  - Arrange for needed discharge resources and transportation as appropriate  - Identify discharge learning needs (meds, wound care, etc.)  - Arrange for interpretive services to assist at discharge as needed  - Refer to Case Management Department for coordinating discharge planning if the patient needs post-hospital services based on physician/advanced practitioner order or complex needs related to functional status, cognitive ability, or social support system  Outcome: Progressing      Problem: Knowledge Deficit  Goal: Patient/family/caregiver demonstrates understanding of disease process, treatment plan, medications, and discharge instructions  Description: Complete learning assessment and assess knowledge base.  Interventions:  - Provide teaching at level of understanding  - Provide teaching via preferred learning methods  Outcome: Progressing

## 2024-09-10 NOTE — H&P
[facility]  H&P  Name: Claude Austin 68 y.o. 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   Last chemo treatment 8/26/2024.  Next treatment scheduled for 9/16/2024. On Paraplatin, Alimta, and Keytruda.   Oncologist, Dr. 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 Multiple intradural extramedullary enhancing nodular lesions along the surface of the cauda equina nerve roots T12, L1, L3-L4, and sacral spinal canal -respectively there were tiny scattered areas of leptomeningeal sites of nodular enhancement along the cauda equina nerve roots the largest at T12 and S1-S2 levels.    Plan    Dexamethasone 4 mg q6hrs   Consult radiology oncology, appreciate recommendations  Monitor neuroexam  Pain control with home medications   Tylenol 975 mg TID PRN, gabapentin 300 mg in AM and 600 mg in PM  Consider lidocaine patch as this has helped in the past    Constipation  Assessment & Plan  Patient reports chronic constipation with stool leakage for which uses lactulose.  He states this allows him to have a bowel movement 2-3 days.  Last bowel movement prior to arrival.     Plan:  Continue lactulose 20 g BID    Multiple subsegmental pulmonary emboli without acute cor pulmonale (HCC)  Assessment & Plan  Patient with history of multiple subsegmental pulmonary emboli that appear to have been diagnosed on 03/29/2023 and patient has been on anticoagulation since.    Plan  Continue Eliquis 5 mg twice daily           VTE Pharmacologic Prophylaxis: VTE Score: 7  Moderate Risk (Score 3-4) - Pharmacological DVT Prophylaxis Ordered: apixaban (Eliquis).  Code Status: Level 1 - Full Code   Discussion with family: Updated  (wife) at bedside.    Anticipated Length of Stay: Patient will be admitted on an inpatient basis with an anticipated length of stay of greater than 2 midnights secondary to progression of metastatic non-small cell adenocarcinoma of right lung.    Chief Complaint: Advised to come to the emergency department due to recent MRI findings    History of Present Illness:  Claude Austin is a 68 y.o. male with a PMHx pulmonary embolism on Eliquis and stage IV adenocarcinoma of the lung with mets to the brain and spine presents after being advised to report to the emergency department by his oncologist, Dr. 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  There were multiple intradural extramedullary enhancing nodular lesions along the surface of the cauda equina nerve roots T12, L1, L3-L4, and sacral spinal canal -respectively there were tiny scattered areas of leptomeningeal sites of nodular enhancement along the cauda equina nerve roots the largest at T12 and S1-S2 levels.  Per chart review, oncology recommended starting dexamethasone 4 mg every 6 hours and radiation oncology consultation.    Patient was seen at [facility] 9/3/24, due to worsening BLE weakness ongoing for several months but worsening over the last 3 weeks. Patient states he feels as if his legs are going to give out which has caused him to fall several times in the past.  Patiently has been following with PT/OT for this.  CT/chest/ab/pel was obtained at [facility] to assess for worsening disease.  Exam noted similar appearance of right upper lobe mass. No evidence of disease progression.Pancreatic cystic lesions. It was recommended the patient obtain an MRI for complete characterization as an outpatient.    Currently  patient endorses right lumbar back pain that radiates to right buttock which he has been controlling with 1 g of Tylenol 3 times daily, gabapentin, and lidocaine patches.  He also reports chronic constipation with stool leakage for which only lactulose has helped. He reports having a bowel movement every 2 to 3 days. He feels the sensation and urge to have a bowel movement, but is sometimes unable to make it to the bathroom. Reports having a large, loose bowel movement prior to arrival.  Denies fevers chills, saddle anesthesia, bladder incontinence, abdominal pain, nausea, vomiting. Last chemo treatment 8/26/2024. Next chemo treatment scheduled for 9/16/2024.    Upon arrival to the ED, patient afebrile and hemodynamically stable. CBC anemia with stable H&H 11.8/34.9, CMP unremarkable.      Review of Systems:  Review of Systems   Constitutional:  Negative for chills and fever.   Respiratory:  Negative for cough and shortness of breath.    Cardiovascular:  Positive for leg swelling. Negative for chest pain.   Gastrointestinal:  Positive for constipation. Negative for abdominal pain, diarrhea, nausea and vomiting.   Genitourinary:  Negative for decreased urine volume, difficulty urinating, frequency and urgency.   Musculoskeletal:  Positive for back pain, gait problem and myalgias. Negative for arthralgias.   Neurological:  Positive for weakness. Negative for dizziness, numbness and headaches.       Past Medical and Surgical History:   Past Medical History:   Diagnosis Date    Cancer (HCC)     Small cell carcinoma (HCC)        Past Surgical History:   Procedure Laterality Date    IR BIOPSY LUNG  4/6/2023    IR BIOPSY LUNG  4/26/2023    IR PORT PLACEMENT  5/16/2023       Meds/Allergies:  Prior to Admission medications    Medication Sig Start Date End Date Taking? Authorizing Provider   acetaminophen (TYLENOL) 500 mg tablet Take 500 mg by mouth every 4 (four) hours as needed for mild pain    Historical Provider, MD    apixaban (ELIQUIS) 5 mg Take 1 tablet (5 mg total) by mouth 2 (two) times a day 5/31/23   Nancy Ruelas DO   dexamethasone (DECADRON) 0.5 mg tablet Take 2 tablets (1 mg total) by mouth 2 (two) times a day with meals 8/14/24   Mak Feliciano MD   folic acid (FOLVITE) 1 mg tablet Take 1 tablet (1,000 mcg total) by mouth daily 8/26/24   TRICIA Rudolph   gabapentin (NEURONTIN) 300 mg capsule Take 300 mg in the AM and 600 mg in the Pm 9/9/24   Jose James MD   Keytruda 100 MG/4ML injection INFUSE 200MG VIA IVPB OVER 30 MIN EVERY 21 DAYS 3/21/24   Nancy Ruelas DO   lactulose (CHRONULAC) 10 g/15 mL solution Take 30 mL (20 g total) by mouth 2 (two) times a day 8/26/24   Claude Hernández MD   lidocaine-prilocaine (EMLA) cream Apply to port 30 to 60 minutes prior to use 2/16/24   TRICIA Russell   triamcinolone (KENALOG) 0.5 % cream Apply topically 2 (two) times a day 5/10/24   Mak Feliciano MD   gabapentin (NEURONTIN) 300 mg capsule Take 300 mg in the AM and 600 mg in the Pm 6/9/24 9/9/24  Jose James MD     I have reviewed home medications with patient personally.    Allergies: No Known Allergies    Social History:  Marital Status: /Civil Union   Occupation: not working  Patient Pre-hospital Living Situation: With spouse  Patient Pre-hospital Level of Mobility: walks with cane  Patient Pre-hospital Diet Restrictions: none  Substance Use History:   Social History     Substance and Sexual Activity   Alcohol Use Yes    Comment: rarely     Social History     Tobacco Use   Smoking Status Every Day    Current packs/day: 0.50    Average packs/day: 0.5 packs/day for 51.7 years (25.8 ttl pk-yrs)    Types: Cigarettes    Start date: 1/1/1973   Smokeless Tobacco Never   Tobacco Comments    Patient is trying to quit,       Social History     Substance and Sexual Activity   Drug Use Never       Family History:  Family History   Problem Relation Age of Onset    Stroke Father        Physical  "Exam:     Vitals:   Blood Pressure: 126/73 (09/10/24 0254)  Pulse: 72 (09/10/24 0254)  Temperature: 98.2 °F (36.8 °C) (09/09/24 2055)  Temp Source: Oral (09/09/24 2055)  Respirations: 16 (09/10/24 0254)  Height: 5' 10\" (177.8 cm) (09/10/24 0330)  Weight - Scale: 69 kg (152 lb 1.9 oz) (09/10/24 0330)  SpO2: 96 % (09/10/24 0254)    Physical Exam  Vitals and nursing note reviewed.   Constitutional:       Appearance: Normal appearance.      Comments: Chronically ill-appearing   HENT:      Head: Normocephalic and atraumatic.      Mouth/Throat:      Mouth: Mucous membranes are moist.   Eyes:      Extraocular Movements: Extraocular movements intact.      Conjunctiva/sclera: Conjunctivae normal.      Pupils: Pupils are equal, round, and reactive to light.   Cardiovascular:      Rate and Rhythm: Normal rate and regular rhythm.   Pulmonary:      Effort: Pulmonary effort is normal. No respiratory distress.      Comments: Decreased bibasilar breath sounds  Abdominal:      General: Bowel sounds are normal. There is no distension.      Palpations: Abdomen is soft.      Tenderness: There is no abdominal tenderness. There is no guarding.   Musculoskeletal:      Cervical back: No tenderness or bony tenderness.      Thoracic back: No tenderness or bony tenderness.      Lumbar back: Bony tenderness present.      Right lower leg: Edema present.      Left lower leg: Edema present.      Comments: 2+ pitting edema noted to bilateral lower extremities   Skin:     General: Skin is warm and dry.   Neurological:      General: No focal deficit present.      Mental Status: He is alert. Mental status is at baseline.      Comments: No gross sensory deficits  Able to lift bilateral lower extremities against gravity  4/5 strength RLE against with resistance likely secondary back pain  5/5 strength LLE   Psychiatric:         Mood and Affect: Mood normal.        Additional Data:     Lab Results:  Results from last 7 days   Lab Units 09/09/24 2152 "   WBC Thousand/uL 4.91   HEMOGLOBIN g/dL 11.8*   HEMATOCRIT % 34.9*   PLATELETS Thousands/uL 167   BANDS PCT % 6   LYMPHO PCT % 13*   MONO PCT % 9   EOS PCT % 2     Results from last 7 days   Lab Units 09/09/24  2152   SODIUM mmol/L 141   POTASSIUM mmol/L 4.2   CHLORIDE mmol/L 111*   CO2 mmol/L 22   BUN mg/dL 25   CREATININE mg/dL 1.17   ANION GAP mmol/L 8   CALCIUM mg/dL 8.4   ALBUMIN g/dL 3.4*   TOTAL BILIRUBIN mg/dL 0.29   ALK PHOS U/L 50   ALT U/L 28   AST U/L 18   GLUCOSE RANDOM mg/dL 101             Lab Results   Component Value Date    HGBA1C 5.6 03/31/2023           Lines/Drains:  Invasive Devices       Central Venous Catheter Line  Duration             Port A Cath 05/16/23 Left Internal jugular 482 days              Peripheral Intravenous Line  Duration             Peripheral IV 09/09/24 Right;Ventral (anterior) Forearm <1 day                    Central Line:  Goal for removal: Patient actively receiving chemo           Imaging: No pertinent imaging reviewed.  No orders to display       EKG and Other Studies Reviewed on Admission:   EKG: NSR. HR 80.    ** Please Note: This note has been constructed using a voice recognition system. **

## 2024-09-10 NOTE — ED NOTES
Pt transported with Tech to Rad. Onc for treatment. Will return after session completed.      Gina Gaspar RN  09/10/24 [id number]

## 2024-09-11 ENCOUNTER — APPOINTMENT (INPATIENT)
Dept: MRI IMAGING | Facility: HOSPITAL | Age: 69
DRG: 055 | End: 2024-09-11
Payer: MEDICARE

## 2024-09-11 ENCOUNTER — DOCUMENTATION (OUTPATIENT)
Dept: HEMATOLOGY ONCOLOGY | Facility: CLINIC | Age: 69
End: 2024-09-11

## 2024-09-11 ENCOUNTER — APPOINTMENT (OUTPATIENT)
Dept: RADIATION ONCOLOGY | Facility: HOSPITAL | Age: 69
End: 2024-09-11
Attending: RADIOLOGY
Payer: MEDICARE

## 2024-09-11 VITALS
BODY MASS INDEX: 21.78 KG/M2 | WEIGHT: 152.12 LBS | HEIGHT: 70 IN | HEART RATE: 67 BPM | OXYGEN SATURATION: 96 % | TEMPERATURE: 97.4 F | DIASTOLIC BLOOD PRESSURE: 69 MMHG | SYSTOLIC BLOOD PRESSURE: 112 MMHG | RESPIRATION RATE: 18 BRPM

## 2024-09-11 LAB
ANION GAP SERPL CALCULATED.3IONS-SCNC: 6 MMOL/L (ref 4–13)
BUN SERPL-MCNC: 19 MG/DL (ref 5–25)
CALCIUM SERPL-MCNC: 8.7 MG/DL (ref 8.4–10.2)
CHLORIDE SERPL-SCNC: 110 MMOL/L (ref 96–108)
CO2 SERPL-SCNC: 21 MMOL/L (ref 21–32)
CREAT SERPL-MCNC: 0.8 MG/DL (ref 0.6–1.3)
ERYTHROCYTE [DISTWIDTH] IN BLOOD BY AUTOMATED COUNT: 15.2 % (ref 11.6–15.1)
GFR SERPL CREATININE-BSD FRML MDRD: 93 ML/MIN/1.73SQ M
GLUCOSE SERPL-MCNC: 118 MG/DL (ref 65–140)
HCT VFR BLD AUTO: 34.2 % (ref 36.5–49.3)
HGB BLD-MCNC: 11.4 G/DL (ref 12–17)
MCH RBC QN AUTO: 34.5 PG (ref 26.8–34.3)
MCHC RBC AUTO-ENTMCNC: 33.3 G/DL (ref 31.4–37.4)
MCV RBC AUTO: 104 FL (ref 82–98)
PLATELET # BLD AUTO: 194 THOUSANDS/UL (ref 149–390)
PMV BLD AUTO: 9.6 FL (ref 8.9–12.7)
POTASSIUM SERPL-SCNC: 4.4 MMOL/L (ref 3.5–5.3)
RBC # BLD AUTO: 3.3 MILLION/UL (ref 3.88–5.62)
SODIUM SERPL-SCNC: 137 MMOL/L (ref 135–147)
WBC # BLD AUTO: 9.68 THOUSAND/UL (ref 4.31–10.16)

## 2024-09-11 PROCEDURE — 72156 MRI NECK SPINE W/O & W/DYE: CPT

## 2024-09-11 PROCEDURE — 99233 SBSQ HOSP IP/OBS HIGH 50: CPT | Performed by: INTERNAL MEDICINE

## 2024-09-11 PROCEDURE — 77295 3-D RADIOTHERAPY PLAN: CPT | Performed by: RADIOLOGY

## 2024-09-11 PROCEDURE — 77412 RADIATION TX DELIVERY LVL 3: CPT | Performed by: STUDENT IN AN ORGANIZED HEALTH CARE EDUCATION/TRAINING PROGRAM

## 2024-09-11 PROCEDURE — 70553 MRI BRAIN STEM W/O & W/DYE: CPT

## 2024-09-11 PROCEDURE — 77334 RADIATION TREATMENT AID(S): CPT | Performed by: RADIOLOGY

## 2024-09-11 PROCEDURE — 77387 GUIDANCE FOR RADJ TX DLVR: CPT | Performed by: STUDENT IN AN ORGANIZED HEALTH CARE EDUCATION/TRAINING PROGRAM

## 2024-09-11 PROCEDURE — 99223 1ST HOSP IP/OBS HIGH 75: CPT | Performed by: NEUROLOGICAL SURGERY

## 2024-09-11 PROCEDURE — 99239 HOSP IP/OBS DSCHRG MGMT >30: CPT | Performed by: INTERNAL MEDICINE

## 2024-09-11 PROCEDURE — 77300 RADIATION THERAPY DOSE PLAN: CPT | Performed by: RADIOLOGY

## 2024-09-11 PROCEDURE — 77427 RADIATION TX MANAGEMENT X5: CPT | Performed by: RADIOLOGY

## 2024-09-11 PROCEDURE — 85027 COMPLETE CBC AUTOMATED: CPT

## 2024-09-11 PROCEDURE — D0061ZZ BEAM RADIATION OF SPINAL CORD USING PHOTONS 1 - 10 MEV: ICD-10-PCS | Performed by: RADIOLOGY

## 2024-09-11 PROCEDURE — 80048 BASIC METABOLIC PNL TOTAL CA: CPT

## 2024-09-11 PROCEDURE — 77280 THER RAD SIMULAJ FIELD SMPL: CPT | Performed by: STUDENT IN AN ORGANIZED HEALTH CARE EDUCATION/TRAINING PROGRAM

## 2024-09-11 PROCEDURE — 99223 1ST HOSP IP/OBS HIGH 75: CPT | Performed by: INTERNAL MEDICINE

## 2024-09-11 PROCEDURE — A9585 GADOBUTROL INJECTION: HCPCS | Performed by: INTERNAL MEDICINE

## 2024-09-11 RX ORDER — DEXAMETHASONE 4 MG/1
4 TABLET ORAL 2 TIMES DAILY WITH MEALS
Qty: 60 TABLET | Refills: 0 | Status: SHIPPED | OUTPATIENT
Start: 2024-09-11

## 2024-09-11 RX ORDER — PANTOPRAZOLE SODIUM 40 MG/1
40 TABLET, DELAYED RELEASE ORAL DAILY
Qty: 30 TABLET | Refills: 0 | Status: SHIPPED | OUTPATIENT
Start: 2024-09-11

## 2024-09-11 RX ORDER — GADOBUTROL 604.72 MG/ML
7 INJECTION INTRAVENOUS
Status: COMPLETED | OUTPATIENT
Start: 2024-09-11 | End: 2024-09-11

## 2024-09-11 RX ORDER — ONDANSETRON 4 MG/1
4 TABLET, FILM COATED ORAL EVERY 8 HOURS PRN
Qty: 20 TABLET | Refills: 0 | Status: SHIPPED | OUTPATIENT
Start: 2024-09-11

## 2024-09-11 RX ADMIN — PANTOPRAZOLE SODIUM 40 MG: 40 TABLET, DELAYED RELEASE ORAL at 16:25

## 2024-09-11 RX ADMIN — LACTULOSE 20 G: 20 SOLUTION ORAL at 08:47

## 2024-09-11 RX ADMIN — DEXAMETHASONE SODIUM PHOSPHATE 4 MG: 4 INJECTION INTRA-ARTICULAR; INTRALESIONAL; INTRAMUSCULAR; INTRAVENOUS; SOFT TISSUE at 03:38

## 2024-09-11 RX ADMIN — DEXAMETHASONE SODIUM PHOSPHATE 4 MG: 4 INJECTION INTRA-ARTICULAR; INTRALESIONAL; INTRAMUSCULAR; INTRAVENOUS; SOFT TISSUE at 16:27

## 2024-09-11 RX ADMIN — PANTOPRAZOLE SODIUM 40 MG: 40 TABLET, DELAYED RELEASE ORAL at 06:25

## 2024-09-11 RX ADMIN — SODIUM CHLORIDE, SODIUM LACTATE, POTASSIUM CHLORIDE, AND CALCIUM CHLORIDE 125 ML/HR: .6; .31; .03; .02 INJECTION, SOLUTION INTRAVENOUS at 09:09

## 2024-09-11 RX ADMIN — GADOBUTROL 7 ML: 604.72 INJECTION INTRAVENOUS at 14:02

## 2024-09-11 RX ADMIN — GABAPENTIN 300 MG: 300 CAPSULE ORAL at 08:48

## 2024-09-11 RX ADMIN — FOLIC ACID 1000 MCG: 1 TABLET ORAL at 08:48

## 2024-09-11 RX ADMIN — APIXABAN 5 MG: 5 TABLET, FILM COATED ORAL at 08:48

## 2024-09-11 RX ADMIN — ACETAMINOPHEN 975 MG: 325 TABLET ORAL at 10:06

## 2024-09-11 RX ADMIN — GABAPENTIN 600 MG: 300 CAPSULE ORAL at 16:25

## 2024-09-11 RX ADMIN — APIXABAN 5 MG: 5 TABLET, FILM COATED ORAL at 17:24

## 2024-09-11 RX ADMIN — DEXAMETHASONE SODIUM PHOSPHATE 4 MG: 4 INJECTION INTRA-ARTICULAR; INTRALESIONAL; INTRAMUSCULAR; INTRAVENOUS; SOFT TISSUE at 08:48

## 2024-09-11 NOTE — DISCHARGE SUMMARY
Discharge Summary - Hospitalist   Name: Claude Austin 68 y.o. 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   Last chemo treatment 8/26/2024.  Next treatment scheduled for 9/16/2024. On Paraplatin, Alimta, and Keytruda.   Oncologist, Dr. 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 Multiple intradural extramedullary enhancing nodular lesions along the surface of the cauda equina nerve roots T12, L1, L3-L4, and sacral spinal canal -respectively there were tiny scattered areas of leptomeningeal sites of nodular enhancement along the cauda equina nerve roots the largest at T12 and S1-S2 levels.\received 1 treatment of radiation today    Plan  Follow-up on MRI of the cervical spine and brain  Dexamethasone 4 mg q6hrs   Neurosurgery , no intervention required  Monitor neuroexam  Pain control with home medications   Tylenol 975 mg TID PRN, gabapentin 300 mg in AM and 600 mg in PM  Consider lidocaine patch as this has helped in the past  Zofran for nausea and vomiting as needed  Patient will continue to follow-up outpatient with oncology and radiology oncology  Multiple subsegmental pulmonary emboli without acute cor pulmonale (HCC)  Patient with history of multiple subsegmental pulmonary emboli that appear to have been diagnosed on 03/29/2023 and patient has been on anticoagulation since.    Plan  Continue Eliquis 5 mg twice daily  Constipation  Patient reports chronic constipation with stool leakage for which uses lactulose.  He states this allows him to have a bowel movement 2-3  days.  Last bowel movement prior to arrival.     Plan:  Continue lactulose 20 g BID     Medical Problems       Resolved Problems  Date Reviewed: 8/22/2024   None       Discharging Physician / Practitioner: Pamela Puga MD  PCP: Jose James MD  Admission Date:   Admission Orders (From admission, onward)       Ordered        09/09/24 2325  INPATIENT ADMISSION  Once                          Discharge Date: 09/11/24    Consultations During Hospital Stay:  Med- Oncology   Radiology oncology   Neuro surgery     Procedures Performed:   None     Significant Findings / Test Results:   MRI lumbosacral plexus wo and w contrast    Result Date: 9/10/2024  Impression: 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 This is most bulky in the lower sacral canal where it involves multiple sacral nerve roots. There is no definite extension along the nerve roots beyond the sacral neural foramen. Workstation performed: [id number]       No Chest XR results available for this patient.      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    I reviewed the above mentioned incidental findings with the patient and/or family and they expressed understanding.    Test Results Pending at Discharge (will require follow up):   None      Outpatient Tests Requested:  None     Complications:  None     Reason for Admission: Leptomeningeal metastasis    Hospital Course:   Claude Austin is a 68 y.o. male patient who originally presented to the hospital on 9/9/2024 due to concerns for leptomeningeal metastasis.  Patient was seen by his oncologist outpatient and had abnormal MRI findings of T and L-spine which was obtained on September 7, 2024.  Neurosurgery and radiology oncology was consulted.  Patient was prepped further treatment.  During this admission patient received 1 radiation treatment.  Patient also underwent  "MRI of cervical spine, brain and lumbar sacral plexus.  Medical oncology was also consulted.  Prognosis of the treatment and the condition was discussed with the patient.  Patient and his wife wanted to be discharged to home and wanted to spend quality time at home.  Patient is being discharged to home in stable conditions with no new neurological deficits.  Patient will continue to follow-up with medical oncology and radiology oncology outpatient.    Hospital Course: No notes on file      Please see above list of diagnoses and related plan for additional information.     Condition at Discharge: stable    Discharge Day Visit / Exam:   Subjective: Patient is alert oriented to time place and person.  Patient denies any new neurological deficits including urinary or bowel incontinence.  Vitals: Blood Pressure: 147/76 (09/11/24 0725)  Pulse: 69 (09/11/24 0725)  Temperature: 98 °F (36.7 °C) (09/11/24 0725)  Temp Source: Oral (09/10/24 0608)  Respirations: 16 (09/11/24 0725)  Height: 5' 10\" (177.8 cm) (09/10/24 0330)  Weight - Scale: 69 kg (152 lb 1.9 oz) (09/10/24 0330)  SpO2: 95 % (09/11/24 0725)  Exam:   Physical Exam  HENT:      Head: Normocephalic and atraumatic.      Right Ear: External ear normal.      Left Ear: External ear normal.      Mouth/Throat:      Pharynx: Oropharynx is clear.   Eyes:      Extraocular Movements: Extraocular movements intact.      Conjunctiva/sclera: Conjunctivae normal.      Pupils: Pupils are equal, round, and reactive to light.   Cardiovascular:      Rate and Rhythm: Normal rate and regular rhythm.      Pulses: Normal pulses.      Heart sounds: Normal heart sounds.   Pulmonary:      Effort: Pulmonary effort is normal.      Breath sounds: Normal breath sounds.   Abdominal:      General: Bowel sounds are normal.      Palpations: Abdomen is soft.   Musculoskeletal:         General: Normal range of motion.   Skin:     General: Skin is warm.      Capillary Refill: Capillary refill takes less " than 2 seconds.   Neurological:      General: No focal deficit present.      Mental Status: He is alert and oriented to person, place, and time.      Physical Exam  HENT:      Head: Normocephalic and atraumatic.      Right Ear: External ear normal.      Left Ear: External ear normal.      Mouth/Throat:      Pharynx: Oropharynx is clear.   Eyes:      Extraocular Movements: Extraocular movements intact.      Conjunctiva/sclera: Conjunctivae normal.      Pupils: Pupils are equal, round, and reactive to light.   Cardiovascular:      Rate and Rhythm: Normal rate and regular rhythm.      Pulses: Normal pulses.      Heart sounds: Normal heart sounds.   Pulmonary:      Effort: Pulmonary effort is normal.      Breath sounds: Normal breath sounds.   Abdominal:      General: Bowel sounds are normal.      Palpations: Abdomen is soft.   Musculoskeletal:         General: Normal range of motion.   Skin:     General: Skin is warm.      Capillary Refill: Capillary refill takes less than 2 seconds.   Neurological:      General: No focal deficit present.      Mental Status: He is alert and oriented to person, place, and time.          Discussion with Family: Updated  (wife) at bedside.    Discharge instructions/Information to patient and family:   See after visit summary for information provided to patient and family.      Provisions for Follow-Up Care:  See after visit summary for information related to follow-up care and any pertinent home health orders.      Mobility at time of Discharge:   Basic Mobility Inpatient Raw Score: 20  JH-HLM Goal: 6: Walk 10 steps or more  JH-HLM Achieved: 7: Walk 25 feet or more  HLM Goal achieved. Continue to encourage appropriate mobility.     Disposition:   Home    Planned Readmission: No    Discharge Medications:  See after visit summary for reconciled discharge medications provided to patient and/or family.      Administrative Statements   Discharge Statement:  I have spent a total  time of 30 minutes in caring for this patient on the day of the visit/encounter. >30 minutes of time was spent on: Reviewing / ordering tests, medicine, procedures  .    **Please Note: This note may have been constructed using a voice recognition system**

## 2024-09-11 NOTE — PROGRESS NOTES
NEURO ONCOLOGY MULTIDISCIPLINARY CANCER CONFERENCE    DATE:   9/11/2024    PRESENTING PROVIDER: Dr. Ruelas    DIAGNOSIS:  Metastatic NSCLC    Claude Austin is a 68 y.o. male who was presented at Neuro Oncology Cancer Conference today.  He has a history of  metastatic non-small cell lung cancer.  He previously received palliative radiation to the upper thoracic spine and whole brain.  He later received systemic therapy but now has imaging concerning for leptomeningeal involvement of the lower thoracic, lumbar and sacral regions.     PHYSICIAN RECOMMENDED PLAN:   - RT to spine.  -Hospice discussion.      Pathology reviewed: No    Imaging reviewed:   09/10/24 MRI lumbosacral plexus wo w contrast  09/07/24 MRI lumbar spine w wo contrast  09/07/24 MRI thoracic spine w wo contrast  04/03/23 MRI thoracic spine w wo contrast  04/03/23 MRI lumbar spine w wo contrast        Team agreed to plan.   NCCN guidelines were readily available for review at this discussion.    The final treatment plan will be left at the discretion of the patient and the treating physician.     DISCLAIMERS:  TO THE TREATING PHYSICIAN:  This conference is a meeting of clinicians from various specialty areas who evaluate and discuss patients for whom a multidisciplinary treatment approach is being considered. Please note that the above opinion was a consensus of the conference attendees and is intended only to assist in quality care of the discussed patient.  The responsibility for follow up on the input given during the conference, along with any final decisions regarding plan of care, is that of the patient and the patient's provider. Accordingly, appointments have only been recommended based on this information; and have NOT been scheduled unless otherwise noted.      TO THE PATIENT:  This summary is a brief record of major aspects of your cancer treatment. You may choose to can share a your copy with any of your doctors or nurses. However, this  is not a detailed or comprehensive record of your care.

## 2024-09-11 NOTE — ASSESSMENT & PLAN NOTE
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   Last chemo treatment 8/26/2024.  Next treatment scheduled for 9/16/2024. On Paraplatin, Alimta, and Keytruda.   Oncologist, Dr. 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 Multiple intradural extramedullary enhancing nodular lesions along the surface of the cauda equina nerve roots T12, L1, L3-L4, and sacral spinal canal -respectively there were tiny scattered areas of leptomeningeal sites of nodular enhancement along the cauda equina nerve roots the largest at T12 and S1-S2 levels.\received 1 treatment of radiation today    Plan  Follow-up on MRI of the cervical spine and brain  Dexamethasone 4 mg q6hrs   Neurosurgery , no intervention required  Monitor neuroexam  Pain control with home medications   Tylenol 975 mg TID PRN, gabapentin 300 mg in AM and 600 mg in PM  Consider lidocaine patch as this has helped in the past  Zofran for nausea and vomiting as needed  Patient will continue to follow-up outpatient with oncology and radiology oncology

## 2024-09-11 NOTE — CONSULTS
Consultation - Neurosurgery   Claude Austin 68 y.o. male MRN: [id number]  Unit/Bed#: S -01 Encounter: [id number]    Images reviewed at morning rounds on 9/11/2024 at 7 AM  Patient was seen and examined on 9/11/2024 at 3 PM    Inpatient consult to Neurosurgery  Consult performed by: Chauncey Church PA-C  Consult ordered by: Pamela Puga MD        Assessment & Plan               Assessment:  Spine metastasis  Hx of non-small cell lung cancer  Ambulatory function  Lower extremity pain      Plan:   Exam: Patient alert and oriented x 3.  Moves all extremities.  Strength is 5/5 bilaterally.  Sensation to light touch intact throughout.DTR's 3+ no clonus bilaterally.  Tenderness in the lower lumbar spine noted on palpation posterior lumbar spine  Imaging is reviewed personally and findings as follows:  MRI of thoracic spine with and without contrast on 9/7/2024 demonstrates decreased soft tissue metastatic disease in the right lateral paravertebral space at T2-T3 extending through the right T2-T3 neural foramina surrounding associated exiting nerve and decreasing enhancing epidural soft tissue metastatic component in the right anterolateral epidural space at T2 level extending superiorly apart distorted T1 and T2 disc space status post treatment-related changes  MRI of lumbar spine with and without contrast findings concerning for worsened leptomeningeal metastasis largest T12 and S1-S2 levels multilevel degenerative disease more at L3-4 with canal stenosis and moderate left L4-5.  MRI of cervical spine with and without contrast-pending official reports  Marrow her brain with and without contrast also pending official reports  MRI of lumbosacral plexus 9/10/2024 demonstrates leptomeningeal metastatic disease in the lower lumbar spine and throughout the sacral spinal canal most likely involving multiple sacral nerve roots.  Pain control: Per primary team  DVT ppx: SCDs bilateral legs, okay with pharmacological DVT  prophylaxis  Seizure ppx: None  Activity: As tolerated  PT/OT evaluation & treatment  Brace: None  Medical Mx: Per primary team  Neurocheck: Routine  Patient with known history of NSCLC with brain metastasis, now admitted with gait issues and pain in the legs with some weakness, shaky/spasm in the leg.  His able to walk today doing physical therapy.  Patient was on chemotherapy and also radiation therapy for his brain metastasis.  Spinal images  findings  as described above. NOWG reviewed the images and recommended radiation therapy.  From neurosurgery perspective, no procedure is anticipated at this time.  Recommend follow-up with radiation oncology.  No follow-up is required with neurosurgery. neurosurgery will sign off.  Call with question or concern.      History of Present Illness     HPI: Claude Austin is a 68 y.o. male with insignificant past medical history, diagnosed with non-small cell lung cancer in April 2023, and was also found to have brain metastasis status post chemotherapy and radiation therapy.  Patient came to hospital following ongoing bilateral lower extremity shaky symptoms/spasm, gait instability and tendency to fall.  Image shows spine metastasis.  Patient complains of mild lower back pain has radicular symptoms to both legs right side more than left side.  No bowel/bladder dysfunction or saddle anesthesia.  Denies any fever, chills, rigors, cough or chest pain.  Patient is on Eliquis for pulmonary emboli.     Denies history of diabetes mellitus, hypertension, congestive heart failure, stroke, seizures, or bleeding disorder.  He continues smoking cigarettes, now half a pack a day.      Review of Systems   Constitutional:  Negative for chills and fever.   HENT:  Negative for trouble swallowing and voice change.    Eyes:  Negative for photophobia and visual disturbance.   Gastrointestinal:  Negative for nausea and vomiting.   Genitourinary:  Negative for difficulty urinating.    Musculoskeletal:  Positive for back pain and gait problem.   Neurological:  Positive for weakness. Negative for dizziness, tremors, seizures, syncope, facial asymmetry, speech difficulty, light-headedness, numbness and headaches.   Psychiatric/Behavioral:  Negative for confusion.        Historical Information   Past Medical History:   Diagnosis Date    Cancer (HCC)     Small cell carcinoma (HCC)      Past Surgical History:   Procedure Laterality Date    IR BIOPSY LUNG  4/6/2023    IR BIOPSY LUNG  4/26/2023    IR PORT PLACEMENT  5/16/2023     Social History     Substance and Sexual Activity   Alcohol Use Yes    Comment: rarely     Social History     Substance and Sexual Activity   Drug Use Never     Social History     Tobacco Use   Smoking Status Every Day    Current packs/day: 0.50    Average packs/day: 0.5 packs/day for 51.7 years (25.8 ttl pk-yrs)    Types: Cigarettes    Start date: 1/1/1973   Smokeless Tobacco Never   Tobacco Comments    Patient is trying to quit,       Family History   Problem Relation Age of Onset    Stroke Father        Meds/Allergies   all current active meds have been reviewed and current meds:   Current Facility-Administered Medications:     acetaminophen (TYLENOL) tablet 975 mg, Q8H PRN    apixaban (ELIQUIS) tablet 5 mg, BID    dexamethasone (DECADRON) injection 4 mg, Q6H HUDSON    folic acid (FOLVITE) tablet 1,000 mcg, Daily    gabapentin (NEURONTIN) capsule 300 mg, Daily    gabapentin (NEURONTIN) capsule 600 mg, Daily With Dinner    lactulose (CHRONULAC) oral solution 20 g, BID    pantoprazole (PROTONIX) EC tablet 40 mg, BID AC  No Known Allergies    Objective   I/O         09/09 0701  09/10 0700 09/10 0701 09/11 0700 09/11 0701 09/12 0700    P.O.  480 200    Total Intake(mL/kg)  480 (7) 200 (2.9)    Urine (mL/kg/hr)  550 (0.3)     Total Output  550     Net  -70 +200                   Physical Exam  Constitutional:       Appearance: Normal appearance.   Pulmonary:      Effort: Pulmonary  "effort is normal.   Musculoskeletal:         General: Tenderness present.      Cervical back: Normal range of motion.   Neurological:      General: No focal deficit present.      Mental Status: He is alert and oriented to person, place, and time.      Deep Tendon Reflexes:      Reflex Scores:       Tricep reflexes are 2+ on the right side and 2+ on the left side.       Bicep reflexes are 2+ on the right side and 2+ on the left side.       Brachioradialis reflexes are 2+ on the right side and 2+ on the left side.       Patellar reflexes are 2+ on the right side and 2+ on the left side.       Achilles reflexes are 2+ on the right side and 2+ on the left side.      Neurologic Exam     Mental Status   Oriented to person, place, and time.   Level of consciousness: alert    Cranial Nerves     CN XI   CN XI normal.     Motor Exam   Muscle bulk: normal  Overall muscle tone: normal  Right arm tone: normal  Left arm tone: normal  Right arm pronator drift: absent  Left arm pronator drift: absent  Right leg tone: normal  Left leg tone: normal    Sensory Exam   Light touch normal.     Gait, Coordination, and Reflexes     Reflexes   Right brachioradialis: 2+  Left brachioradialis: 2+  Right biceps: 2+  Left biceps: 2+  Right triceps: 2+  Left triceps: 2+  Right patellar: 2+  Left patellar: 2+  Right achilles: 2+  Left achilles: 2+  Right : 2+  Left : 2+  Right [place]: absent  Left [place]: absent  Right ankle clonus: absent  Left ankle clonus: absent      Vitals:Blood pressure 147/76, pulse 69, temperature 98 °F (36.7 °C), resp. rate 16, height 5' 10\" (1.778 m), weight 69 kg (152 lb 1.9 oz), SpO2 95%.,Body mass index is 21.83 kg/m².     Lab Results:   Results from last 7 days   Lab Units 09/11/24  0540 09/10/24  0637 09/09/24  2152   WBC Thousand/uL 9.68 5.22 4.91   HEMOGLOBIN g/dL 11.4* 11.6* 11.8*   HEMATOCRIT % 34.2* 34.3* 34.9*   PLATELETS Thousands/uL 194 172 167   MONO PCT %  --   --  9   EOS PCT %  --   --  2 " "    Results from last 7 days   Lab Units 09/11/24  0540 09/10/24  0637 09/09/24  2152   POTASSIUM mmol/L 4.4 4.6 4.2   CHLORIDE mmol/L 110* 110* 111*   CO2 mmol/L 21 20* 22   BUN mg/dL 19 22 25   CREATININE mg/dL 0.80 0.90 1.17   CALCIUM mg/dL 8.7 8.4 8.4   ALK PHOS U/L  --   --  50   ALT U/L  --   --  28   AST U/L  --   --  18                 No results found for: \"TROPONINT\"  ABG:No results found for: \"PHART\", \"UIP3XVG\", \"PO2ART\", \"ZQV8EYZ\", \"F3IEYCJO\", \"BEART\", \"SOURCE\"    Imaging Studies: Personally reviewed the following image studies in PACS and associated radiology reports: MRI brain and MRI spine. My interpretation of the radiology images/reports is: See above.    EKG, Pathology, and Other Studies: No pertinent imaging studies reviewed.    VTE Prophylaxis: Heparin    Code Status: Level 1 - Full Code  Advance Directive and Living Will: Yes    Power of : Yes  POLST:      Counseling / Coordination of Care  I spent 30 minutes with the patient.    "

## 2024-09-11 NOTE — CONSULTS
Oncology Consult Note  Claude Austin 68 y.o. 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    His oncology history is as follows.  He was diagnosed with Stage Pablo poorly differentiated NSCLC of the RUL metastatic to the brain and T-spine (cT4, cN1, cM1b) with Caris showing PD-L1 0%, TMB 60, and no other targetable mutations.  In May 2023 he received palliative RT to T-spine and brain f/b carboplatin/pemetrexed/pembrolizumab.  In July 2023, pembrolizumab was held for one cycle for pneumonitis, and carboplatin was discontinued for fatigue.  In November 2023, pemetrexed was dose-reduced for fatigue.  He has continued on pemetrexed/pembrolizumab since then with his most recent infusion (C23) on 8/26/24.    Over the past several months the patient has been experiencing worsening b/l LE weakness which has become significantly worse in the last 3 weeks resulting in several falls.  MRI of the T and L-spine's were ordered and showed decrease in RUL pulmonary malignancy, decrease in paravertebral soft tissue and osseous metastatic disease, but demonstrated new leptomeningeal metastasis.  The patient's medical oncologist, Dr. Chew, referred the patient to the ED.    In the ED, the patient's vitals were WNL.  Labs showed macrocytosis with Hb 11.4, , and all other values for CBC and CMP WNL.  MRI of the L-spine was added which also demonstrated leptomeningeal metastatic disease most bulky in the lower sacral canal involving multiple sacral nerve roots.  The patient was placed on dexamethasone 4 mg every 6  hours and radiation oncology was consulted and has plans to begin palliative neuraxial radiation.  The patient was offered an LP with cytology, but declined.  He was also restarted on his home Eliquis for PE.    Review of Systems - As stated in the HPI otherwise the fourteen point review of systems was negative.    Past Medical History:   Diagnosis Date    Cancer (HCC)     Small cell carcinoma (HCC)      Social History     Socioeconomic History    Marital status: /Civil Union     Spouse name: None    Number of children: None    Years of education: None    Highest education level: None   Occupational History    None   Tobacco Use    Smoking status: Every Day     Current packs/day: 0.50     Average packs/day: 0.5 packs/day for 51.7 years (25.8 ttl pk-yrs)     Types: Cigarettes     Start date: 1/1/1973    Smokeless tobacco: Never    Tobacco comments:     Patient is trying to quit,     Vaping Use    Vaping status: Never Used   Substance and Sexual Activity    Alcohol use: Yes     Comment: rarely    Drug use: Never    Sexual activity: None   Other Topics Concern    None   Social History Narrative    None     Social Determinants of Health     Financial Resource Strain: Not on file   Food Insecurity: Patient Declined (5/20/2024)    Hunger Vital Sign     Worried About Running Out of Food in the Last Year: Patient declined     Ran Out of Food in the Last Year: Patient declined   Transportation Needs: Patient Declined (5/20/2024)    PRAPARE - Transportation     Lack of Transportation (Medical): Patient declined     Lack of Transportation (Non-Medical): Patient declined   Physical Activity: Not on file   Stress: Not on file   Social Connections: Not on file   Intimate Partner Violence: Not on file   Housing Stability: Patient Declined (5/20/2024)    Housing Stability Vital Sign     Unable to Pay for Housing in the Last Year: Patient declined     Number of Times Moved in the Last Year: Not on file     Homeless in the  "Last Year: Patient declined     Family History   Problem Relation Age of Onset    Stroke Father      No Known Allergies    Current Facility-Administered Medications:     acetaminophen (TYLENOL) tablet 975 mg, 975 mg, Oral, Q8H PRN, Shirley Fragoso MD, 975 mg at 09/10/24 2312    apixaban (ELIQUIS) tablet 5 mg, 5 mg, Oral, BID, Shirley Fragoso MD, 5 mg at 09/10/24 1818    dexamethasone (DECADRON) injection 4 mg, 4 mg, Intravenous, Q6H HUDSON, Shirley Fragoso MD, 4 mg at 09/11/24 0338    folic acid (FOLVITE) tablet 1,000 mcg, 1,000 mcg, Oral, Daily, Shirley Fragoso MD, 1,000 mcg at 09/10/24 0912    gabapentin (NEURONTIN) capsule 300 mg, 300 mg, Oral, Daily, Shirley Fragoso MD, 300 mg at 09/10/24 0913    gabapentin (NEURONTIN) capsule 600 mg, 600 mg, Oral, Daily With Dinner, Pamela Puga MD, 600 mg at 09/10/24 1819    lactated ringers infusion, 125 mL/hr, Intravenous, Continuous, Shirley Fragoso MD, Last Rate: 125 mL/hr at 09/10/24 2313, 125 mL/hr at 09/10/24 2313    lactulose (CHRONULAC) oral solution 20 g, 20 g, Oral, BID, Shirley Fragoso MD, 20 g at 09/10/24 0911    pantoprazole (PROTONIX) EC tablet 40 mg, 40 mg, Oral, BID AC, Pamela Puga MD, 40 mg at 09/11/24 0625    /76   Pulse 69   Temp 98 °F (36.7 °C)   Resp 16   Ht 5' 10\" (1.778 m)   Wt 69 kg (152 lb 1.9 oz)   SpO2 95%   BMI 21.83 kg/m²     General: thin, pleasant, walking in his room, no acute distress  HEENT: PERRLA, moist mucosa, atraumatic, long beard  Neck: Normal habitus, no masses, no cervical/supraclavicular lymphadenopathy  Respiratory: CTAB w/o wheezes, rales, or rhonchi, no increased work of breathing  Cardiovascular: RRR w/o murmurs, 2+ radial and pedal pulses, no b/l LE edema  Abdomen: soft, non-tender, non-distended, no hepatomegaly or splenomegaly  /Rectal: deferred  Musculoskeletal: moves all four extremities with normal strength and ROM  Integumentary: warm, dry, no rash or bruising  Neurological: mildly antalgic gait, normal " sensation  Psychiatric: pleasant and cooperative with normal mood, affect, and cognition    Recent Results (from the past 48 hour(s))   ECG 12 lead    Collection Time: 09/09/24  9:42 PM   Result Value Ref Range    Ventricular Rate 80 BPM    Atrial Rate 80 BPM    AR Interval 164 ms    QRSD Interval 90 ms    QT Interval 378 ms    QTC Interval 435 ms    P Axis 66 degrees    QRS Axis 86 degrees    T Wave Axis 73 degrees   CBC and differential    Collection Time: 09/09/24  9:52 PM   Result Value Ref Range    WBC 4.91 4.31 - 10.16 Thousand/uL    RBC 3.33 (L) 3.88 - 5.62 Million/uL    Hemoglobin 11.8 (L) 12.0 - 17.0 g/dL    Hematocrit 34.9 (L) 36.5 - 49.3 %     (H) 82 - 98 fL    MCH 35.4 (H) 26.8 - 34.3 pg    MCHC 33.8 31.4 - 37.4 g/dL    RDW 15.4 (H) 11.6 - 15.1 %    MPV 9.1 8.9 - 12.7 fL    Platelets 167 149 - 390 Thousands/uL   Comprehensive metabolic panel    Collection Time: 09/09/24  9:52 PM   Result Value Ref Range    Sodium 141 135 - 147 mmol/L    Potassium 4.2 3.5 - 5.3 mmol/L    Chloride 111 (H) 96 - 108 mmol/L    CO2 22 21 - 32 mmol/L    ANION GAP 8 4 - 13 mmol/L    BUN 25 5 - 25 mg/dL    Creatinine 1.17 0.60 - 1.30 mg/dL    Glucose 101 65 - 140 mg/dL    Calcium 8.4 8.4 - 10.2 mg/dL    Corrected Calcium 8.9 8.3 - 10.1 mg/dL    AST 18 13 - 39 U/L    ALT 28 7 - 52 U/L    Alkaline Phosphatase 50 34 - 104 U/L    Total Protein 6.5 6.4 - 8.4 g/dL    Albumin 3.4 (L) 3.5 - 5.0 g/dL    Total Bilirubin 0.29 0.20 - 1.00 mg/dL    eGFR 64 ml/min/1.73sq m   Manual Differential(PHLEBS Do Not Order)    Collection Time: 09/09/24  9:52 PM   Result Value Ref Range    Segmented % 69 43 - 75 %    Bands % 6 0 - 8 %    Lymphocytes % 13 (L) 14 - 44 %    Monocytes % 9 4 - 12 %    Eosinophils % 2 0 - 6 %    Basophils % 0 0 - 1 %    Myelocytes % 1 0 - 1 %    Absolute Neutrophils 3.68 1.85 - 7.62 Thousand/uL    Absolute Lymphocytes 0.64 0.60 - 4.47 Thousand/uL    Absolute Monocytes 0.44 0.00 - 1.22 Thousand/uL    Absolute  Eosinophils 0.10 0.00 - 0.40 Thousand/uL    Absolute Basophils 0.00 0.00 - 0.10 Thousand/uL    Absolute Myelocytes 0.05 0.00 - 0.10 Thousand/uL    Total Counted      RBC Morphology Normal     Platelet Estimate Adequate Adequate   Basic metabolic panel    Collection Time: 09/10/24  6:37 AM   Result Value Ref Range    Sodium 137 135 - 147 mmol/L    Potassium 4.6 3.5 - 5.3 mmol/L    Chloride 110 (H) 96 - 108 mmol/L    CO2 20 (L) 21 - 32 mmol/L    ANION GAP 7 4 - 13 mmol/L    BUN 22 5 - 25 mg/dL    Creatinine 0.90 0.60 - 1.30 mg/dL    Glucose 134 65 - 140 mg/dL    Calcium 8.4 8.4 - 10.2 mg/dL    eGFR 88 ml/min/1.73sq m   CBC    Collection Time: 09/10/24  6:37 AM   Result Value Ref Range    WBC 5.22 4.31 - 10.16 Thousand/uL    RBC 3.30 (L) 3.88 - 5.62 Million/uL    Hemoglobin 11.6 (L) 12.0 - 17.0 g/dL    Hematocrit 34.3 (L) 36.5 - 49.3 %     (H) 82 - 98 fL    MCH 35.2 (H) 26.8 - 34.3 pg    MCHC 33.8 31.4 - 37.4 g/dL    RDW 15.2 (H) 11.6 - 15.1 %    Platelets 172 149 - 390 Thousands/uL    MPV 9.4 8.9 - 12.7 fL   Basic metabolic panel    Collection Time: 09/11/24  5:40 AM   Result Value Ref Range    Sodium 137 135 - 147 mmol/L    Potassium 4.4 3.5 - 5.3 mmol/L    Chloride 110 (H) 96 - 108 mmol/L    CO2 21 21 - 32 mmol/L    ANION GAP 6 4 - 13 mmol/L    BUN 19 5 - 25 mg/dL    Creatinine 0.80 0.60 - 1.30 mg/dL    Glucose 118 65 - 140 mg/dL    Calcium 8.7 8.4 - 10.2 mg/dL    eGFR 93 ml/min/1.73sq m   CBC    Collection Time: 09/11/24  5:40 AM   Result Value Ref Range    WBC 9.68 4.31 - 10.16 Thousand/uL    RBC 3.30 (L) 3.88 - 5.62 Million/uL    Hemoglobin 11.4 (L) 12.0 - 17.0 g/dL    Hematocrit 34.2 (L) 36.5 - 49.3 %     (H) 82 - 98 fL    MCH 34.5 (H) 26.8 - 34.3 pg    MCHC 33.3 31.4 - 37.4 g/dL    RDW 15.2 (H) 11.6 - 15.1 %    Platelets 194 149 - 390 Thousands/uL    MPV 9.6 8.9 - 12.7 fL     MRI lumbosacral plexus wo and w contrast    Result Date: 9/10/2024  Narrative: MRI LUMBOSACRAL PLEXUS WITH AND WITHOUT  CONTRAST INDICATION: leptomeningeal spread.   Non-small cell cancer of right lung COMPARISON: Lumbar MRI dated 9/7/2024 TECHNIQUE: Multiplanar, multisequence imaging of the lumbar plexus was performed. . IV Contrast:  7 mL of Gadobutrol injection (SINGLE-DOSE) FINDINGS: LUMBOSACRAL PLEXUS: Visualized lumbosacral plexus has normal course and signal intensity without mass or mass effect on the bilateral sciatic nerves. SOFT TISSUES:  Muscle bulk is symmetric without denervation atrophy. LUMBAR SPINE:  Vertebral body height and alignment are normal. There is nodular enhancement along the lower lumbar nerve roots as seen on recent lumbar spine MRI. There is also extensive enhancement in the inferior sacral canal. This measures approximately 3.3 cm cranial caudal by 1.5 cm transverse on coronal images and likely involves the right S2, bilateral S3, and bilateral more inferior sacral nerve roots. The enhancement does not clearly extend along the nerve roots beyond the sacral foramen. OTHER: None.     Impression: 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 This is most bulky in the lower sacral canal where it involves multiple sacral nerve roots. There is no definite extension along the nerve roots beyond the sacral neural foramen. Workstation performed: [id number]     MRI lumbar spine w wo contrast    Result Date: 9/9/2024  Narrative: MRI LUMBAR SPINE WITH AND WITHOUT CONTRAST INDICATION: D49.2: Neoplasm of unspecified behavior of bone, soft tissue, and skin C34.91: Malignant neoplasm of unspecified part of right bronchus or lung R26.89: Other abnormalities of gait and mobility.   stage IV adenocarcinoma of lung. Interval history includes bilateral lower extremity weakness. COMPARISON: Same day MRI thoracic spine with and without contrast. CT chest abdomen and pelvis with contrast 9/3/2024. MRI brain with and without contrast 7/1/2024. TECHNIQUE:  Multiplanar,  multisequence imaging of the lumbar spine was performed before and after gadolinium administration. . IV Contrast:  6 mL of Gadobutrol injection (SINGLE-DOSE) IMAGE QUALITY:  Diagnostic FINDINGS: VERTEBRAL BODIES:  There is a transitional lumbosacral junction - sacralized L5 with bilateral assimilation joints (near complete ankylosis on left side). Minor levoscoliosis of lumbar spine. No spondylolysis or spondylolisthesis.  No compression fracture. Normal marrow signal is identified within the visualized bony structures.  No discrete marrow lesion. SACRUM: Please see below discussion. Normal signal within the sacrum. No evidence of insufficiency or stress fracture. DISTAL CORD AND CONUS: Multiple intradural extramedullary enhancing nodular lesions along the surface of the cauda equina nerve roots T12, L1, L3-L4, and sacral spinal canal -respectively there were tiny scattered areas of leptomeningeal sites of nodular  enhancement along the cauda equina nerve roots. Dominant enhancing lesion at right T12 measures 2.6 cm (18:12). Additionally, there is 3.0 cm enhancing nodular lesion at S1 to superior S2 level extending into a small sacral Tarlov cysts (18:12). Please refer to concurrent MRI thoracic spine for additional details. Conus medullaris terminates at T12-L1. PARASPINAL SOFT TISSUES:  Paraspinal soft tissues are unremarkable. LOWER THORACIC DISC SPACES: Please refer to concurrent MRI thoracic spine for detailed report. LUMBAR DISC SPACES: Multilevel osteophytes, disc height loss, and facet arthropathy. L1-L2: Diffuse disc bulge, right foraminal disc protrusion. Mild bilateral facet arthrosis, ligamentum flavum thickening. Mild canal stenosis. Mild bilateral foraminal narrowing (right greater than left). L2-L3: Diffuse disc bulge. Mild bilateral facet arthrosis, ligamentum flavum thickening. Mild canal stenosis. No significant foraminal narrowing. L3-L4: Diffuse disc bulge. Mild bilateral facet arthrosis,  ligamentum flavum thickening. Mild canal stenosis. No significant foraminal narrowing. L4-L5: Diffuse disc bulge, central disc protrusion. Mild hypertrophic facet arthropathy, ligamentum flavum thickening. Mild canal stenosis. Mild right and moderate left foraminal narrowing. L5-S1: Sacralized L5 vertebral body with bilateral assimilation joints (complete ankylosis on left side). No significant canal stenosis or foraminal narrowing. POSTCONTRAST IMAGING: Please see above discussion. OTHER FINDINGS: Partially imaged distended bladder and colonic diverticulosis.     Impression: Findings concerning for worsened leptomeningeal metastasis, largest at T12 and S1-S2 levels.  Please refer to concurrent MRI thoracic spine with and without contrast for additional details. Partially imaged distended bladder. Recommend correlation with urinary output. Multilevel degenerative changes of lumbar spine with varying degrees of canal stenosis (multilevel mild, worse at L3-L4 level) and foraminal narrowing (moderate left L4-L5), as detailed above. Dr. Jewel Pleitez personally discussed this study with Lorri Armstrong RN on 9/9/2024 at 3:26 PM who stated she would relay this message to Dr. Nancy Ruelas. Resident: JEWEL Pleitez I, the attending radiologist, have reviewed the images and agree with the final report above. Workstation performed: [id number]     MRI thoracic spine w wo contrast    Result Date: 9/9/2024  Narrative: MRI THORACIC SPINE WITH AND WITHOUT CONTRAST INDICATION: D49.2: Neoplasm of unspecified behavior of bone, soft tissue, and skin C34.91: Malignant neoplasm of unspecified part of right bronchus or lung R26.89: Other abnormalities of gait and mobility.   Stage IV adenocarcinoma of lung. COMPARISON: Same day lumbar spine with and without contrast. CT chest abdomen pelvis 9/3/2024. MRI brain with and without contrast 7/1/2024. MRI of thoracic spine with and without contrast 4/3/2023 TECHNIQUE:  Multiplanar,  multisequence imaging of the thoracic spine was performed before and after gadolinium administration. . IV Contrast:  6 mL of Gadobutrol injection (SINGLE-DOSE) IMAGE QUALITY:  Diagnostic. FINDINGS: ALIGNMENT:  Normal alignment of the thoracic spine.  No compression fracture.  No subluxation.  No evidence of scoliosis. MARROW SIGNAL: Fatty marrow changes in upper thoracic spine, compatible with post radiation fibrotic changes.  Tiny residual STIR signal abnormality on the posterior aspect of T1 and T2 vertebral body, decreased in size, compatible with treatment-related  changes of osseous metastasis. THORACIC CORD: T1/T2 isointense intradural, extramedullary nodular enhancing lesion along the right lateral aspect of the thecal sac at the level of T12 measures approximately 2.7 cm (19:14). There is resultant leftward lateral displacement of lower thoracic spinal cord. Please see additional leptomeningeal nodules on same day MRI lumbar spine with and without contrast. Decreased soft tissue metastatic disease in right lateral paravertebral space at T2-T3, extending through right T2-T3 neural foramen surrounding associated exiting nerve, and decrease enhancing epidural soft tissue metastatic component in right anterolateral epidural space at T2 level extending superiorly upwards towards T1-T2 disc space status post treatment-related changes. PREVERTEBRAL AND PARASPINAL SOFT TISSUES:   Please see above discussion in thoracic cord section. THORACIC DEGENERATIVE CHANGE: Mild multilevel degenerative changes of thoracic spine consisting of endplate osteophytes and mild disc height loss. No significant canal stenosis or foraminal narrowing. POSTCONTRAST: Please see above discussion for further evaluation. OTHER FINDINGS: Right upper lobe pulmonary mass, better visualized on prior CT chest abdomen pelvis study 9/3/2024 but decreased in size since MR thoracic spine with and without contrast 4/3/2023.     Impression: Findings  concerning for leptomeningeal metastasis. Decreased soft tissue metastatic disease in right lateral paravertebral space at T2-T3, extending through right T2-T3 neural foramen surrounding associated exiting nerve, and decrease enhancing epidural soft tissue metastatic component in right anterolateral epidural space at T2 level extending superiorly upwards towards T1-T2 disc space status post treatment-related changes. Decreased size of treated osseous metastatic lesions in posterior T1 and posterior T2 vertebral bodies. Decrease size of treated right upper lobe pulmonary malignancy. Dr. Jewel Pleitez personally discussed this study with Lorri Armstrong RN on 9/9/2024 at 3:26 PM who stated she would relay this message to Dr. Nancy Ruelas. Resident: JEWEL Pleitez I, the attending radiologist, have reviewed the images and agree with the final report above. Workstation performed: [id number]     CT chest abdomen pelvis w contrast    Result Date: 9/4/2024  Narrative: CT CHEST, ABDOMEN AND PELVIS WITH IV CONTRAST INDICATION: assess disease progression, generalized weakness, mets. COMPARISON: Multiple priors most recently 5/17/2024p TECHNIQUE: CT examination of the chest, abdomen and pelvis was performed. Multiplanar 2D reformatted images were created from the source data. This examination, like all CT scans performed in the [facility], was performed utilizing techniques to minimize radiation dose exposure, including the use of iterative reconstruction and automated exposure control. Radiation dose length product (DLP) for this visit: 625 mGy-cm IV Contrast: 85 mL of iohexol (OMNIPAQUE) Enteric Contrast: Not administered. FINDINGS: CHEST LUNGS: Upper lobe predominant emphysema again seen. Similar appearance of irregular right upper lobe mass measuring approximately 3.4 x 2.2 cm (3, 41), abutting the posterior pleural surface at the lung apex. Stable 0.6 cm pulmonary nodule in the lingula  (3, 166). PLEURA: No  pleural effusion or pneumothorax. HEART/GREAT VESSELS: Heart is unremarkable for patient's age. No thoracic aortic aneurysm. MEDIASTINUM AND TE: Unremarkable. CHEST WALL AND LOWER NECK: Left chest Mediport. ABDOMEN LIVER/BILIARY TREE: Unremarkable. GALLBLADDER: No calcified gallstones. No pericholecystic inflammatory change. SPLEEN: Small splenic low-attenuation lesions are stable, likely cysts. PANCREAS: 1 cm cystic lesion in the pancreatic tail. Increasing size of low-attenuation in the distal pancreatic body measuring up to 1.4 x 1.1 cm (2, 136). Calcifications in the pancreatic head, compatible with sequela of prior inflammation. ADRENAL GLANDS: Unremarkable. KIDNEYS/URETERS: Unremarkable. No hydronephrosis. STOMACH AND BOWEL: Colonic diverticulosis without findings of acute diverticulitis. APPENDIX: No findings to suggest appendicitis. ABDOMINOPELVIC CAVITY: No ascites. No pneumoperitoneum. No lymphadenopathy. VESSELS: Atherosclerosis without abdominal aortic aneurysm. PELVIS REPRODUCTIVE ORGANS: Mildly enlarged URINARY BLADDER: Distended urinary bladder. ABDOMINAL WALL/INGUINAL REGIONS: Small fat-containing umbilical hernia. BONES: Sclerosis in the right posterior second and third ribs is stable, likely posttreatment change. No acute osseous abnormality. Multilevel degenerative changes of the spine.     Impression: Similar appearance of right upper lobe mass. No evidence of disease progression. Pancreatic cystic lesions. Recommend MRI with and without contrast for complete characterization on an outpatient basis. The study was marked in EPIC for immediate notification. Workstation performed: [id number]     ECOG PS: 0-1    Assessment/Plan:  68 yoM with PMHx of Stage Pablo NSCLC of the lung metastatic to the brain and spine, pneumonitis, and PE on Eliquis developed worsening b/l LE weakness and falls over three months and was found to have new leptomeningeal metastasis in the setting of otherwise improving size  of primary tumor and known mets.  He was started on dexamethasone and has plans for neuraxial radiation.    New leptomeningeal metastasis of known stage Pablo NSCLC of the lung involving brain and spine  - Tumor board recs: RT to spine and hospice GOC  - The patient has had a mixed response to his current therapy with pemetrexed and pembrolizumab with decrease in size of his lung primary, paravertebral soft tissue mets, and spinal mets; while developing leptomeningeal metastasis.  Prior Caris showed PD-L1 0% and no targetable mutations.  - Recommend MRI Brain  - Radiation oncology planning palliative neuraxial RT  - Continue dexamethasone  - NSGY consulted, no plans for surgery  - Palliative Care consulted  - Continue GOC/Palliative discussions    PE  - continue home Eliquis    Additional recommendations to follow per attending, Dr. Lozano.    Fred Duval DO, PGY4  Hematology/Oncology Fellow

## 2024-09-11 NOTE — PROGRESS NOTES
Chart reviewed in preparation of Neuro Multidisciplinary Cancer Conference presentation by Dr. Ruelas on 9/11/24.  Patient has a history of  metastatic non-small cell lung cancer.  He previously received palliative radiation to the upper thoracic spine and whole brain.  He later received systemic therapy but now has imaging concerning for leptomeningeal involvement of the lower thoracic, lumbar and sacral regions.

## 2024-09-11 NOTE — QUICK NOTE
Radiation Oncology Treatment Note     A treatment dose of 300 cGy was administered today to the involved tumor site(s)  T-10 -sacrum using 1-10 MV photons.  Present total dose at this time is 300 cGy.  Approximately 9 more treatment before completion of treatments or critical review.

## 2024-09-11 NOTE — DISCHARGE INSTR - AVS FIRST PAGE
Dear Claude Austin,     It was our pleasure to care for you here at [facility].  It is our hope that we were always able to exceed the expected standards for your care during your stay.  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  You were cared for on the South 4 floor by Pamela Puga MD under the service of Zoey Manzo* with the [facility] who covers for your primary care physician (PCP), Jose James MD, while you were hospitalized.  If you have any questions or concerns related to this hospitalization, you may contact us at .  For follow up as well as any medication refills, we recommend that you follow up with your primary care physician.  A registered nurse will reach out to you by phone within a few days after your discharge to answer any additional questions that you may have after going home.  However, at this time we provide for you here, the most important instructions / recommendations at discharge:     Notable Medication Adjustments -   Please take dexamethasone 4 Mg 1 tablet 2 times daily  Please take ondansetron 4 mg tablet every 8 hours as needed for nausea or vomiting  Please take pantoprazole 40 Mg 1 tablet daily  Please continue taking rest of your medications as prescribed  Testing Required after Discharge -   None   ** Please contact your PCP to request testing orders for any of the testing recommended here **  Important follow up information -   Please follow-up with your PCP  Please follow-up with oncology  Please follow-up with radiation oncology  Other Instructions -   Please come back to the ED if any of the symptoms worsens or you experience any new neurological deficit  Please review this entire after visit summary as additional general instructions including medication list, appointments, activity, diet, any pertinent wound care, and other additional recommendations from your care  team that may be provided for you.      Sincerely,     Pamela Puga MD

## 2024-09-11 NOTE — PROGRESS NOTES
Progress Note - Hospitalist   Name: Claude Austin 68 y.o. 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   Last chemo treatment 8/26/2024.  Next treatment scheduled for 9/16/2024. On Paraplatin, Alimta, and Keytruda.   Oncologist, Dr. 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 Multiple intradural extramedullary enhancing nodular lesions along the surface of the cauda equina nerve roots T12, L1, L3-L4, and sacral spinal canal -respectively there were tiny scattered areas of leptomeningeal sites of nodular enhancement along the cauda equina nerve roots the largest at T12 and S1-S2 levels.\received 1 treatment of radiation today    Plan  Follow-up on MRI of the cervical spine and brain  Dexamethasone 4 mg q6hrs   radiology oncology on board, appreciate recommendations  Neurosurgery on board, no intervention required  Monitor neuroexam  Pain control with home medications   Tylenol 975 mg TID PRN, gabapentin 300 mg in AM and 600 mg in PM  Consider lidocaine patch as this has helped in the past  Multiple subsegmental pulmonary emboli without acute cor pulmonale (HCC)  Patient with history of multiple subsegmental pulmonary emboli that appear to have been diagnosed on 03/29/2023 and patient has been on anticoagulation since.    Plan  Continue Eliquis 5 mg twice daily  Constipation  Patient reports chronic constipation with stool leakage for which uses lactulose.  He states this allows him to have a bowel movement 2-3 days.  Last bowel movement prior to arrival.     Plan:  Continue lactulose  20 g BID    VTE Pharmacologic Prophylaxis: VTE Score: 7 High Risk (Score >/= 5) - Pharmacological DVT Prophylaxis Ordered: apixaban (Eliquis). Sequential Compression Devices Ordered.    Mobility:   Basic Mobility Inpatient Raw Score: 20  JH-HLM Goal: 6: Walk 10 steps or more  JH-HLM Achieved: 7: Walk 25 feet or more  JH-HLM Goal achieved. Continue to encourage appropriate mobility.    Patient Centered Rounds: I performed bedside rounds with nursing staff today.   Discussions with Specialists or Other Care Team Provider: Radio Onc and neurosurgery     Education and Discussions with Family / Patient: Updated  (wife) at bedside.    Current Length of Stay: 2 day(s)  Current Patient Status: Inpatient   Certification Statement: The patient will continue to require additional inpatient hospital stay due to management of leptomeningeal metastasis  Discharge Plan: Anticipate discharge in 24-48 hrs to home.    Code Status: Level 1 - Full Code    Subjective   Patient is alert, denies any new neurological deficits.  Patient denies urinary or bowel incontinence.    Objective     Vitals:   Temp (24hrs), Av.1 °F (36.7 °C), Min:98 °F (36.7 °C), Max:98.2 °F (36.8 °C)    Temp:  [98 °F (36.7 °C)-98.2 °F (36.8 °C)] 98 °F (36.7 °C)  HR:  [69-79] 69  Resp:  [16-18] 16  BP: (111-147)/(75-76) 147/76  SpO2:  [93 %-95 %] 95 %  Body mass index is 21.83 kg/m².     Input and Output Summary (last 24 hours):     Intake/Output Summary (Last 24 hours) at 2024 1515  Last data filed at 2024 1100  Gross per 24 hour   Intake 900 ml   Output 550 ml   Net 350 ml       Physical Exam  HENT:      Head: Normocephalic and atraumatic.      Right Ear: External ear normal.      Left Ear: External ear normal.      Mouth/Throat:      Pharynx: Oropharynx is clear.   Eyes:      Extraocular Movements: Extraocular movements intact.      Conjunctiva/sclera: Conjunctivae normal.      Pupils: Pupils are equal, round, and reactive to light.    Cardiovascular:      Rate and Rhythm: Normal rate and regular rhythm.      Pulses: Normal pulses.      Heart sounds: Normal heart sounds.   Pulmonary:      Effort: Pulmonary effort is normal.      Breath sounds: Normal breath sounds.   Abdominal:      General: Bowel sounds are normal.      Palpations: Abdomen is soft.   Musculoskeletal:         General: Normal range of motion.   Skin:     General: Skin is warm.      Capillary Refill: Capillary refill takes less than 2 seconds.   Neurological:      General: No focal deficit present.      Mental Status: He is alert and oriented to person, place, and time.          Lines/Drains:  Lines/Drains/Airways       Active Status       Name Placement date Placement time Site Days    Port A Cath 05/16/23 Left Internal jugular 05/16/23  1322  Internal jugular  484                    Central Line:  Goal for removal:  for chemotherapy               Lab Results: I have reviewed the following results: CBC/BMP:   .     09/11/24  0540   WBC 9.68   HGB 11.4*   HCT 34.2*      SODIUM 137   K 4.4   *   CO2 21   BUN 19   CREATININE 0.80   GLUC 118       Results from last 7 days   Lab Units 09/11/24  0540 09/10/24  0637 09/09/24  2152   WBC Thousand/uL 9.68   < > 4.91   HEMOGLOBIN g/dL 11.4*   < > 11.8*   HEMATOCRIT % 34.2*   < > 34.9*   PLATELETS Thousands/uL 194   < > 167   BANDS PCT %  --   --  6   LYMPHO PCT %  --   --  13*   MONO PCT %  --   --  9   EOS PCT %  --   --  2    < > = values in this interval not displayed.     Results from last 7 days   Lab Units 09/11/24  0540 09/10/24  0637 09/09/24  2152   SODIUM mmol/L 137   < > 141   POTASSIUM mmol/L 4.4   < > 4.2   CHLORIDE mmol/L 110*   < > 111*   CO2 mmol/L 21   < > 22   BUN mg/dL 19   < > 25   CREATININE mg/dL 0.80   < > 1.17   ANION GAP mmol/L 6   < > 8   CALCIUM mg/dL 8.7   < > 8.4   ALBUMIN g/dL  --   --  3.4*   TOTAL BILIRUBIN mg/dL  --   --  0.29   ALK PHOS U/L  --   --  50   ALT U/L  --   --  28   AST U/L  --    --  18   GLUCOSE RANDOM mg/dL 118   < > 101    < > = values in this interval not displayed.                       Recent Cultures (last 7 days):         Imaging Review: Reviewed radiology reports from this admission including: MRI spine.  Other Studies: EKG was reviewed.     Last 24 Hours Medication List:     Current Facility-Administered Medications:     acetaminophen (TYLENOL) tablet 975 mg, Q8H PRN    apixaban (ELIQUIS) tablet 5 mg, BID    dexamethasone (DECADRON) injection 4 mg, Q6H HUDSON    folic acid (FOLVITE) tablet 1,000 mcg, Daily    gabapentin (NEURONTIN) capsule 300 mg, Daily    gabapentin (NEURONTIN) capsule 600 mg, Daily With Dinner    lactulose (CHRONULAC) oral solution 20 g, BID    pantoprazole (PROTONIX) EC tablet 40 mg, BID AC    Administrative Statements   Today, Patient Was Seen By: Pamela Puga MD  I have spent a total time of 30 minutes in caring for this patient on the day of the visit/encounter including Reviewing / ordering tests, medicine, procedures  .    **Please Note: This note may have been constructed using a voice recognition system.**

## 2024-09-12 ENCOUNTER — TELEPHONE (OUTPATIENT)
Age: 69
End: 2024-09-12

## 2024-09-12 ENCOUNTER — HOSPICE ADMISSION (OUTPATIENT)
Dept: HOME HOSPICE | Facility: HOSPICE | Age: 69
End: 2024-09-12
Payer: MEDICARE

## 2024-09-12 ENCOUNTER — APPOINTMENT (OUTPATIENT)
Dept: RADIATION ONCOLOGY | Facility: HOSPITAL | Age: 69
End: 2024-09-12
Attending: RADIOLOGY
Payer: MEDICARE

## 2024-09-12 ENCOUNTER — APPOINTMENT (OUTPATIENT)
Dept: RADIATION ONCOLOGY | Facility: HOSPITAL | Age: 69
End: 2024-09-12
Payer: MEDICARE

## 2024-09-12 ENCOUNTER — HOME CARE VISIT (OUTPATIENT)
Dept: HOME HEALTH SERVICES | Facility: HOME HEALTHCARE | Age: 69
End: 2024-09-12

## 2024-09-12 ENCOUNTER — APPOINTMENT (OUTPATIENT)
Dept: PHYSICAL THERAPY | Age: 69
End: 2024-09-12
Payer: MEDICARE

## 2024-09-12 DIAGNOSIS — C79.31 METASTASIS TO BRAIN (HCC): ICD-10-CM

## 2024-09-12 DIAGNOSIS — C34.91 NON-SMALL CELL CANCER OF RIGHT LUNG (HCC): Primary | ICD-10-CM

## 2024-09-12 PROCEDURE — 77334 RADIATION TREATMENT AID(S): CPT | Performed by: RADIOLOGY

## 2024-09-12 PROCEDURE — G6002 STEREOSCOPIC X-RAY GUIDANCE: HCPCS | Performed by: RADIOLOGY

## 2024-09-12 PROCEDURE — 77412 RADIATION TX DELIVERY LVL 3: CPT | Performed by: RADIOLOGY

## 2024-09-12 PROCEDURE — 77387 GUIDANCE FOR RADJ TX DLVR: CPT | Performed by: RADIOLOGY

## 2024-09-12 NOTE — TELEPHONE ENCOUNTER
Returned call to patient's wife. They are ready for hospice consult.  Will place today. Emotional support provided, and asked her to call us if she has not heard from hospice services within 24 hours.    Infusion schedulers:  please cancel all appointments. Thank you

## 2024-09-12 NOTE — TELEPHONE ENCOUNTER
Patient was in the hospital yesterday, and wife, Lolis, would like to speak with a nurse regarding next steps with hospice & a few other questions

## 2024-09-13 ENCOUNTER — HOSPITAL ENCOUNTER (OUTPATIENT)
Dept: INFUSION CENTER | Facility: HOSPITAL | Age: 69
End: 2024-09-13

## 2024-09-13 ENCOUNTER — APPOINTMENT (OUTPATIENT)
Dept: RADIATION ONCOLOGY | Facility: HOSPITAL | Age: 69
End: 2024-09-13
Attending: RADIOLOGY
Payer: MEDICARE

## 2024-09-13 PROCEDURE — 77301 RADIOTHERAPY DOSE PLAN IMRT: CPT | Performed by: RADIOLOGY

## 2024-09-13 PROCEDURE — 77300 RADIATION THERAPY DOSE PLAN: CPT | Performed by: RADIOLOGY

## 2024-09-13 PROCEDURE — 77338 DESIGN MLC DEVICE FOR IMRT: CPT | Performed by: RADIOLOGY

## 2024-09-16 ENCOUNTER — APPOINTMENT (OUTPATIENT)
Dept: RADIATION ONCOLOGY | Facility: HOSPITAL | Age: 69
End: 2024-09-16
Attending: RADIOLOGY
Payer: MEDICARE

## 2024-09-16 ENCOUNTER — HOSPITAL ENCOUNTER (OUTPATIENT)
Dept: INFUSION CENTER | Facility: HOSPITAL | Age: 69
End: 2024-09-16
Attending: INTERNAL MEDICINE

## 2024-09-16 PROCEDURE — 77386 HB NTSTY MODUL RAD TX DLVR CPLX: CPT | Performed by: STUDENT IN AN ORGANIZED HEALTH CARE EDUCATION/TRAINING PROGRAM

## 2024-09-16 PROCEDURE — 77014 CHG CT GUIDANCE RADIATION THERAPY FLDS PLACEMENT: CPT | Performed by: STUDENT IN AN ORGANIZED HEALTH CARE EDUCATION/TRAINING PROGRAM

## 2024-09-17 ENCOUNTER — APPOINTMENT (OUTPATIENT)
Dept: PHYSICAL THERAPY | Age: 69
End: 2024-09-17
Payer: MEDICARE

## 2024-09-17 ENCOUNTER — APPOINTMENT (OUTPATIENT)
Dept: RADIATION ONCOLOGY | Facility: HOSPITAL | Age: 69
End: 2024-09-17
Attending: RADIOLOGY
Payer: MEDICARE

## 2024-09-17 ENCOUNTER — APPOINTMENT (OUTPATIENT)
Dept: RADIATION ONCOLOGY | Facility: HOSPITAL | Age: 69
End: 2024-09-17
Payer: MEDICARE

## 2024-09-17 PROCEDURE — 77336 RADIATION PHYSICS CONSULT: CPT | Performed by: RADIOLOGY

## 2024-09-17 PROCEDURE — 77386 HB NTSTY MODUL RAD TX DLVR CPLX: CPT | Performed by: RADIOLOGY

## 2024-09-17 PROCEDURE — 77014 CHG CT GUIDANCE RADIATION THERAPY FLDS PLACEMENT: CPT | Performed by: RADIOLOGY

## 2024-09-18 ENCOUNTER — APPOINTMENT (OUTPATIENT)
Dept: RADIATION ONCOLOGY | Facility: HOSPITAL | Age: 69
End: 2024-09-18
Attending: RADIOLOGY
Payer: MEDICARE

## 2024-09-18 PROCEDURE — 77427 RADIATION TX MANAGEMENT X5: CPT | Performed by: RADIOLOGY

## 2024-09-18 PROCEDURE — 77386 HB NTSTY MODUL RAD TX DLVR CPLX: CPT | Performed by: RADIOLOGY

## 2024-09-18 PROCEDURE — 77014 CHG CT GUIDANCE RADIATION THERAPY FLDS PLACEMENT: CPT | Performed by: RADIOLOGY

## 2024-09-19 ENCOUNTER — APPOINTMENT (OUTPATIENT)
Dept: RADIATION ONCOLOGY | Facility: HOSPITAL | Age: 69
End: 2024-09-19
Attending: RADIOLOGY
Payer: MEDICARE

## 2024-09-19 ENCOUNTER — APPOINTMENT (OUTPATIENT)
Dept: RADIATION ONCOLOGY | Facility: HOSPITAL | Age: 69
End: 2024-09-19
Payer: MEDICARE

## 2024-09-19 ENCOUNTER — APPOINTMENT (OUTPATIENT)
Dept: PHYSICAL THERAPY | Age: 69
End: 2024-09-19
Payer: MEDICARE

## 2024-09-19 PROCEDURE — 77014 CHG CT GUIDANCE RADIATION THERAPY FLDS PLACEMENT: CPT | Performed by: RADIOLOGY

## 2024-09-19 PROCEDURE — 77386 HB NTSTY MODUL RAD TX DLVR CPLX: CPT | Performed by: RADIOLOGY

## 2024-09-20 ENCOUNTER — APPOINTMENT (OUTPATIENT)
Dept: RADIATION ONCOLOGY | Facility: HOSPITAL | Age: 69
End: 2024-09-20
Attending: RADIOLOGY
Payer: MEDICARE

## 2024-09-20 PROCEDURE — 77014 CHG CT GUIDANCE RADIATION THERAPY FLDS PLACEMENT: CPT | Performed by: STUDENT IN AN ORGANIZED HEALTH CARE EDUCATION/TRAINING PROGRAM

## 2024-09-20 PROCEDURE — 77386 HB NTSTY MODUL RAD TX DLVR CPLX: CPT | Performed by: STUDENT IN AN ORGANIZED HEALTH CARE EDUCATION/TRAINING PROGRAM

## 2024-09-23 ENCOUNTER — APPOINTMENT (OUTPATIENT)
Dept: PHYSICAL THERAPY | Age: 69
End: 2024-09-23
Payer: MEDICARE

## 2024-09-23 ENCOUNTER — APPOINTMENT (OUTPATIENT)
Dept: RADIATION ONCOLOGY | Facility: HOSPITAL | Age: 69
End: 2024-09-23
Attending: RADIOLOGY
Payer: MEDICARE

## 2024-09-23 PROCEDURE — 77014 CHG CT GUIDANCE RADIATION THERAPY FLDS PLACEMENT: CPT | Performed by: RADIOLOGY

## 2024-09-23 PROCEDURE — 77386 HB NTSTY MODUL RAD TX DLVR CPLX: CPT | Performed by: RADIOLOGY

## 2024-09-24 ENCOUNTER — APPOINTMENT (OUTPATIENT)
Dept: RADIATION ONCOLOGY | Facility: HOSPITAL | Age: 69
End: 2024-09-24
Attending: RADIOLOGY
Payer: MEDICARE

## 2024-09-24 DIAGNOSIS — C71.9 MALIGNANT NEOPLASM OF BRAIN, UNSPECIFIED LOCATION (HCC): Primary | ICD-10-CM

## 2024-09-24 PROCEDURE — 77014 CHG CT GUIDANCE RADIATION THERAPY FLDS PLACEMENT: CPT | Performed by: RADIOLOGY

## 2024-09-24 PROCEDURE — 77386 HB NTSTY MODUL RAD TX DLVR CPLX: CPT | Performed by: RADIOLOGY

## 2024-09-24 PROCEDURE — 77336 RADIATION PHYSICS CONSULT: CPT | Performed by: RADIOLOGY

## 2024-09-24 RX ORDER — DIPHENHYDRAMINE HYDROCHLORIDE AND LIDOCAINE HYDROCHLORIDE AND ALUMINUM HYDROXIDE AND MAGNESIUM HYDRO
10 KIT EVERY 4 HOURS PRN
Qty: 237 ML | Refills: 1 | Status: SHIPPED | OUTPATIENT
Start: 2024-09-24

## 2024-09-25 ENCOUNTER — APPOINTMENT (OUTPATIENT)
Dept: RADIATION ONCOLOGY | Facility: HOSPITAL | Age: 69
End: 2024-09-25
Attending: RADIOLOGY
Payer: MEDICARE

## 2024-09-25 PROCEDURE — 77014 CHG CT GUIDANCE RADIATION THERAPY FLDS PLACEMENT: CPT | Performed by: RADIOLOGY

## 2024-09-25 PROCEDURE — 77386 HB NTSTY MODUL RAD TX DLVR CPLX: CPT | Performed by: RADIOLOGY

## 2024-09-26 ENCOUNTER — HOSPITAL ENCOUNTER (INPATIENT)
Facility: HOSPITAL | Age: 69
LOS: 3 days | Discharge: HOME HEALTH | DRG: 522 | End: 2024-09-29
Attending: EMERGENCY MEDICINE | Admitting: SURGERY
Payer: MEDICARE

## 2024-09-26 ENCOUNTER — APPOINTMENT (OUTPATIENT)
Dept: RADIATION ONCOLOGY | Facility: HOSPITAL | Age: 69
End: 2024-09-26
Attending: RADIOLOGY
Payer: MEDICARE

## 2024-09-26 ENCOUNTER — APPOINTMENT (INPATIENT)
Dept: RADIOLOGY | Facility: HOSPITAL | Age: 69
DRG: 522 | End: 2024-09-26
Payer: MEDICARE

## 2024-09-26 ENCOUNTER — TELEPHONE (OUTPATIENT)
Dept: RADIATION ONCOLOGY | Facility: HOSPITAL | Age: 69
End: 2024-09-26

## 2024-09-26 ENCOUNTER — APPOINTMENT (EMERGENCY)
Dept: RADIOLOGY | Facility: HOSPITAL | Age: 69
DRG: 522 | End: 2024-09-26
Payer: MEDICARE

## 2024-09-26 DIAGNOSIS — S72.011A CLOSED SUBCAPITAL FRACTURE OF FEMUR, RIGHT, INITIAL ENCOUNTER (HCC): ICD-10-CM

## 2024-09-26 DIAGNOSIS — S72.001A CLOSED DISPLACED FRACTURE OF RIGHT FEMORAL NECK (HCC): Primary | ICD-10-CM

## 2024-09-26 DIAGNOSIS — C34.91 NON-SMALL CELL CANCER OF RIGHT LUNG (HCC): ICD-10-CM

## 2024-09-26 LAB
ABO GROUP BLD: NORMAL
ABO GROUP BLD: NORMAL
ANION GAP SERPL CALCULATED.3IONS-SCNC: 6 MMOL/L (ref 4–13)
ANISOCYTOSIS BLD QL SMEAR: PRESENT
ATRIAL RATE: 64 BPM
BASOPHILS # BLD MANUAL: 0 THOUSAND/UL (ref 0–0.1)
BASOPHILS NFR MAR MANUAL: 0 % (ref 0–1)
BLD GP AB SCN SERPL QL: NEGATIVE
BUN SERPL-MCNC: 29 MG/DL (ref 5–25)
CALCIUM SERPL-MCNC: 7.9 MG/DL (ref 8.4–10.2)
CHLORIDE SERPL-SCNC: 105 MMOL/L (ref 96–108)
CO2 SERPL-SCNC: 23 MMOL/L (ref 21–32)
CREAT SERPL-MCNC: 0.86 MG/DL (ref 0.6–1.3)
EOSINOPHIL # BLD MANUAL: 0 THOUSAND/UL (ref 0–0.4)
EOSINOPHIL NFR BLD MANUAL: 0 % (ref 0–6)
ERYTHROCYTE [DISTWIDTH] IN BLOOD BY AUTOMATED COUNT: 15.7 % (ref 11.6–15.1)
GFR SERPL CREATININE-BSD FRML MDRD: 90 ML/MIN/1.73SQ M
GLUCOSE SERPL-MCNC: 82 MG/DL (ref 65–140)
HCT VFR BLD AUTO: 33.7 % (ref 36.5–49.3)
HGB BLD-MCNC: 11.6 G/DL (ref 12–17)
INR PPP: 1.2 (ref 0.85–1.19)
LYMPHOCYTES # BLD AUTO: 0 % (ref 14–44)
LYMPHOCYTES # BLD AUTO: 0 THOUSAND/UL (ref 0.6–4.47)
MACROCYTES BLD QL AUTO: PRESENT
MCH RBC QN AUTO: 36.1 PG (ref 26.8–34.3)
MCHC RBC AUTO-ENTMCNC: 34.4 G/DL (ref 31.4–37.4)
MCV RBC AUTO: 105 FL (ref 82–98)
METAMYELOCYTE ABSOLUTE CT: 0.24 THOUSAND/UL (ref 0–0.1)
METAMYELOCYTES NFR BLD MANUAL: 4 % (ref 0–1)
MONOCYTES # BLD AUTO: 0.3 THOUSAND/UL (ref 0–1.22)
MONOCYTES NFR BLD: 5 % (ref 4–12)
MYELOCYTE ABSOLUTE CT: 0.12 THOUSAND/UL (ref 0–0.1)
MYELOCYTES NFR BLD MANUAL: 2 % (ref 0–1)
NEUTROPHILS # BLD MANUAL: 5.34 THOUSAND/UL (ref 1.85–7.62)
NEUTS BAND NFR BLD MANUAL: 10 % (ref 0–8)
NEUTS SEG NFR BLD AUTO: 79 % (ref 43–75)
P AXIS: 76 DEGREES
PLATELET # BLD AUTO: 46 THOUSANDS/UL (ref 149–390)
PLATELET # BLD AUTO: 55 THOUSANDS/UL (ref 149–390)
PLATELET BLD QL SMEAR: ABNORMAL
PMV BLD AUTO: 9.3 FL (ref 8.9–12.7)
PMV BLD AUTO: 9.7 FL (ref 8.9–12.7)
POLYCHROMASIA BLD QL SMEAR: PRESENT
POTASSIUM SERPL-SCNC: 3.8 MMOL/L (ref 3.5–5.3)
PR INTERVAL: 148 MS
PROTHROMBIN TIME: 15.9 SECONDS (ref 12.3–15)
QRS AXIS: 83 DEGREES
QRSD INTERVAL: 82 MS
QT INTERVAL: 386 MS
QTC INTERVAL: 398 MS
RBC # BLD AUTO: 3.21 MILLION/UL (ref 3.88–5.62)
RBC MORPH BLD: PRESENT
RH BLD: POSITIVE
RH BLD: POSITIVE
SMUDGE CELLS BLD QL SMEAR: PRESENT
SODIUM SERPL-SCNC: 134 MMOL/L (ref 135–147)
SPECIMEN EXPIRATION DATE: NORMAL
T WAVE AXIS: 81 DEGREES
VENTRICULAR RATE: 64 BPM
WBC # BLD AUTO: 6 THOUSAND/UL (ref 4.31–10.16)

## 2024-09-26 PROCEDURE — 73552 X-RAY EXAM OF FEMUR 2/>: CPT

## 2024-09-26 PROCEDURE — 71045 X-RAY EXAM CHEST 1 VIEW: CPT

## 2024-09-26 PROCEDURE — 93005 ELECTROCARDIOGRAM TRACING: CPT

## 2024-09-26 PROCEDURE — 93010 ELECTROCARDIOGRAM REPORT: CPT | Performed by: INTERNAL MEDICINE

## 2024-09-26 PROCEDURE — 85610 PROTHROMBIN TIME: CPT | Performed by: PHYSICIAN ASSISTANT

## 2024-09-26 PROCEDURE — 85049 AUTOMATED PLATELET COUNT: CPT

## 2024-09-26 PROCEDURE — 85007 BL SMEAR W/DIFF WBC COUNT: CPT | Performed by: EMERGENCY MEDICINE

## 2024-09-26 PROCEDURE — 99223 1ST HOSP IP/OBS HIGH 75: CPT | Performed by: PHYSICIAN ASSISTANT

## 2024-09-26 PROCEDURE — 96360 HYDRATION IV INFUSION INIT: CPT

## 2024-09-26 PROCEDURE — 86850 RBC ANTIBODY SCREEN: CPT | Performed by: EMERGENCY MEDICINE

## 2024-09-26 PROCEDURE — 80048 BASIC METABOLIC PNL TOTAL CA: CPT | Performed by: EMERGENCY MEDICINE

## 2024-09-26 PROCEDURE — 96361 HYDRATE IV INFUSION ADD-ON: CPT

## 2024-09-26 PROCEDURE — 85027 COMPLETE CBC AUTOMATED: CPT | Performed by: EMERGENCY MEDICINE

## 2024-09-26 PROCEDURE — 86901 BLOOD TYPING SEROLOGIC RH(D): CPT | Performed by: EMERGENCY MEDICINE

## 2024-09-26 PROCEDURE — 99223 1ST HOSP IP/OBS HIGH 75: CPT | Performed by: SURGERY

## 2024-09-26 PROCEDURE — 99285 EMERGENCY DEPT VISIT HI MDM: CPT

## 2024-09-26 PROCEDURE — 86900 BLOOD TYPING SEROLOGIC ABO: CPT | Performed by: EMERGENCY MEDICINE

## 2024-09-26 PROCEDURE — 73502 X-RAY EXAM HIP UNI 2-3 VIEWS: CPT

## 2024-09-26 RX ORDER — PANTOPRAZOLE SODIUM 40 MG/1
40 TABLET, DELAYED RELEASE ORAL DAILY
Status: DISCONTINUED | OUTPATIENT
Start: 2024-09-27 | End: 2024-09-29 | Stop reason: HOSPADM

## 2024-09-26 RX ORDER — OXYCODONE HYDROCHLORIDE 5 MG/1
5 TABLET ORAL EVERY 4 HOURS PRN
Status: DISCONTINUED | OUTPATIENT
Start: 2024-09-26 | End: 2024-09-29 | Stop reason: HOSPADM

## 2024-09-26 RX ORDER — FOLIC ACID 1 MG/1
1000 TABLET ORAL DAILY
Status: DISCONTINUED | OUTPATIENT
Start: 2024-09-27 | End: 2024-09-29 | Stop reason: HOSPADM

## 2024-09-26 RX ORDER — ACETAMINOPHEN 325 MG/1
650 TABLET ORAL ONCE
Status: COMPLETED | OUTPATIENT
Start: 2024-09-26 | End: 2024-09-26

## 2024-09-26 RX ORDER — ACETAMINOPHEN 325 MG/1
975 TABLET ORAL EVERY 8 HOURS SCHEDULED
Status: DISCONTINUED | OUTPATIENT
Start: 2024-09-26 | End: 2024-09-29 | Stop reason: HOSPADM

## 2024-09-26 RX ORDER — AMOXICILLIN 250 MG
1 CAPSULE ORAL
Status: DISCONTINUED | OUTPATIENT
Start: 2024-09-26 | End: 2024-09-29 | Stop reason: HOSPADM

## 2024-09-26 RX ORDER — ONDANSETRON 2 MG/ML
4 INJECTION INTRAMUSCULAR; INTRAVENOUS EVERY 4 HOURS PRN
Status: DISCONTINUED | OUTPATIENT
Start: 2024-09-26 | End: 2024-09-29 | Stop reason: HOSPADM

## 2024-09-26 RX ORDER — SODIUM CHLORIDE, SODIUM LACTATE, POTASSIUM CHLORIDE, CALCIUM CHLORIDE 600; 310; 30; 20 MG/100ML; MG/100ML; MG/100ML; MG/100ML
100 INJECTION, SOLUTION INTRAVENOUS CONTINUOUS
Status: DISCONTINUED | OUTPATIENT
Start: 2024-09-26 | End: 2024-09-28

## 2024-09-26 RX ORDER — LACTULOSE 10 G/15ML
20 SOLUTION ORAL 2 TIMES DAILY
Status: DISCONTINUED | OUTPATIENT
Start: 2024-09-26 | End: 2024-09-29 | Stop reason: HOSPADM

## 2024-09-26 RX ORDER — HYDROMORPHONE HCL IN WATER/PF 6 MG/30 ML
0.2 PATIENT CONTROLLED ANALGESIA SYRINGE INTRAVENOUS EVERY 2 HOUR PRN
Status: DISCONTINUED | OUTPATIENT
Start: 2024-09-26 | End: 2024-09-29 | Stop reason: HOSPADM

## 2024-09-26 RX ORDER — POLYETHYLENE GLYCOL 3350 17 G/17G
17 POWDER, FOR SOLUTION ORAL DAILY
Status: DISCONTINUED | OUTPATIENT
Start: 2024-09-26 | End: 2024-09-29 | Stop reason: HOSPADM

## 2024-09-26 RX ORDER — GABAPENTIN 300 MG/1
300 CAPSULE ORAL DAILY
Status: DISCONTINUED | OUTPATIENT
Start: 2024-09-27 | End: 2024-09-29 | Stop reason: HOSPADM

## 2024-09-26 RX ORDER — ENOXAPARIN SODIUM 100 MG/ML
30 INJECTION SUBCUTANEOUS EVERY 12 HOURS
Status: DISCONTINUED | OUTPATIENT
Start: 2024-09-27 | End: 2024-09-27

## 2024-09-26 RX ORDER — CHLORHEXIDINE GLUCONATE ORAL RINSE 1.2 MG/ML
15 SOLUTION DENTAL ONCE
Status: COMPLETED | OUTPATIENT
Start: 2024-09-26 | End: 2024-09-26

## 2024-09-26 RX ADMIN — OXYCODONE HYDROCHLORIDE 5 MG: 5 TABLET ORAL at 16:38

## 2024-09-26 RX ADMIN — ACETAMINOPHEN 325MG 650 MG: 325 TABLET ORAL at 14:13

## 2024-09-26 RX ADMIN — ACETAMINOPHEN 325MG 975 MG: 325 TABLET ORAL at 21:22

## 2024-09-26 RX ADMIN — SENNOSIDES AND DOCUSATE SODIUM 1 TABLET: 8.6; 5 TABLET ORAL at 21:22

## 2024-09-26 RX ADMIN — SODIUM CHLORIDE, SODIUM LACTATE, POTASSIUM CHLORIDE, AND CALCIUM CHLORIDE 100 ML/HR: .6; .31; .03; .02 INJECTION, SOLUTION INTRAVENOUS at 21:22

## 2024-09-26 RX ADMIN — CHLORHEXIDINE GLUCONATE 15 ML: 1.2 RINSE ORAL at 17:57

## 2024-09-26 RX ADMIN — TRANEXAMIC ACID 690 MG: 100 INJECTION, SOLUTION INTRAVENOUS at 16:35

## 2024-09-26 RX ADMIN — POLYETHYLENE GLYCOL 3350 17 G: 17 POWDER, FOR SOLUTION ORAL at 17:57

## 2024-09-26 RX ADMIN — SODIUM CHLORIDE, SODIUM LACTATE, POTASSIUM CHLORIDE, AND CALCIUM CHLORIDE 100 ML/HR: .6; .31; .03; .02 INJECTION, SOLUTION INTRAVENOUS at 13:51

## 2024-09-26 RX ADMIN — OXYCODONE HYDROCHLORIDE 5 MG: 5 TABLET ORAL at 21:25

## 2024-09-26 NOTE — ED PROCEDURE NOTE
PROCEDURE  ECG 12 Lead Documentation Only    Date/Time: 9/26/2024 4:16 PM    Performed by: Claude Pena MD  Authorized by: Claude Pena MD    Indications / Diagnosis:  Fall/ r hip fx  ECG reviewed by me, the ED Provider: yes    Patient location:  ED  Previous ECG:     Previous ECG:  Compared to current    Comparison ECG info:  9/9/24- no change    Similarity:  No change    Comparison to cardiac monitor: Yes    Interpretation:     Interpretation: non-specific    Rate:     ECG rate:  64    ECG rate assessment: normal    Rhythm:     Rhythm: sinus rhythm    Ectopy:     Ectopy: none    QRS:     QRS axis:  Normal    QRS intervals:  Normal  Conduction:     Conduction: normal    ST segments:     ST segments:  Normal  T waves:     T waves: flattening      Flattening:  AVL and V1  Comments:      No ecg signs of ischemia/ injury        Claude Pena MD  09/26/24 [id number]

## 2024-09-26 NOTE — H&P
H&P - Trauma   Name: Claude Austin 68 y.o. male I MRN: [id number]  Unit/Bed#: ED-21 I Date of Admission: 9/26/2024   Date of Service: 9/26/2024 I Hospital Day: 0     Assessment & Plan  Closed displaced fracture of right femoral neck (HCC)   - Multimodal pain management   - Orthopedic consult, appreciate recommendations   - N.p.o. at midnight, OR in the morning.   - PT OT consult   - consult geriatrics   - consult case management  Non-small cell cancer of right lung (HCC)   - Speak with case management about hospice vs acute rehab   - consult placed   - multimodal pain management         History of Present Illness   Chief Complaint: right hip pain after a fall  Mechanism:Fall     Claude Austin is a 68 y.o. male who presents right hip pain after a fall.  Patient has a significant history of lung cancer that has spread to the spine into the brain.  Patient had a fall today in which he sustained a fracture of the right subcapital femur.  No open wounds noted.  Patient states that fall was mechanical in nature and he was feeling his normal self prior to the fall.    Review of Systems   Constitutional:  Negative for chills and fever.   HENT:  Negative for congestion and rhinorrhea.    Eyes:  Negative for visual disturbance.   Respiratory:  Negative for chest tightness and shortness of breath.    Cardiovascular:  Negative for chest pain and palpitations.   Genitourinary:  Negative for dysuria.   Musculoskeletal:  Positive for gait problem. Negative for arthralgias and back pain.   Neurological:  Negative for dizziness and headaches.   Psychiatric/Behavioral:  Negative for agitation and confusion.      I have reviewed the patient's PMH, PSH, Social History, Family History, Meds, and Allergies  Immunization History   Administered Date(s) Administered    Tdap 07/20/2021     Last Tetanus: 2021    1. Before the illness or injury that brought you to the Emergency, did you need someone to help you on a regular basis? 0=No   2.  Since the illness or injury that brought you to the Emergency, have you needed more help than usual to take care of yourself? 0=No   3. Have you been hospitalized for one or more nights during the past 6 months (excluding a stay in the Emergency Department)? 1=Yes   4. In general, do you see well? 0=Yes   5. In general, do you have serious problems with your memory? 0=No   6. Do you take more than three different medications everyday? 1=Yes   TOTAL   2     Did you order a geriatric consult if the score was 2 or greater?: yes       Objective   Initial Vitals:   Temperature: 98.1 °F (36.7 °C) (09/26/24 1243)  Pulse: 67 (09/26/24 1243)  Respirations: 18 (09/26/24 1243)  Blood Pressure: 108/83 (09/26/24 1243)    Primary Survey:   Airway:        Status: patent;        Pre-hospital Interventions: none        Hospital Interventions: none  Breathing:        Pre-hospital Interventions: none       Effort: normal       Right breath sounds: normal       Left breath sounds: normal  Circulation:        Rhythm: regular       Rate: regular   Right Pulses Left Pulses    R radial: 2+        R popliteal: 2+ L radial: 2+        L popliteal: 2+   Disability:        GCS: Eye: 4; Verbal: 5 Motor: 6 Total: 15       Right Pupil: round;  reactive         Left Pupil:  round;  reactive      R Motor Strength L Motor Strength    R : 5/5  R dorsiflex: 2/5  R plantarflex: 2/5 L : 5/5  L dorsiflex: 5/5  L plantarflex: 5/5        Sensory:  No sensory deficit  Exposure:       Completed: Yes      Secondary Survey:  Physical Exam  Constitutional:       Appearance: Normal appearance.   HENT:      Head: Normocephalic and atraumatic.      Right Ear: External ear normal.      Left Ear: External ear normal.      Nose: Nose normal.      Mouth/Throat:      Mouth: Mucous membranes are moist.      Pharynx: Oropharynx is clear.   Eyes:      Extraocular Movements: Extraocular movements intact.      Pupils: Pupils are equal, round, and reactive to light.    Cardiovascular:      Rate and Rhythm: Normal rate.      Pulses: Normal pulses.      Heart sounds: Normal heart sounds.   Pulmonary:      Effort: Pulmonary effort is normal.      Breath sounds: Normal breath sounds.   Abdominal:      General: Bowel sounds are normal.      Palpations: Abdomen is soft.   Musculoskeletal:         General: Tenderness present.      Cervical back: Normal range of motion.      Right hip: Tenderness and bony tenderness present.   Skin:     General: Skin is warm.      Capillary Refill: Capillary refill takes less than 2 seconds.   Neurological:      General: No focal deficit present.      Mental Status: He is alert and oriented to person, place, and time.   Psychiatric:         Mood and Affect: Mood normal.         Behavior: Behavior normal.         Invasive Devices       Central Venous Catheter Line  Duration             Port A Cath 05/16/23 Left Internal jugular 499 days                    Lab Results: I have reviewed the following results: CBC/BMP:   .     09/26/24  1346   WBC 6.00   HGB 11.6*   HCT 33.7*   PLT 55*   BANDSPCT 10*   SODIUM 134*   K 3.8      CO2 23   BUN 29*   CREATININE 0.86   GLUC 82    , Creatinine Clearance: Estimated Creatinine Clearance: 82 mL/min (by C-G formula based on SCr of 0.86 mg/dL)., PTT/INR:No new results in last 24 hours.   Recent Labs     09/26/24  1346   WBC 6.00   HGB 11.6*   HCT 33.7*   PLT 55*   BANDSPCT 10*   SODIUM 134*   K 3.8      CO2 23   BUN 29*   CREATININE 0.86   GLUC 82       Imaging Results: I have personally reviewed pertinent images saved in PACS. CT scan findings (and other pertinent positive findings on images) were discussed with radiology. My interpretation of the images/reports are as follows:  Chest Xray(s): positive for acute findings: Acute subcapital impacted right femoral neck fracture.   FAST exam(s): N/A   CT Scan(s): N/A   Additional Xray(s): positive for acute findings: Mildly displaced acute subcapital right  femoral neck fracture     Other Studies: No additional pertinent studies reviewed.

## 2024-09-26 NOTE — ASSESSMENT & PLAN NOTE
S/p fall prior to admission.   Ambulates with cane at baseline secondary to chronic low back pain.   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   Non weight bearing to the right lower extremity  For operative fixation 9/27/2024  NPO at midnight  Consent on file  Pre operative labs ordered  Pre operative abx ordered  Intra operative TXA ordered.   Pain control per primary team.   Medical management per primary team.   Dispo: ortho will follow

## 2024-09-26 NOTE — HOSPICE NOTE
[facility] RECEIVED MESSAGE FROM DR. TERRAZAS THAT PT IN ED AFTER FALLING AND SUSTAINING FRACTURE R FEMUR. HE WAS TO START ON HOME HOSPICE SERVICES TOMORROW. NOTED THAT PT HAS BEEN ADMITTED. HOSPICE WILL CONTINUE TO FOLLOW.

## 2024-09-26 NOTE — ASSESSMENT & PLAN NOTE
- Multimodal pain management   - Orthopedic consult, appreciate recommendations   - N.p.o. at midnight, OR in the morning.   - PT OT consult   - consult geriatrics   - consult case management

## 2024-09-26 NOTE — CONSULTS
"Consultation - Orthopedics   Name: Claude Austin 68 y.o. male I MRN: [id number]  Unit/Bed#: ED-21 I Date of Admission: 9/26/2024   Date of Service: 9/26/2024 I Hospital Day: 0   Inpatient consult to Orthopedic Surgery  Consult performed by: Rupali Linares PA-C  Consult ordered by: Lynette Maurer PA-C        Physician Requesting Evaluation: Claude Pena MD   Reason for Evaluation / Principal Problem: right hip pain     Assessment & Plan  Closed displaced fracture of right femoral neck (HCC)  S/p fall prior to admission.   Ambulates with cane at baseline secondary to chronic low back pain.   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   Non weight bearing to the right lower extremity  For operative fixation 9/27/2024  NPO at midnight  Consent on file  Pre operative labs ordered  Pre operative abx ordered  Intra operative TXA ordered.   Pain control per primary team.   Medical management per primary team.   Dispo: ortho will follow    Orthopedics service will follow. Please contact the SecureChat role for the Orthopedics service with any questions/concerns.    Case reviewed and discussed with Dr. Salazar.     History of Present Illness   HPI: Claude Austin is a 68 y.o. year old male who ambulates with a cane at baseline with history of non small cell lung cancer with known metastatic disease to the brain, spine and lung, who presents s/p fall at home with resultant right hip pain. He notes chronic low back pain with radiations into his buttocks and down his right leg at baseline. Today he notes while ambulating with his cane that his right leg \"gave out\" resulting in fall. He has no other complaints at present, other than isolated right hip pain. He has no numbness/tingling or weakness. He reports that he has recently been reevaluated by his oncology team, and was told that he had \"2 weeks to 4 months left to live\".   His spouse is at bedside. "   Otherwise feels in his regular state of health.   His wife reports that he has fallen frequently over the last few months.     Review of Systems   Musculoskeletal:  Positive for arthralgias, back pain and gait problem.   All other systems reviewed and are negative.    I have reviewed the patient's PMH, PSH, Social History, Family History, Meds, and Allergies    Objective      Temp:  [98.1 °F (36.7 °C)] 98.1 °F (36.7 °C)  HR:  [61-68] 61  Resp:  [18] 18  BP: (108-143)/(72-94) 133/72  O2 Device: None (Room air)          I/O       None          Lines/Drains/Airways       Active Status       Name Placement date Placement time Site Days    Port A Cath 05/16/23 Left Internal jugular 05/16/23  1322  Internal jugular  499                  Physical Exam  Vitals and nursing note reviewed.   Constitutional:       Appearance: Normal appearance. He is normal weight.   HENT:      Head: Normocephalic and atraumatic.      Nose: Nose normal.      Mouth/Throat:      Mouth: Mucous membranes are moist.      Pharynx: Oropharynx is clear.   Eyes:      Extraocular Movements: Extraocular movements intact.      Conjunctiva/sclera: Conjunctivae normal.      Pupils: Pupils are equal, round, and reactive to light.   Cardiovascular:      Rate and Rhythm: Normal rate.      Pulses: Normal pulses.   Pulmonary:      Effort: Pulmonary effort is normal.   Musculoskeletal:      Cervical back: Normal range of motion.      Comments: Musculoskeletal: bilateral upper extremity  Skin dry and intact, no erythema or ecchymosis  No ttp over bilateral clavicles, shoulders, upper arms, elbows, forearms, wrists or hands.   ROM full.   Motor intact to radial, ulnar, median, musculocutaneous, and axillary nerve distributions  SILT m/r/u.   Motor intact ain/pin/m/r/u  2+ radial and ulnar pulse  Musculature is soft and compressible, no pain with passive stretch    Musculoskeletal: bilateral lower extremity  Skin dry and intact, no erythema or ecchymosis  TTP over  "the right hip.   No pain over left hip, bilateral femurs, knees, lower legs, feet or ankles.   SILT s/s/sp/dp/t.   ROM full to the left lower extremity.   ROM deferred to the right lower extremity in setting of acute fracture.   +ankle dorsi/plantar flexion, EHL/FHL bilaterally.   2+ DP/PT pulse bilaterally   Musculature is soft and compressible, no pain with passive stretch  Right leg shortened and externally rotated.     Tertiary: all other noninjured extremities were palpated and ranged looking for secondary injuries. Patient had no pain with range of motion of her other major joints. No tenderness over all other joints/long bones as except already stated.       Skin:     General: Skin is warm and dry.      Capillary Refill: Capillary refill takes less than 2 seconds.   Neurological:      General: No focal deficit present.      Mental Status: He is alert and oriented to person, place, and time.   Psychiatric:         Mood and Affect: Mood normal.         Behavior: Behavior normal.         Thought Content: Thought content normal.         Judgment: Judgment normal.          Lab Results: I have reviewed the following results: CBC/BMP:   .     09/26/24  1346   WBC 6.00   HGB 11.6*   HCT 33.7*   PLT 55*   BANDSPCT 10*   SODIUM 134*   K 3.8      CO2 23   BUN 29*   CREATININE 0.86   GLUC 82    , LFTs: No new results in last 24 hours. , PTT/INR:No new results in last 24 hours.    Recent Labs     09/26/24  1346   WBC 6.00   HGB 11.6*   HCT 33.7*   PLT 55*   BANDSPCT 10*   BUN 29*   CREATININE 0.86     Blood Culture:   Lab Results   Component Value Date    BLOODCX No Growth After 5 Days. 07/12/2023    BLOODCX No Growth After 5 Days. 07/12/2023     Wound Culture: No results found for: \"WOUNDCULT\"    Radiology:   I have personally reviewed imaging studies at time of consult:   XRAY pelvis and hip: right femoral neck fracture     XR hip/pelv 2-3 vws right   Final Result by Chucho Henning MD (09/26 9496)    "   Mildly displaced acute subcapital right femoral neck fracture         Computerized Assisted Algorithm (CAA) may have been used to analyze all applicable images.            Workstation performed: [id number]         XR chest 1 view portable   Final Result by Chucho Henning MD (09/26 2760)      No acute new findings. Right apical tumor stable or a little bit diminished in prominence from prior imaging. Pleural-parenchymal density at the left apex also similar to prior imaging, perhaps scarring.            Workstation performed: [id number]         XR femur 2 vw right    (Results Pending)

## 2024-09-26 NOTE — PLAN OF CARE
Problem: Potential for Falls  Goal: Patient will remain free of falls  Description: INTERVENTIONS:  - Educate patient/family on patient safety including physical limitations  - Instruct patient to call for assistance with activity   - Consult OT/PT to assist with strengthening/mobility   - Keep Call bell within reach  - Keep bed low and locked with side rails adjusted as appropriate  - Keep care items and personal belongings within reach  - Initiate and maintain comfort rounds  - Make Fall Risk Sign visible to staff  - Offer Toileting every 2 Hours, in advance of need  - Initiate/Maintain bed/chair alarm  Problem: INFECTION - ADULT  Goal: Absence or prevention of progression during hospitalization  Description: INTERVENTIONS:  - Assess and monitor for signs and symptoms of infection  - Monitor lab/diagnostic results  - Monitor all insertion sites, i.e. indwelling lines, tubes, and drains  - Monitor endotracheal if appropriate and nasal secretions for changes in amount and color  - [place] appropriate cooling/warming therapies per order  - Administer medications as ordered  - Instruct and encourage patient and family to use good hand hygiene technique  - Identify and instruct in appropriate isolation precautions for identified infection/condition  Outcome: Progressing     Problem: DISCHARGE PLANNING  Goal: Discharge to home or other facility with appropriate resources  Description: INTERVENTIONS:  - Identify barriers to discharge w/patient and caregiver  - Arrange for needed discharge resources and transportation as appropriate  - Identify discharge learning needs (meds, wound care, etc.)  - Arrange for interpretive services to assist at discharge as needed  - Refer to Case Management Department for coordinating discharge planning if the patient needs post-hospital services based on physician/advanced practitioner order or complex needs related to functional status, cognitive ability, or social support  system  Outcome: Progressing     - Apply yellow socks and bracelet for high fall risk patients  - Consider moving patient to room near nurses station  9/26/2024 1803 by Enma Ackerman RN  Outcome: Progressing  9/26/2024 1803 by Enma Ackerman RN  Outcome: Progressing

## 2024-09-26 NOTE — ASSESSMENT & PLAN NOTE
- Speak with case management about hospice vs acute rehab   - consult placed   - multimodal pain management

## 2024-09-27 ENCOUNTER — ANESTHESIA (INPATIENT)
Dept: PERIOP | Facility: HOSPITAL | Age: 69
DRG: 522 | End: 2024-09-27
Payer: MEDICARE

## 2024-09-27 ENCOUNTER — APPOINTMENT (OUTPATIENT)
Dept: PHYSICAL THERAPY | Age: 69
End: 2024-09-27
Payer: MEDICARE

## 2024-09-27 ENCOUNTER — ANESTHESIA EVENT (INPATIENT)
Dept: PERIOP | Facility: HOSPITAL | Age: 69
DRG: 522 | End: 2024-09-27
Payer: MEDICARE

## 2024-09-27 PROBLEM — R54 FRAILTY: Status: ACTIVE | Noted: 2024-09-27
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
## 2024-09-27 PROBLEM — R26.2 AMBULATORY DYSFUNCTION: Status: ACTIVE | Noted: 2024-09-27

## 2024-09-27 PROBLEM — Z79.899 ENCOUNTER FOR MEDICATION REVIEW: Status: ACTIVE | Noted: 2024-09-27

## 2024-09-27 LAB
ANION GAP SERPL CALCULATED.3IONS-SCNC: 4 MMOL/L (ref 4–13)
BASOPHILS # BLD MANUAL: 0 THOUSAND/UL (ref 0–0.1)
BASOPHILS NFR MAR MANUAL: 0 % (ref 0–1)
BUN SERPL-MCNC: 25 MG/DL (ref 5–25)
CALCIUM SERPL-MCNC: 8.1 MG/DL (ref 8.4–10.2)
CHLORIDE SERPL-SCNC: 106 MMOL/L (ref 96–108)
CO2 SERPL-SCNC: 26 MMOL/L (ref 21–32)
CREAT SERPL-MCNC: 0.77 MG/DL (ref 0.6–1.3)
EOSINOPHIL # BLD MANUAL: 0 THOUSAND/UL (ref 0–0.4)
EOSINOPHIL NFR BLD MANUAL: 0 % (ref 0–6)
ERYTHROCYTE [DISTWIDTH] IN BLOOD BY AUTOMATED COUNT: 15.9 % (ref 11.6–15.1)
GFR SERPL CREATININE-BSD FRML MDRD: 94 ML/MIN/1.73SQ M
GLUCOSE SERPL-MCNC: 68 MG/DL (ref 65–140)
HCT VFR BLD AUTO: 35.2 % (ref 36.5–49.3)
HGB BLD-MCNC: 12 G/DL (ref 12–17)
INR PPP: 1.04 (ref 0.85–1.19)
LYMPHOCYTES # BLD AUTO: 0.26 THOUSAND/UL (ref 0.6–4.47)
LYMPHOCYTES # BLD AUTO: 8 % (ref 14–44)
MAGNESIUM SERPL-MCNC: 2.2 MG/DL (ref 1.9–2.7)
MCH RBC QN AUTO: 35.9 PG (ref 26.8–34.3)
MCHC RBC AUTO-ENTMCNC: 34.1 G/DL (ref 31.4–37.4)
MCV RBC AUTO: 105 FL (ref 82–98)
MONOCYTES # BLD AUTO: 0.19 THOUSAND/UL (ref 0–1.22)
MONOCYTES NFR BLD: 6 % (ref 4–12)
NEUTROPHILS # BLD MANUAL: 2.79 THOUSAND/UL (ref 1.85–7.62)
NEUTS BAND NFR BLD MANUAL: 3 % (ref 0–8)
NEUTS SEG NFR BLD AUTO: 83 % (ref 43–75)
PHOSPHATE SERPL-MCNC: 2.2 MG/DL (ref 2.3–4.1)
PLATELET # BLD AUTO: 40 THOUSANDS/UL (ref 149–390)
PLATELET # BLD AUTO: 87 THOUSANDS/UL (ref 149–390)
PLATELET BLD QL SMEAR: ABNORMAL
PMV BLD AUTO: 9.4 FL (ref 8.9–12.7)
PMV BLD AUTO: 9.9 FL (ref 8.9–12.7)
POTASSIUM SERPL-SCNC: 4.3 MMOL/L (ref 3.5–5.3)
PROTHROMBIN TIME: 14.3 SECONDS (ref 12.3–15)
RBC # BLD AUTO: 3.34 MILLION/UL (ref 3.88–5.62)
RBC MORPH BLD: NORMAL
SODIUM SERPL-SCNC: 136 MMOL/L (ref 135–147)
WBC # BLD AUTO: 3.24 THOUSAND/UL (ref 4.31–10.16)

## 2024-09-27 PROCEDURE — NC001 PR NO CHARGE: Performed by: ORTHOPAEDIC SURGERY

## 2024-09-27 PROCEDURE — 27236 TREAT THIGH FRACTURE: CPT | Performed by: PHYSICIAN ASSISTANT

## 2024-09-27 PROCEDURE — C1776 JOINT DEVICE (IMPLANTABLE): HCPCS | Performed by: ORTHOPAEDIC SURGERY

## 2024-09-27 PROCEDURE — 99232 SBSQ HOSP IP/OBS MODERATE 35: CPT | Performed by: SURGERY

## 2024-09-27 PROCEDURE — C1713 ANCHOR/SCREW BN/BN,TIS/BN: HCPCS | Performed by: ORTHOPAEDIC SURGERY

## 2024-09-27 PROCEDURE — 99223 1ST HOSP IP/OBS HIGH 75: CPT | Performed by: NURSE PRACTITIONER

## 2024-09-27 PROCEDURE — 0SRR0J9 REPLACEMENT OF RIGHT HIP JOINT, FEMORAL SURFACE WITH SYNTHETIC SUBSTITUTE, CEMENTED, OPEN APPROACH: ICD-10-PCS | Performed by: ORTHOPAEDIC SURGERY

## 2024-09-27 PROCEDURE — 27236 TREAT THIGH FRACTURE: CPT | Performed by: ORTHOPAEDIC SURGERY

## 2024-09-27 PROCEDURE — 85007 BL SMEAR W/DIFF WBC COUNT: CPT

## 2024-09-27 PROCEDURE — 99285 EMERGENCY DEPT VISIT HI MDM: CPT | Performed by: EMERGENCY MEDICINE

## 2024-09-27 PROCEDURE — 85610 PROTHROMBIN TIME: CPT

## 2024-09-27 PROCEDURE — P9037 PLATE PHERES LEUKOREDU IRRAD: HCPCS

## 2024-09-27 PROCEDURE — 83735 ASSAY OF MAGNESIUM: CPT

## 2024-09-27 PROCEDURE — 80048 BASIC METABOLIC PNL TOTAL CA: CPT

## 2024-09-27 PROCEDURE — 85049 AUTOMATED PLATELET COUNT: CPT | Performed by: PHYSICIAN ASSISTANT

## 2024-09-27 PROCEDURE — 84100 ASSAY OF PHOSPHORUS: CPT

## 2024-09-27 PROCEDURE — 85027 COMPLETE CBC AUTOMATED: CPT

## 2024-09-27 DEVICE — BONE PREPARATION KIT
Type: IMPLANTABLE DEVICE | Site: FEMUR | Status: FUNCTIONAL
Brand: BIOPREP

## 2024-09-27 DEVICE — SUMMIT FEMORAL STEM 12/14 TAPER CEMENTED SIZE 4 STD 114MM
Type: IMPLANTABLE DEVICE | Site: FEMUR | Status: FUNCTIONAL
Brand: SUMMIT

## 2024-09-27 DEVICE — ARTICUL/EZE FEMORAL HEAD DIAMETER 28MM +1.5 12/14 TAPER
Type: IMPLANTABLE DEVICE | Site: HIP | Status: FUNCTIONAL
Brand: ARTICUL/EZE

## 2024-09-27 DEVICE — SMARTSET HIGH PERFORMANCE MV MEDIUM VISCOSITY BONE CEMENT 40G
Type: IMPLANTABLE DEVICE | Site: FEMUR | Status: FUNCTIONAL
Brand: SMARTSET

## 2024-09-27 DEVICE — SELF CENTERING BI-POLAR HEAD 28MM ID 51MM OD
Type: IMPLANTABLE DEVICE | Site: HIP | Status: FUNCTIONAL
Brand: SELF CENTERING

## 2024-09-27 DEVICE — CEMENTRALIZER STEM CENTRALIZER 9.25MM CEMENTED
Type: IMPLANTABLE DEVICE | Site: FEMUR | Status: FUNCTIONAL
Brand: CEMENTRALIZER

## 2024-09-27 RX ORDER — ONDANSETRON 2 MG/ML
4 INJECTION INTRAMUSCULAR; INTRAVENOUS ONCE AS NEEDED
Status: DISCONTINUED | OUTPATIENT
Start: 2024-09-27 | End: 2024-09-27 | Stop reason: HOSPADM

## 2024-09-27 RX ORDER — TRANEXAMIC ACID 10 MG/ML
1000 INJECTION, SOLUTION INTRAVENOUS
Status: DISCONTINUED | OUTPATIENT
Start: 2024-09-28 | End: 2024-09-27 | Stop reason: HOSPADM

## 2024-09-27 RX ORDER — MAGNESIUM HYDROXIDE 1200 MG/15ML
LIQUID ORAL AS NEEDED
Status: DISCONTINUED | OUTPATIENT
Start: 2024-09-27 | End: 2024-09-27 | Stop reason: HOSPADM

## 2024-09-27 RX ORDER — SODIUM CHLORIDE, SODIUM LACTATE, POTASSIUM CHLORIDE, CALCIUM CHLORIDE 600; 310; 30; 20 MG/100ML; MG/100ML; MG/100ML; MG/100ML
100 INJECTION, SOLUTION INTRAVENOUS CONTINUOUS
Status: CANCELLED | OUTPATIENT
Start: 2024-09-27

## 2024-09-27 RX ORDER — ALBUMIN, HUMAN INJ 5% 5 %
SOLUTION INTRAVENOUS CONTINUOUS PRN
Status: DISCONTINUED | OUTPATIENT
Start: 2024-09-27 | End: 2024-09-27

## 2024-09-27 RX ORDER — CEFAZOLIN SODIUM 2 G/50ML
2000 SOLUTION INTRAVENOUS
Status: DISCONTINUED | OUTPATIENT
Start: 2024-09-28 | End: 2024-09-27 | Stop reason: HOSPADM

## 2024-09-27 RX ORDER — SODIUM CHLORIDE, SODIUM LACTATE, POTASSIUM CHLORIDE, CALCIUM CHLORIDE 600; 310; 30; 20 MG/100ML; MG/100ML; MG/100ML; MG/100ML
INJECTION, SOLUTION INTRAVENOUS CONTINUOUS PRN
Status: DISCONTINUED | OUTPATIENT
Start: 2024-09-27 | End: 2024-09-27

## 2024-09-27 RX ORDER — FENTANYL CITRATE/PF 50 MCG/ML
50 SYRINGE (ML) INJECTION
Status: DISCONTINUED | OUTPATIENT
Start: 2024-09-27 | End: 2024-09-27 | Stop reason: HOSPADM

## 2024-09-27 RX ORDER — TRANEXAMIC ACID 10 MG/ML
INJECTION, SOLUTION INTRAVENOUS AS NEEDED
Status: DISCONTINUED | OUTPATIENT
Start: 2024-09-27 | End: 2024-09-27

## 2024-09-27 RX ORDER — CEFAZOLIN SODIUM 2 G/50ML
2000 SOLUTION INTRAVENOUS EVERY 8 HOURS
Status: COMPLETED | OUTPATIENT
Start: 2024-09-27 | End: 2024-09-28

## 2024-09-27 RX ORDER — LIDOCAINE HYDROCHLORIDE 10 MG/ML
INJECTION, SOLUTION EPIDURAL; INFILTRATION; INTRACAUDAL; PERINEURAL AS NEEDED
Status: DISCONTINUED | OUTPATIENT
Start: 2024-09-27 | End: 2024-09-27

## 2024-09-27 RX ORDER — MIDAZOLAM HYDROCHLORIDE 2 MG/2ML
INJECTION, SOLUTION INTRAMUSCULAR; INTRAVENOUS AS NEEDED
Status: DISCONTINUED | OUTPATIENT
Start: 2024-09-27 | End: 2024-09-27

## 2024-09-27 RX ORDER — DEXAMETHASONE SODIUM PHOSPHATE 10 MG/ML
INJECTION, SOLUTION INTRAMUSCULAR; INTRAVENOUS AS NEEDED
Status: DISCONTINUED | OUTPATIENT
Start: 2024-09-27 | End: 2024-09-27

## 2024-09-27 RX ORDER — EPHEDRINE SULFATE 50 MG/ML
INJECTION INTRAVENOUS AS NEEDED
Status: DISCONTINUED | OUTPATIENT
Start: 2024-09-27 | End: 2024-09-27

## 2024-09-27 RX ORDER — HYDROMORPHONE HCL IN WATER/PF 6 MG/30 ML
0.2 PATIENT CONTROLLED ANALGESIA SYRINGE INTRAVENOUS
Status: DISCONTINUED | OUTPATIENT
Start: 2024-09-27 | End: 2024-09-27 | Stop reason: HOSPADM

## 2024-09-27 RX ORDER — FENTANYL CITRATE 50 UG/ML
INJECTION, SOLUTION INTRAMUSCULAR; INTRAVENOUS AS NEEDED
Status: DISCONTINUED | OUTPATIENT
Start: 2024-09-27 | End: 2024-09-27

## 2024-09-27 RX ORDER — CEFAZOLIN SODIUM 1 G/3ML
INJECTION, POWDER, FOR SOLUTION INTRAMUSCULAR; INTRAVENOUS AS NEEDED
Status: DISCONTINUED | OUTPATIENT
Start: 2024-09-27 | End: 2024-09-27

## 2024-09-27 RX ORDER — ROCURONIUM BROMIDE 10 MG/ML
INJECTION, SOLUTION INTRAVENOUS AS NEEDED
Status: DISCONTINUED | OUTPATIENT
Start: 2024-09-27 | End: 2024-09-27

## 2024-09-27 RX ORDER — KETAMINE HYDROCHLORIDE 100 MG/ML
INJECTION, SOLUTION INTRAMUSCULAR; INTRAVENOUS AS NEEDED
Status: DISCONTINUED | OUTPATIENT
Start: 2024-09-27 | End: 2024-09-27

## 2024-09-27 RX ORDER — PROPOFOL 10 MG/ML
INJECTION, EMULSION INTRAVENOUS AS NEEDED
Status: DISCONTINUED | OUTPATIENT
Start: 2024-09-27 | End: 2024-09-27

## 2024-09-27 RX ORDER — HYDROMORPHONE HCL/PF 1 MG/ML
SYRINGE (ML) INJECTION AS NEEDED
Status: DISCONTINUED | OUTPATIENT
Start: 2024-09-27 | End: 2024-09-27

## 2024-09-27 RX ADMIN — FENTANYL CITRATE 50 MCG: 50 INJECTION INTRAMUSCULAR; INTRAVENOUS at 16:23

## 2024-09-27 RX ADMIN — ACETAMINOPHEN 325MG 975 MG: 325 TABLET ORAL at 05:20

## 2024-09-27 RX ADMIN — ROCURONIUM BROMIDE 10 MG: 10 INJECTION INTRAVENOUS at 15:33

## 2024-09-27 RX ADMIN — PROPOFOL 80 MG: 10 INJECTION, EMULSION INTRAVENOUS at 15:00

## 2024-09-27 RX ADMIN — LIDOCAINE HYDROCHLORIDE 50 MG: 10 INJECTION, SOLUTION EPIDURAL; INFILTRATION; INTRACAUDAL at 15:00

## 2024-09-27 RX ADMIN — ROCURONIUM BROMIDE 10 MG: 10 INJECTION INTRAVENOUS at 16:00

## 2024-09-27 RX ADMIN — DEXAMETHASONE SODIUM PHOSPHATE 10 MG: 10 INJECTION, SOLUTION INTRAMUSCULAR; INTRAVENOUS at 15:00

## 2024-09-27 RX ADMIN — KETAMINE HYDROCHLORIDE 30 MG: 100 INJECTION INTRAMUSCULAR; INTRAVENOUS at 15:11

## 2024-09-27 RX ADMIN — OXYCODONE HYDROCHLORIDE 5 MG: 5 TABLET ORAL at 05:20

## 2024-09-27 RX ADMIN — OXYCODONE HYDROCHLORIDE 5 MG: 5 TABLET ORAL at 10:25

## 2024-09-27 RX ADMIN — KETAMINE HYDROCHLORIDE 20 MG: 100 INJECTION INTRAMUSCULAR; INTRAVENOUS at 15:35

## 2024-09-27 RX ADMIN — EPHEDRINE SULFATE 10 MG: 50 INJECTION INTRAVENOUS at 16:05

## 2024-09-27 RX ADMIN — CEFAZOLIN 2000 MG: 1 INJECTION, POWDER, FOR SOLUTION INTRAMUSCULAR; INTRAVENOUS at 15:15

## 2024-09-27 RX ADMIN — SUGAMMADEX 200 MG: 100 INJECTION, SOLUTION INTRAVENOUS at 16:46

## 2024-09-27 RX ADMIN — FENTANYL CITRATE 100 MCG: 50 INJECTION INTRAMUSCULAR; INTRAVENOUS at 15:00

## 2024-09-27 RX ADMIN — MIDAZOLAM 2 MG: 1 INJECTION INTRAMUSCULAR; INTRAVENOUS at 14:52

## 2024-09-27 RX ADMIN — ROCURONIUM BROMIDE 50 MG: 10 INJECTION INTRAVENOUS at 15:00

## 2024-09-27 RX ADMIN — PHENYLEPHRINE HYDROCHLORIDE 30 MCG/MIN: 50 INJECTION INTRAVENOUS at 15:05

## 2024-09-27 RX ADMIN — DEXMEDETOMIDINE 0.1 MCG/KG/HR: 100 INJECTION, SOLUTION INTRAVENOUS at 15:05

## 2024-09-27 RX ADMIN — SODIUM CHLORIDE, SODIUM LACTATE, POTASSIUM CHLORIDE, AND CALCIUM CHLORIDE: .6; .31; .03; .02 INJECTION, SOLUTION INTRAVENOUS at 14:53

## 2024-09-27 RX ADMIN — OXYCODONE HYDROCHLORIDE 5 MG: 5 TABLET ORAL at 20:53

## 2024-09-27 RX ADMIN — TRANEXAMIC ACID 1000 MG: 10 INJECTION, SOLUTION INTRAVENOUS at 15:14

## 2024-09-27 RX ADMIN — ACETAMINOPHEN 325MG 975 MG: 325 TABLET ORAL at 20:53

## 2024-09-27 RX ADMIN — SENNOSIDES AND DOCUSATE SODIUM 1 TABLET: 8.6; 5 TABLET ORAL at 20:53

## 2024-09-27 RX ADMIN — SODIUM CHLORIDE, SODIUM LACTATE, POTASSIUM CHLORIDE, AND CALCIUM CHLORIDE 100 ML/HR: .6; .31; .03; .02 INJECTION, SOLUTION INTRAVENOUS at 17:58

## 2024-09-27 RX ADMIN — HYDROMORPHONE HYDROCHLORIDE 0.5 MG: 1 INJECTION, SOLUTION INTRAMUSCULAR; INTRAVENOUS; SUBCUTANEOUS at 15:44

## 2024-09-27 RX ADMIN — ALBUMIN (HUMAN): 12.5 INJECTION, SOLUTION INTRAVENOUS at 15:50

## 2024-09-27 NOTE — ASSESSMENT & PLAN NOTE
S/p fall prior to admission.   Ambulates with cane at baseline secondary to chronic low back pain.   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   Non weight bearing to the right lower extremity  For operative fixation 9/27/2024  NPO for procedure   Consent on file in OR  Pre operative labs reviewed: CBC, type/screen, BMP, INR.   Platelets at 40. Will give platelets and recheck pre op.   Pre operative abx ordered  Intra operative TXA ordered.   Pain control per primary team.   Medical management per primary team.   Dispo: ortho will follow

## 2024-09-27 NOTE — PROGRESS NOTES
Progress Note - Trauma   Name: Claude Austin 68 y.o. male I MRN: [id number]  Unit/Bed#: W -01 I Date of Admission: 9/26/2024   Date of Service: 9/27/2024 I Hospital Day: 1    Assessment & Plan  Closed displaced fracture of right femoral neck (HCC)   - Multimodal pain management   - Orthopedic consult, appreciate recommendations   - Pending OR today   - Per Ortho, patient receiving platelets preop.   - PT OT consult   - consult geriatrics   - consult case management  Non-small cell cancer of right lung (HCC)   - Speak with case management about hospice vs acute rehab   - consult placed   - multimodal pain management     VTE Prophylaxis: Reason for no pharmacologic prophylaxis held at this time for operative procedure today      Disposition:  Trauma service will follow.    TRAUMA TERTIARY SURVEY  Summary of Diagnosed Injuries: Acute subcapital impacted right femoral neck fracture    Transfer from: Home    Mechanism of Injury:Fall     History of Present Illness   Chief Complaint: Right femoral neck fracture after a fall    24 Hour Events : Patient's platelets noted to be low, DVT prophylaxis held at this time, patient will receive platelets prior to the OR.    Objective      Temp:  [97.8 °F (36.6 °C)-98 °F (36.7 °C)] 98 °F (36.7 °C)  HR:  [61-76] 76  Resp:  [12-18] 18  BP: (105-140)/(65-84) 136/80  O2 Device: None (Room air)          I/O         09/25 0701 09/26 0700 09/26 0701  09/27 0700 09/27 0701  09/28 0700    P.O.   0    Blood   300    Total Intake(mL/kg)   300 (4.5)    Net   +300                 Lines/Drains/Airways       Active Status       Name Placement date Placement time Site Days    Port A Cath 05/16/23 Left Internal jugular 05/16/23  1322  Internal jugular  500                  Physical Exam  Constitutional:       Appearance: Normal appearance.   HENT:      Head: Normocephalic and atraumatic.      Right Ear: External ear normal.      Left Ear: External ear normal.      Nose: Nose normal.       Mouth/Throat:      Mouth: Mucous membranes are moist.      Pharynx: Oropharynx is clear.   Eyes:      Extraocular Movements: Extraocular movements intact.      Conjunctiva/sclera: Conjunctivae normal.      Pupils: Pupils are equal, round, and reactive to light.   Cardiovascular:      Rate and Rhythm: Normal rate.      Pulses: Normal pulses.      Heart sounds: Normal heart sounds.   Pulmonary:      Effort: Pulmonary effort is normal.      Breath sounds: Normal breath sounds.   Abdominal:      General: Bowel sounds are normal.      Palpations: Abdomen is soft.   Musculoskeletal:      Cervical back: Normal range of motion.      Right upper leg: Tenderness present.      Comments: Patient was known right femoral neck fracture   Skin:     General: Skin is warm.      Capillary Refill: Capillary refill takes less than 2 seconds.   Neurological:      General: No focal deficit present.      Mental Status: He is alert and oriented to person, place, and time.   Psychiatric:         Mood and Affect: Mood normal.         Behavior: Behavior normal.          1. Before the illness or injury that brought you to the Emergency, did you need someone to help you on a regular basis? 0=No   2. Since the illness or injury that brought you to the Emergency, have you needed more help than usual to take care of yourself? 1=Yes   3. Have you been hospitalized for one or more nights during the past 6 months (excluding a stay in the Emergency Department)? 1=Yes   4. In general, do you see well? 0=Yes   5. In general, do you have serious problems with your memory? 0=No   6. Do you take more than three different medications everyday? 1=Yes   TOTAL   3     Did you order a geriatric consult if the score was 2 or greater?:  Yes, patient will be pursuing hospice care following hospital stay.           Lab Results: I have reviewed the following results:   Recent Labs     09/27/24  0523 09/27/24  1342   WBC 3.24*  --    HGB 12.0  --    HCT 35.2*  --     PLT 40* 87*   BANDSPCT 3  --    SODIUM 136  --    K 4.3  --      --    CO2 26  --    BUN 25  --    CREATININE 0.77  --    GLUC 68  --    MG 2.2  --    PHOS 2.2*  --    INR 1.04  --        Imaging Results: Reviewed radiology reports from this admission including: chest xray and xray(s).  Chest Xray(s): negative for acute findings, No acute new findings. Right apical tumor stable or a little bit diminished in prominence from prior imaging. Pleural-parenchymal density at the left apex also similar to prior imaging, perhaps scarring.   FAST exam(s): N/A   CT Scan(s): N/A   Additional Xray(s): positive for acute findings: Acute subcapital impacted right femoral neck fracture     Other Studies: No additional pertinent studies reviewed.

## 2024-09-27 NOTE — PHYSICAL THERAPY NOTE
Physical Therapy Cancellation Note     09/27/24 0739   Note Type   Note type Cancelled Session   Cancel Reasons Patient to operating room   Additional Comments referral received for PT eval and tx. pt is pending surgical repair of right femoral neck fx. postoperative reorders for PT will be needed including weight bearing status and activity orders.     Clay Mckeon, PT

## 2024-09-27 NOTE — ASSESSMENT & PLAN NOTE
Clinical Frail Scale: 6  Total protein 6.5 and albumin 3.4  Encourage adequate hydration and nutrition, including protein in meals  OOB as tolerated  PT/OT consulted  Encourage early and frequent mobilization

## 2024-09-27 NOTE — QUICK NOTE
Preoperative diagnosis Closed subcapital fracture of femur, right   Postoperative diagnosis Closed subcapital fracture of femur, right     Procedure performed right hemiarthroplasty    Estimated blood loss minimal  Drains none    Bandages dry and intact  Degree of strikethrough none    Postsurgical region right hip with soft compartments.  No signs of infection    General-comfortable  Neuro-no acute neurological deficits; alert and oriented x3  HEENT-EOM intact no pain on EOM, oropharynx clear and patent  Cardiac-regular rate rhythm, no murmurs rubs or gallops  Pulmonary-clear to auscultation bilaterally, no adventitious breath sounds  GI-soft, nontender, no distension, normal bowel sounds  -voiding  Musculoskeletal-moves all extremities; neurovascularly intact  Skin-warm and well perfused    Continue multimodal pain control    Current vital signs   Vitals:    09/27/24 1915   BP: 146/95   Pulse:    Resp:    Temp:    SpO2:        Current Hb/Hct 12 at 5:00 this morning.    will continue to monitor

## 2024-09-27 NOTE — ASSESSMENT & PLAN NOTE
Home medication review:  Gabapentin 300mg 1 capsule in morning and 2 capsules at night  Lactulose 30ml bid- as needed per wife  Folic acid 1mg daily- no longer taking   Dexamethasone 1mg bid with meals  Tricimalone cream 0.5% as needed  Hctz 12.5mg daily- not refilled since April- not taking  Pantoprazole 40mg daily   Confirmed with [facility] pharmacy [phone]    Magic mouthwash as needed  Confirmed with [facility] pharmacy    Wife reports they were getting samples of the Eliquis 5 mg twice daily from doctors office

## 2024-09-27 NOTE — PLAN OF CARE
Problem: Potential for Falls  Goal: Patient will remain free of falls  Description: INTERVENTIONS:  - Educate patient/family on patient safety including physical limitations  - Instruct patient to call for assistance with activity   - Consult OT/PT to assist with strengthening/mobility   - Keep Call bell within reach  - Keep bed low and locked with side rails adjusted as appropriate  - Keep care items and personal belongings within reach  - Initiate and maintain comfort rounds  - Make Fall Risk Sign visible to staff  - Offer Toileting every 2 Hours, in advance of need  - Initiate/Maintain bed/chair alarm    Problem: INFECTION - ADULT  Goal: Absence or prevention of progression during hospitalization  Description: INTERVENTIONS:  - Assess and monitor for signs and symptoms of infection  - Monitor lab/diagnostic results  - Monitor all insertion sites, i.e. indwelling lines, tubes, and drains  - Monitor endotracheal if appropriate and nasal secretions for changes in amount and color  - [place] appropriate cooling/warming therapies per order  - Administer medications as ordered  - Instruct and encourage patient and family to use good hand hygiene technique  - Identify and instruct in appropriate isolation precautions for identified infection/condition  Outcome: Progressing     Problem: DISCHARGE PLANNING  Goal: Discharge to home or other facility with appropriate resources  Description: INTERVENTIONS:  - Identify barriers to discharge w/patient and caregiver  - Arrange for needed discharge resources and transportation as appropriate  - Identify discharge learning needs (meds, wound care, etc.)  - Arrange for interpretive services to assist at discharge as needed  - Refer to Case Management Department for coordinating discharge planning if the patient needs post-hospital services based on physician/advanced practitioner order or complex needs related to functional status, cognitive ability, or social support  system  Outcome: Progressing   - Apply yellow socks and bracelet for high fall risk patients  - Consider moving patient to room near nurses station  Outcome: Progressing

## 2024-09-27 NOTE — ASSESSMENT & PLAN NOTE
With metastasis to brain and spine  Patient was to start home hospice on 9/27/2024  Patient subsequently fell at home and was admitted for femur fracture  Palliative care was consulted while hospitalized for discharge planning and neck steps after surgical fixation of his femur

## 2024-09-27 NOTE — OCCUPATIONAL THERAPY NOTE
Occupational Therapy Cancelled Session    Patient Name: Claude Austin  Today's Date: 9/27/2024 09/27/24 0740   Note Type   Note type Cancelled Session   Cancel Reasons Patient to operating room   Additional Comments OT orders received and chart review performed. Pt admitted s/p fall resulting in R femur fx. Pt to OR today for hemiarthroplasty. Will f/u post-op. Appreciate activity orders and WBS     LUIS EDUARDO Sy, OTR/L  [place] License [id number]  [place] License [id number]

## 2024-09-27 NOTE — OP NOTE
OPERATIVE REPORT  PATIENT NAME: Claude Austin  : 1955  MRN: [id number]  Pt Location:  AN OR ROOM 05    Surgery Date: 2024    Surgeons and Role:     * Avery Salazar, DO - Primary   Filomena Foreman PA-C utilized on the case for assistance with prepping draping positioning retraction of the soft tissue during approach trial and implantation.  Also assisted with reduction and dislocation of the hip.  Also assisted with closure of the more distal aspect of the wound applied dressing application    Preop Diagnosis:  Closed subcapital fracture of femur, right, initial encounter ([facility]) [S72.011A]    Post-Op Diagnosis Codes:     * Closed subcapital fracture of femur, right, initial encounter ([facility]) [S72.011A]    Procedure(s):  Right - HEMIARTHROPLASTY HIP (BIPOLAR) and all associated procedures    Specimens:  * No specimens in log *    Estimated Blood Loss:   Minimal    Drains:  * No LDAs found *    Anesthesia Type:   Choice     Operative Indications:  Closed subcapital fracture of femur, right, initial encounter ([facility]) [S72.011A]    Operative Findings:  Fractured femoral neck    Complications:   None      Hip Approach: Posterior    Chronic Narcotic Use:  No      Procedure and Technique:  Patient was identified in the preoperative area. The right lower extremity was marked the consent H and P had been reviewed.  The patient was brought back to the operative suite where she was given a general anesthetic. She was then placed in a lateral recumbent position with the operative side up.  An axillary roll was used under the dependent side. The lower extremity was protected with foam padding to protect the peroneal nerve. The right lower extremities prepped and draped in this lateral recumbent position with a beanbag positioner.  After proper time-out commenced identified the right lower extremity as the operative site and incision was made curvilinear area in line with the femur and curving posterior from the  posterior aspect of the greater trochanter. We dissected down through the subcutaneous tissue using electrocautery device identified our fascia.  Made a incision in the fascia and then using our finger to protect the underlying tissue we dissected the gluteus muscle.  We then used a Delacruz elevator to resect and retract the trochanteric bursal tissue.  We excised the bursal tissue.  We identified our piriformis muscle.  We detached it from its insertion and placed 5.  Ethibond into the tendon.  We retracted the tendon to help protect the sciatic nerve.  We then made our tea and our capsule using electrocautery device again passed 5.  Ethibond into both capsular limbs.  We were able to excise the femoral head we sized this to be 51.  We then used our trial bipolar component.  We found this to be 51.  We then moved onto our femur used our box cut to start our intramedullary position at 25° of anteversion. We then used our opening Reamer and then broached up to a size 4.  We found good rotational stability we trialed head and neck sizes and found best fit to be 28+1.5 femoral head.  Our rotation at 90° of hip flexion was 80+ degrees.  We selected the implant to be size 4 femur with a 28 inner diameter +1.5 and a 51 outer diameter with a centralizer as well.  Cement was utilized for the Warren stem.  We implanted that stem and held until the cement was completely dried.  We then we trialed with the outer diameter inner diameter components and found good fit here length shock was minimal lateral shock was minimal extensions was appropriate and internal rotation was approximately 80 degrees.  We copiously irrigated the wound and then attached our implant to our femoral component followed it with a impactor 3 times and make sure that the implant was stable.  We then reduced the implant and hip irrigated once more.  We then drilled 2 holes into the lateral aspect of the greater trochanter for reattachment of capsule and  piriformis.  We closed the rest of the capsule with 5.  Ethibond.  Passed our sutures lateral to the greater trochanter and tied laterally.  We then irrigated once again closed the gluteus musculature with interrupted sutures. We then closed deep tissue with 0 Vicryl then a running strata fix as well on the gluteal tissue.  The adipose tissue was also closed with interrupted figure-of-eight 0 Vicryl suture.  Subcutaneous tissue was closed with 2 O Vicryl staples on skin.  Mepilex dressings were applied patient had anesthesia reversed and taken back to PACU with hip abductor pillow in place.   I was present for the entire procedure.  A qualified resident was not available    Patient Disposition:  PACU               SIGNATURE: Avery Salazar DO  DATE: September 27, 2024  TIME: 2:35 PM

## 2024-09-27 NOTE — CASE MANAGEMENT
Case Management Assessment    Patient name Claude Austin  Location W /W -01 MRN [id number]  : 1955 Date 2024       Current Admission Date: 2024  Current Admission Diagnosis:Closed displaced fracture of right femoral neck (HCC)   Patient Active Problem List    Diagnosis Date Noted Date Diagnosed    Fall 2024     Encounter for medication review 2024     Frailty 2024     Ambulatory dysfunction 2024     Closed displaced fracture of right femoral neck (HCC) 2024     Loss of balance 2024     Nutritional anemia 2024     Drug rash 2024     Immunocompromised (HCC) 2024     Constipation 2024     Pruritic rash 2024     Weakness 2024     Peripheral vascular disease (HCC) 2024     Palliative care by specialist 2023     Lower extremity edema 11/15/2023     Bloating 2023     Drug-induced pneumonitis 2023     Current every day smoker 2023     Lesion of left lung 2023     Chemotherapy-induced fatigue 2023     Metastasis to brain (HCC) 2023     Non-small cell cancer of right lung (HCC) 2023     Cancer related pain 2023     Advanced care planning/counseling discussion 2023     Brain lesions 2023     Thoracic spine tumor 2023     Multiple subsegmental pulmonary emboli without acute cor pulmonale (HCC) 2023     Tobacco use 2023     DDD (degenerative disc disease), cervical 2023     Pleural mass 2023       LOS (days): 1  Geometric Mean LOS (GMLOS) (days): 2.8  Days to GMLOS:2     OBJECTIVE:  PATIENT READMITTED TO HOSPITAL  Risk of Unplanned Readmission Score: 22.35         Current admission status: Inpatient       Preferred Pharmacy:   [facility]  [place] - [address] [facility]  [address] [facility] [address]  Phone: [phone] Fax: [phone]    [facility] - [place] - [address] [address]  [address]  [place] [address]  Phone: [phone] Fax: [phone]    [facility] - [place], [place] - [address]  [address]  [place] [address]  Phone: [phone] Fax: [phone]    [facility] [place] [place] - [address]  [address]  [place] [address]  Phone: [phone] Fax: [phone]    Primary Care Provider: No primary care provider on file.    Primary Insurance: MEDICARE  Secondary Insurance:     ASSESSMENT:  Active Health Care Proxies       Lolis Austni [facility] Care Representative - Spouse   Primary Phone: [phone] (Mobile)                           Readmission Root Cause  30 Day Readmission: Yes  During your hospital stay, did someone (provider, nurse, ) explain your care to you in a way you could understand?: Yes  Did you feel medically stable to leave the hospital?: Yes  Were you able to pay for your medication at the pharmacy?: Yes  Did you have reliable transportation to take you to your appointments?: Yes  During previous admission, was a post-acute recommendation made?: No  Patient was readmitted due to: fall - hip fracture  Action Plan: C vs inpt rehab    Patient Information  Admitted from:: Home  Mental Status: Alert  During Assessment patient was accompanied by: Spouse  Assessment information provided by:: Spouse  Primary Caregiver: Spouse  Caregiver's Name:: Lolis - wife  Caregiver's Relationship to Patient:: Significant Other  Support Systems: Spouse/significant other, Children, Family members, Hinduism/hector community, Friends/neighbors  County of Residence: [place]  What city do you live in?: [place]  Home entry access options. Select all that apply.: Stairs  Number of steps to enter home.: 5  Do the steps have railings?: Yes  Type of Current Residence: Trailer Home  Living Arrangements: Lives w/ Spouse/significant other  Is patient a ?: Yes  Is patient active with [facility] (  Affairs)?: No    Activities of Daily Living Prior to Admission  Functional Status: Independent  Completes ADLs independently?: Yes  Ambulates independently?: Yes  Does patient use assisted devices?: Yes  Assisted Devices (DME) used: Wheelchair, Straight Cane, Walker  Does patient currently own DME?: Yes  What DME does the patient currently own?: Wheelchair, Straight Cane, Walker (WC from mother)  Does patient have a history of Outpatient Therapy (PT/OT)?: Yes  Does patient have a history of HHC?: Yes  Does patient currently have HHC?: No         Patient Information Continued  Income Source: Self-employed  Does patient have prescription coverage?: Yes  Does patient receive dialysis treatments?: No  Does patient have a history of substance abuse?: No  Does patient have a history of Mental Health Diagnosis?: No         Means of Transportation  Means of Transport to Appts:: Family transport      Social Determinants of Health (SDOH)      Flowsheet Row Most Recent Value   Housing Stability    In the last 12 months, was there a time when you were not able to pay the mortgage or rent on time? N   At any time in the past 12 months, were you homeless or living in a shelter (including now)? N   Transportation Needs    In the past 12 months, has lack of transportation kept you from medical appointments or from getting medications? no   In the past 12 months, has lack of transportation kept you from meetings, work, or from getting things needed for daily living? No   Food Insecurity    Within the past 12 months, you worried that your food would run out before you got the money to buy more. Never true   Within the past 12 months, the food you bought just didn't last and you didn't have money to get more. Never true   Utilities    In the past 12 months has the electric, gas, oil, or water company threatened to shut off services in your home? No        CM reviewed the availability of treatment team to discuss questions or concerns  patient and/or family may have regarding  understanding medications and recognizing signs and symptoms at discharge.  CM also encouraged patient to follow up with all recommended appointments after discharge. CM reviewed the information that will be provided to pt/family on the discharge instructions.  Patient advised of importance for patient and family to participate in managing patient's medical well being.      CM spoke with pt and wife to review the options once pt has surgery.  Perior to coming to the hospital, pt and family were considering hospice until he fell.  At this time, due to the fact pt was told he has 3 months to live, both family and pt would prefer he not go to a rehab facility.  They are hoping to do therapy in the home.  They would like to wait to make a decision regarding DC until after surgery and PT OT have evaluated him.  CM will continue to follow to assist with needs and planning for DC.

## 2024-09-27 NOTE — PLAN OF CARE
Problem: Potential for Falls  Goal: Patient will remain free of falls  Description: INTERVENTIONS:  - Educate patient/family on patient safety including physical limitations  - Instruct patient to call for assistance with activity   - Consult OT/PT to assist with strengthening/mobility   - Keep Call bell within reach  - Keep bed low and locked with side rails adjusted as appropriate  - Keep care items and personal belongings within reach  - Initiate and maintain comfort rounds  - Make Fall Risk Sign visible to staff  - Offer Toileting every  Hours, in advance of need  - Initiate/Maintain alarm  - Obtain necessary fall risk management equipment:   - Apply yellow socks and bracelet for high fall risk patients  - Consider moving patient to room near nurses station  Outcome: Progressing     Problem: PAIN - ADULT  Goal: Verbalizes/displays adequate comfort level or baseline comfort level  Description: Interventions:  - Encourage patient to monitor pain and request assistance  - Assess pain using appropriate pain scale  - Administer analgesics based on type and severity of pain and evaluate response  - Implement non-pharmacological measures as appropriate and evaluate response  - Consider cultural and social influences on pain and pain management  - Notify physician/advanced practitioner if interventions unsuccessful or patient reports new pain  Outcome: Progressing     Problem: INFECTION - ADULT  Goal: Absence or prevention of progression during hospitalization  Description: INTERVENTIONS:  - Assess and monitor for signs and symptoms of infection  - Monitor lab/diagnostic results  - Monitor all insertion sites, i.e. indwelling lines, tubes, and drains  - Monitor endotracheal if appropriate and nasal secretions for changes in amount and color  - [place] appropriate cooling/warming therapies per order  - Administer medications as ordered  - Instruct and encourage patient and family to use good hand hygiene technique  -  Identify and instruct in appropriate isolation precautions for identified infection/condition  Outcome: Progressing  Goal: Absence of fever/infection during neutropenic period  Description: INTERVENTIONS:  - Monitor WBC    Outcome: Progressing     Problem: SAFETY ADULT  Goal: Patient will remain free of falls  Description: INTERVENTIONS:  - Educate patient/family on patient safety including physical limitations  - Instruct patient to call for assistance with activity   - Consult OT/PT to assist with strengthening/mobility   - Keep Call bell within reach  - Keep bed low and locked with side rails adjusted as appropriate  - Keep care items and personal belongings within reach  - Initiate and maintain comfort rounds  - Make Fall Risk Sign visible to staff  - Offer Toileting every  Hours, in advance of need  - Initiate/Maintain alarm  - Obtain necessary fall risk management equipment:   - Apply yellow socks and bracelet for high fall risk patients  - Consider moving patient to room near nurses station  Outcome: Progressing  Goal: Maintain or return to baseline ADL function  Description: INTERVENTIONS:  -  Assess patient's ability to carry out ADLs; assess patient's baseline for ADL function and identify physical deficits which impact ability to perform ADLs (bathing, care of mouth/teeth, toileting, grooming, dressing, etc.)  - Assess/evaluate cause of self-care deficits   - Assess range of motion  - Assess patient's mobility; develop plan if impaired  - Assess patient's need for assistive devices and provide as appropriate  - Encourage maximum independence but intervene and supervise when necessary  - Involve family in performance of ADLs  - Assess for home care needs following discharge   - Consider OT consult to assist with ADL evaluation and planning for discharge  - Provide patient education as appropriate  Outcome: Progressing  Goal: Maintains/Returns to pre admission functional level  Description:  INTERVENTIONS:  - Perform AM-PAC 6 Click Basic Mobility/ Daily Activity assessment daily.  - Set and communicate daily mobility goal to care team and patient/family/caregiver.   - Collaborate with rehabilitation services on mobility goals if consulted  - Perform Range of Motion  times a day.  - Reposition patient every  hours.  - Dangle patient  times a day  - Stand patient  times a day  - Ambulate patient  times a day  - Out of bed to chair  times a day   - Out of bed for meals  times a day  - Out of bed for toileting  - Record patient progress and toleration of activity level   Outcome: Progressing     Problem: DISCHARGE PLANNING  Goal: Discharge to home or other facility with appropriate resources  Description: INTERVENTIONS:  - Identify barriers to discharge w/patient and caregiver  - Arrange for needed discharge resources and transportation as appropriate  - Identify discharge learning needs (meds, wound care, etc.)  - Arrange for interpretive services to assist at discharge as needed  - Refer to Case Management Department for coordinating discharge planning if the patient needs post-hospital services based on physician/advanced practitioner order or complex needs related to functional status, cognitive ability, or social support system  Outcome: Progressing     Problem: Knowledge Deficit  Goal: Patient/family/caregiver demonstrates understanding of disease process, treatment plan, medications, and discharge instructions  Description: Complete learning assessment and assess knowledge base.  Interventions:  - Provide teaching at level of understanding  - Provide teaching via preferred learning methods  Outcome: Progressing     Problem: Prexisting or High Potential for Compromised Skin Integrity  Goal: Skin integrity is maintained or improved  Description: INTERVENTIONS:  - Identify patients at risk for skin breakdown  - Assess and monitor skin integrity  - Assess and monitor nutrition and hydration status  -  Monitor labs   - Assess for incontinence   - Turn and reposition patient  - Assist with mobility/ambulation  - Relieve pressure over bony prominences  - Avoid friction and shearing  - Provide appropriate hygiene as needed including keeping skin clean and dry  - Evaluate need for skin moisturizer/barrier cream  - Collaborate with interdisciplinary team   - Patient/family teaching  - Consider wound care consult   Outcome: Progressing

## 2024-09-27 NOTE — ASSESSMENT & PLAN NOTE
At a baseline ambulates with cane  PT/OT following  Fall precautions  Out of bed as tolerated  Encourage early and frequent mobilization  Encourage adequate hydration and nutrition  Provide adequate pain management   Goal is undetermined   Continue with PT/OT for continued strength and balance training

## 2024-09-27 NOTE — ASSESSMENT & PLAN NOTE
[place] fall precautions   Assess patient frequently for physical needs, encourage use of assistant devices as needed and directed by PT/OT  Identify cognitive and physical deficits and behaviors that affect risk of falls  Consider moving patient closer to nursing station to monitor more closely for impulsive behavior which may increase risk of falls  Educate patient/family on patient safety including physical limitations and importance of using call bell for assistance   Modify environment to reduce risk of injury including disconnecting from pole when not in use, ensuring adequate lighting in room and restroom, ensuring that path to restroom is clear and free of trip hazards  PT/OT consulted to assist with strengthening/mobility and assist with discharge planning to appropriate level of care

## 2024-09-27 NOTE — ASSESSMENT & PLAN NOTE
- Multimodal pain management   - Orthopedic consult, appreciate recommendations   - Pending OR today   - Per Ortho, patient receiving platelets preop.   - PT OT consult   - consult geriatrics   - consult case management

## 2024-09-27 NOTE — ANESTHESIA PREPROCEDURE EVALUATION
Procedure:  HEMIARTHROPLASTY HIP (BIPOLAR) and all associated procedures (Right: Hip)    Relevant Problems   HEMATOLOGY   (+) Immunocompromised (HCC)   (+) Nutritional anemia      MUSCULOSKELETAL   (+) DDD (degenerative disc disease), cervical      PULMONARY   (+) Current every day smoker        Physical Exam    Airway    Mallampati score: III  TM Distance: >3 FB  Neck ROM: full     Dental    upper dentures and lower dentures    Cardiovascular  Cardiovascular exam normal    Pulmonary      Other Findings    Prior to the fx was somewhat active with walker assistance at home. Not limited by cardiac or pulmonary disease.    Anesthesia Plan  ASA Score- 3     Anesthesia Type- general with ASA Monitors.         Additional Monitors:     Airway Plan: ETT.           Plan Factors-Exercise tolerance (METS): >4 METS.    Chart reviewed. EKG reviewed.  Existing labs reviewed. Patient summary reviewed.    Patient is a current smoker.  Patient did not smoke on day of surgery.            Induction- intravenous.    Postoperative Plan- Plan for postoperative opioid use. Planned trial extubation    Perioperative Resuscitation Plan - Level 1 - Full Code.       Informed Consent- Anesthetic plan and risks discussed with patient and spouse.  I personally reviewed this patient with the CRNA. Discussed and agreed on the Anesthesia Plan with the CRNA..    Discussed suspension of DNR/DNI order for OR with patient. Explained that many causes of decompensation in the OR are quickly and easily reversible. Pt agrees to full measures for this case and to reactivate his DNR/DNI order post-procedure.

## 2024-09-27 NOTE — CONSULTS
Consultation - Geriatric Medicine   Claude Austin 68 y.o. male MRN: [id number]  Unit/Bed#: W -01 Encounter: [id number]      Assessment & Plan     Fall  Assessment & Plan  [place] fall precautions   Assess patient frequently for physical needs, encourage use of assistant devices as needed and directed by PT/OT  Identify cognitive and physical deficits and behaviors that affect risk of falls  Consider moving patient closer to nursing station to monitor more closely for impulsive behavior which may increase risk of falls  Educate patient/family on patient safety including physical limitations and importance of using call bell for assistance   Modify environment to reduce risk of injury including disconnecting from pole when not in use, ensuring adequate lighting in room and restroom, ensuring that path to restroom is clear and free of trip hazards  PT/OT consulted to assist with strengthening/mobility and assist with discharge planning to appropriate level of care      Closed displaced fracture of right femoral neck (HCC)  Assessment & Plan  S/p fall  X-ray - Acute subcapital impacted right femoral neck fracture.   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 Hydration  Encourage prune juice as tolerated      Frailty  Assessment & Plan  Clinical Frail Scale: 6  Total protein 6.5 and albumin 3.4  Encourage adequate hydration and nutrition, including protein in meals  OOB as tolerated  PT/OT consulted  Encourage early and frequent mobilization      Multiple subsegmental pulmonary emboli without acute cor pulmonale (HCC)  Assessment & Plan  History of PE, takes Eliquis 5 mg twice daily at home    Ambulatory dysfunction  Assessment & Plan  At a baseline ambulates with cane  PT/OT following  Fall precautions  Out of bed as tolerated  Encourage early and frequent mobilization  Encourage adequate hydration and nutrition  Provide adequate pain management   Goal is undetermined   Continue with PT/OT for continued strength and balance training       Encounter for medication review  Assessment & Plan  Home medication review:  Gabapentin 300mg 1 capsule in morning and 2 capsules at night  Lactulose 30ml bid- as needed per wife  Folic acid 1mg daily- no longer taking   Dexamethasone 1mg bid with meals  Tricimalone cream 0.5% as needed  Hctz 12.5mg daily- not refilled since April- not taking  Pantoprazole 40mg daily   Confirmed with [facility] pharmacy [phone]    Magic mouthwash as needed  Confirmed with [facility] pharmacy    Wife reports they were getting samples of the Eliquis 5 mg twice daily from doctors office              History of Present Illness   Physician Requesting Consult: Venita Oreilly MD  Reason for Consult / Principal Problem: Closed subcapital fracture of femur, right  Hx and PE limited by: none  Additional history obtained from: wife      HPI: Claude Austin is a 68 y.o. year old male who presents with history of lung cancer with metastasis, PE, constipation, hypertension, and ambulatory dysfunction.  He presented to the ER after a fall at home, found to have a fracture of right femoral  neck.    Patient lives at home with his wife in a trailer.  There are 5 stairs to get into the trailer.  Everything else is all on 1 floor.  He uses a cane generally for ambulation, although was not using it when he fell.  He has had 2 other falls in the last 6 months.  Independent for bathing and dressing.  There are grab bars shower chairs in the bathroom.  Wife helps with the cooking, cleaning, medications, and finances.  He wears upper and lower dentures, no glasses or hearing aids.  He denies any anxiety or depression.  Reports some insomnia, although does not take anything for that.  Appetite has been stable, no recent appetite loss or weight loss.  Reports he stopped driving about a month ago.  Denies any significant memory loss or forgetfulness.    Upon exam patient was lying in bed, resting.  He appeared comfortable and was in no acute distress.  Reports his pain is coming down since receiving oxycodone.  Had trouble sleeping overnight due to pain.  Plan for or today.  Per nursing no acute concerns or issues at this time.    Inpatient consult to Gerontology  Consult performed by: TRICIA Mccray  Consult ordered by: Chucho Marcus MD          Review of Systems   Constitutional:  Negative for activity change, appetite change, chills, fatigue and fever.   HENT:  Negative for sore throat and trouble swallowing.    Eyes:  Negative for visual disturbance.   Respiratory:  Negative for cough and shortness of breath.    Cardiovascular:  Negative for chest pain and palpitations.   Gastrointestinal:  Negative for abdominal pain, constipation, diarrhea, nausea and vomiting.   Genitourinary:  Negative for difficulty urinating and dysuria.   Musculoskeletal:  Positive for arthralgias and gait problem. Negative for back pain.   Skin:  Negative for color change and rash.   Neurological:  Positive for weakness. Negative for dizziness, light-headedness and headaches.   Psychiatric/Behavioral:  Positive for sleep  disturbance. Negative for confusion and hallucinations. The patient is not nervous/anxious.            Historical Information   Past Medical History:   Diagnosis Date    Cancer (HCC)     Small cell carcinoma (HCC)      Past Surgical History:   Procedure Laterality Date    IR BIOPSY LUNG  4/6/2023    IR BIOPSY LUNG  4/26/2023    IR PORT PLACEMENT  5/16/2023     Social History   Social History     Substance and Sexual Activity   Alcohol Use Yes    Comment: rarely     Social History     Substance and Sexual Activity   Drug Use Never     Social History     Tobacco Use   Smoking Status Every Day    Current packs/day: 0.50    Average packs/day: 0.5 packs/day for 51.7 years (25.9 ttl pk-yrs)    Types: Cigarettes    Start date: 1/1/1973   Smokeless Tobacco Never   Tobacco Comments    Patient is trying to quit,       Family History:   Family History   Problem Relation Age of Onset    Stroke Father        Meds/Allergies   all current active meds have been reviewed    No Known Allergies    Objective     Intake/Output Summary (Last 24 hours) at 9/27/2024 1152  Last data filed at 9/27/2024 0815  Gross per 24 hour   Intake 0 ml   Output --   Net 0 ml     Invasive Devices       Central Venous Catheter Line  Duration             Port A Cath 05/16/23 Left Internal jugular 499 days                    Physical Exam  Vitals reviewed.   Constitutional:       General: He is not in acute distress.     Appearance: He is well-developed. He is not ill-appearing.      Comments: Frail appearing    HENT:      Head: Normocephalic and atraumatic.   Cardiovascular:      Rate and Rhythm: Normal rate and regular rhythm.      Heart sounds: No murmur heard.  Pulmonary:      Effort: Pulmonary effort is normal. No respiratory distress.      Breath sounds: Normal breath sounds.   Abdominal:      General: Bowel sounds are normal. There is no distension.      Palpations: Abdomen is soft.      Tenderness: There is no abdominal tenderness.   Musculoskeletal:  "        General: Tenderness and signs of injury present. No swelling.   Skin:     General: Skin is warm and dry.      Coloration: Skin is pale.   Neurological:      General: No focal deficit present.      Mental Status: He is alert and oriented to person, place, and time. Mental status is at baseline.      Cranial Nerves: No cranial nerve deficit.      Motor: Weakness present.      Gait: Gait abnormal.   Psychiatric:         Mood and Affect: Mood normal.         Behavior: Behavior normal.         Lab Results:   I have personally reviewed pertinent lab results including the following:  - cbc, bmp, pt/inr, phosphorus, magnesium    I have personally reviewed the following imaging study reports in PACS:  -X-ray femur, x-ray chest, x-ray hip pelvis      Therapies:   PT: consulted  OT: consulted    VTE Prophylaxis: VTE covered by:    None     and Sequential compression device (Venodyne)     Code Status: Level 3 - DNAR and DNI -reviewed with wife and patient  Advance Directive and Living Will: Yes    Power of : Yes  POLST:  no    Family and Social Support:   Living Arrangements: Lives w/ Spouse/significant other  Support Systems: Self; Spouse/significant other  Assistance Needed: none  Type of Current Residence: Private residence  Current Home Care Services: No      Goals of Care: OR today    Please note:  Voice-recognition software may have been used in the preparation of this document.  Occasional wrong word or \"sound-alike\" substitutions may have occurred due to the inherent limitations of voice recognition software.  Interpretation should be guided by context.      "

## 2024-09-27 NOTE — ASSESSMENT & PLAN NOTE
Chronic constipation, especially while he was on chemo  Palliative care had ordered lactulose which helped keep him more regular  Last BM was prior to hospitalization  Is currently on lactulose, senna, MiraLAX  Encourage adequate p.o. Hydration  Encourage prune juice as tolerated

## 2024-09-27 NOTE — PROGRESS NOTES
Progress Note - Orthopedics   Name: Claude Austin 68 y.o. male I MRN: [id number]  Unit/Bed#: W -01 I Date of Admission: 9/26/2024   Date of Service: 9/27/2024 I Hospital Day: 1    Assessment & Plan  Closed displaced fracture of right femoral neck (HCC)  S/p fall prior to admission.   Ambulates with cane at baseline secondary to chronic low back pain.   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   Non weight bearing to the right lower extremity  For operative fixation 9/27/2024  NPO for procedure   Consent on file in OR  Pre operative labs reviewed: CBC, type/screen, BMP, INR.   Platelets at 40. Will give platelets and recheck pre op.   Pre operative abx ordered  Intra operative TXA ordered.   Pain control per primary team.   Medical management per primary team.   Dispo: ortho will follow      Orthopedics service will follow. Please contact the SecureChat role for the Orthopedics service with any questions/concerns.    History of Present Illness   68 y.o.male seen and examined at bedside. Npo status confirmed. Eager for OR today. Spouse at bedside. Continued right hip pain.     Objective      Temp:  [97.8 °F (36.6 °C)-98.1 °F (36.7 °C)] 97.8 °F (36.6 °C)  HR:  [61-75] 69  Resp:  [12-18] 18  BP: (105-143)/(65-94) 120/71  O2 Device: None (Room air)          I/O         09/25 0701 09/26 0700 09/26 0701 09/27 0700 09/27 0701 09/28 0700    P.O.   0    Total Intake(mL/kg)   0 (0)    Net   0                 Lines/Drains/Airways       Active Status       Name Placement date Placement time Site Days    Port A Cath 05/16/23 Left Internal jugular 05/16/23  1322  Internal jugular  499                  Physical Exam Musculoskeletal: rightlower  Skin intact.   Pain over the right hip  ROM deferred in setting of known fracture  Right leg is shortened and externally rotated.   Motor intact to +FHL/EHL, +ankle dorsi/plantar flexion  Sensation intact to saphenous, sural,  "tibial, superficial peroneal nerve, and deep peroneal  2+ DP pulse  No calf swelling or tenderness to palpation      Lab Results: I have reviewed the following results: CBC/BMP:   .     09/27/24  0523   WBC 3.24*   HGB 12.0   HCT 35.2*   PLT 40*   BANDSPCT 3   SODIUM 136   K 4.3      CO2 26   BUN 25   CREATININE 0.77   GLUC 68   MG 2.2   PHOS 2.2*    , LFTs: No new results in last 24 hours. , PTT/INR:  .     09/27/24 0523   INR 1.04      Recent Labs     09/26/24  1346 09/26/24  1723 09/27/24  0523   WBC 6.00  --  3.24*   HGB 11.6*  --  12.0   HCT 33.7*  --  35.2*   PLT 55* 46* 40*   BANDSPCT 10*  --  3   BUN 29*  --  25   CREATININE 0.86  --  0.77   INR  --  1.20* 1.04     Blood Culture:    Lab Results   Component Value Date    BLOODCX No Growth After 5 Days. 07/12/2023    BLOODCX No Growth After 5 Days. 07/12/2023     Wound Culture: No results found for: \"WOUNDCULT\"  "

## 2024-09-27 NOTE — ASSESSMENT & PLAN NOTE
S/p fall  X-ray - Acute subcapital impacted right femoral neck fracture.   Orthopedics was consulted and is following  Plan: OR today, currently NWB  Patient with some thrombocytopenia to receive platelets prior to aware  Multimodal pain regimen including geriatric pain protocol  PT/OT consult   Management per orthopedics

## 2024-09-27 NOTE — DISCHARGE INSTR - AVS FIRST PAGE
Discharge Instructions - Orthopedics  Claude Austin 68 y.o. male MRN: [id number]  Unit/Bed#: AN OR MAIN    Weight Bearing Status:                                           Weight bearing as tolerated to the right lower extremity     DVT prophylaxis:  Continue Eliquis as directed     Pain:  Continue analgesics as directed    Dressing Instructions:   Please keep clean, dry and intact until follow up     Appt Instructions:   Follow up in 2 weeks with Dr. Salazar   If you do not have your appointment, please call the clinic at [phone]  Otherwise follow up as scheduled.    Contact the office sooner if you experience any increased numbness/tingling in the extremities.

## 2024-09-27 NOTE — ANESTHESIA POSTPROCEDURE EVALUATION
Post-Op Assessment Note    CV Status:  Stable    Pain management: adequate       Mental Status:  Sleepy and arousable   Hydration Status:  Euvolemic   PONV Controlled:  Controlled   Airway Patency:  Patent  Two or more mitigation strategies used for obstructive sleep apnea   Post Op Vitals Reviewed: Yes    No anethesia notable event occurred.    Staff: Anesthesiologist, CRNA               BP      Temp      Pulse     Resp      SpO2

## 2024-09-28 LAB
ABO GROUP BLD BPU: NORMAL
ANION GAP SERPL CALCULATED.3IONS-SCNC: 5 MMOL/L (ref 4–13)
BPU ID: NORMAL
BUN SERPL-MCNC: 23 MG/DL (ref 5–25)
CALCIUM SERPL-MCNC: 8.2 MG/DL (ref 8.4–10.2)
CHLORIDE SERPL-SCNC: 102 MMOL/L (ref 96–108)
CO2 SERPL-SCNC: 27 MMOL/L (ref 21–32)
CREAT SERPL-MCNC: 0.88 MG/DL (ref 0.6–1.3)
ERYTHROCYTE [DISTWIDTH] IN BLOOD BY AUTOMATED COUNT: 16 % (ref 11.6–15.1)
GFR SERPL CREATININE-BSD FRML MDRD: 89 ML/MIN/1.73SQ M
GLUCOSE SERPL-MCNC: 97 MG/DL (ref 65–140)
HCT VFR BLD AUTO: 31.6 % (ref 36.5–49.3)
HGB BLD-MCNC: 10.6 G/DL (ref 12–17)
MCH RBC QN AUTO: 35.8 PG (ref 26.8–34.3)
MCHC RBC AUTO-ENTMCNC: 33.5 G/DL (ref 31.4–37.4)
MCV RBC AUTO: 107 FL (ref 82–98)
PLATELET # BLD AUTO: 75 THOUSANDS/UL (ref 149–390)
PMV BLD AUTO: 10.3 FL (ref 8.9–12.7)
POTASSIUM SERPL-SCNC: 4.5 MMOL/L (ref 3.5–5.3)
RBC # BLD AUTO: 2.96 MILLION/UL (ref 3.88–5.62)
SODIUM SERPL-SCNC: 134 MMOL/L (ref 135–147)
UNIT DISPENSE STATUS: NORMAL
UNIT PRODUCT CODE: NORMAL
UNIT PRODUCT VOLUME: 300 ML
UNIT RH: NORMAL
WBC # BLD AUTO: 3.67 THOUSAND/UL (ref 4.31–10.16)

## 2024-09-28 PROCEDURE — 97167 OT EVAL HIGH COMPLEX 60 MIN: CPT

## 2024-09-28 PROCEDURE — 97535 SELF CARE MNGMENT TRAINING: CPT

## 2024-09-28 PROCEDURE — 99232 SBSQ HOSP IP/OBS MODERATE 35: CPT | Performed by: SURGERY

## 2024-09-28 PROCEDURE — 85027 COMPLETE CBC AUTOMATED: CPT | Performed by: PHYSICIAN ASSISTANT

## 2024-09-28 PROCEDURE — 99024 POSTOP FOLLOW-UP VISIT: CPT | Performed by: ORTHOPAEDIC SURGERY

## 2024-09-28 PROCEDURE — 97116 GAIT TRAINING THERAPY: CPT

## 2024-09-28 PROCEDURE — 80048 BASIC METABOLIC PNL TOTAL CA: CPT | Performed by: PHYSICIAN ASSISTANT

## 2024-09-28 PROCEDURE — 97163 PT EVAL HIGH COMPLEX 45 MIN: CPT

## 2024-09-28 RX ORDER — ENOXAPARIN SODIUM 100 MG/ML
30 INJECTION SUBCUTANEOUS EVERY 12 HOURS SCHEDULED
Status: DISCONTINUED | OUTPATIENT
Start: 2024-09-28 | End: 2024-09-29 | Stop reason: HOSPADM

## 2024-09-28 RX ORDER — DEXAMETHASONE 4 MG/1
4 TABLET ORAL 2 TIMES DAILY WITH MEALS
Status: DISCONTINUED | OUTPATIENT
Start: 2024-09-28 | End: 2024-09-29 | Stop reason: HOSPADM

## 2024-09-28 RX ADMIN — CEFAZOLIN SODIUM 2000 MG: 2 SOLUTION INTRAVENOUS at 00:40

## 2024-09-28 RX ADMIN — LACTULOSE 20 G: 20 SOLUTION ORAL at 17:27

## 2024-09-28 RX ADMIN — POLYETHYLENE GLYCOL 3350 17 G: 17 POWDER, FOR SOLUTION ORAL at 08:42

## 2024-09-28 RX ADMIN — DEXAMETHASONE 4 MG: 4 TABLET ORAL at 17:27

## 2024-09-28 RX ADMIN — ACETAMINOPHEN 325MG 975 MG: 325 TABLET ORAL at 05:07

## 2024-09-28 RX ADMIN — SENNOSIDES AND DOCUSATE SODIUM 1 TABLET: 8.6; 5 TABLET ORAL at 21:08

## 2024-09-28 RX ADMIN — ACETAMINOPHEN 325MG 975 MG: 325 TABLET ORAL at 13:30

## 2024-09-28 RX ADMIN — GABAPENTIN 300 MG: 300 CAPSULE ORAL at 10:08

## 2024-09-28 RX ADMIN — ENOXAPARIN SODIUM 30 MG: 30 INJECTION SUBCUTANEOUS at 13:30

## 2024-09-28 RX ADMIN — CEFAZOLIN SODIUM 2000 MG: 2 SOLUTION INTRAVENOUS at 06:49

## 2024-09-28 RX ADMIN — LACTULOSE 20 G: 20 SOLUTION ORAL at 08:42

## 2024-09-28 RX ADMIN — SODIUM CHLORIDE, SODIUM LACTATE, POTASSIUM CHLORIDE, AND CALCIUM CHLORIDE 100 ML/HR: .6; .31; .03; .02 INJECTION, SOLUTION INTRAVENOUS at 05:07

## 2024-09-28 RX ADMIN — FOLIC ACID 1000 MCG: 1 TABLET ORAL at 08:42

## 2024-09-28 RX ADMIN — ACETAMINOPHEN 325MG 975 MG: 325 TABLET ORAL at 21:08

## 2024-09-28 RX ADMIN — ENOXAPARIN SODIUM 30 MG: 30 INJECTION SUBCUTANEOUS at 21:08

## 2024-09-28 RX ADMIN — OXYCODONE HYDROCHLORIDE 5 MG: 5 TABLET ORAL at 08:17

## 2024-09-28 NOTE — PHYSICAL THERAPY NOTE
PHYSICAL THERAPY EVALUATION & TREATMENT  DATE: 09/28/24  TIME: 0919-0959    NAME:  Claude Austin  AGE:   68 y.o.  Mrn:   [id number]  Length Of Stay: 2    ADMIT DX:  Hip pain [M25.559]  Closed subcapital fracture of femur, right, initial encounter (HCC) [S72.011A]  Non-small cell cancer of right lung (HCC) [C34.91]  Closed displaced fracture of right femoral neck (HCC) [S72.001A]    Past Medical History:   Diagnosis Date    Cancer (HCC)     Small cell carcinoma (HCC)      Past Surgical History:   Procedure Laterality Date    IR BIOPSY LUNG  4/6/2023    IR BIOPSY LUNG  4/26/2023    IR PORT PLACEMENT  5/16/2023       Performed at least 2 patient identifiers during session: Name, Birthday, ID bracelet, and Epic photo     09/28/24 0919   PT Last Visit   PT Visit Date 09/28/24   Note Type   Note type Evaluation  (& treatment)   Pain Assessment   Pain Assessment Tool FLACC  (5/10 at rest, increased with mobility (does not rate numerically with mobility))   Pain Location/Orientation Orientation: Right;Location: Hip   Pain Onset/Description Onset: Ongoing;Descriptor: Aching;Descriptor: Discomfort   Effect of Pain on Daily Activities limits tolerance of mobility, speed of mobility, ease of mobility, independence with mobility and self care   Patient's Stated Pain Goal No pain   Hospital Pain Intervention(s) Medication (See MAR);Repositioned;Ambulation/increased activity;Emotional support;Elevated   Multiple Pain Sites No   Pain Rating: FLACC (Rest) - Face 0   Pain Rating: FLACC (Rest) - Legs 0   Pain Rating: FLACC (Rest) - Activity 1   Pain Rating: FLACC (Rest) - Cry 1   Pain Rating: FLACC (Rest) - Consolability 0   Score: FLACC (Rest) 2   Pain Rating: FLACC (Activity) - Face 1   Pain Rating: FLACC (Activity) - Legs 1   Pain Rating: FLACC (Activity) - Activity 1   Pain Rating: FLACC (Activity) - Cry 1   Pain Rating: FLACC (Activity) - Consolability 1   Score: FLACC (Activity) 5   Restrictions/Precautions   Weight Bearing  Precautions Per Order Yes   RLE Weight Bearing Per Order WBAT  (s/p Bipolar emanuel-arthroplasty by Dr. Salazar 9/27/24)   Braces or Orthoses Other (Comment)  (hip abduction wedge)   Other Precautions Chair Alarm;Bed Alarm;THR;Multiple lines;Fall Risk;Pain  (POSTERIOR HIP PRECAUTIONS)   Home Living   Type of Home Mobile home   Home Layout One level;Stairs to enter with rails  (5 FRACISCO front vs 3 FRACISCO in back - B HR with all steps)   Bathroom Shower/Tub Walk-in shower   Bathroom Toilet Standard   Bathroom Equipment Grab bars in shower;Shower chair;Commode   Bathroom Accessibility Accessible;Accessible via walker   Home Equipment Walker;Cane  (rollator walker; has access to lift recliner chair)   Additional Comments Sleeps in standard flat bed, but does have access to recliner chair.   Prior Function   Level of [place] Independent with ADLs;Independent with functional mobility;Needs assistance with IADLS  (mostly independent with basic ADLs, however wife provides assistance prn. wife reports that at baseline he completes all mobility & self care tasks very slowly and unsteadily.)   Lives With Spouse   Receives Help From Family;Friend(s)  (pt with excellent support system)   IADLs Family/Friend/Other provides transportation;Family/Friend/Other provides meals;Independent with medication management   Falls in the last 6 months 1 to 4  (4 falls)   Vocational Retired   Comments PTA, pt reports that he was fully independent with ADLs (occasional difficulty), ambulating with cane vs rollator walker very slowly and wobbly. Wife reports increased instability since June, progressive worsening.   General   Additional Pertinent History Pt is a 68 yr old male admitted 9/26/24 s/p fall resulting in R femoral neck fx. Underwent bipolar emanuel-arthroplasty by Dr. Salazar 9/27/24. Previously was planned for hospice services via oncology however R LE orthopedic surgical intervention completed as palliative measure.   Family/Caregiver Present  "Yes  (Wife, father, brother all present during session)   Cognition   Overall Cognitive Status WFL   Arousal/Participation Cooperative   Orientation Level Oriented X4   Memory Within functional limits;Decreased recall of precautions  (able to recite 2/3 THPs at end of session, prompting for recall of third)   Following Commands Follows one step commands without difficulty   Subjective   Subjective \"The pain is about the same as before surgery\"   RUE Assessment   RUE Assessment WFL   RUE Strength   RUE Overall Strength Within Functional Limits - able to perform ADL tasks with strength   LUE Assessment   LUE Assessment WFL   LUE Strength   LUE Overall Strength Within Functional Limits - able to perform ADL tasks with strength   RLE Assessment   RLE Assessment X   Strength RLE   R Hip Flexion   (NT in order to maintain THPs)   R Knee Flexion 2/5   R Knee Extension 2/5   R Ankle Dorsiflexion 2/5   LLE Assessment   LLE Assessment X   Strength LLE   L Hip Flexion 3-/5   L Knee Flexion 3-/5   L Knee Extension 3-/5   L Ankle Dorsiflexion 3/5   Coordination   Movements are Fluid and Coordinated 0   Coordination and Movement Description Pt with non fluid/segmental quality gait; questionable incoordination of R UE noted when reaching for target surfaces.   Sensation WFL   Light Touch   RLE Light Touch Grossly intact   LLE Light Touch Grossly intact   Proprioception   RLE Proprioception Grossly intact   LLE Proprioception Grossly Intact   Bed Mobility   Supine to Sit 3  Moderate assistance   Additional items Assist x 1;HOB elevated;Bedrails;Increased time required;Verbal cues;LE management  (trunk management)   Sit to Supine   (NT as pt was left seated OOB in recliner chair at end of session)   Additional Comments Pt with intermittent posterior trunk LOB while seated at EOB (specifically while completing LE MMT), ESTRELLA needed to maintain upright balance and posture at EOB. Pt able to scoot fwd at EOB with close S and increased " time/effort.   Transfers   Sit to Stand 3  Moderate assistance   Additional items Assist x 2;Increased time required;Verbal cues  (RW)   Stand to Sit 3  Moderate assistance   Additional items Assist x 1;Increased time required;Verbal cues;Armrests  (RW)   Additional Comments Pt initially needing 2 person assistance and verbal / visual cues for hand and R LE placement for optimal mechanics. During addt'l treatment time, pt displays progression to ESTRELLA 1 person for all transfers. In order to maintain upright supported standing at RW, pt initially needing MODA, however with improved foot positioning was able to improve to ESTRELLA.   Ambulation/Elevation   Gait pattern Improper Weight shift;Decreased foot clearance;Short stride;Decreased hip extension;Decreased heel strike;Decreased toe off;Step to;Excessively slow  (difficulty advancing R LE due to pain; cues for step patterning and positioning of walker)   Gait Assistance 3  Moderate assist  (initially MODA 2 person, however during first ambulation trial pt is able to progress to MODA1 person)   Additional items Assist x 2;Verbal cues;Tactile cues   Assistive Device Rolling walker   Distance 18ft x1   Stair Management Assistance Not tested  (pt has 3 vs 5 FRACISCO the home, BHR)   Ambulation/Elevation Additional Comments Plan for elevations training next session, including caregiver training on positioning and safety methods.   Balance   Static Sitting Fair -   Dynamic Sitting Poor +   Static Standing Poor +  (w/ RW)   Dynamic Standing Poor  (w/ RW)   Ambulatory Poor  (w/ RW)   Endurance Deficit   Endurance Deficit Yes   Endurance Deficit Description Pt with high degree of generalized fatigue and deconditioning; pain limiting function/mobility; HR increasing to 130bpm with mobility efforts however returns to 80-90s with functional rest.   Activity Tolerance   Activity Tolerance Patient limited by fatigue;Patient limited by pain   Medical Staff Made Aware Spoke with CM, OT,  RN, CC, PCA   Assessment   Prognosis Good   Problem List Decreased strength;Decreased range of motion;Decreased endurance;Impaired balance;Decreased mobility;Decreased coordination;Decreased skin integrity;Orthopedic restrictions;Pain   Assessment Pt seen for PT evaluation for mobility assessment & discharge needs. Pt admitted 9/26/2024 s/p fall resulting in R femoral neck fx, now s/p R bipolar hemiarthroplasty with Dr. Salazar (WBAT R LE, posterior hip precautions). 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 During PT IE, pt requires MODA for bed mobility in hospital bed, MODA 2 person for STS transfer from EOB, and MODA 2 person progressing to MODA 1 person for ambulation of 18ft with RW. During addt'l treatment time, pt progresses to complete transfers with ESTRELLA 1 person, and ambulate 10ft with RW and ESTRELLA/CGA (however distance significantly limited during this trial d/t inc pain and generalized fatigue). Pt displays above outlined functional impairments & limitations, and presents below his baseline level of functional mobility. The AM-PAC & Barthel Index outcome tools were used to assist in determining pt safety w/ mobility/self care & appropriate d/c recommendations, see above for scores. Pt is at risk of falls d/t multiple comorbidities, h/o falls, impaired balance, impaired insight/safety awareness, use of ambulatory aid, varying levels of pain , acuity of medical illness, ongoing medical treatment of primary dx, abnormal lab values, polypharmacy, and unstable vitals. Pt's clinical presentation is currently unstable/unpredictable as seen in pt's presentation of vital sign response, changing level of pain, varying levels of cognitive performance, increased fall risk, new onset of impairment of functional mobility, decreased endurance, and new onset of weakness. Pt  "will benefit from continued PT services in order to address impairments, decrease risk of falls, maximize independence w/ fnxl mobility, & ensure safety w/ mobility for transition to next level of care. Based on pt presentation & impairments, pt would most appropriately benefit from Level II (moderate PT intensity) resources upon d/c, vs Level III pending progress with mobility and level of assistance family able to provide at home.     * Family reports that due to pt's medical situation re: hospice services, they prefer to return to home with HHPT instead of rehab if possible. CM aware.   Goals   Patient Goals \"to go home with family supports and home PT\"   STG Expiration Date 10/12/24   Short Term Goal #1 Patient PT goals established in order to address pt self reported goal of :to return to home with home PT\". Pt will: complete all bed mobility in flat bed with ESTRELLA in order to promote increased OOB functional mobility and simulate home environment; complete all transfers with RW and S in order to increase safety with functional mobility; ambulate >50ft with RW and S in order to increase safety with household distance functional mobility; negotiate 3-5 stairs with HR assist and no greater than ESTRELLA in order to facilitate safe access to his home with family A; improve R LE strength to >/= 3+/5 MMT t/o in order to increase safety with functional mobility and decrease risk of falls; demonstrate understanding and independence with LE strengthening HEP; improve ambulatory balance to >/= fair+ grade with RW in order to promote safety and increased independence with mobility; tolerate >3hrs OOB in upright position, in order to improve muscular endurance and respiratory status; improve AM-PAC score to >/= 17/24 in order to increase independence with mobility and decrease burden of care; improve Barthel Index score to >/= 45/100 in order to increase independence and decrease risk of falls.   PT Treatment Day 1   Plan "   Treatment/Interventions Functional transfer training;LE strengthening/ROM;Elevations;Therapeutic exercise;Endurance training;Patient/family training;Equipment eval/education;Bed mobility;Gait training;Compensatory technique education;Spoke to MD;Spoke to nursing;Spoke to case management;Spoke to advanced practitioner   PT Frequency 4-6x/wk   Discharge Recommendation   Rehab Resource Intensity Level, PT II (Moderate Resource Intensity)   Equipment Recommended Walker   Walker Package Recommended Wheeled walker   Change/add to Walker Package? No   AM-PAC Basic Mobility Inpatient   Turning in Flat Bed Without Bedrails 2   Lying on Back to Sitting on Edge of Flat Bed Without Bedrails 2   Moving Bed to Chair 2   Standing Up From Chair Using Arms 2   Walk in Room 2   Climb 3-5 Stairs With Railing 2   Basic Mobility Inpatient Raw Score 12   Basic Mobility Standardized Score 32.23   [facility] Highest Level Of Mobility   -[facility] Goal 4: Move to chair/commode   -HL Achieved 7: Walk 25 feet or more   Modified [place] Scale   Modified [place] Scale 4   Barthel Index   Feeding 5   Bathing 0   Grooming Score 0   Dressing Score 5   Bladder Score 0   Bowels Score 10   Toilet Use Score 5   Transfers (Bed/Chair) Score 5   Mobility (Level Surface) Score 0   Stairs Score 5   Barthel Index Score 35   Additional Treatment Session   Start Time 0940   End Time 0959   Treatment Assessment Pt is agreeable to participate in additional mobility/gait and caregiver training post IE. Pt progresses to complete STS transfers to/from BSC and recliner chair with ESTRELLA, cues for hand and R LE placement for safe transfers and optimal mechanics. Pt then ambulates 10ft with RW - improved independence as pt requiring ESTRELLA/CGA, however very limited distance on this trial due to generalized fatigue and increase in RLE pain. With fatigue, pt with progressive difficulty lifting and advancing R LE fwd. Required chair to be brought up behind pt. Therapist  had pt's spouse step in to provide guarding and assistance for mobility - educating spouse on her positioning and body mechanics. Spouse with good application, however requires further addt'l training and education. At end of session pt was left seated OOB in recliner chair with alarm engaged and family in room. Regarding functional mobility, pt remains below his baseline level of functional mobility, requires assist x1 for all mobility, and demonstrates high degree of pain and quick onset fatigue. Continue to recommend Level II (moderate PT intensity) resources once medically cleared for d/c from the acute care setting. Will continue skilled PT POC as able and appropriate to address functional impairments and progress towards therapy goals. Next session, plan for intervention of increased gait and elevations training as able.   Equipment Use Ax1, RW, BSC   Additional Treatment Day 1   End of Consult   Patient Position at End of Consult Bedside chair;Bed/Chair alarm activated;All needs within reach  (family in room)       This session, pt required and most appropriately benefited from partial or full skilled PT/OT co-eval & treatment due to extensive physical assistance of SKILLED therapists, significant regression from baseline level of mobility, continuous vitals monitoring, decreased activity tolerance, and unpredictable medical and/or functional status. PT and OT goals were addressed separately as seen in documentation.    Based on patient's [facility] Highest Level of Mobility scores today, patient currently has a goal of [facility] Levels: 7: WALK 25 FEET OR MORE, to be completed with RN staffing each shift, in order to improve overall activity tolerance and mobility, combat hospital related deconditioning, and maximize outcomes for d/c from the acute care setting.     The patient's AM-PAC Basic Mobility Inpatient Short Form Raw Score is 12. A Raw score of less than or equal to 16 suggests the patient may  benefit from discharge to post-acute rehabilitation services. Please also refer to the recommendation of the Physical Therapist for safe discharge planning.      Radha Cuevas PT, DPT   Available via [employer]  NPI # [id number]  [place] License - [id number]  9/28/2024

## 2024-09-28 NOTE — PLAN OF CARE
Problem: PHYSICAL THERAPY ADULT  Goal: Performs mobility at highest level of function for planned discharge setting.  See evaluation for individualized goals.  Description: Treatment/Interventions: Functional transfer training, LE strengthening/ROM, Elevations, Therapeutic exercise, Endurance training, Patient/family training, Equipment eval/education, Bed mobility, Gait training, Compensatory technique education, Spoke to MD, Spoke to nursing, Spoke to case management, Spoke to advanced practitioner    Equipment Recommended: Walker     See flowsheet documentation for full assessment, interventions and recommendations.  Outcome: Progressing  Note: Prognosis: Good  Problem List: Decreased strength, Decreased range of motion, Decreased endurance, Impaired balance, Decreased mobility, Decreased coordination, Decreased skin integrity, Orthopedic restrictions, Pain  Assessment: Pt seen for PT evaluation for mobility assessment & discharge needs. Pt admitted 9/26/2024 s/p fall resulting in R femoral neck fx, now s/p R bipolar hemiarthroplasty with Dr. Salazar (WBAT R LE, posterior hip precautions). 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 During PT IE, pt requires MODA for bed mobility in hospital bed, MODA 2 person for STS transfer from EOB, and MODA 2 person progressing to MODA 1 person for ambulation of 18ft with RW. During addt'l treatment time, pt progresses to complete transfers with ESTRELLA 1 person, and ambulate 10ft with RW and ESTRELLA/CGA (however distance significantly limited during this trial d/t inc pain and generalized fatigue). Pt displays above outlined functional impairments & limitations, and presents below his baseline level of functional mobility. The AM-PAC & Barthel Index outcome tools were used to assist in determining pt safety w/ mobility/self care &  appropriate d/c recommendations, see above for scores. Pt is at risk of falls d/t multiple comorbidities, h/o falls, impaired balance, impaired insight/safety awareness, use of ambulatory aid, varying levels of pain , acuity of medical illness, ongoing medical treatment of primary dx, abnormal lab values, polypharmacy, and unstable vitals. Pt's clinical presentation is currently unstable/unpredictable as seen in pt's presentation of vital sign response, changing level of pain, varying levels of cognitive performance, increased fall risk, new onset of impairment of functional mobility, decreased endurance, and new onset of weakness. Pt will benefit from continued PT services in order to address impairments, decrease risk of falls, maximize independence w/ fnxl mobility, & ensure safety w/ mobility for transition to next level of care. Based on pt presentation & impairments, pt would most appropriately benefit from Level II (moderate PT intensity) resources upon d/c, vs Level III pending progress with mobility and level of assistance family able to provide at home. * Family reports that due to pt's medical situation re: hospice services, they prefer to return to home with HHPT instead of rehab if possible. CM aware.      Rehab Resource Intensity Level, PT: II (Moderate Resource Intensity)    See flowsheet documentation for full assessment.

## 2024-09-28 NOTE — ASSESSMENT & PLAN NOTE
- Multimodal pain management   - Orthopedic consult, appreciate recommendations   - Patient taken to the OR yesterday for repair.   - PT OT consult   - consult geriatrics   - consult case management

## 2024-09-28 NOTE — PROGRESS NOTES
Progress Note - Trauma   Name: Claude Austin 68 y.o. male I MRN: [id number]  Unit/Bed#: W -01 I Date of Admission: 9/26/2024   Date of Service: 9/28/2024 I Hospital Day: 2    Assessment & Plan  Closed displaced fracture of right femoral neck (HCC)   - Multimodal pain management   - Orthopedic consult, appreciate recommendations   - Patient taken to the OR yesterday for repair.   - PT OT consult   - consult geriatrics   - consult case management  Non-small cell cancer of right lung (HCC)   - Speak with case management about hospice vs acute rehab   - consult placed   - multimodal pain management     Bowel Regimen: Senokot and docusate  VTE Prophylaxis:Enoxaparin (Lovenox)     Disposition:  Trauma service will follow.    History of Present Illness   Patient seen for a right femur fracture after a fall.  Patient was taken to the OR yesterday for repair.    24 Hour Events : Patient was taken to the OR for repair.  This morning, patient was able to get up and walk around and participate in PT.  Patient states pain managed appropriately with pain medication.    Objective      Temp:  [96.6 °F (35.9 °C)-101.2 °F (38.4 °C)] 98 °F (36.7 °C)  HR:  [] 106  Resp:  [11-19] 16  BP: (106-146)/(60-95) 127/78  O2 Device: None (Room air)          I/O         09/26 0701  09/27 0700 09/27 0701  09/28 0700 09/28 0701  09/29 0700    P.O.  0 240    I.V. (mL/kg)  750 (9.5)     Blood  300     IV Piggyback  250     Total Intake(mL/kg)  1300 (16.5) 240 (3)    Urine (mL/kg/hr)  850 (0.4) 1350 (2.7)    Blood  150     Total Output  1000 1350    Net  +300 -1110                 Lines/Drains/Airways       Active Status       Name Placement date Placement time Site Days    Port A Cath 05/16/23 Left Internal jugular 05/16/23  1322  Internal jugular  500                  Physical Exam  Constitutional:       Appearance: Normal appearance.   HENT:      Head: Normocephalic and atraumatic.      Right Ear: External ear normal.      Left Ear:  External ear normal.      Nose: Nose normal.      Mouth/Throat:      Pharynx: Oropharynx is clear.   Eyes:      Extraocular Movements: Extraocular movements intact.      Conjunctiva/sclera: Conjunctivae normal.      Pupils: Pupils are equal, round, and reactive to light.   Cardiovascular:      Rate and Rhythm: Normal rate.      Pulses: Normal pulses.      Heart sounds: Normal heart sounds.   Pulmonary:      Effort: Pulmonary effort is normal.      Breath sounds: Normal breath sounds.   Abdominal:      Palpations: Abdomen is soft.   Musculoskeletal:      Cervical back: Normal range of motion.      Comments: ROM normal in bilateral upper extremities and left lower extremity.  Right lower extremity splinted following surgery.  Patient is up and ambulating and able to walk with PT.  Patient does endorse pain at the surgical site but is managed with pain management.   Skin:     General: Skin is warm.      Capillary Refill: Capillary refill takes less than 2 seconds.   Neurological:      General: No focal deficit present.      Mental Status: He is alert and oriented to person, place, and time.   Psychiatric:         Mood and Affect: Mood normal.         Behavior: Behavior normal.             Lab Results: I have reviewed the following results:   Recent Labs     09/27/24  0523 09/27/24  1342 09/28/24  0619   WBC 3.24*  --  3.67*   HGB 12.0  --  10.6*   HCT 35.2*  --  31.6*   PLT 40*   < > 75*   BANDSPCT 3  --   --    SODIUM 136  --  134*   K 4.3  --  4.5     --  102   CO2 26  --  27   BUN 25  --  23   CREATININE 0.77  --  0.88   GLUC 68  --  97   MG 2.2  --   --    PHOS 2.2*  --   --    INR 1.04  --   --     < > = values in this interval not displayed.       Imaging Review: No pertinent imaging studies reviewed.  Other Studies: No additional pertinent studies reviewed.

## 2024-09-28 NOTE — ED PROVIDER NOTES
Final diagnoses:   Closed displaced fracture of right femoral neck (HCC)     ED Disposition       None          Assessment & Plan       Medical Decision Making  Amount and/or Complexity of Data Reviewed  Labs: ordered.  Radiology: ordered.    Risk  OTC drugs.        ED Course as of 09/27/24 2157   Thu Sep 26, 2024   1253 ER MD NOTE- PT - OFFERED PAIN MEDICATION REFUSES AT PRESENT    1415 CXR PORTABLE- COMPARED  TO PREVIOUS- NO SIGN CHANGES-    1416 PELVIS/ R HIP XRAY -  R SUBCAPITAL FX    1450 ER MD NOTE- PT IS ABOUT TO START HOSPICE TOMORROW-- WAS TO HAVE LAST RADIATION TX TODAY -AS PER DISCUSSION WITH WIFE/PT- PT HAS BEEN GIVEN APPROX 3 MONTHS TO LIVE --  THEY ARE FEEL THAT  THEY WOULD NOT WANT SURG AT THI POINT -- TALKED TO A[facility] FOR [facility]- AS HOSPITAL BED  NOT SET UP AT HOME-  WOULD REC OVERNIGHT ADMIT  AND HAVE  HOSP CASE CASE MANAGEMENT TALK TO HOSPICE TOMORROW  ABOUT HOME SET UP        Medications   lactated ringers infusion (100 mL/hr Intravenous New Bag 9/26/24 1351)   acetaminophen (TYLENOL) tablet 975 mg (has no administration in time range)   oxyCODONE (ROXICODONE) split tablet 2.5 mg ( Oral See Alternative 9/26/24 1638)     Or   oxyCODONE (ROXICODONE) IR tablet 5 mg (5 mg Oral Given 9/26/24 1638)   HYDROmorphone HCl (DILAUDID) injection 0.2 mg (has no administration in time range)   naloxone (NARCAN) 0.04 mg/mL syringe 0.04 mg (has no administration in time range)   senna-docusate sodium (SENOKOT S) 8.6-50 mg per tablet 1 tablet (has no administration in time range)   polyethylene glycol (MIRALAX) packet 17 g (has no administration in time range)   ondansetron (ZOFRAN) injection 4 mg (has no administration in time range)   acetaminophen (TYLENOL) tablet 650 mg (650 mg Oral Given 9/26/24 1413)   tranexamic Acid 690 mg in sodium chloride 0.9 % 100 mL IVPB (690 mg Intravenous Given 9/26/24 1635)   chlorhexidine (PERIDEX) 0.12 % oral rinse 15 mL (has no administration in time range)       ED Risk  Strat Scores                           SBIRT 20yo+      Flowsheet Row Most Recent Value   Initial Alcohol Screen: US AUDIT-C     1. How often do you have a drink containing alcohol? 1 Filed at: 09/26/2024 1400   2. How many drinks containing alcohol do you have on a typical day you are drinking?  0 Filed at: 09/26/2024 1400   3a. Male UNDER 65: How often do you have five or more drinks on one occasion? 0 Filed at: 09/26/2024 1400   3b. FEMALE Any Age, or MALE 65+: How often do you have 4 or more drinks on one occassion? 0 Filed at: 09/26/2024 1400   Audit-C Score 1 Filed at: 09/26/2024 1400   CRISTINA: How many times in the past year have you...    Used an illegal drug or used a prescription medication for non-medical reasons? Never Filed at: 09/26/2024 1400                            History of Present Illness       Chief Complaint   Patient presents with    Fall     Pt was supposed to get radiation today for his cancer and all of a sudden his legs gave out and he landed on his right hip which is a 10/10 pain and -HS -LOC and +BT       Past Medical History:   Diagnosis Date    Cancer (HCC)     Small cell carcinoma (HCC)       Past Surgical History:   Procedure Laterality Date    IR BIOPSY LUNG  4/6/2023    IR BIOPSY LUNG  4/26/2023    IR PORT PLACEMENT  5/16/2023      Family History   Problem Relation Age of Onset    Stroke Father       Social History     Tobacco Use    Smoking status: Every Day     Current packs/day: 0.50     Average packs/day: 0.5 packs/day for 51.7 years (25.9 ttl pk-yrs)     Types: Cigarettes     Start date: 1/1/1973    Smokeless tobacco: Never    Tobacco comments:     Patient is trying to quit,     Vaping Use    Vaping status: Never Used   Substance Use Topics    Alcohol use: Yes     Comment: rarely    Drug use: Never      E-Cigarette/Vaping    E-Cigarette Use Never User       E-Cigarette/Vaping Substances    Nicotine No     THC No     CBD No     Flavoring No     Other No     Unknown No       I  have reviewed and agree with the history as documented.     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      Review of Systems   Constitutional: Negative.    HENT: Negative.     Eyes: Negative.    Respiratory: Negative.     Cardiovascular: Negative.    Gastrointestinal: Negative.    Endocrine: Negative.    Genitourinary: Negative.    Musculoskeletal: Negative.         Right hip pain    Skin: Negative.    Allergic/Immunologic: Negative.    Neurological: Negative.    Hematological: Negative.    Psychiatric/Behavioral: Negative.             Objective       ED Triage Vitals [09/26/24 1243]   Temperature Pulse Blood Pressure Respirations SpO2 Patient Position - Orthostatic VS   98.1 °F (36.7 °C) 67 108/83 18 97 % --      Temp Source Heart Rate Source BP Location FiO2 (%) Pain Score    Oral Monitor Right arm -- 10 - Worst Possible Pain      Vitals      Date and Time Temp Pulse SpO2 Resp BP Pain Score FACES Pain Rating User   09/27/24 2053 -- -- -- -- -- 7 -- LT   09/27/24 2000 -- -- -- -- -- No Pain -- LT   09/27/24 1915 -- -- -- -- 146/95 -- -- CZ   09/27/24 1911 96.6 °F (35.9 °C) 75 98 % -- 146/95 -- -- DII   09/27/24 1845 -- -- -- -- 113/70 -- -- CZ   09/27/24 1838 -- 63 98 % 17 113/70 -- -- DII   09/27/24 1830 -- -- -- -- 112/70 -- -- ID   09/27/24 1815 -- -- -- -- 116/66 -- -- CZ   09/27/24 1814 -- 69 93 % 17 116/66 -- -- DII   09/27/24 1800 -- -- -- -- 108/68 -- -- ID   09/27/24 1744 -- -- -- 13 -- -- -- ID   09/27/24 1744 97.2 °F (36.2 °C) 66 94 % --  117/73 -- -- DII   09/27/24 1729 97.5 °F (36.4 °C) 70 94 % 11 117/60 No Pain -- MP   09/27/24 1713 97.4 °F (36.3 °C) 86 94 % 19 125/65 No Pain -- MP   09/27/24 1700 97.4 °F (36.3 °C) -- -- -- -- -- -- MP   09/27/24 1658 97.7 °F (36.5 °C) 74 97 % 16 107/65 No Pain -- MP   09/27/24 1444 101.2 °F (38.4 °C) 76 93 % 16 138/85 4 -- KELLY   09/27/24 1341 98 °F (36.7 °C) 76 92 % 18 136/80 -- -- DII   09/27/24 1341 98 °F (36.7 °C) 76 -- 18 136/80 -- -- ID   09/27/24 1154 97.9 °F (36.6 °C) 75 94 % 17 117/73 -- -- DII   09/27/24 1151 97.8 °F (36.6 °C) 69 92 % 16 116/75 -- -- DII   09/27/24 1145 98 °F (36.7 °C) 72 93 % 17 121/74 -- -- DII   09/27/24 1128 97.9 °F (36.6 °C) 71 92 % 16 115/75 -- -- DII   09/27/24 1025 -- -- -- -- -- 8 -- ID   09/27/24 1000 -- -- -- -- -- 8 -- ID   09/27/24 0758 97.8 °F (36.6 °C) 69 93 % 18 120/71 -- -- DII   09/27/24 0520 -- -- -- -- -- 8 -- LT   09/27/24 0321 97.9 °F (36.6 °C) 75 95 % 16 105/66 -- -- DII   09/26/24 2325 98 °F (36.7 °C) 73 94 % 16 113/65 -- -- DII   09/26/24 2125 -- -- -- -- -- 8 -- LT   09/26/24 2000 -- -- -- -- -- 8 -- LT   09/26/24 1945 97.8 °F (36.6 °C) 69 95 % 16 114/71 -- -- DII   09/26/24 1800 -- -- -- -- -- No Pain -- ID   09/26/24 1720 98 °F (36.7 °C) 61 93 % 12 140/75 -- -- DII   09/26/24 1638 -- -- -- -- -- 9 -- EG   09/26/24 1500 -- 61 96 % -- 133/72 -- -- MM   09/26/24 1430 -- 63 95 % -- 135/84 -- -- MARISOL   09/26/24 1415 -- 68 95 % -- 143/94 -- -- MARISOL   09/26/24 1413 -- -- -- -- -- 10 - Worst Possible Pain -- MM   09/26/24 1245 -- 63 97 % -- 108/83 -- -- MARISOL   09/26/24 1243 98.1 °F (36.7 °C) -- -- -- -- -- -- TS   09/26/24 1243 -- 67 97 % 18 108/83 10 - Worst Possible Pain -- MM            Physical Exam  Vitals and nursing note reviewed.   Constitutional:       General: He is not in acute distress.     Appearance: He is ill-appearing. He is not toxic-appearing or diaphoretic.      Comments: Avss- mild htn- pulse ox 98 % on ra- interpretation is normal- no intervention -  chronically ill appearing    HENT:      Head: Normocephalic and atraumatic.      Comments: No scalp tenderness-contusion - hematoma     Right Ear: Tympanic membrane, ear canal and external ear normal. There is no impacted cerumen.      Left Ear: Tympanic membrane, ear canal and external ear normal. There is no impacted cerumen.      Ears:      Comments: No hemotympanum- normal mastoid areas      Nose: Nose normal. No congestion or rhinorrhea.      Mouth/Throat:      Mouth: Mucous membranes are dry.      Pharynx: Oropharynx is clear. No oropharyngeal exudate or posterior oropharyngeal erythema.   Eyes:      General: No scleral icterus.        Right eye: No discharge.         Left eye: No discharge.      Extraocular Movements: Extraocular movements intact.      Conjunctiva/sclera: Conjunctivae normal.      Pupils: Pupils are equal, round, and reactive to light.      Comments: Mm pink   Neck:      Vascular: No carotid bruit.      Comments: No pmt c/t/l/s spine   Cardiovascular:      Rate and Rhythm: Normal rate and regular rhythm.      Pulses: Normal pulses.      Heart sounds: Normal heart sounds. No murmur heard.     No friction rub. No gallop.   Pulmonary:      Effort: Pulmonary effort is normal. No respiratory distress.      Breath sounds: No stridor. No wheezing, rhonchi or rales.      Comments: Mild decreased but sym bs-b  Chest:      Chest wall: No tenderness.   Abdominal:      General: Bowel sounds are normal. There is no distension.      Palpations: Abdomen is soft. There is no mass.      Tenderness: There is no abdominal tenderness. There is no right CVA tenderness, left CVA tenderness, guarding or rebound.      Hernia: No hernia is present.      Comments: Soft nt/nd- no hsm- no cva tenderness- no ascites- no peritoneal signs    Musculoskeletal:         General: Tenderness and signs of injury present. No swelling or deformity.      Cervical back: Normal range of motion and neck supple. No rigidity or  tenderness.      Right lower leg: Edema present.      Left lower leg: Edema present.      Comments: Rle- shortened - unable to move at hip-- normal rle distal pulse-sensation/strength/rom- 1 plus ble pretibial edema- nt- no asym/ erythema- not new    Lymphadenopathy:      Cervical: No cervical adenopathy.   Skin:     General: Skin is warm.      Capillary Refill: Capillary refill takes less than 2 seconds.      Coloration: Skin is not jaundiced or pale.      Findings: No bruising, erythema, lesion or rash.   Neurological:      General: No focal deficit present.      Mental Status: He is alert and oriented to person, place, and time. Mental status is at baseline.      Cranial Nerves: No cranial nerve deficit.      Sensory: No sensory deficit.      Motor: No weakness.      Comments: Non focal neuro exam    Psychiatric:         Mood and Affect: Mood normal.         Behavior: Behavior normal.         Thought Content: Thought content normal.         Judgment: Judgment normal.         Results Reviewed       Procedure Component Value Units Date/Time    CBC and differential [[id number]]  (Abnormal) Collected: 09/27/24 0523    Lab Status: Final result Specimen: Blood from Arm, Left Updated: 09/27/24 0756     WBC 3.24 Thousand/uL      RBC 3.34 Million/uL      Hemoglobin 12.0 g/dL      Hematocrit 35.2 %       fL      MCH 35.9 pg      MCHC 34.1 g/dL      RDW 15.9 %      MPV 9.9 fL      Platelets 40 Thousands/uL     Basic metabolic panel [[id number]]  (Abnormal) Collected: 09/27/24 0523    Lab Status: Final result Specimen: Blood from Arm, Left Updated: 09/27/24 0617     Sodium 136 mmol/L      Potassium 4.3 mmol/L      Chloride 106 mmol/L      CO2 26 mmol/L      ANION GAP 4 mmol/L      BUN 25 mg/dL      Creatinine 0.77 mg/dL      Glucose 68 mg/dL      Calcium 8.1 mg/dL      eGFR 94 ml/min/1.73sq m     Narrative:      National Kid[employer] Disease Foundation guidelines for Chronic Kidney Disease (CKD):     Stage 1 with normal or  high GFR (GFR > 90 mL/min/1.73 square meters)    Stage 2 Mild CKD (GFR = 60-89 mL/min/1.73 square meters)    Stage 3A Moderate CKD (GFR = 45-59 mL/min/1.73 square meters)    Stage 3B Moderate CKD (GFR = 30-44 mL/min/1.73 square meters)    Stage 4 Severe CKD (GFR = 15-29 mL/min/1.73 square meters)    Stage 5 End Stage CKD (GFR <15 mL/min/1.73 square meters)  Note: GFR calculation is accurate only with a steady state creatinine    Protime-INR [[id number]]  (Normal) Collected: 09/27/24 0523    Lab Status: Final result Specimen: Blood from Arm, Left Updated: 09/27/24 0556     Protime 14.3 seconds      INR 1.04    Narrative:      INR Therapeutic Range    Indication                                             INR Range      Atrial Fibrillation                                               2.0-3.0  Hypercoagulable State                                    2.0.2.3  Left Ventricular Asist Device                            2.0-3.0  Mechanical Heart Valve                                  -    Aortic(with afib, MI, embolism, HF, LA enlargement,    and/or coagulopathy)                                     2.0-3.0 (2.5-3.5)     Mitral                                                             2.5-3.5  Prosthetic/Bioprosthetic Heart Valve               2.0-3.0  Venous thromboembolism (VTE: VT, PE        2.0-3.0    Platelet count [[id number]]  (Abnormal) Collected: 09/26/24 1723    Lab Status: Final result Specimen: Blood from Line Updated: 09/26/24 1751     Platelets 46 Thousands/uL      MPV 9.3 fL     Protime-INR [[id number]]  (Abnormal) Collected: 09/26/24 1723    Lab Status: Final result Specimen: Blood from Line Updated: 09/26/24 1750     Protime 15.9 seconds      INR 1.20    Narrative:      INR Therapeutic Range    Indication                                             INR Range      Atrial Fibrillation                                               2.0-3.0  Hypercoagulable State                                    2.0.2.3  Left  Ventricular Asist Device                            2.0-3.0  Mechanical Heart Valve                                  -    Aortic(with afib, MI, embolism, HF, LA enlargement,    and/or coagulopathy)                                     2.0-3.0 (2.5-3.5)     Mitral                                                             2.5-3.5  Prosthetic/Bioprosthetic Heart Valve               2.0-3.0  Venous thromboembolism (VTE: VT, PE        2.0-3.0    RBC Morphology Reflex Test [[id number]] Collected: 09/26/24 1346    Lab Status: Final result Specimen: Blood from Line Updated: 09/26/24 1502    CBC and differential [[id number]]  (Abnormal) Collected: 09/26/24 1346    Lab Status: Final result Specimen: Blood from Line Updated: 09/26/24 1448     WBC 6.00 Thousand/uL      RBC 3.21 Million/uL      Hemoglobin 11.6 g/dL      Hematocrit 33.7 %       fL      MCH 36.1 pg      MCHC 34.4 g/dL      RDW 15.7 %      MPV 9.7 fL      Platelets 55 Thousands/uL     Manual Differential(PHLEBS Do Not Order) [[id number]]  (Abnormal) Collected: 09/26/24 1346    Lab Status: Final result Specimen: Blood from Line Updated: 09/26/24 1448     Segmented % 79 %      Bands % 10 %      Lymphocytes % 0 %      Monocytes % 5 %      Eosinophils % 0 %      Basophils % 0 %      Metamyelocytes % 4 %      Myelocytes % 2 %      Absolute Neutrophils 5.34 Thousand/uL      Absolute Lymphocytes 0.00 Thousand/uL      Absolute Monocytes 0.30 Thousand/uL      Absolute Eosinophils 0.00 Thousand/uL      Absolute Basophils 0.00 Thousand/uL      Absolute Metamyelocytes 0.24 Thousand/uL      Absolute Myelocytes 0.12 Thousand/uL      Total Counted --     Smudge Cells Present     RBC Morphology Present     Platelet Estimate Decreased     Anisocytosis Present     Macrocytes Present     Polychromasia Present    Basic metabolic panel [[id number]]  (Abnormal) Collected: 09/26/24 1346    Lab Status: Final result Specimen: Blood from Line Updated: 09/26/24 1413     Sodium 134 mmol/L       Potassium 3.8 mmol/L      Chloride 105 mmol/L      CO2 23 mmol/L      ANION GAP 6 mmol/L      BUN 29 mg/dL      Creatinine 0.86 mg/dL      Glucose 82 mg/dL      Calcium 7.9 mg/dL      eGFR 90 ml/min/1.73sq m     Narrative:      National Kidney Disease Foundation guidelines for Chronic Kidney Disease (CKD):     Stage 1 with normal or high GFR (GFR > 90 mL/min/1.73 square meters)    Stage 2 Mild CKD (GFR = 60-89 mL/min/1.73 square meters)    Stage 3A Moderate CKD (GFR = 45-59 mL/min/1.73 square meters)    Stage 3B Moderate CKD (GFR = 30-44 mL/min/1.73 square meters)    Stage 4 Severe CKD (GFR = 15-29 mL/min/1.73 square meters)    Stage 5 End Stage CKD (GFR <15 mL/min/1.73 square meters)  Note: GFR calculation is accurate only with a steady state creatinine            XR femur 2 vw right   Final Interpretation by Mook Morrell MD (09/26 6904)      Acute subcapital impacted right femoral neck fracture.      Computerized Assisted Algorithm (CAA) may have been used to analyze all applicable images.         Workstation performed: [id number]         XR hip/pelv 2-3 vws right   Final Interpretation by Chucho Hnening MD (09/26 2088)      Mildly displaced acute subcapital right femoral neck fracture         Computerized Assisted Algorithm (CAA) may have been used to analyze all applicable images.            Workstation performed: [id number]         XR chest 1 view portable   Final Interpretation by Chucho Henning MD (09/26 8146)      No acute new findings. Right apical tumor stable or a little bit diminished in prominence from prior imaging. Pleural-parenchymal density at the left apex also similar to prior imaging, perhaps scarring.            Workstation performed: [id number]             Procedures    ED Medication and Procedure Management   Prior to Admission Medications   Prescriptions Last Dose Informant Patient Reported? Taking?   acetaminophen (TYLENOL) 500 mg tablet  Self Yes No   Sig:  Take 500 mg by mouth every 4 (four) hours as needed for mild pain   al mag oxide-diphenhydramine-lidocaine viscous (MAGIC MOUTHWASH) 1:1:1 suspension   No No   Sig: Swish and swallow 10 mL every 4 (four) hours as needed for mouth pain or discomfort   apixaban (ELIQUIS) 5 mg  Self No No   Sig: Take 1 tablet (5 mg total) by mouth 2 (two) times a day   dexamethasone (DECADRON) 4 mg tablet   No No   Sig: Take 1 tablet (4 mg total) by mouth 2 (two) times a day with meals   diphenhydramine, lidocaine, Al/Mg hydroxide, simethicone (Magic Mouthwash) SUSP   No No   Sig: Swish and spit 10 mL every 4 (four) hours as needed for mouth pain or discomfort   folic acid (FOLVITE) 1 mg tablet   No No   Sig: Take 1 tablet (1,000 mcg total) by mouth daily   gabapentin (NEURONTIN) 300 mg capsule   No No   Sig: Take 300 mg in the AM and 600 mg in the Pm   lactulose (CHRONULAC) 10 g/15 mL solution   No No   Sig: Take 30 mL (20 g total) by mouth 2 (two) times a day   ondansetron (ZOFRAN) 4 mg tablet   No No   Sig: Take 1 tablet (4 mg total) by mouth every 8 (eight) hours as needed for nausea or vomiting   pantoprazole (PROTONIX) 40 mg tablet   No No   Sig: Take 1 tablet (40 mg total) by mouth daily      Facility-Administered Medications: None     Current Discharge Medication List        CONTINUE these medications which have NOT CHANGED    Details   acetaminophen (TYLENOL) 500 mg tablet Take 500 mg by mouth every 4 (four) hours as needed for mild pain      al mag oxide-diphenhydramine-lidocaine viscous (MAGIC MOUTHWASH) 1:1:1 suspension Swish and swallow 10 mL every 4 (four) hours as needed for mouth pain or discomfort  Qty: 500 mL, Refills: 1    Associated Diagnoses: Malignant neoplasm of brain, unspecified location (HCC)      apixaban (ELIQUIS) 5 mg Take 1 tablet (5 mg total) by mouth 2 (two) times a day  Qty: 60 tablet, Refills: 5    Associated Diagnoses: Multiple subsegmental pulmonary emboli without acute cor pulmonale (HCC); Non-small  cell cancer of right lung (HCC)      dexamethasone (DECADRON) 4 mg tablet Take 1 tablet (4 mg total) by mouth 2 (two) times a day with meals  Qty: 60 tablet, Refills: 0    Associated Diagnoses: Primary malignant neoplasm of lung metastatic to other site, unspecified laterality (HCC)      diphenhydramine, lidocaine, Al/Mg hydroxide, simethicone (Magic Mouthwash) SUSP Swish and spit 10 mL every 4 (four) hours as needed for mouth pain or discomfort  Qty: 237 mL, Refills: 1    Associated Diagnoses: Malignant neoplasm of brain, unspecified location (HCC)      folic acid (FOLVITE) 1 mg tablet Take 1 tablet (1,000 mcg total) by mouth daily  Qty: 90 tablet, Refills: 1    Associated Diagnoses: Non-small cell cancer of right lung (HCC)      gabapentin (NEURONTIN) 300 mg capsule Take 300 mg in the AM and 600 mg in the Pm  Qty: 270 capsule, Refills: 0    Associated Diagnoses: Non-small cell cancer of right lung (HCC); DDD (degenerative disc disease), cervical; Thoracic spine tumor; Cancer associated pain      lactulose (CHRONULAC) 10 g/15 mL solution Take 30 mL (20 g total) by mouth 2 (two) times a day  Qty: 1800 mL, Refills: 5    Associated Diagnoses: Constipation, unspecified constipation type      ondansetron (ZOFRAN) 4 mg tablet Take 1 tablet (4 mg total) by mouth every 8 (eight) hours as needed for nausea or vomiting  Qty: 20 tablet, Refills: 0    Associated Diagnoses: Primary malignant neoplasm of lung metastatic to other site, unspecified laterality (HCC)      pantoprazole (PROTONIX) 40 mg tablet Take 1 tablet (40 mg total) by mouth daily  Qty: 30 tablet, Refills: 0    Associated Diagnoses: Primary malignant neoplasm of lung metastatic to other site, unspecified laterality (HCC)           No discharge procedures on file.  ED SEPSIS DOCUMENTATION   Time reflects when diagnosis was documented in both MDM as applicable and the Disposition within this note       Time User Action Codes Description Comment    9/26/2024  3:05  PM Lynette Maurer Add [S72.011A] Closed subcapital fracture of femur, right, initial encounter ([facility]ly [facility])     9/26/2024  4:58 PM Lynette Maurer Add [C34.91] Non-small cell cancer of right lung (HCC)     9/26/2024  4:59 PM Chucho Mera Modify [S72.011A] Closed subcapital fracture of femur, right, initial encounter ([facility])     9/26/2024  4:59 PM Chucho Mera Add [S72.001A] Closed displaced fracture of right femoral neck (Formerly [facility])                  Claude Pena MD  09/27/24 5506

## 2024-09-28 NOTE — PLAN OF CARE
Problem: OCCUPATIONAL THERAPY ADULT  Goal: Performs self-care activities at highest level of function for planned discharge setting.  See evaluation for individualized goals.  Description: Treatment Interventions: ADL retraining, Functional transfer training, Endurance training, Patient/family training, Equipment evaluation/education, Compensatory technique education, Energy conservation  Equipment Recommended: Hip Kit ($)       See flowsheet documentation for full assessment, interventions and recommendations.   Note: Limitation: Decreased ADL status, Decreased endurance, Decreased self-care trans, Decreased high-level ADLs (pain, balance, fxnl mobility, act dino, fxnl reach, standing dino, strength, fxnl sitting balance, and fxnl sitting dino, safety awareness, insight, and pacing)  Prognosis: Good  Assessment: Pt is a 68 y.o. male seen for OT evaluation s/p admit to [facility] on 9/26/2024 s/p fall resulting in R femur fx. POD # 1 s/p R bipolar emanuel-arthroplasty w/ Dr. Salazar. Prior to admission, pt was living with spouse in a 1 level house, (I) with ADLs, (I) with light IADLs, SPC vs rollator, (+) falls, (-) . Personal and environmental factors affecting patient at time of evaluation include inaccessible home environment, inaccessible bathroom environment, difficulty completing ADLs, and difficulty completing IADLs. Personal factors supporting patient at time of evaluation include age, (I) PLOF, supportive spouse and local family, attitude towards recovery, and FFSU. Based upon this evaluation, pt is functioning below baseline. Pt will benefit from continued skilled inpatient OT to maximize safety, level of independence and overall performance in ADLs, functional transfers, functional mobility to return to functional baseline/highest level of function.     Rehab Resource Intensity Level, OT: (S) II (Moderate Resource Intensity) (at this time. Anticipate pt will progress to level III pending progress  towards goals, level of social support at home and continued caregiver training. Pt and family prefer DC to home w/ family support given pt current medical status re hospice services)     Leila Aguilar, OTD, OTR/L  [place] License [id number]  [place] License [id number]

## 2024-09-28 NOTE — ASSESSMENT & PLAN NOTE
S/p fall prior to admission.   Ambulates with cane at baseline secondary to chronic low back pain.   WBAT  Pain control per primary team.   Medical management per primary team.   Dispo: ortho will follow

## 2024-09-28 NOTE — PLAN OF CARE
Problem: Potential for Falls  Goal: Patient will remain free of falls  Description: INTERVENTIONS:  - Educate patient/family on patient safety including physical limitations  - Instruct patient to call for assistance with activity   - Consult OT/PT to assist with strengthening/mobility   - Keep Call bell within reach  - Keep bed low and locked with side rails adjusted as appropriate  - Keep care items and personal belongings within reach  - Initiate and maintain comfort rounds  - Make Fall Risk Sign visible to staff  - Offer Toileting every  Hours, in advance of need  - Initiate/Maintain alarm  - Obtain necessary fall risk management equipment:   - Apply yellow socks and bracelet for high fall risk patients  - Consider moving patient to room near nurses station  Outcome: Progressing     Problem: PAIN - ADULT  Goal: Verbalizes/displays adequate comfort level or baseline comfort level  Description: Interventions:  - Encourage patient to monitor pain and request assistance  - Assess pain using appropriate pain scale  - Administer analgesics based on type and severity of pain and evaluate response  - Implement non-pharmacological measures as appropriate and evaluate response  - Consider cultural and social influences on pain and pain management  - Notify physician/advanced practitioner if interventions unsuccessful or patient reports new pain  Outcome: Progressing     Problem: INFECTION - ADULT  Goal: Absence or prevention of progression during hospitalization  Description: INTERVENTIONS:  - Assess and monitor for signs and symptoms of infection  - Monitor lab/diagnostic results  - Monitor all insertion sites, i.e. indwelling lines, tubes, and drains  - Monitor endotracheal if appropriate and nasal secretions for changes in amount and color  - [place] appropriate cooling/warming therapies per order  - Administer medications as ordered  - Instruct and encourage patient and family to use good hand hygiene technique  -  Identify and instruct in appropriate isolation precautions for identified infection/condition  Outcome: Progressing  Goal: Absence of fever/infection during neutropenic period  Description: INTERVENTIONS:  - Monitor WBC    Outcome: Progressing     Problem: SAFETY ADULT  Goal: Patient will remain free of falls  Description: INTERVENTIONS:  - Educate patient/family on patient safety including physical limitations  - Instruct patient to call for assistance with activity   - Consult OT/PT to assist with strengthening/mobility   - Keep Call bell within reach  - Keep bed low and locked with side rails adjusted as appropriate  - Keep care items and personal belongings within reach  - Initiate and maintain comfort rounds  - Make Fall Risk Sign visible to staff  - Offer Toileting every  Hours, in advance of need  - Initiate/Maintain alarm  - Obtain necessary fall risk management equipment:   - Apply yellow socks and bracelet for high fall risk patients  - Consider moving patient to room near nurses station  Outcome: Progressing  Goal: Maintain or return to baseline ADL function  Description: INTERVENTIONS:  -  Assess patient's ability to carry out ADLs; assess patient's baseline for ADL function and identify physical deficits which impact ability to perform ADLs (bathing, care of mouth/teeth, toileting, grooming, dressing, etc.)  - Assess/evaluate cause of self-care deficits   - Assess range of motion  - Assess patient's mobility; develop plan if impaired  - Assess patient's need for assistive devices and provide as appropriate  - Encourage maximum independence but intervene and supervise when necessary  - Involve family in performance of ADLs  - Assess for home care needs following discharge   - Consider OT consult to assist with ADL evaluation and planning for discharge  - Provide patient education as appropriate  Outcome: Progressing  Goal: Maintains/Returns to pre admission functional level  Description:  INTERVENTIONS:  - Perform AM-PAC 6 Click Basic Mobility/ Daily Activity assessment daily.  - Set and communicate daily mobility goal to care team and patient/family/caregiver.   - Collaborate with rehabilitation services on mobility goals if consulted  - Perform Range of Motion  times a day.  - Reposition patient every  hours.  - Dangle patient  times a day  - Stand patient  times a day  - Ambulate patient  times a day  - Out of bed to chair  times a day   - Out of bed for meals  times a day  - Out of bed for toileting  - Record patient progress and toleration of activity level   Outcome: Progressing     Problem: DISCHARGE PLANNING  Goal: Discharge to home or other facility with appropriate resources  Description: INTERVENTIONS:  - Identify barriers to discharge w/patient and caregiver  - Arrange for needed discharge resources and transportation as appropriate  - Identify discharge learning needs (meds, wound care, etc.)  - Arrange for interpretive services to assist at discharge as needed  - Refer to Case Management Department for coordinating discharge planning if the patient needs post-hospital services based on physician/advanced practitioner order or complex needs related to functional status, cognitive ability, or social support system  Outcome: Progressing     Problem: Knowledge Deficit  Goal: Patient/family/caregiver demonstrates understanding of disease process, treatment plan, medications, and discharge instructions  Description: Complete learning assessment and assess knowledge base.  Interventions:  - Provide teaching at level of understanding  - Provide teaching via preferred learning methods  Outcome: Progressing     Problem: Prexisting or High Potential for Compromised Skin Integrity  Goal: Skin integrity is maintained or improved  Description: INTERVENTIONS:  - Identify patients at risk for skin breakdown  - Assess and monitor skin integrity  - Assess and monitor nutrition and hydration status  -  Monitor labs   - Assess for incontinence   - Turn and reposition patient  - Assist with mobility/ambulation  - Relieve pressure over bony prominences  - Avoid friction and shearing  - Provide appropriate hygiene as needed including keeping skin clean and dry  - Evaluate need for skin moisturizer/barrier cream  - Collaborate with interdisciplinary team   - Patient/family teaching  - Consider wound care consult   Outcome: Progressing     Problem: Nutrition/Hydration-ADULT  Goal: Nutrient/Hydration intake appropriate for improving, restoring or maintaining nutritional needs  Description: Monitor and assess patient's nutrition/hydration status for malnutrition. Collaborate with interdisciplinary team and initiate plan and interventions as ordered.  Monitor patient's weight and dietary intake as ordered or per policy. Utilize nutrition screening tool and intervene as necessary. Determine patient's food preferences and provide high-protein, high-caloric foods as appropriate.     INTERVENTIONS:  - Monitor oral intake, urinary output, labs, and treatment plans  - Assess nutrition and hydration status and recommend course of action  - Evaluate amount of meals eaten  - Assist patient with eating if necessary   - Allow adequate time for meals  - Recommend/ encourage appropriate diets, oral nutritional supplements, and vitamin/mineral supplements  - Order, calculate, and assess calorie counts as needed  - Recommend, monitor, and adjust tube feedings and TPN/PPN based on assessed needs  - Assess need for intravenous fluids  - Provide specific nutrition/hydration education as appropriate  - Include patient/family/caregiver in decisions related to nutrition  Outcome: Progressing

## 2024-09-28 NOTE — PROGRESS NOTES
Progress Note - Orthopedics   Name: Claude Austin 68 y.o. male I MRN: [id number]  Unit/Bed#: W -01 I Date of Admission: 9/26/2024   Date of Service: 9/28/2024 I Hospital Day: 2     Assessment & Plan  Closed displaced fracture of right femoral neck (HCC)  S/p fall prior to admission.   Ambulates with cane at baseline secondary to chronic low back pain.   WBAT  Pain control per primary team.   Medical management per primary team.   Dispo: ortho will follow    Constipation    Frailty    Ambulatory dysfunction      Ok for discharge from Orthopedics service perspective. Orthopedics service will follow.    History of Present Illness   68 y.o.male status post right hip hemiarthroplasty cemented no acute events, no new complaints. Pain well controlled. Denies fevers, chills, CP, SOB, N/V, numbness or tingling. Patient reports no issues with urination or bowel movements. Patient states pain was worse yesterday but is doing well today.  We went over hip precautions with him as well.  No adduction beyond neutral no knee flexion beyond the hip no pivoting.    Objective      Temp:  [96.6 °F (35.9 °C)-101.2 °F (38.4 °C)] 98 °F (36.7 °C)  HR:  [] 106  Resp:  [11-19] 16  BP: (106-146)/(60-95) 127/78  O2 Device: None (Room air)          I/O         09/26 0701  09/27 0700 09/27 0701  09/28 0700 09/28 0701  09/29 0700    P.O.  0     I.V. (mL/kg)  750 (9.5)     Blood  300     IV Piggyback  250     Total Intake(mL/kg)  1300 (16.5)     Urine (mL/kg/hr)  850 (0.4) 1350 (3.2)    Blood  150     Total Output  1000 1350    Net  +300 -1350                 Lines/Drains/Airways       Active Status       Name Placement date Placement time Site Days    Port A Cath 05/16/23 Left Internal jugular 05/16/23  1322  Internal jugular  500                  Physical Exam  Vitals and nursing note reviewed.   Constitutional:       Appearance: He is well-developed.   HENT:      Head: Normocephalic.   Eyes:      Pupils: Pupils are equal, round,  "and reactive to light.   Pulmonary:      Effort: Pulmonary effort is normal.   Abdominal:      General: There is no distension.   Musculoskeletal:      Cervical back: Neck supple.   Skin:     General: Skin is warm.   Neurological:      Mental Status: He is alert and oriented to person, place, and time.      Musculoskeletal: rightlower  Skin clean dry. No erythema or ecchymosis.  Dressing intact  TTP minimal over the proximal lateral hip and posterior        Lab Results: I have reviewed the following results: CBC/BMP:   .     09/28/24  0619   WBC 3.67*   HGB 10.6*   HCT 31.6*   PLT 75*   SODIUM 134*   K 4.5      CO2 27   BUN 23   CREATININE 0.88   GLUC 97      Recent Labs     09/26/24  1346 09/26/24  1723 09/27/24  0523 09/27/24  1342 09/28/24  0619   WBC 6.00  --  3.24*  --  3.67*   HGB 11.6*  --  12.0  --  10.6*   HCT 33.7*  --  35.2*  --  31.6*   PLT 55* 46* 40*   < > 75*   BANDSPCT 10*  --  3  --   --    BUN 29*  --  25  --  23   CREATININE 0.86  --  0.77  --  0.88   INR  --  1.20* 1.04  --   --     < > = values in this interval not displayed.     Blood Culture:    Lab Results   Component Value Date    BLOODCX No Growth After 5 Days. 07/12/2023    BLOODCX No Growth After 5 Days. 07/12/2023     Wound Culture: No results found for: \"WOUNDCULT\"  "

## 2024-09-28 NOTE — OCCUPATIONAL THERAPY NOTE
Occupational Therapy Evaluation     Patient Name: Claude Austin  Today's Date: 2024  Problem List  Active Problems:    Non-small cell cancer of right lung (HCC)    Constipation    Closed displaced fracture of right femoral neck (HCC)    Fall    Encounter for medication review    Frailty    Ambulatory dysfunction    Past Medical History  Past Medical History:   Diagnosis Date    Cancer (HCC)     Small cell carcinoma (HCC)      Past Surgical History  Past Surgical History:   Procedure Laterality Date    IR BIOPSY LUNG  2023    IR BIOPSY LUNG  2023    IR PORT PLACEMENT  2023        Patient's identity confirmed via 2 patient identifiers (full name and ) at start of session      24 0920   OT Last Visit   OT Visit Date 24   Note Type   Note type Evaluation  (and Treatment)   Pain Assessment   Pain Assessment Tool 0-10   Pain Score 5   Pain Location/Orientation Orientation: Right;Location: Hip   Pain Onset/Description Onset: Ongoing;Descriptor: Aching   Effect of Pain on Daily Activities limits activity tolerance, comfort, speed of ADLs/mobility   Patient's Stated Pain Goal No pain   Hospital Pain Intervention(s) Repositioned;Ambulation/increased activity;Elevated;Emotional support   Restrictions/Precautions   Weight Bearing Precautions Per Order Yes   RLE Weight Bearing Per Order WBAT  (s/p Bipolar emanuel-arthroplasty by Dr. Salazar 24)   Braces or Orthoses Other (Comment)  (wedge abduction pillow)   Other Precautions Chair Alarm;Bed Alarm;WBS;THR;Fall Risk;Multiple lines;Pain  (POSTERIOR HIP PRECAUTIONS, condom cath)   Home Living   Type of Home Mobile home   Home Layout One level;Performs ADLs on one level;Able to live on main level with bedroom/bathroom;Stairs to enter with rails  (5 FRACISCO vs 3 FRACISCO)   Bathroom Shower/Tub Walk-in shower   Bathroom Toilet Standard   Bathroom Equipment Grab bars in shower;Shower chair;Commode   Bathroom Accessibility Accessible   Home Equipment  "Walker;Cane  (rollator; also has access to lift recliner)   Prior Function   Level of [place] Independent with ADLs;Independent with functional mobility;Needs assistance with IADLS   Lives With Spouse   Receives Help From Family;Friend(s)  (large support system per pt and spouse)   IADLs Family/Friend/Other provides transportation;Family/Friend/Other provides meals;Independent with medication management   Falls in the last 6 months 1 to 4  (4 falls, 3 in 1 day per pt)   Vocational Retired   Comments Pt reports he was (I) w/ ADLs PTA, (I) w/ light IADLs w/ occasional difficulty. Has been using SPC vs rollator for mobility. Increased unsteadiness/weakness w/ activity/mobility since June   Lifestyle   Autonomy Pt lives w/ spouse in a 1 level house and is (I) w/ ADLs/light IADLs PTA, SPC vs RW, (+) falls, (-)    Reciprocal Relationships Supportive spouse, large supportive family   Service to Others Retired   Intrinsic Gratification Pt enjoys working out in the yard, getting outside   General   Additional Pertinent History Pt admitted s/p fall resulting in R femur fx. POD # 1 bipolar emanuel-arthroplasty 9/27. PMH includes: NSCLC w/ brain and spine mets, PVD   Family/Caregiver Present Yes  (Spouse, brother, father)   Subjective   Subjective \"I need to use the bathroom\"   ADL   Where Assessed Edge of bed   Eating Assistance 7  Independent   Eating Deficit Setup;Beverage management   Grooming Assistance 5  Supervision/Setup   UB Bathing Assistance 5  Supervision/Setup   LB Bathing Assistance 2  Maximal Assistance   UB Dressing Assistance 5  Supervision/Setup   LB Dressing Assistance 2  Maximal Assistance   LB Dressing Deficit Don/doff R sock;Don/doff L sock   Toileting Assistance  3  Moderate Assistance   Toileting Deficit Setup;Steadying;Verbal cueing;Supervison/safety;Increased time to complete;Bedside commode;Perineal hygiene  (able to complete hygiene seated on BSC)   Bed Mobility   Supine to Sit 3  Moderate " assistance   Additional items Assist x 1;HOB elevated;Bedrails;Increased time required;Verbal cues;LE management   Additional Comments Able to sit EOB w/ mostly S, occasional min A x 1 for correction of posterior LOB w/ MMT of BLE. OOB to recliner at end of session   Transfers   Sit to Stand 3  Moderate assistance   Additional items Assist x 2;Increased time required;Verbal cues   Stand to Sit 3  Moderate assistance   Additional items Assist x 1;Increased time required;Verbal cues;Armrests  (to sit on BSC)   Toilet transfer 3  Moderate assistance   Additional items Assist x 1;Increased time required;Verbal cues;Commode;Armrests  (sit on BSC in bathroom, VC for body positioning and hand placement)   Additional Comments VC for hand placement, body positioning. See additional treat for progression.   Functional Mobility   Functional Mobility   (initially mod A x 2 progressing to mod A x 1 w/ increased time/reps)   Additional Comments Household distance to bathroom w/ RW.   Additional items Rolling walker   Balance   Static Sitting Fair -   Dynamic Sitting Poor +   Static Standing Poor +   Dynamic Standing Poor   Activity Tolerance   Activity Tolerance Patient limited by fatigue;Patient limited by pain   Medical Staff Made Aware Care coordainated w/ PT Estelita   Nurse Made Aware Spoke to RN [place], PCA Marc   RUE Assessment   RUE Assessment WFL   RUE Strength   RUE Overall Strength Within Functional Limits - able to perform ADL tasks with strength   LUE Assessment   LUE Assessment WFL   LUE Strength   LUE Overall Strength Within Functional Limits - able to perform ADL tasks with strength   Hand Function   Gross Motor Coordination Functional   Fine Motor Coordination Functional   Sensation   Light Touch No apparent deficits   Vision-Basic Assessment   Current Vision Does not wear glasses   Cognition   Overall Cognitive Status WFL   Arousal/Participation Alert;Cooperative   Attention Within functional limits   Orientation  "Level Oriented X4   Memory Within functional limits  (able to recall 2/3 THP at end of session, required prompting for 3rd)   Following Commands Follows one step commands without difficulty   Comments Pleasant and agreeable. Good attention, carryover of edcuation.   Assessment   Limitation Decreased ADL status;Decreased endurance;Decreased self-care trans;Decreased high-level ADLs  (pain, balance, fxnl mobility, act dino, fxnl reach, standing dino, strength, fxnl sitting balance, and fxnl sitting dino, safety awareness, insight, and pacing)   Prognosis Good   Assessment Pt is a 68 y.o. male seen for OT evaluation s/p admit to [facility] on 9/26/2024 s/p fall resulting in R femur fx. POD # 1 s/p R bipolar emanuel-arthroplasty w/ Dr. Salazar. Prior to admission, pt was living with spouse in a 1 level house, (I) with ADLs, (I) with light IADLs, SPC vs rollator, (+) falls, (-) . Personal and environmental factors affecting patient at time of evaluation include inaccessible home environment, inaccessible bathroom environment, difficulty completing ADLs, and difficulty completing IADLs. Personal factors supporting patient at time of evaluation include age, (I) PLOF, supportive spouse and local family, attitude towards recovery, and FFSU. Based upon this evaluation, pt is functioning below baseline. Pt will benefit from continued skilled inpatient OT to maximize safety, level of independence and overall performance in ADLs, functional transfers, functional mobility to return to functional baseline/highest level of function.   Goals   Patient Goals \"to go home with family support\"   [facility] Time Frame 10-14   Long Term Goal #1 see goals below   Plan   Treatment Interventions ADL retraining;Functional transfer training;Endurance training;Patient/family training;Equipment evaluation/education;Compensatory technique education;Energy conservation   Goal Expiration Date 10/08/24   OT Treatment Day 0   OT Frequency (S)  " 4-6x/wk   Discharge Recommendation   Rehab Resource Intensity Level, OT (S)  II (Moderate Resource Intensity)  (at this time. Anticipate pt will progress to level III pending progress towards goals, level of social support at home and continued caregiver training. Pt and family prefer DC to home w/ family support given pt current medical status re hospice services)   Equipment Recommended Hip Kit ($)   AM-PAC Daily Activity Inpatient   Lower Body Dressing 2   Bathing 2   Toileting 2   Upper Body Dressing 3   Grooming 3   Eating 4   Daily Activity Raw Score 16   Daily Activity Standardized Score (Calc for Raw Score >=11) 35.96   AM-PAC Applied Cognition Inpatient   Following a Speech/Presentation 4   Understanding Ordinary Conversation 4   Taking Medications 3   Remembering Where Things Are Placed or Put Away 4   Remembering List of 4-5 Errands 3   Taking Care of Complicated Tasks 3   Applied Cognition Raw Score 21   Applied Cognition Standardized Score 44.3   Additional Treatment Session   Start Time 0940   End Time 0959   Treatment Assessment Pt seen for additional treatment session to begin caregiver training to facilitate goal of returning home w/ family support. Pt received on BSC in bathroom after completing hygiene s/p BM. Pt improved to min A x 1 to perform STS from BSC. Pt wife present for session and willing to participate in caregiver training. Pt spouse provided w/ verbal and visual cueing as to body positioning, safe pt handling and contact points to safely assist pt w/ mobility. Pt able to walk short distance from bathroom w/ RW and min A  x1/CGA w/ second staff member present for safety. Pt endorses fatigue after short distance and increased pain in RLE. Pt required recliner to be brought closer. Spouse required some continued cueing for proximity to pt to provide adequate support but demonstrated good attention, willingness to learn and application w/ cueing. Pt seated in recliner w/ min A x 1. All  "needs met and able to recall 2/3 precautions w/ prompting for 3rd. Continue to recommend DC to level II (Moderate resource intensity) upon DC. Continue per POC.   Additional Treatment Day 1   End of Consult   Patient Position at End of Consult Bedside chair;Bed/Chair alarm activated;All needs within reach   Nurse Communication Nurse aware of consult     GOALS:    *Goals established to promote patient goal of \"to go home w/ family support\":      *Patient will perform grooming tasks standing at sink with (S) in order to increase overall independence     *UB ADL with (I) for inc'd independence with self care    *LB ADL with Min (A) using AE prn for inc'd independence with self care    *Toileting with Min (A) for clothing management and hygiene to increase hygiene/thoroughness in order to reduce caregiver burden    *Pt will demonstrate use of long handled AE during 100% of tx sessions for increased ADL safety and independence following D/C     *ADL transfers with (S) for inc'd independence with ADLs/purposeful tasks    *Bed mobility- (S) for inc'd independence to manage own comfort and initiate EOB & OOB purposeful tasks    *Patient will increase functional mobility to and from bathroom with rolling walker with supervision to increase independence with toileting    *Patient will increase OOB/sitting tolerance to 2-4 hours per day to increase participation in self-care and leisure tasks with no s/s of exertion.     *Increase stand tolerance x 3-5  m for inc'd tolerance with standing purposeful tasks including grooming/self-care    *Patient will improve functional activity tolerance to 20 minutes of sustained functional tasks to increase participation in basic self-care and decrease assistance level.    *Pt will verbalize and demonstrate understanding of post-op movement precautions 100% in tx sessions for increased safety and functional mobility.     *Caregiver will demonstrate good body mechanics and safe technique when " assisting pt during functional ADLs/transfers to maximize safety upon DC.    The patient's raw score on the AM-PAC Daily Activity Inpatient Short Form is 16. A raw score of less than 19 suggests the patient may benefit from discharge to post-acute rehabilitation services. Please also refer to the recommendation of the Occupational Therapist for safe discharge planning.     Pt seen as a co-eval with PT due to the patient's co-morbidities and clinically unstable presentation indicated by chart review. During session, pt benefited from two skilled therapists due to extensive physical assistance to achieve transitional movements and pt's decreased activity tolerance.      Leila Aguilar, LUIS EDUARDO, OTR/L    [place] License [id number]  [place] License [id number]

## 2024-09-29 VITALS
OXYGEN SATURATION: 95 % | RESPIRATION RATE: 18 BRPM | WEIGHT: 172.1 LBS | HEART RATE: 79 BPM | SYSTOLIC BLOOD PRESSURE: 132 MMHG | HEIGHT: 70 IN | BODY MASS INDEX: 24.64 KG/M2 | DIASTOLIC BLOOD PRESSURE: 77 MMHG | TEMPERATURE: 97.5 F

## 2024-09-29 LAB

## 2024-09-29 PROCEDURE — 99024 POSTOP FOLLOW-UP VISIT: CPT | Performed by: PHYSICIAN ASSISTANT

## 2024-09-29 PROCEDURE — NC001 PR NO CHARGE: Performed by: SURGERY

## 2024-09-29 RX ORDER — OXYCODONE HYDROCHLORIDE 5 MG/1
5 TABLET ORAL EVERY 6 HOURS PRN
Qty: 30 TABLET | Refills: 0 | Status: SHIPPED | OUTPATIENT
Start: 2024-09-29 | End: 2024-10-01 | Stop reason: SDUPTHER

## 2024-09-29 RX ADMIN — DEXAMETHASONE 4 MG: 4 TABLET ORAL at 08:23

## 2024-09-29 RX ADMIN — LACTULOSE 20 G: 20 SOLUTION ORAL at 08:28

## 2024-09-29 RX ADMIN — OXYCODONE HYDROCHLORIDE 5 MG: 5 TABLET ORAL at 07:08

## 2024-09-29 RX ADMIN — ACETAMINOPHEN 325MG 975 MG: 325 TABLET ORAL at 13:16

## 2024-09-29 RX ADMIN — ENOXAPARIN SODIUM 30 MG: 30 INJECTION SUBCUTANEOUS at 08:24

## 2024-09-29 RX ADMIN — OXYCODONE HYDROCHLORIDE 5 MG: 5 TABLET ORAL at 13:17

## 2024-09-29 RX ADMIN — GABAPENTIN 300 MG: 300 CAPSULE ORAL at 08:25

## 2024-09-29 RX ADMIN — PANTOPRAZOLE SODIUM 40 MG: 40 TABLET, DELAYED RELEASE ORAL at 08:25

## 2024-09-29 NOTE — ASSESSMENT & PLAN NOTE
S/p right hip hemiarthroplasty on 9/27/24 (POD 2)   Doing well overall   Ambulates with cane at baseline secondary to chronic low back pain, has been up with PT.   Hgb 10.6 today, vitals stable  WBAT, posterior hip precautions  Ok to resume home eliquis from ortho perspective  Pain control per primary team.   Medical management per primary team.

## 2024-09-29 NOTE — ASSESSMENT & PLAN NOTE
- Multimodal pain management   - Orthopedic consult, appreciate recommendations   - Patient taken to the OR yesterday for repair.   - PT OT consult   - geriatrics, appreciate recs   - case management, appreciate recs   - considering home rehab with palliative vs hospice

## 2024-09-29 NOTE — PLAN OF CARE
Problem: Potential for Falls  Goal: Patient will remain free of falls  Description: INTERVENTIONS:  - Educate patient/family on patient safety including physical limitations  - Instruct patient to call for assistance with activity   - Consult OT/PT to assist with strengthening/mobility   - Keep Call bell within reach  - Keep bed low and locked with side rails adjusted as appropriate  - Keep care items and personal belongings within reach  - Initiate and maintain comfort rounds  - Make Fall Risk Sign visible to staff  - Offer Toileting every  Hours, in advance of need  - Initiate/Maintain alarm  - Obtain necessary fall risk management equipment:   - Apply yellow socks and bracelet for high fall risk patients  - Consider moving patient to room near nurses station  Outcome: Progressing     Problem: PAIN - ADULT  Goal: Verbalizes/displays adequate comfort level or baseline comfort level  Description: Interventions:  - Encourage patient to monitor pain and request assistance  - Assess pain using appropriate pain scale  - Administer analgesics based on type and severity of pain and evaluate response  - Implement non-pharmacological measures as appropriate and evaluate response  - Consider cultural and social influences on pain and pain management  - Notify physician/advanced practitioner if interventions unsuccessful or patient reports new pain  Outcome: Progressing     Problem: INFECTION - ADULT  Goal: Absence or prevention of progression during hospitalization  Description: INTERVENTIONS:  - Assess and monitor for signs and symptoms of infection  - Monitor lab/diagnostic results  - Monitor all insertion sites, i.e. indwelling lines, tubes, and drains  - Monitor endotracheal if appropriate and nasal secretions for changes in amount and color  - [place] appropriate cooling/warming therapies per order  - Administer medications as ordered  - Instruct and encourage patient and family to use good hand hygiene technique  -  Identify and instruct in appropriate isolation precautions for identified infection/condition  Outcome: Progressing     Problem: SAFETY ADULT  Goal: Patient will remain free of falls  Description: INTERVENTIONS:  - Educate patient/family on patient safety including physical limitations  - Instruct patient to call for assistance with activity   - Consult OT/PT to assist with strengthening/mobility   - Keep Call bell within reach  - Keep bed low and locked with side rails adjusted as appropriate  - Keep care items and personal belongings within reach  - Initiate and maintain comfort rounds  - Make Fall Risk Sign visible to staff  - Offer Toileting every  Hours, in advance of need  - Initiate/Maintain alarm  - Obtain necessary fall risk management equipment:   - Apply yellow socks and bracelet for high fall risk patients  - Consider moving patient to room near nurses station  Outcome: Progressing  Goal: Maintain or return to baseline ADL function  Description: INTERVENTIONS:  -  Assess patient's ability to carry out ADLs; assess patient's baseline for ADL function and identify physical deficits which impact ability to perform ADLs (bathing, care of mouth/teeth, toileting, grooming, dressing, etc.)  - Assess/evaluate cause of self-care deficits   - Assess range of motion  - Assess patient's mobility; develop plan if impaired  - Assess patient's need for assistive devices and provide as appropriate  - Encourage maximum independence but intervene and supervise when necessary  - Involve family in performance of ADLs  - Assess for home care needs following discharge   - Consider OT consult to assist with ADL evaluation and planning for discharge  - Provide patient education as appropriate  Outcome: Progressing  Goal: Maintains/Returns to pre admission functional level  Description: INTERVENTIONS:  - Perform AM-PAC 6 Click Basic Mobility/ Daily Activity assessment daily.  - Set and communicate daily mobility goal to care  team and patient/family/caregiver.   - Collaborate with rehabilitation services on mobility goals if consulted  - Perform Range of Motion  times a day.  - Reposition patient every  hours.  - Dangle patient  times a day  - Stand patient  times a day  - Ambulate patient  times a day  - Out of bed to chair  times a day   - Out of bed for meals times a day  - Out of bed for toileting  - Record patient progress and toleration of activity level   Outcome: Progressing     Problem: DISCHARGE PLANNING  Goal: Discharge to home or other facility with appropriate resources  Description: INTERVENTIONS:  - Identify barriers to discharge w/patient and caregiver  - Arrange for needed discharge resources and transportation as appropriate  - Identify discharge learning needs (meds, wound care, etc.)  - Arrange for interpretive services to assist at discharge as needed  - Refer to Case Management Department for coordinating discharge planning if the patient needs post-hospital services based on physician/advanced practitioner order or complex needs related to functional status, cognitive ability, or social support system  Outcome: Progressing     Problem: Knowledge Deficit  Goal: Patient/family/caregiver demonstrates understanding of disease process, treatment plan, medications, and discharge instructions  Description: Complete learning assessment and assess knowledge base.  Interventions:  - Provide teaching at level of understanding  - Provide teaching via preferred learning methods  Outcome: Progressing     Problem: Prexisting or High Potential for Compromised Skin Integrity  Goal: Skin integrity is maintained or improved  Description: INTERVENTIONS:  - Identify patients at risk for skin breakdown  - Assess and monitor skin integrity  - Assess and monitor nutrition and hydration status  - Monitor labs   - Assess for incontinence   - Turn and reposition patient  - Assist with mobility/ambulation  - Relieve pressure over bony  prominences  - Avoid friction and shearing  - Provide appropriate hygiene as needed including keeping skin clean and dry  - Evaluate need for skin moisturizer/barrier cream  - Collaborate with interdisciplinary team   - Patient/family teaching  - Consider wound care consult   Outcome: Progressing     Problem: Nutrition/Hydration-ADULT  Goal: Nutrient/Hydration intake appropriate for improving, restoring or maintaining nutritional needs  Description: Monitor and assess patient's nutrition/hydration status for malnutrition. Collaborate with interdisciplinary team and initiate plan and interventions as ordered.  Monitor patient's weight and dietary intake as ordered or per policy. Utilize nutrition screening tool and intervene as necessary. Determine patient's food preferences and provide high-protein, high-caloric foods as appropriate.     INTERVENTIONS:  - Monitor oral intake, urinary output, labs, and treatment plans  - Assess nutrition and hydration status and recommend course of action  - Evaluate amount of meals eaten  - Assist patient with eating if necessary   - Allow adequate time for meals  - Recommend/ encourage appropriate diets, oral nutritional supplements, and vitamin/mineral supplements  - Order, calculate, and assess calorie counts as needed  - Recommend, monitor, and adjust tube feedings and TPN/PPN based on assessed needs  - Assess need for intravenous fluids  - Provide specific nutrition/hydration education as appropriate  - Include patient/family/caregiver in decisions related to nutrition  Outcome: Progressing

## 2024-09-29 NOTE — DISCHARGE SUMMARY
Discharge Summary - Trauma   Name: Claude Austin 68 y.o. male I MRN: [id number]  Unit/Bed#: W -01 I Date of Admission: 9/26/2024   Date of Service: 9/29/2024 I Hospital Day: 3     Admission Date: 9/26/2024 1236  Discharge Date: 09/29/24  Admitting Diagnosis: Hip pain [M25.559]  Closed subcapital fracture of femur, right, initial encounter ([facility]) [S72.011A]  Non-small cell cancer of right lung ([facility]) [C34.91]  Closed displaced fracture of right femoral neck ([facility]) [S72.001A]  Discharge Diagnosis:   Medical Problems       Resolved Problems  Date Reviewed: 8/22/2024   None         HPI: 68-year-old male with significant history of non-small cell cancer of the right lung who presented with a subcapital fracture of the femur on the right side after a fall.    Procedures Performed:   Orders Placed This Encounter   Procedures    ED ECG Documentation Only       Summary of Hospital Course: Patient was admitted for operative repair of right femur.  Following repair, patient was seen by PT and OT.  Patient able to walk and ambulate.  Patient's pain managed at this time.  Patient has palliative care at home with lung cancer in which she can get pain management.  Will give prescription for Oxy for at home pain management as well.  Patient will complete rehab at home and is planning on moving to hospice once completed rehab.    Significant Findings, Care, Treatment and Services Provided: Closed subcapital fracture of the right femur, operative repair while here in admission.    Condition at Discharge: stable       Discharge instructions/Information to patient and family:   See After Visit Summary (AVS) for information provided to patient and family.      Provisions for Follow-Up Care:  See after visit summary for information related to follow-up care and any pertinent home health orders.      PCP: No primary care provider on file.    Disposition: Home    Planned Readmission: No     Discharge Medications:  See after visit  summary for reconciled discharge medications provided to patient and family.      Discharge Statement:  I have spent a total time of 30 minutes in caring for this patient on the day of the visit/encounter. >30 minutes of time was spent on: Diagnostic results, Prognosis, Risks and benefits of tx options, Instructions for management, Patient and family education, Importance of tx compliance, Risk factor reductions, Impressions, Counseling / Coordination of care, Documenting in the medical record, and Reviewing / ordering tests, medicine, procedures  .

## 2024-09-29 NOTE — CASE MANAGEMENT
Case Management Progress Note    Patient name Claude Austin  Location W /W -01 MRN [id number]  : 1955 Date 2024       LOS (days): 3  Geometric Mean LOS (GMLOS) (days): 2.8  Days to GMLOS:0        OBJECTIVE:        Current admission status: Inpatient  Preferred Pharmacy:   [facility] [place], [place] - [address]  [address] [facility] [address]  Phone: [phone] Fax: [phone]    [facility] [place] [facility] [address]  [address]  [place] [address]  Phone: [phone] Fax: [phone]    [place] - [address]  [address]  [place] [address]  Phone: [phone] Fax: [phone]    [facility] [place] [address]  [address]  [facility] [address]  Phone: [phone] Fax: [phone]    Primary Care Provider: No primary care provider on file.    Primary Insurance: MEDICARE  Secondary Insurance:     PROGRESS NOTE:    Cm met with patient and spouse at bedside. Provider came into room to meet with patient so CM met with spouse outside. Plan is for patient to return home with [facility] at this time. Spouse gave verbal permission for CM to place blanket referral into [facility].  Family would like hospice and HHC but unsure how this would work with insurance. Family also interested in DME equipment. They would like wheel chair that is 25 inches wide or smaller to fit in door way as well as BSC. CM to place order into parachute. Family unaware of anything else needed at this time. CM to follow for dc planning. Patient may need transport home due to stairs to enter home.

## 2024-09-29 NOTE — PLAN OF CARE
Problem: Potential for Falls  Goal: Patient will remain free of falls  Description: INTERVENTIONS:  - Educate patient/family on patient safety including physical limitations  - Instruct patient to call for assistance with activity   - Consult OT/PT to assist with strengthening/mobility   - Keep Call bell within reach  - Keep bed low and locked with side rails adjusted as appropriate  - Keep care items and personal belongings within reach  - Initiate and maintain comfort rounds  - Make Fall Risk Sign visible to staff  - Offer Toileting every 2 Hours, in advance of need  - Initiate/Maintain bed/chair alarm    Problem: INFECTION - ADULT  Goal: Absence or prevention of progression during hospitalization  Description: INTERVENTIONS:  - Assess and monitor for signs and symptoms of infection  - Monitor lab/diagnostic results  - Monitor all insertion sites, i.e. indwelling lines, tubes, and drains  - Monitor endotracheal if appropriate and nasal secretions for changes in amount and color  - Ranger appropriate cooling/warming therapies per order  - Administer medications as ordered  - Instruct and encourage patient and family to use good hand hygiene technique  - Identify and instruct in appropriate isolation precautions for identified infection/condition  Outcome: Progressing     Problem: DISCHARGE PLANNING  Goal: Discharge to home or other facility with appropriate resources  Description: INTERVENTIONS:  - Identify barriers to discharge w/patient and caregiver  - Arrange for needed discharge resources and transportation as appropriate  - Identify discharge learning needs (meds, wound care, etc.)  - Arrange for interpretive services to assist at discharge as needed  - Refer to Case Management Department for coordinating discharge planning if the patient needs post-hospital services based on physician/advanced practitioner order or complex needs related to functional status, cognitive ability, or social support  system  Outcome: Progressing   - Apply yellow socks and bracelet for high fall risk patients  - Consider moving patient to room near nurses station  Outcome: Progressing

## 2024-09-29 NOTE — PROGRESS NOTES
Progress Note - Orthopedics   Name: Claude Austin 68 y.o. male I MRN: [id number]  Unit/Bed#: W -01 I Date of Admission: 9/26/2024   Date of Service: 9/29/2024 I Hospital Day: 3     Assessment & Plan  Closed displaced fracture of right femoral neck (HCC)  S/p right hip hemiarthroplasty on 9/27/24 (POD 2)   Doing well overall   Ambulates with cane at baseline secondary to chronic low back pain, has been up with PT.   Hgb 10.6 today, vitals stable  WBAT, posterior hip precautions  Ok to resume home eliquis from ortho perspective  Pain control per primary team.   Medical management per primary team.         Ok for discharge from Orthopedics service perspective.     History of Present Illness   68 y.o.male seen and evaluated today. He is overall doing very well. Pain is controlled and he has been able to ambulate with therapy. No paresthesias, fevers/chills, CP/SOB.     Objective      Temp:  [97.2 °F (36.2 °C)-98 °F (36.7 °C)] 97.9 °F (36.6 °C)  HR:  [74-88] 78  Resp:  [16-20] 16  BP: ()/(65-85) 119/76  O2 Device: None (Room air)          I/O         09/27 0701  09/28 0700 09/28 0701  09/29 0700 09/29 0701  09/30 0700    P.O. 0 480     I.V. (mL/kg) 750 (9.5)      Blood 300      IV Piggyback 250      Total Intake(mL/kg) 1300 (16.5) 480 (6.1)     Urine (mL/kg/hr) 850 (0.4) 2500 (1.3)     Blood 150      Total Output 1000 2500     Net +300 -2020                  Lines/Drains/Airways       Active Status       Name Placement date Placement time Site Days    Port A Cath 05/16/23 Left Internal jugular 05/16/23  1322  Internal jugular  501                  Physical Exam Musculoskeletal: rightlower  No significant edema . No erythema or ecchymosis. Compartments soft  Dressing intact, pinpoint spot of bloody drainage proximally   Mild jacinto-incisional tenderness   Motor intact to +FHL/EHL, +ankle dorsi/plantar flexion  2+ DP pulse      Lab Results: I have reviewed the following results: CBC/BMP: No new results in last  "24 hours.   Recent Labs     09/26/24  1346 09/26/24  1723 09/27/24  0523 09/27/24  1342 09/28/24  0619   WBC 6.00  --  3.24*  --  3.67*   HGB 11.6*  --  12.0  --  10.6*   HCT 33.7*  --  35.2*  --  31.6*   PLT 55* 46* 40*   < > 75*   BANDSPCT 10*  --  3  --   --    BUN 29*  --  25  --  23   CREATININE 0.86  --  0.77  --  0.88   INR  --  1.20* 1.04  --   --     < > = values in this interval not displayed.     Blood Culture:    Lab Results   Component Value Date    BLOODCX No Growth After 5 Days. 07/12/2023    BLOODCX No Growth After 5 Days. 07/12/2023     Wound Culture: No results found for: \"WOUNDCULT\"  "

## 2024-09-29 NOTE — PROGRESS NOTES
Progress Note - Trauma   Name: Claude Austin 68 y.o. male I MRN: [id number]  Unit/Bed#: W -01 I Date of Admission: 9/26/2024   Date of Service: 9/29/2024 I Hospital Day: 3    Assessment & Plan  Closed displaced fracture of right femoral neck (HCC)   - Multimodal pain management   - Orthopedic consult, appreciate recommendations   - Patient taken to the OR yesterday for repair.   - PT OT consult   - geriatrics, appreciate recs   - case management, appreciate recs   - considering home rehab with palliative vs hospice  Non-small cell cancer of right lung (HCC)   - Speak with case management about hospice vs acute rehab   - consult placed   - multimodal pain management     Bowel Regimen: senokot and docusate  VTE Prophylaxis:Enoxaparin (Lovenox)     Disposition:  Ok for discharge from Trauma service perspective.    History of Present Illness   Right femoral neck fracture status post repair.    24 Hour Events : No acute overnight events    Objective      Temp:  [97.2 °F (36.2 °C)-98 °F (36.7 °C)] 97.8 °F (36.6 °C)  HR:  [74-88] 75  Resp:  [18-20] 18  BP: ()/(65-85) 138/84  O2 Device: None (Room air)          I/O         09/27 0701  09/28 0700 09/28 0701 09/29 0700 09/29 0701  09/30 0700    P.O. 0 480     I.V. (mL/kg) 750 (9.5)      Blood 300      IV Piggyback 250      Total Intake(mL/kg) 1300 (16.5) 480 (6.1)     Urine (mL/kg/hr) 850 (0.4) 2500 (1.3)     Blood 150      Total Output 1000 2500     Net +300 -2020                  Lines/Drains/Airways       Active Status       Name Placement date Placement time Site Days    Port A Cath 05/16/23 Left Internal jugular 05/16/23  1322  Internal jugular  501                  Physical Exam  Constitutional:       Appearance: Normal appearance.   HENT:      Head: Normocephalic and atraumatic.      Right Ear: External ear normal.      Left Ear: External ear normal.      Nose: Nose normal.      Mouth/Throat:      Pharynx: Oropharynx is clear.   Eyes:      Extraocular  Movements: Extraocular movements intact.      Conjunctiva/sclera: Conjunctivae normal.      Pupils: Pupils are equal, round, and reactive to light.   Cardiovascular:      Rate and Rhythm: Normal rate.      Pulses: Normal pulses.      Heart sounds: Normal heart sounds.   Pulmonary:      Effort: Pulmonary effort is normal.      Breath sounds: Normal breath sounds.   Musculoskeletal:         General: Normal range of motion.      Comments: Pain well controlled.  Patient tolerating movement   Skin:     General: Skin is warm.      Capillary Refill: Capillary refill takes less than 2 seconds.   Neurological:      General: No focal deficit present.      Mental Status: He is alert and oriented to person, place, and time.             Lab Results: I have reviewed the following results:   Recent Labs     09/27/24  0523 09/27/24  1342 09/28/24  0619   WBC 3.24*  --  3.67*   HGB 12.0  --  10.6*   HCT 35.2*  --  31.6*   PLT 40*   < > 75*   BANDSPCT 3  --   --    SODIUM 136  --  134*   K 4.3  --  4.5     --  102   CO2 26  --  27   BUN 25  --  23   CREATININE 0.77  --  0.88   GLUC 68  --  97   MG 2.2  --   --    PHOS 2.2*  --   --    INR 1.04  --   --     < > = values in this interval not displayed.       Imaging Review: No pertinent imaging studies reviewed.  Other Studies: No additional pertinent studies reviewed.

## 2024-09-29 NOTE — CASE MANAGEMENT
Case Management Discharge Planning Note    Patient name Claude Austin  Location W /W -01 MRN [id number]  : 1955 Date 2024       Current Admission Date: 2024  Current Admission Diagnosis:Closed displaced fracture of right femoral neck (HCC)   Patient Active Problem List    Diagnosis Date Noted Date Diagnosed    Fall 2024     Encounter for medication review 2024     Frailty 2024     Ambulatory dysfunction 2024     Closed displaced fracture of right femoral neck (HCC) 2024     Loss of balance 2024     Nutritional anemia 2024     Drug rash 2024     Immunocompromised (HCC) 2024     Constipation 2024     Pruritic rash 2024     Weakness 2024     Peripheral vascular disease (HCC) 2024     Palliative care by specialist 2023     Lower extremity edema 11/15/2023     Bloating 2023     Drug-induced pneumonitis 2023     Current every day smoker 2023     Lesion of left lung 2023     Chemotherapy-induced fatigue 2023     Metastasis to brain (HCC) 2023     Non-small cell cancer of right lung (HCC) 2023     Cancer related pain 2023     Advanced care planning/counseling discussion 2023     Brain lesions 2023     Thoracic spine tumor 2023     Tobacco use 2023     DDD (degenerative disc disease), cervical 2023     Pleural mass 2023       LOS (days): 3  Geometric Mean LOS (GMLOS) (days): 2.8  Days to GMLOS:-0.2     OBJECTIVE:  Risk of Unplanned Readmission Score: 24.48         Current admission status: Inpatient   Preferred Pharmacy:   [facility] [place] - [address]  [address] [facility] [place] [address]  Phone: [phone] Fax: [phone]    [facility] [place] - [address]  [address]  [place] [address]  Phone: [phone] Fax: [phone]    [facility]  [place] [place] - [address]  [address]  [place] [address]  Phone: [phone] Fax: [phone]    [facility] [place] [place] [address]  [address]  [place] [address]  Phone: [phone] Fax: [phone]    Primary Care Provider: No primary care provider on file.    Primary Insurance: MEDICARE  Secondary Insurance:     DISCHARGE DETAILS:    Discharge planning discussed with:: Patient and spouse  Freedom of Choice: Yes  Comments - Freedom of Choice: Cm met with patient and spouse to discuss dc planning. Patient and spouse would like HHC upon dc. Family wondering if patient able to do HHC and hospice.  CM reached out to hospice liason who reports this is not possible due to insurance. CM advised patient and spouse who are in agreement with just HHC at this time. They are aware to reach out to PCP if they decide down the road to go through with hospice. CM reserved with [facility] by their request. Family also interested in BSC and wheel chair for home. CM placed order into parachute. Wheel chair and bsc delivered to bedside. Delivery ticket for bsc placed in adapt folder. Cm requesting 4:15 transport by stretcher due to stairs at home and patient not being strong enough to navigate stairs at this time. Cm confirmed address . A family friend is looking into providing a ramp for the home to better assist family with patient getting into and out of the home. Cm advised by PT that patient assist straps may be benefical for patient and spouse with getting patient in and out of bed and these could be found on [employer] Cm reviewed [place] chair with patient and spouse as well but they do not feel this is needed at this time.  CM contacted family/caregiver?: Yes  Were Treatment Team discharge recommendations reviewed with patient/caregiver?: Yes  Did patient/caregiver verbalize understanding of patient care needs?: Yes  Were patient/caregiver advised of the  risks associated with not following Treatment Team discharge recommendations?: Yes    Contacts  Patient Contacts: Lolis  Relationship to Patient:: Family  Contact Method: In Person  Reason/Outcome: Discharge Planning    Requested Home Health Care         Is the patient interested in HHC at discharge?: Yes  Home Health Discipline requested:: Physical Therapy, Occupational Therapy  Home Health Agency Name:: [facility]  HHA External Referral Reason (only applicable if external HHA name selected): Patient has established relationship with provider  Home Health Follow-Up Provider:: PCP  Home Health Services Needed:: Strengthening/Theraputic Exercises to Improve Function, Evaluate Functional Status and Safety, Gait/ADL Training  Homebound Criteria Met:: Uses an Assist Device (i.e. cane, walker, etc), Requires the Assistance of Another Person for Safe Ambulation or to Leave the Home  Supporting Clincal Findings:: Limited Endurance, Fatigues Easliy in Short Distances    DME Referral Provided  Referral made for DME?: Yes  DME referral completed for the following items:: Bedside Commode, Wheelchair  DME Supplier Name:: [employer]    Other Referral/Resources/Interventions Provided:  Interventions: HHC, DME  Referral Comments: HHC with [facility] reserved in [facility]. Wheel chair ordered and delivered by [employer] at bedside. BSC ordered and delivered by  to bedside.         Treatment Team Recommendation: Home with Home Health Care  Discharge Destination Plan:: Home with Home Health Care  Transport at Discharge : Kathryn rockwell     Number/Name of Dispatcher: Round Trip     ETA of Transport (Date): 09/29/24  ETA of Transport (Time): 1615              IMM Given (Date):: 09/29/24  IMM Given to:: Patient     Additional Comments: Patient and spouse in agreement with dc plan.

## 2024-09-30 ENCOUNTER — TELEPHONE (OUTPATIENT)
Age: 69
End: 2024-09-30

## 2024-09-30 ENCOUNTER — HOME CARE VISIT (OUTPATIENT)
Dept: HOME HEALTH SERVICES | Facility: HOME HEALTHCARE | Age: 69
End: 2024-09-30

## 2024-09-30 NOTE — TELEPHONE ENCOUNTER
"Pharmacy requesting a change in the oxycodone 5 mg prescription so they may fill it for the patient. Pharmacist stated that since it was ordered by a PA-BAMBI, it must say \"ongoing therapy\" in the instructions. Thank you.      "

## 2024-09-30 NOTE — TELEPHONE ENCOUNTER
Wife calling in regarding script for oxycodone 5mg.     She called earlier and has not heard back from anyone about this.     Warm transferred to Orthopedics for further assistance.

## 2024-09-30 NOTE — TELEPHONE ENCOUNTER
"Caller:dayron Wife    Doctor: Martin    Reason for call: Wife calling for an RX for pain for Oxycodone it ws sent to pharmacy yesterday by the PA-C the pharmacist stated   Pharmacy requesting a change in the oxycodone 5 mg prescription so they may fill it for the patient. Pharmacist stated that since it was ordered by a PASHAUN, it must say \"ongoing therapy\" in the instructions. Thank you.      Messaging on call     Call back#:  [phone]  " ----- Message from Mandi Jabari sent at 6/18/2020 10:51 AM CDT -----  Pt states that he will be having dental work and wants to know if he needs antibiotic.  TKA was done approx -5 years by Dr Hinson.  Pt call back # [phone]

## 2024-10-01 DIAGNOSIS — S72.001A CLOSED DISPLACED FRACTURE OF RIGHT FEMORAL NECK (HCC): ICD-10-CM

## 2024-10-01 RX ORDER — OXYCODONE HYDROCHLORIDE 5 MG/1
5 TABLET ORAL EVERY 6 HOURS PRN
Qty: 30 TABLET | Refills: 0 | Status: SHIPPED | OUTPATIENT
Start: 2024-10-01 | End: 2024-10-04

## 2024-10-01 NOTE — CASE COMMUNICATION
Patient  assessed  and  found  to  more  appropriate  for  hospice  services  than  HH  services.  Patient  and  wife  wanting  to  sign  onto  hospice  ASAP.  Presently  patient  is  ambulatory  with  WW  within  home  and  is  safe  with  transfers  on  and  off  commode  and  shower  and  in  and  out  of  bed  with  supervision/stand  by  assist  of  wife.    Patient  has  been  performing  a  strengthening  ex  program  to  BLEs  p rior  to  fracture  and  encouraged  to  continue  with  same.  Patient  with  Good  understanding  of  THR  precautions.  Patient's  primary  concern  to  to  be  able  to  negotiate  4  stairs  to  street  level  which  can  be  address  by  hospice  PT.  Patient  also  informed  me  that  he  anticipates  friends  will  be  constructing  a  ramp  within  the  next  week  or  so.  Spoke  with  hospice  personnel  and  they  will  cont act  patient  to  sign  on  to  [facility].  Patient  will  not  be  seen  by  HH  at  this  time

## 2024-10-02 ENCOUNTER — HOME CARE VISIT (OUTPATIENT)
Dept: HOME HOSPICE | Facility: HOSPICE | Age: 69
End: 2024-10-02
Payer: MEDICARE

## 2024-10-02 ENCOUNTER — HOME CARE VISIT (OUTPATIENT)
Dept: HOME HEALTH SERVICES | Facility: HOME HEALTHCARE | Age: 69
End: 2024-10-02
Payer: MEDICARE

## 2024-10-02 VITALS — DIASTOLIC BLOOD PRESSURE: 80 MMHG | SYSTOLIC BLOOD PRESSURE: 140 MMHG | HEART RATE: 68 BPM | RESPIRATION RATE: 16 BRPM

## 2024-10-02 PROCEDURE — G0155 HHCP-SVS OF CSW,EA 15 MIN: HCPCS

## 2024-10-02 PROCEDURE — G0299 HHS/HOSPICE OF RN EA 15 MIN: HCPCS

## 2024-10-03 LAB
DME PARACHUTE DELIVERY DATE ACTUAL: NORMAL
DME PARACHUTE DELIVERY DATE REQUESTED: NORMAL
DME PARACHUTE ITEM DESCRIPTION: NORMAL
DME PARACHUTE ORDER STATUS: NORMAL
DME PARACHUTE SUPPLIER NAME: NORMAL
DME PARACHUTE SUPPLIER PHONE: NORMAL

## 2024-10-04 ENCOUNTER — HOME CARE VISIT (OUTPATIENT)
Dept: HOME HEALTH SERVICES | Facility: HOME HEALTHCARE | Age: 69
End: 2024-10-04
Payer: MEDICARE

## 2024-10-04 ENCOUNTER — HOME CARE VISIT (OUTPATIENT)
Dept: HOME HOSPICE | Facility: HOSPICE | Age: 69
End: 2024-10-04
Payer: MEDICARE

## 2024-10-04 PROCEDURE — G0299 HHS/HOSPICE OF RN EA 15 MIN: HCPCS

## 2024-10-08 ENCOUNTER — PATIENT OUTREACH (OUTPATIENT)
Dept: CASE MANAGEMENT | Facility: HOSPITAL | Age: 69
End: 2024-10-08

## 2024-10-08 ENCOUNTER — HOME CARE VISIT (OUTPATIENT)
Dept: HOME HEALTH SERVICES | Facility: HOME HEALTHCARE | Age: 69
End: 2024-10-08
Payer: MEDICARE

## 2024-10-08 PROCEDURE — G0151 HHCP-SERV OF PT,EA 15 MIN: HCPCS

## 2024-10-08 NOTE — PROGRESS NOTES
Pt is on hospice services and his psychosocial needs will be met by the hospice social worker.  LSW to close the chart.

## 2024-10-09 VITALS — SYSTOLIC BLOOD PRESSURE: 120 MMHG | OXYGEN SATURATION: 97 % | DIASTOLIC BLOOD PRESSURE: 84 MMHG | HEART RATE: 80 BPM

## 2024-10-11 ENCOUNTER — HOME CARE VISIT (OUTPATIENT)
Dept: HOME HEALTH SERVICES | Facility: HOME HEALTHCARE | Age: 69
End: 2024-10-11
Payer: MEDICARE

## 2024-10-11 VITALS
HEART RATE: 78 BPM | WEIGHT: 140.44 LBS | RESPIRATION RATE: 16 BRPM | SYSTOLIC BLOOD PRESSURE: 120 MMHG | BODY MASS INDEX: 20.15 KG/M2 | TEMPERATURE: 97.9 F | OXYGEN SATURATION: 99 % | DIASTOLIC BLOOD PRESSURE: 80 MMHG

## 2024-10-11 PROCEDURE — G0299 HHS/HOSPICE OF RN EA 15 MIN: HCPCS

## 2024-10-14 ENCOUNTER — HOME CARE VISIT (OUTPATIENT)
Dept: HOME HEALTH SERVICES | Facility: HOME HEALTHCARE | Age: 69
End: 2024-10-14
Payer: MEDICARE

## 2024-10-14 VITALS
RESPIRATION RATE: 16 BRPM | HEART RATE: 80 BPM | OXYGEN SATURATION: 98 % | DIASTOLIC BLOOD PRESSURE: 80 MMHG | SYSTOLIC BLOOD PRESSURE: 120 MMHG

## 2024-10-14 PROCEDURE — G0151 HHCP-SERV OF PT,EA 15 MIN: HCPCS

## 2024-10-16 ENCOUNTER — HOME CARE VISIT (OUTPATIENT)
Dept: HOME HOSPICE | Facility: HOSPICE | Age: 69
End: 2024-10-16
Payer: MEDICARE

## 2024-10-16 ENCOUNTER — HOME CARE VISIT (OUTPATIENT)
Dept: HOME HEALTH SERVICES | Facility: HOME HEALTHCARE | Age: 69
End: 2024-10-16
Payer: MEDICARE

## 2024-10-16 PROCEDURE — G0155 HHCP-SVS OF CSW,EA 15 MIN: HCPCS

## 2024-10-16 PROCEDURE — G0151 HHCP-SERV OF PT,EA 15 MIN: HCPCS

## 2024-10-18 ENCOUNTER — HOME CARE VISIT (OUTPATIENT)
Dept: HOME HEALTH SERVICES | Facility: HOME HEALTHCARE | Age: 69
End: 2024-10-18
Payer: MEDICARE

## 2024-10-18 VITALS
TEMPERATURE: 97.5 F | DIASTOLIC BLOOD PRESSURE: 70 MMHG | WEIGHT: 141.06 LBS | RESPIRATION RATE: 16 BRPM | SYSTOLIC BLOOD PRESSURE: 108 MMHG | HEART RATE: 100 BPM | OXYGEN SATURATION: 99 % | BODY MASS INDEX: 20.24 KG/M2

## 2024-10-18 PROCEDURE — G0299 HHS/HOSPICE OF RN EA 15 MIN: HCPCS

## 2024-10-22 ENCOUNTER — HOME CARE VISIT (OUTPATIENT)
Dept: HOME HEALTH SERVICES | Facility: HOME HEALTHCARE | Age: 69
End: 2024-10-22
Payer: MEDICARE

## 2024-10-22 VITALS — SYSTOLIC BLOOD PRESSURE: 126 MMHG | OXYGEN SATURATION: 95 % | HEART RATE: 89 BPM | DIASTOLIC BLOOD PRESSURE: 64 MMHG

## 2024-10-22 PROCEDURE — G0151 HHCP-SERV OF PT,EA 15 MIN: HCPCS

## 2024-10-24 ENCOUNTER — HOME CARE VISIT (OUTPATIENT)
Dept: HOME HEALTH SERVICES | Facility: HOME HEALTHCARE | Age: 69
End: 2024-10-24
Payer: MEDICARE

## 2024-10-24 ENCOUNTER — HOME CARE VISIT (OUTPATIENT)
Dept: HOME HOSPICE | Facility: HOSPICE | Age: 69
End: 2024-10-24
Payer: MEDICARE

## 2024-10-24 ENCOUNTER — DOCUMENTATION (OUTPATIENT)
Dept: HOSPICE | Facility: HOSPICE | Age: 69
End: 2024-10-24

## 2024-10-25 ENCOUNTER — HOME CARE VISIT (OUTPATIENT)
Dept: HOME HEALTH SERVICES | Facility: HOME HEALTHCARE | Age: 69
End: 2024-10-25
Payer: MEDICARE

## 2024-10-25 ENCOUNTER — HOME CARE VISIT (OUTPATIENT)
Dept: HOME HOSPICE | Facility: HOSPICE | Age: 69
End: 2024-10-25
Payer: MEDICARE

## 2024-10-25 VITALS
RESPIRATION RATE: 18 BRPM | DIASTOLIC BLOOD PRESSURE: 64 MMHG | OXYGEN SATURATION: 97 % | SYSTOLIC BLOOD PRESSURE: 110 MMHG | TEMPERATURE: 97.5 F | HEART RATE: 87 BPM

## 2024-10-25 PROCEDURE — G0300 HHS/HOSPICE OF LPN EA 15 MIN: HCPCS

## 2024-10-28 NOTE — CASE COMMUNICATION
Pt discharged from PT.  Pt/spouse educated in hep, bed mobility, transfers and ambulation.  Pt recommended to continue with use of SW for ambulation, WC as needed.  Spouse available to provide assistance with activities and ensure safety.

## 2024-10-31 ENCOUNTER — HOME CARE VISIT (OUTPATIENT)
Dept: HOME HEALTH SERVICES | Facility: HOME HEALTHCARE | Age: 69
End: 2024-10-31
Payer: MEDICARE

## 2024-10-31 ENCOUNTER — HOME CARE VISIT (OUTPATIENT)
Dept: HOME HOSPICE | Facility: HOSPICE | Age: 69
End: 2024-10-31
Payer: MEDICARE

## 2024-10-31 VITALS — HEART RATE: 84 BPM | DIASTOLIC BLOOD PRESSURE: 62 MMHG | SYSTOLIC BLOOD PRESSURE: 120 MMHG | RESPIRATION RATE: 17 BRPM

## 2024-10-31 PROCEDURE — G0299 HHS/HOSPICE OF RN EA 15 MIN: HCPCS

## 2024-11-01 ENCOUNTER — HOME CARE VISIT (OUTPATIENT)
Dept: HOME HOSPICE | Facility: HOSPICE | Age: 69
End: 2024-11-01
Payer: MEDICARE

## 2024-11-01 PROCEDURE — G0155 HHCP-SVS OF CSW,EA 15 MIN: HCPCS

## 2024-11-09 PROCEDURE — 10330087 HSPC SERVICE INTENSITY ADD-ON

## 2024-11-11 ENCOUNTER — HOME CARE VISIT (OUTPATIENT)
Dept: HOME HOSPICE | Facility: HOSPICE | Age: 69
End: 2024-11-11
Payer: MEDICARE

## 2024-11-15 ENCOUNTER — HOME CARE VISIT (OUTPATIENT)
Dept: HOME HOSPICE | Facility: HOSPICE | Age: 69
End: 2024-11-15
Payer: MEDICARE

## 2024-11-15 NOTE — CASE COMMUNICATION
Claude Austin, Bereavement Final IDG [id number] ([id number]) Due: 24   : 1955  SOC: 10/2/24  DOD: 11/10/24  Diagnosis: Metastatic Lung Cancer    Claude was a 68 year old man. Wife of 1.5 years is Lolis. Claude had 6 children from a previous relationship. One of his sons  in [place] 11 years ago. Lolis has a daughter from previous relationship. He had siblings. 1 brother  in the service a few years ago. His mother lives  at [facility]. 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 He enjoyed yard work, construction, listening to music, cars/trains, sports and traveling. He belonged to [facility] [employer] in [place]. Lolis and Claude acknowledged good support and strong hector. Only Lolis provided information for bereavement follow-up.     TOD: Lolis and multiple family members and friends were present at TOD. Support services  were declined.    Call Assignments:  KARLA Vera to reassess wife Lolis Austin at . (Due: 24)

## 2024-12-26 ENCOUNTER — TELEPHONE (OUTPATIENT)
Age: 69
End: 2024-12-26

## 2024-12-26 NOTE — TELEPHONE ENCOUNTER
Caller: Lolis    Doctor: Martin    Reason for call: Lolis is receiving emails to fill out tasks for her  sp.  Provided helpdesk ph #     Call back#: [phone]
